# Patient Record
Sex: MALE | Race: WHITE | NOT HISPANIC OR LATINO | Employment: OTHER | URBAN - METROPOLITAN AREA
[De-identification: names, ages, dates, MRNs, and addresses within clinical notes are randomized per-mention and may not be internally consistent; named-entity substitution may affect disease eponyms.]

---

## 2017-02-23 ENCOUNTER — TELEPHONE (OUTPATIENT)
Dept: CARDIOLOGY | Facility: CLINIC | Age: 82
End: 2017-02-23

## 2017-03-08 ENCOUNTER — OFFICE VISIT (OUTPATIENT)
Dept: CARDIOLOGY | Facility: CLINIC | Age: 82
End: 2017-03-08

## 2017-03-08 VITALS
HEART RATE: 56 BPM | BODY MASS INDEX: 27.85 KG/M2 | DIASTOLIC BLOOD PRESSURE: 62 MMHG | WEIGHT: 188 LBS | SYSTOLIC BLOOD PRESSURE: 128 MMHG | HEIGHT: 69 IN

## 2017-03-08 DIAGNOSIS — R06.09 DYSPNEA ON EXERTION: ICD-10-CM

## 2017-03-08 DIAGNOSIS — I10 ESSENTIAL HYPERTENSION: Primary | ICD-10-CM

## 2017-03-08 DIAGNOSIS — R07.89 OTHER CHEST PAIN: ICD-10-CM

## 2017-03-08 PROCEDURE — 93000 ELECTROCARDIOGRAM COMPLETE: CPT | Performed by: INTERNAL MEDICINE

## 2017-03-08 PROCEDURE — 99214 OFFICE O/P EST MOD 30 MIN: CPT | Performed by: INTERNAL MEDICINE

## 2017-03-08 RX ORDER — NAPROXEN 500 MG/1
500 TABLET ORAL 2 TIMES DAILY WITH MEALS
Status: ON HOLD | COMMUNITY
End: 2019-01-07

## 2017-03-08 RX ORDER — METOPROLOL TARTRATE 50 MG/1
25 TABLET, FILM COATED ORAL EVERY 12 HOURS SCHEDULED
Status: ON HOLD | COMMUNITY
Start: 2017-02-17 | End: 2018-04-10

## 2017-03-09 NOTE — PROGRESS NOTES
Subjective:     Encounter Date:03/08/2017      Patient ID: Yoni Bonner Jr. is a 83 y.o. male.    Chief Complaint:  History of Present Illness     Mr. Bonner is an 83-year-old man with a history of hypertension, chronic bronchitis, and dyslipidemia who presents for a preoperative evaluation.  I saw the patient back on 02/04/2015 when he presented to [facility].  The patient had a history of recurrent syncopal episodes.  I felt that his symptoms were due to orthostatic syncope at that time.  He had a history of rheumatic fever and a thickened aortic valve along with carotid artery disease so I sat him up for both an echocardiogram and a carotid ultrasound around that time.  His echocardiogram was performed on 03/11/2015 and revealed normal left ventricular systolic function and wall motion, an ejection fraction of 54%, grade IA diastolic dysfunction and no significant valvular disease.  His carotid ultrasound revealed moderate bilateral stenosis.  I have not seen the patient in followup again until today.  He has had more than one fall where he has fallen on his shoulder.  His most recent fall was back in 01/2017.  He now has significant shoulder issues with the left worse than the right.  He is tentatively scheduled for left shoulder surgery at the end of the month.  The patient is not completely committed to proceeding with the surgery.      He reports dyspnea on exertion which has gotten a little worse lately.  He also reports episodes of chest discomfort that occur primarily when he is feeling stressed.  He has a chronic history of palpitations which is unchanged.  He will become lightheaded on occasion and that is unchanged.  His blood pressures have been well controlled recently per his wife and the patient.          Review of Systems   Constitution: Negative for weakness and malaise/fatigue.   HENT: Positive for hearing loss. Negative for headaches, hoarse voice, nosebleeds and sore throat.    Eyes:  Negative for pain.   Cardiovascular: Positive for chest pain and dyspnea on exertion. Negative for claudication, cyanosis, irregular heartbeat, leg swelling, near-syncope, orthopnea, palpitations, paroxysmal nocturnal dyspnea and syncope.   Respiratory: Negative for shortness of breath and snoring.    Endocrine: Negative for cold intolerance, heat intolerance, polydipsia, polyphagia and polyuria.   Skin: Negative for itching and rash.   Musculoskeletal: Positive for joint pain. Negative for arthritis, falls, joint swelling, muscle cramps, muscle weakness and myalgias.   Gastrointestinal: Negative for constipation, diarrhea, dysphagia, heartburn, hematemesis, hematochezia, melena, nausea and vomiting.   Genitourinary: Negative for frequency, hematuria and hesitancy.   Neurological: Positive for light-headedness. Negative for excessive daytime sleepiness, dizziness and numbness.   Psychiatric/Behavioral: Negative for depression. The patient is not nervous/anxious.           Current Outpatient Prescriptions:   •  metoprolol tartrate (LOPRESSOR) 50 MG tablet, , Disp: , Rfl:   •  naproxen (NAPROSYN) 500 MG tablet, Take 500 mg by mouth., Disp: , Rfl:     Past Medical History   Diagnosis Date   • Carotid arterial disease    • Chronic bronchitis    • Gout    • Hyperlipidemia    • Hypertension    • Rheumatic fever    • Syncopal episodes      Past Surgical History   Procedure Laterality Date   • Knee surgery     • Testicle surgery     • Hernia repair     • Eye surgery       cataract surg   • Hand surgery     • Cardiac catheterization       Family History   Problem Relation Age of Onset   • Heart disease Father    • Hypertension Father    • Stroke Father    • Diabetes Sister    • Cancer Brother    • Heart disease Brother    • Hypertension Brother      Social History   Substance Use Topics   • Smoking status: Never Smoker   • Smokeless tobacco: None      Comment: caffeine use   • Alcohol use Yes           ECG 12  "Lead  Date/Time: 3/8/2017 7:46 PM  Performed by: DB MCCLELLAN  Authorized by: DB MCCLELLAN   Comparison: compared with previous ECG   Similar to previous ECG  Rhythm: sinus rhythm  Comments: latearl T wave inversions               Objective:         Visit Vitals   • /62 (BP Location: Right arm, Patient Position: Sitting)   • Pulse 56   • Ht 69\" (175.3 cm)   • Wt 188 lb (85.3 kg)   • BMI 27.76 kg/m2          Physical Exam   Constitutional: He is oriented to person, place, and time. He appears well-developed and well-nourished.   HENT:   Head: Normocephalic and atraumatic.   Eyes: Conjunctivae, EOM and lids are normal. Pupils are equal, round, and reactive to light.   Neck: Normal range of motion and full passive range of motion without pain. Neck supple. No JVD present. Carotid bruit is not present.   Cardiovascular: Normal rate, regular rhythm, S1 normal and S2 normal.  Exam reveals no gallop.    No murmur heard.  Pulses:       Radial pulses are 2+ on the right side, and 2+ on the left side.   No bilateral lower extremity edema   Pulmonary/Chest: Effort normal and breath sounds normal.   Abdominal: Soft. Normal appearance.   Lymphadenopathy:     He has no cervical adenopathy.   Neurological: He is alert and oriented to person, place, and time.   Skin: Skin is warm, dry and intact.   Psychiatric: He has a normal mood and affect.       Lab Review:       Assessment:          Diagnosis Plan   1. Essential hypertension  Adult Stress Echo With Color & Doppler   2. Other chest pain  Adult Stress Echo With Color & Doppler   3. Dyspnea on exertion  Adult Stress Echo With Color & Doppler          Plan:        1. Preoperative evaluation.  The patient has been having some worsening of his chronic dyspnea on exertion with chest discomfort at times of stress.   He has risk factors of  hypertension, nicotine use and a strong family history of coronary artery disease.  I would like to proceed with a stress test in order " to further risk stratify him.  Unfortunately, we are limited on the type of stress test.  He is not steady enough to proceed with any kind of treadmill stress testing.   Imaging is limited because of his bad shoulders.  We will not be able to get adequate imaging with a nuclear stress test.   The only other option is to see if we can get any kind of imaging with a Dobutamine stress echo.   We will get him set up for this.  He does also have a murmur on exam, and he has not had an echocardiogram in the last couple of years.  I would like to see if he has developed any valvular disease.     2. Hypertension. His blood pressures are fairly well controlled on his current regimen of medications.     3. Tobacco use.  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         We will call and discuss the results of his echo and dobutamine stress echo.  We will give further recommendations based on those results.

## 2017-03-13 ENCOUNTER — HOSPITAL ENCOUNTER (OUTPATIENT)
Dept: CARDIOLOGY | Facility: HOSPITAL | Age: 82
Discharge: HOME | End: 2017-03-13
Attending: INTERNAL MEDICINE | Admitting: INTERNAL MEDICINE

## 2017-03-13 VITALS
OXYGEN SATURATION: 99 % | BODY MASS INDEX: 27.85 KG/M2 | DIASTOLIC BLOOD PRESSURE: 54 MMHG | SYSTOLIC BLOOD PRESSURE: 142 MMHG | HEART RATE: 65 BPM | WEIGHT: 188 LBS | HEIGHT: 69 IN

## 2017-03-13 LAB
ASCENDING AORTA: 3.2 CM
BH CV ECHO MEAS - ACS: 1.4 CM
BH CV ECHO MEAS - AO MAX PG: 5.3 MMHG
BH CV ECHO MEAS - AO ROOT AREA (BSA CORRECTED): 1.8
BH CV ECHO MEAS - AO ROOT AREA: 9.8 CM^2
BH CV ECHO MEAS - AO ROOT DIAM: 3.5 CM
BH CV ECHO MEAS - AO V2 MAX: 115.4 CM/SEC
BH CV ECHO MEAS - BSA(HAYCOCK): 2.1 M^2
BH CV ECHO MEAS - BSA: 2 M^2
BH CV ECHO MEAS - BZI_BMI: 27.8 KILOGRAMS/M^2
BH CV ECHO MEAS - BZI_METRIC_HEIGHT: 175.3 CM
BH CV ECHO MEAS - BZI_METRIC_WEIGHT: 85.3 KG
BH CV ECHO MEAS - CONTRAST EF 4CH: 56 ML/M^2
BH CV ECHO MEAS - EDV(CUBED): 127.9 ML
BH CV ECHO MEAS - EDV(MOD-SP4): 75 ML
BH CV ECHO MEAS - EDV(TEICH): 120.4 ML
BH CV ECHO MEAS - EF(CUBED): 64.7 %
BH CV ECHO MEAS - EF(MOD-SP4): 56 %
BH CV ECHO MEAS - EF(TEICH): 55.9 %
BH CV ECHO MEAS - ESV(CUBED): 45.2 ML
BH CV ECHO MEAS - ESV(MOD-SP4): 33 ML
BH CV ECHO MEAS - ESV(TEICH): 53 ML
BH CV ECHO MEAS - FS: 29.3 %
BH CV ECHO MEAS - IVS/LVPW: 1
BH CV ECHO MEAS - IVSD: 1.1 CM
BH CV ECHO MEAS - LAT PEAK E' VEL: 5 CM/SEC
BH CV ECHO MEAS - LV DIASTOLIC VOL/BSA (35-75): 37.3 ML/M^2
BH CV ECHO MEAS - LV MASS(C)D: 200.4 GRAMS
BH CV ECHO MEAS - LV MASS(C)DI: 99.6 GRAMS/M^2
BH CV ECHO MEAS - LV SYSTOLIC VOL/BSA (12-30): 16.4 ML/M^2
BH CV ECHO MEAS - LVIDD: 5 CM
BH CV ECHO MEAS - LVIDS: 3.6 CM
BH CV ECHO MEAS - LVLD AP4: 6.8 CM
BH CV ECHO MEAS - LVLS AP4: 6.5 CM
BH CV ECHO MEAS - LVPWD: 1 CM
BH CV ECHO MEAS - MED PEAK E' VEL: 3 CM/SEC
BH CV ECHO MEAS - MV A DUR: 0.11 SEC
BH CV ECHO MEAS - MV A MAX VEL: 107.2 CM/SEC
BH CV ECHO MEAS - MV DEC SLOPE: 178.5 CM/SEC^2
BH CV ECHO MEAS - MV DEC TIME: 0.35 SEC
BH CV ECHO MEAS - MV E MAX VEL: 60.6 CM/SEC
BH CV ECHO MEAS - MV E/A: 0.57
BH CV ECHO MEAS - MV P1/2T MAX VEL: 60.6 CM/SEC
BH CV ECHO MEAS - MV P1/2T: 99.5 MSEC
BH CV ECHO MEAS - MVA P1/2T LCG: 3.6 CM^2
BH CV ECHO MEAS - MVA(P1/2T): 2.2 CM^2
BH CV ECHO MEAS - PULM A REVS DUR: 0.13 SEC
BH CV ECHO MEAS - PULM A REVS VEL: 26.1 CM/SEC
BH CV ECHO MEAS - PULM DIAS VEL: 36.2 CM/SEC
BH CV ECHO MEAS - PULM S/D: 0.83
BH CV ECHO MEAS - PULM SYS VEL: 30.1 CM/SEC
BH CV ECHO MEAS - SI(CUBED): 41.1 ML/M^2
BH CV ECHO MEAS - SI(MOD-SP4): 20.9 ML/M^2
BH CV ECHO MEAS - SI(TEICH): 33.5 ML/M^2
BH CV ECHO MEAS - SV(CUBED): 82.7 ML
BH CV ECHO MEAS - SV(MOD-SP4): 42 ML
BH CV ECHO MEAS - SV(TEICH): 67.3 ML
BH CV ECHO MEAS - TAPSE (>1.6): 1.9 CM2
BH CV STRESS BP STAGE 1: NORMAL
BH CV STRESS BP STAGE 2: NORMAL
BH CV STRESS BP STAGE 3: NORMAL
BH CV STRESS DOSE DOBUTAMINE STAGE 1: 5
BH CV STRESS DOSE DOBUTAMINE STAGE 2: 10
BH CV STRESS DOSE DOBUTAMINE STAGE 3: 20
BH CV STRESS DURATION MIN STAGE 1: 3
BH CV STRESS DURATION MIN STAGE 2: 3
BH CV STRESS DURATION MIN STAGE 3: 2
BH CV STRESS DURATION SEC STAGE 1: 0
BH CV STRESS DURATION SEC STAGE 2: 0
BH CV STRESS DURATION SEC STAGE 3: 0
BH CV STRESS ECHO POST STRESS EJECTION FRACTION EF: 76 %
BH CV STRESS HR STAGE 1: 62
BH CV STRESS HR STAGE 2: 101
BH CV STRESS HR STAGE 3: 131
BH CV STRESS PROTOCOL 1: NORMAL
BH CV STRESS RATE STAGE 1: 26
BH CV STRESS RATE STAGE 2: 52
BH CV STRESS RATE STAGE 3: 104
BH CV STRESS STAGE 1: 1
BH CV STRESS STAGE 2: 2
BH CV STRESS STAGE 3: 3
BH CV XLRA - RV BASE: 2.4 CM
BH CV XLRA - TDI S': 11 CM/SEC
E/E' RATIO: 14.5
LEFT ATRIUM VOLUME INDEX: 26 ML/M2
LV EF 2D ECHO EST: 56 %
MAXIMAL PREDICTED HEART RATE: 137 BPM
PERCENT MAX PREDICTED HR: 95.62 %
SINUS: 3 CM
STJ: 2.6 CM
STRESS BASELINE BP: NORMAL MMHG
STRESS BASELINE HR: 65 BPM
STRESS O2 SAT REST: 99 %
STRESS PERCENT HR: 112 %
STRESS POST PEAK BP: NORMAL MMHG
STRESS POST PEAK HR: 131 BPM
STRESS TARGET HR: 116 BPM

## 2017-03-13 PROCEDURE — 93320 DOPPLER ECHO COMPLETE: CPT | Performed by: INTERNAL MEDICINE

## 2017-03-13 PROCEDURE — 93017 CV STRESS TEST TRACING ONLY: CPT

## 2017-03-13 PROCEDURE — 25010000002 PERFLUTREN (DEFINITY) 8.476 MG IN SODIUM CHLORIDE 10 ML INJECTION: Performed by: INTERNAL MEDICINE

## 2017-03-13 PROCEDURE — 93350 STRESS TTE ONLY: CPT | Performed by: INTERNAL MEDICINE

## 2017-03-13 PROCEDURE — 93016 CV STRESS TEST SUPVJ ONLY: CPT | Performed by: INTERNAL MEDICINE

## 2017-03-13 PROCEDURE — 93320 DOPPLER ECHO COMPLETE: CPT

## 2017-03-13 PROCEDURE — 93018 CV STRESS TEST I&R ONLY: CPT | Performed by: INTERNAL MEDICINE

## 2017-03-13 PROCEDURE — 93352 ADMIN ECG CONTRAST AGENT: CPT | Performed by: INTERNAL MEDICINE

## 2017-03-13 PROCEDURE — 25010000003 DOBUTAMINE PER 250 MG: Performed by: INTERNAL MEDICINE

## 2017-03-13 PROCEDURE — C8928 TTE W OR W/O FOL W/CON,STRES: HCPCS

## 2017-03-13 PROCEDURE — 93325 DOPPLER ECHO COLOR FLOW MAPG: CPT | Performed by: INTERNAL MEDICINE

## 2017-03-13 PROCEDURE — 93325 DOPPLER ECHO COLOR FLOW MAPG: CPT

## 2017-03-13 RX ORDER — DOBUTAMINE HYDROCHLORIDE 100 MG/100ML
2-20 INJECTION INTRAVENOUS
Status: DISCONTINUED | OUTPATIENT
Start: 2017-03-13 | End: 2017-03-14 | Stop reason: HOSPADM

## 2017-03-13 RX ADMIN — PERFLUTREN 3 ML: 6.52 INJECTION, SUSPENSION INTRAVENOUS at 11:24

## 2017-03-13 RX ADMIN — DOBUTAMINE HYDROCHLORIDE 8 MCG/KG/MIN: 100 INJECTION INTRAVENOUS at 12:05

## 2017-03-14 ENCOUNTER — TELEPHONE (OUTPATIENT)
Dept: CARDIOLOGY | Facility: CLINIC | Age: 82
End: 2017-03-14

## 2017-03-14 NOTE — TELEPHONE ENCOUNTER
Danielle spouse of pt requesting test results for Echo Stress Test. Please advise or call pt. Thanks, Mary

## 2017-03-16 NOTE — TELEPHONE ENCOUNTER
03/16/17  11:16 AM  Mrs. Tipton calls and says the fax number for Dr. Muñoz is [phone].    I informed her that clearance had been faxed to [facility] with Dr. Muñoz.    Vanesa NIEVES RN

## 2018-04-08 ENCOUNTER — HOSPITAL ENCOUNTER (INPATIENT)
Facility: HOSPITAL | Age: 83
LOS: 2 days | Discharge: HOME | End: 2018-04-10
Attending: INTERNAL MEDICINE | Admitting: INTERNAL MEDICINE

## 2018-04-08 PROBLEM — I21.19 ACUTE ST ELEVATION MYOCARDIAL INFARCTION (STEMI) OF INFERIOR WALL: Status: ACTIVE | Noted: 2018-04-08

## 2018-04-08 LAB
ANION GAP SERPL CALCULATED.3IONS-SCNC: 17.7 MMOL/L
BUN BLD-MCNC: 21 MG/DL (ref 8–23)
BUN/CREAT SERPL: 18.4 (ref 7–25)
CALCIUM SPEC-SCNC: 8.9 MG/DL (ref 8.6–10.5)
CHLORIDE SERPL-SCNC: 101 MMOL/L (ref 98–107)
CO2 SERPL-SCNC: 18.3 MMOL/L (ref 22–29)
CREAT BLD-MCNC: 1.14 MG/DL (ref 0.76–1.27)
DEPRECATED RDW RBC AUTO: 43.1 FL (ref 37–54)
ERYTHROCYTE [DISTWIDTH] IN BLOOD BY AUTOMATED COUNT: 13.5 % (ref 11.5–14.5)
GFR SERPL CREATININE-BSD FRML MDRD: 61 ML/MIN/1.73
GLUCOSE BLD-MCNC: 119 MG/DL (ref 65–99)
GLUCOSE BLDC GLUCOMTR-MCNC: 97 MG/DL (ref 70–130)
HCT VFR BLD AUTO: 37.6 % (ref 40.4–52.2)
HGB BLD-MCNC: 11.9 G/DL (ref 13.7–17.6)
MCH RBC QN AUTO: 27.7 PG (ref 27–32.7)
MCHC RBC AUTO-ENTMCNC: 31.6 G/DL (ref 32.6–36.4)
MCV RBC AUTO: 87.6 FL (ref 79.8–96.2)
PLATELET # BLD AUTO: 211 10*3/MM3 (ref 140–500)
PMV BLD AUTO: 10.7 FL (ref 6–12)
POTASSIUM BLD-SCNC: 4.2 MMOL/L (ref 3.5–5.2)
RBC # BLD AUTO: 4.29 10*6/MM3 (ref 4.6–6)
SODIUM BLD-SCNC: 137 MMOL/L (ref 136–145)
WBC NRBC COR # BLD: 6.92 10*3/MM3 (ref 4.5–10.7)

## 2018-04-08 PROCEDURE — C1874 STENT, COATED/COV W/DEL SYS: HCPCS | Performed by: INTERNAL MEDICINE

## 2018-04-08 PROCEDURE — 93460 R&L HRT ART/VENTRICLE ANGIO: CPT | Performed by: INTERNAL MEDICINE

## 2018-04-08 PROCEDURE — 85018 HEMOGLOBIN: CPT

## 2018-04-08 PROCEDURE — 25010000002 HEPARIN (PORCINE) PER 1000 UNITS: Performed by: INTERNAL MEDICINE

## 2018-04-08 PROCEDURE — C1769 GUIDE WIRE: HCPCS | Performed by: INTERNAL MEDICINE

## 2018-04-08 PROCEDURE — 25010000002 FUROSEMIDE PER 20 MG: Performed by: INTERNAL MEDICINE

## 2018-04-08 PROCEDURE — C1894 INTRO/SHEATH, NON-LASER: HCPCS | Performed by: INTERNAL MEDICINE

## 2018-04-08 PROCEDURE — C1757 CATH, THROMBECTOMY/EMBOLECT: HCPCS | Performed by: INTERNAL MEDICINE

## 2018-04-08 PROCEDURE — C1725 CATH, TRANSLUMIN NON-LASER: HCPCS | Performed by: INTERNAL MEDICINE

## 2018-04-08 PROCEDURE — 85027 COMPLETE CBC AUTOMATED: CPT | Performed by: INTERNAL MEDICINE

## 2018-04-08 PROCEDURE — C9606 PERC D-E COR REVASC W AMI S: HCPCS | Performed by: INTERNAL MEDICINE

## 2018-04-08 PROCEDURE — C1887 CATHETER, GUIDING: HCPCS | Performed by: INTERNAL MEDICINE

## 2018-04-08 PROCEDURE — 93010 ELECTROCARDIOGRAM REPORT: CPT | Performed by: INTERNAL MEDICINE

## 2018-04-08 PROCEDURE — 85347 COAGULATION TIME ACTIVATED: CPT

## 2018-04-08 PROCEDURE — B2161ZZ FLUOROSCOPY OF RIGHT AND LEFT HEART USING LOW OSMOLAR CONTRAST: ICD-10-PCS | Performed by: INTERNAL MEDICINE

## 2018-04-08 PROCEDURE — 80048 BASIC METABOLIC PNL TOTAL CA: CPT | Performed by: INTERNAL MEDICINE

## 2018-04-08 PROCEDURE — B2111ZZ FLUOROSCOPY OF MULTIPLE CORONARY ARTERIES USING LOW OSMOLAR CONTRAST: ICD-10-PCS | Performed by: INTERNAL MEDICINE

## 2018-04-08 PROCEDURE — 92941 PRQ TRLML REVSC TOT OCCL AMI: CPT | Performed by: INTERNAL MEDICINE

## 2018-04-08 PROCEDURE — 0 IOPAMIDOL PER 1 ML: Performed by: INTERNAL MEDICINE

## 2018-04-08 PROCEDURE — 99223 1ST HOSP IP/OBS HIGH 75: CPT | Performed by: INTERNAL MEDICINE

## 2018-04-08 PROCEDURE — 93005 ELECTROCARDIOGRAM TRACING: CPT | Performed by: INTERNAL MEDICINE

## 2018-04-08 PROCEDURE — 027034Z DILATION OF CORONARY ARTERY, ONE ARTERY WITH DRUG-ELUTING INTRALUMINAL DEVICE, PERCUTANEOUS APPROACH: ICD-10-PCS | Performed by: INTERNAL MEDICINE

## 2018-04-08 PROCEDURE — 82962 GLUCOSE BLOOD TEST: CPT

## 2018-04-08 PROCEDURE — 4A023N8 MEASUREMENT OF CARDIAC SAMPLING AND PRESSURE, BILATERAL, PERCUTANEOUS APPROACH: ICD-10-PCS | Performed by: INTERNAL MEDICINE

## 2018-04-08 PROCEDURE — 85014 HEMATOCRIT: CPT

## 2018-04-08 PROCEDURE — 02C03ZZ EXTIRPATION OF MATTER FROM CORONARY ARTERY, ONE ARTERY, PERCUTANEOUS APPROACH: ICD-10-PCS | Performed by: INTERNAL MEDICINE

## 2018-04-08 DEVICE — XIENCE ALPINE EVEROLIMUS ELUTING CORONARY STENT SYSTEM 3.50 MM X 33 MM / RAPID-EXCHANGE
Type: IMPLANTABLE DEVICE | Site: HEART | Status: FUNCTIONAL
Brand: XIENCE ALPINE

## 2018-04-08 RX ORDER — CLOPIDOGREL BISULFATE 75 MG/1
TABLET ORAL AS NEEDED
Status: DISCONTINUED | OUTPATIENT
Start: 2018-04-08 | End: 2018-04-08 | Stop reason: HOSPADM

## 2018-04-08 RX ORDER — ASPIRIN 81 MG/1
81 TABLET ORAL DAILY
Status: DISCONTINUED | OUTPATIENT
Start: 2018-04-08 | End: 2018-04-10 | Stop reason: HOSPADM

## 2018-04-08 RX ORDER — SODIUM CHLORIDE 9 MG/ML
INJECTION, SOLUTION INTRAVENOUS CONTINUOUS PRN
Status: DISCONTINUED | OUTPATIENT
Start: 2018-04-08 | End: 2018-04-08 | Stop reason: HOSPADM

## 2018-04-08 RX ORDER — CLOPIDOGREL BISULFATE 75 MG/1
75 TABLET ORAL DAILY
Status: DISCONTINUED | OUTPATIENT
Start: 2018-04-09 | End: 2018-04-10 | Stop reason: HOSPADM

## 2018-04-08 RX ORDER — LISINOPRIL 5 MG/1
5 TABLET ORAL DAILY
Status: DISCONTINUED | OUTPATIENT
Start: 2018-04-08 | End: 2018-04-10 | Stop reason: HOSPADM

## 2018-04-08 RX ORDER — HEPARIN SODIUM 1000 [USP'U]/ML
INJECTION, SOLUTION INTRAVENOUS; SUBCUTANEOUS AS NEEDED
Status: DISCONTINUED | OUTPATIENT
Start: 2018-04-08 | End: 2018-04-08 | Stop reason: HOSPADM

## 2018-04-08 RX ORDER — NITROGLYCERIN 5 MG/ML
INJECTION, SOLUTION INTRAVENOUS AS NEEDED
Status: DISCONTINUED | OUTPATIENT
Start: 2018-04-08 | End: 2018-04-08 | Stop reason: HOSPADM

## 2018-04-08 RX ORDER — FUROSEMIDE 10 MG/ML
40 INJECTION INTRAMUSCULAR; INTRAVENOUS ONCE
Status: COMPLETED | OUTPATIENT
Start: 2018-04-08 | End: 2018-04-08

## 2018-04-08 RX ORDER — LIDOCAINE HYDROCHLORIDE 20 MG/ML
INJECTION, SOLUTION INFILTRATION; PERINEURAL AS NEEDED
Status: DISCONTINUED | OUTPATIENT
Start: 2018-04-08 | End: 2018-04-08 | Stop reason: HOSPADM

## 2018-04-08 RX ADMIN — FUROSEMIDE 40 MG: 10 INJECTION, SOLUTION INTRAMUSCULAR; INTRAVENOUS at 17:18

## 2018-04-08 NOTE — H&P
History And Physical Assessment    Patient Name: Yoni Bonner Jr.  Age/Sex: 84 y.o. male  : 1933  MRN: [id number]    Date of Hospital Admission: 2018  Date of Encounter Visit: 18  Encounter Provider: Nicolas Jerez MD  Place of Service: [facility] CARDIOLOGY  Patient Care Team:  Patric Cameron DO as PCP - General (Family Medicine)    Subjective:     Chief Complaint:  Acute inferior STEMI with cardiogenic shock.    History of Present Illness:  Yoni Bonner Jr. is a 84 y.o. male who follows with Dr. Templeton for his cardiac care.  He has a past history of rheumatic fever and has some mild valvular heart disease.    He was at [facility] today visiting somebody in the emergency room.  While he was there he had a near-syncopal episode.  He was hypotensive and bradycardic.  EKG demonstrated evolution of an inferior acute inferior STEMI.    He was brought emergently to [facility] where he went directly to the Cath Lab.  He was found to have a thrombotic near total occlusion of the proximal right coronary.  This was treated with thrombectomy and stenting with good result.  He had a chronic occlusion of his Cx as well as diffuse LAD disease.    He was treated with diuretics for elevated filling pressures.  Currently he is feeling better and is without complaint.    Past Medical History:  Past Medical History:   Diagnosis Date   • Carotid arterial disease    • Chronic bronchitis    • Gout    • Hyperlipidemia    • Hypertension    • Rheumatic fever    • Syncopal episodes        Past Surgical History:   Procedure Laterality Date   • CARDIAC CATHETERIZATION     • EYE SURGERY      cataract surg   • HAND SURGERY     • HERNIA REPAIR     • KNEE SURGERY     • TESTICLE SURGERY         Home Medications:   Prescriptions Prior to Admission   Medication Sig Dispense Refill Last Dose   • metoprolol tartrate (LOPRESSOR) 50 MG tablet    Taking   • naproxen  (NAPROSYN) 500 MG tablet Take 500 mg by mouth.   Taking       Allergies:  Allergies   Allergen Reactions   • No Known Drug Allergy        Past Social History:  Social History     Social History   • Marital status:      Social History Main Topics   • Smoking status: Never Smoker      Comment: caffeine use   • Alcohol use Yes   • Drug use: Unknown     Other Topics Concern   • Not on file       Past Family History:  Family History   Problem Relation Age of Onset   • Heart disease Father    • Hypertension Father    • Stroke Father    • Diabetes Sister    • Cancer Brother    • Heart disease Brother    • Hypertension Brother        Review of Systems:   All systems reviewed. Pertinent positives identified in HPI. All other systems are negative.    Objective:   Resp:  [18] 18    Intake/Output Summary (Last 24 hours) at 04/08/18 1807  Last data filed at 04/08/18 1749   Gross per 24 hour   Intake              125 ml   Output                0 ml   Net              125 ml     There is no height or weight on file to calculate BMI.  There were no vitals filed for this visit.  Weight change:     Physical Exam:     Physical Exam   Constitutional: He is oriented to person, place, and time. He appears well-developed and well-nourished.   HENT:   Head: Normocephalic and atraumatic.   Right Ear: External ear normal.   Left Ear: External ear normal.   Eyes: EOM are normal. Pupils are equal, round, and reactive to light.   Neck: Normal range of motion. Neck supple.   Cardiovascular: Normal rate, regular rhythm, normal heart sounds and intact distal pulses.    Pulmonary/Chest: Effort normal. He has wheezes.   Abdominal: Soft. Bowel sounds are normal.   Musculoskeletal: Normal range of motion.   Neurological: He is alert and oriented to person, place, and time.   Skin: Skin is warm and dry.   Psychiatric: He has a normal mood and affect. His behavior is normal. Judgment and thought content normal.       Lab Review:                            Imaging:  Imaging Results (most recent)     None        I personally viewed and interpreted the patient's EKG    Assessment:     Active Problems:    Acute ST elevation myocardial infarction (STEMI) of inferior wall        Plan:     Acute inferior STEMI with borderline cardiogenic shock.  Routine post-MI care in CCU.  Diurese as needed for CHF.  New AF.  Hold on anticoagulation unless it persists.    Nicolas Jerez MD  04/08/18  6:07 PM

## 2018-04-09 ENCOUNTER — APPOINTMENT (OUTPATIENT)
Dept: CARDIOLOGY | Facility: HOSPITAL | Age: 83
End: 2018-04-09
Attending: INTERNAL MEDICINE

## 2018-04-09 LAB
ACT BLD: 296 SECONDS (ref 82–152)
ANION GAP SERPL CALCULATED.3IONS-SCNC: 14 MMOL/L
AORTIC DIMENSIONLESS INDEX: 0.5 (DI)
BH CV ECHO MEAS - ACS: 1.1 CM
BH CV ECHO MEAS - AO MAX PG (FULL): 16.5 MMHG
BH CV ECHO MEAS - AO MAX PG: 20.4 MMHG
BH CV ECHO MEAS - AO MEAN PG (FULL): 7 MMHG
BH CV ECHO MEAS - AO MEAN PG: 9 MMHG
BH CV ECHO MEAS - AO ROOT AREA (BSA CORRECTED): 1.6
BH CV ECHO MEAS - AO ROOT AREA: 8 CM^2
BH CV ECHO MEAS - AO ROOT DIAM: 3.2 CM
BH CV ECHO MEAS - AO V2 MAX: 226 CM/SEC
BH CV ECHO MEAS - AO V2 MEAN: 138 CM/SEC
BH CV ECHO MEAS - AO V2 VTI: 48 CM
BH CV ECHO MEAS - AVA(I,A): 1.3 CM^2
BH CV ECHO MEAS - AVA(I,D): 1.3 CM^2
BH CV ECHO MEAS - AVA(V,A): 1.2 CM^2
BH CV ECHO MEAS - AVA(V,D): 1.2 CM^2
BH CV ECHO MEAS - BSA(HAYCOCK): 2.1 M^2
BH CV ECHO MEAS - BSA: 2 M^2
BH CV ECHO MEAS - BZI_BMI: 28.8 KILOGRAMS/M^2
BH CV ECHO MEAS - BZI_METRIC_HEIGHT: 175.3 CM
BH CV ECHO MEAS - BZI_METRIC_WEIGHT: 88.5 KG
BH CV ECHO MEAS - CONTRAST EF (2CH): 43.5 ML/M^2
BH CV ECHO MEAS - CONTRAST EF 4CH: 41.9 ML/M^2
BH CV ECHO MEAS - EDV(CUBED): 117.6 ML
BH CV ECHO MEAS - EDV(MOD-SP2): 124 ML
BH CV ECHO MEAS - EDV(MOD-SP4): 129 ML
BH CV ECHO MEAS - EDV(TEICH): 112.8 ML
BH CV ECHO MEAS - EF(CUBED): 60.3 %
BH CV ECHO MEAS - EF(MOD-SP2): 43.5 %
BH CV ECHO MEAS - EF(MOD-SP4): 41.9 %
BH CV ECHO MEAS - EF(TEICH): 51.8 %
BH CV ECHO MEAS - ESV(CUBED): 46.7 ML
BH CV ECHO MEAS - ESV(MOD-SP2): 70 ML
BH CV ECHO MEAS - ESV(MOD-SP4): 75 ML
BH CV ECHO MEAS - ESV(TEICH): 54.4 ML
BH CV ECHO MEAS - FS: 26.5 %
BH CV ECHO MEAS - IVS/LVPW: 1
BH CV ECHO MEAS - IVSD: 1.1 CM
BH CV ECHO MEAS - LAT PEAK E' VEL: 8 CM/SEC
BH CV ECHO MEAS - LV DIASTOLIC VOL/BSA (35-75): 63.1 ML/M^2
BH CV ECHO MEAS - LV MASS(C)D: 200.5 GRAMS
BH CV ECHO MEAS - LV MASS(C)DI: 98.1 GRAMS/M^2
BH CV ECHO MEAS - LV MAX PG: 4 MMHG
BH CV ECHO MEAS - LV MEAN PG: 2 MMHG
BH CV ECHO MEAS - LV SYSTOLIC VOL/BSA (12-30): 36.7 ML/M^2
BH CV ECHO MEAS - LV V1 MAX: 99.4 CM/SEC
BH CV ECHO MEAS - LV V1 MEAN: 63.5 CM/SEC
BH CV ECHO MEAS - LV V1 VTI: 21.6 CM
BH CV ECHO MEAS - LVIDD: 4.9 CM
BH CV ECHO MEAS - LVIDS: 3.6 CM
BH CV ECHO MEAS - LVLD AP2: 8.4 CM
BH CV ECHO MEAS - LVLD AP4: 8.2 CM
BH CV ECHO MEAS - LVLS AP2: 7 CM
BH CV ECHO MEAS - LVLS AP4: 6.8 CM
BH CV ECHO MEAS - LVOT AREA (M): 2.8 CM^2
BH CV ECHO MEAS - LVOT AREA: 2.8 CM^2
BH CV ECHO MEAS - LVOT DIAM: 1.9 CM
BH CV ECHO MEAS - LVPWD: 1.1 CM
BH CV ECHO MEAS - MED PEAK E' VEL: 5 CM/SEC
BH CV ECHO MEAS - MR MAX PG: 114.9 MMHG
BH CV ECHO MEAS - MR MAX VEL: 536 CM/SEC
BH CV ECHO MEAS - MV A DUR: 0.14 SEC
BH CV ECHO MEAS - MV A MAX VEL: 130 CM/SEC
BH CV ECHO MEAS - MV DEC SLOPE: 313 CM/SEC^2
BH CV ECHO MEAS - MV DEC TIME: 0.25 SEC
BH CV ECHO MEAS - MV E MAX VEL: 97.5 CM/SEC
BH CV ECHO MEAS - MV E/A: 0.75
BH CV ECHO MEAS - MV MAX PG: 9 MMHG
BH CV ECHO MEAS - MV MEAN PG: 3 MMHG
BH CV ECHO MEAS - MV P1/2T MAX VEL: 106 CM/SEC
BH CV ECHO MEAS - MV P1/2T: 99.2 MSEC
BH CV ECHO MEAS - MV V2 MAX: 150 CM/SEC
BH CV ECHO MEAS - MV V2 MEAN: 77.5 CM/SEC
BH CV ECHO MEAS - MV V2 VTI: 44 CM
BH CV ECHO MEAS - MVA P1/2T LCG: 2.1 CM^2
BH CV ECHO MEAS - MVA(P1/2T): 2.2 CM^2
BH CV ECHO MEAS - MVA(VTI): 1.4 CM^2
BH CV ECHO MEAS - PA ACC TIME: 0.13 SEC
BH CV ECHO MEAS - PA MAX PG (FULL): 0.2 MMHG
BH CV ECHO MEAS - PA MAX PG: 2.6 MMHG
BH CV ECHO MEAS - PA PR(ACCEL): 20.5 MMHG
BH CV ECHO MEAS - PA V2 MAX: 80.1 CM/SEC
BH CV ECHO MEAS - PULM A REVS DUR: 0.16 SEC
BH CV ECHO MEAS - PULM A REVS VEL: 35 CM/SEC
BH CV ECHO MEAS - PULM DIAS VEL: 32.6 CM/SEC
BH CV ECHO MEAS - PULM S/D: 1.3
BH CV ECHO MEAS - PULM SYS VEL: 41.8 CM/SEC
BH CV ECHO MEAS - PVA(V,A): 2.4 CM^2
BH CV ECHO MEAS - PVA(V,D): 2.4 CM^2
BH CV ECHO MEAS - QP/QS: 0.58
BH CV ECHO MEAS - RV MAX PG: 2.4 MMHG
BH CV ECHO MEAS - RV MEAN PG: 1 MMHG
BH CV ECHO MEAS - RV V1 MAX: 76.9 CM/SEC
BH CV ECHO MEAS - RV V1 MEAN: 53.5 CM/SEC
BH CV ECHO MEAS - RV V1 VTI: 14 CM
BH CV ECHO MEAS - RVOT AREA: 2.5 CM^2
BH CV ECHO MEAS - RVOT DIAM: 1.8 CM
BH CV ECHO MEAS - SI(AO): 188.9 ML/M^2
BH CV ECHO MEAS - SI(CUBED): 34.7 ML/M^2
BH CV ECHO MEAS - SI(LVOT): 30 ML/M^2
BH CV ECHO MEAS - SI(MOD-SP2): 26.4 ML/M^2
BH CV ECHO MEAS - SI(MOD-SP4): 26.4 ML/M^2
BH CV ECHO MEAS - SI(TEICH): 28.6 ML/M^2
BH CV ECHO MEAS - SV(AO): 386 ML
BH CV ECHO MEAS - SV(CUBED): 71 ML
BH CV ECHO MEAS - SV(LVOT): 61.2 ML
BH CV ECHO MEAS - SV(MOD-SP2): 54 ML
BH CV ECHO MEAS - SV(MOD-SP4): 54 ML
BH CV ECHO MEAS - SV(RVOT): 35.6 ML
BH CV ECHO MEAS - SV(TEICH): 58.4 ML
BH CV ECHO MEAS - TAPSE (>1.6): 1.7 CM2
BH CV VAS BP LEFT ARM: NORMAL MMHG
BH CV XLRA - RV BASE: 2.5 CM
BH CV XLRA - TDI S': 13 CM/SEC
BUN BLD-MCNC: 24 MG/DL (ref 8–23)
BUN/CREAT SERPL: 19.2 (ref 7–25)
CALCIUM SPEC-SCNC: 8.9 MG/DL (ref 8.6–10.5)
CHLORIDE SERPL-SCNC: 102 MMOL/L (ref 98–107)
CHOLEST SERPL-MCNC: 129 MG/DL (ref 0–200)
CO2 SERPL-SCNC: 24 MMOL/L (ref 22–29)
CREAT BLD-MCNC: 1.25 MG/DL (ref 0.76–1.27)
DEPRECATED RDW RBC AUTO: 43.2 FL (ref 37–54)
E/E' RATIO: 16
ERYTHROCYTE [DISTWIDTH] IN BLOOD BY AUTOMATED COUNT: 13.4 % (ref 11.5–14.5)
GFR SERPL CREATININE-BSD FRML MDRD: 55 ML/MIN/1.73
GLUCOSE BLD-MCNC: 123 MG/DL (ref 65–99)
HBA1C MFR BLD: 4.91 % (ref 4.8–5.6)
HCT VFR BLD AUTO: 35.9 % (ref 40.4–52.2)
HCT VFR BLDA CALC: 31 % (ref 38–51)
HCT VFR BLDA CALC: 32 % (ref 38–51)
HCT VFR BLDA CALC: 32 % (ref 38–51)
HDLC SERPL-MCNC: 35 MG/DL (ref 40–60)
HGB BLD-MCNC: 11.2 G/DL (ref 13.7–17.6)
HGB BLDA-MCNC: 10.5 G/DL (ref 12–17)
HGB BLDA-MCNC: 10.9 G/DL (ref 12–17)
HGB BLDA-MCNC: 10.9 G/DL (ref 12–17)
LDLC SERPL CALC-MCNC: 67 MG/DL (ref 0–100)
LDLC/HDLC SERPL: 1.92 {RATIO}
LEFT ATRIUM VOLUME INDEX: 30 ML/M2
LEFT ATRIUM VOLUME: 56 CM3
LV EF 2D ECHO EST: 42 %
MAXIMAL PREDICTED HEART RATE: 136 BPM
MCH RBC QN AUTO: 27.3 PG (ref 27–32.7)
MCHC RBC AUTO-ENTMCNC: 31.2 G/DL (ref 32.6–36.4)
MCV RBC AUTO: 87.6 FL (ref 79.8–96.2)
PLATELET # BLD AUTO: 215 10*3/MM3 (ref 140–500)
PMV BLD AUTO: 10.7 FL (ref 6–12)
POTASSIUM BLD-SCNC: 4.7 MMOL/L (ref 3.5–5.2)
RBC # BLD AUTO: 4.1 10*6/MM3 (ref 4.6–6)
SAO2 % BLDA: 40 % (ref 95–98)
SAO2 % BLDA: 98 % (ref 95–98)
SAO2 % BLDA: 99 % (ref 95–98)
SODIUM BLD-SCNC: 140 MMOL/L (ref 136–145)
STRESS TARGET HR: 116 BPM
TRIGL SERPL-MCNC: 134 MG/DL (ref 0–150)
TROPONIN T SERPL-MCNC: 6.9 NG/ML (ref 0–0.03)
VLDLC SERPL-MCNC: 26.8 MG/DL (ref 5–40)
WBC NRBC COR # BLD: 9.04 10*3/MM3 (ref 4.5–10.7)

## 2018-04-09 PROCEDURE — 99232 SBSQ HOSP IP/OBS MODERATE 35: CPT | Performed by: INTERNAL MEDICINE

## 2018-04-09 PROCEDURE — 93005 ELECTROCARDIOGRAM TRACING: CPT | Performed by: INTERNAL MEDICINE

## 2018-04-09 PROCEDURE — 80061 LIPID PANEL: CPT | Performed by: INTERNAL MEDICINE

## 2018-04-09 PROCEDURE — 93306 TTE W/DOPPLER COMPLETE: CPT

## 2018-04-09 PROCEDURE — 93306 TTE W/DOPPLER COMPLETE: CPT | Performed by: INTERNAL MEDICINE

## 2018-04-09 PROCEDURE — 84484 ASSAY OF TROPONIN QUANT: CPT | Performed by: INTERNAL MEDICINE

## 2018-04-09 PROCEDURE — 83036 HEMOGLOBIN GLYCOSYLATED A1C: CPT | Performed by: INTERNAL MEDICINE

## 2018-04-09 PROCEDURE — 85027 COMPLETE CBC AUTOMATED: CPT | Performed by: INTERNAL MEDICINE

## 2018-04-09 PROCEDURE — 93010 ELECTROCARDIOGRAM REPORT: CPT | Performed by: INTERNAL MEDICINE

## 2018-04-09 PROCEDURE — 25010000002 PERFLUTREN (DEFINITY) 8.476 MG IN SODIUM CHLORIDE 0.9 % 10 ML INJECTION: Performed by: INTERNAL MEDICINE

## 2018-04-09 PROCEDURE — 80048 BASIC METABOLIC PNL TOTAL CA: CPT | Performed by: INTERNAL MEDICINE

## 2018-04-09 RX ORDER — ATORVASTATIN CALCIUM 10 MG/1
10 TABLET, FILM COATED ORAL NIGHTLY
Status: DISCONTINUED | OUTPATIENT
Start: 2018-04-09 | End: 2018-04-10 | Stop reason: HOSPADM

## 2018-04-09 RX ADMIN — METOPROLOL TARTRATE 25 MG: 25 TABLET ORAL at 02:43

## 2018-04-09 RX ADMIN — METOPROLOL TARTRATE 25 MG: 25 TABLET ORAL at 15:34

## 2018-04-09 RX ADMIN — PERFLUTREN 2 ML: 6.52 INJECTION, SUSPENSION INTRAVENOUS at 13:30

## 2018-04-09 RX ADMIN — ATORVASTATIN CALCIUM 10 MG: 10 TABLET, FILM COATED ORAL at 21:16

## 2018-04-09 RX ADMIN — CLOPIDOGREL 75 MG: 75 TABLET, FILM COATED ORAL at 10:24

## 2018-04-09 NOTE — PLAN OF CARE
Problem: Patient Care Overview  Goal: Plan of Care Review  Outcome: Ongoing (interventions implemented as appropriate)   04/09/18 0626   Coping/Psychosocial   Plan of Care Reviewed With patient;spouse   Plan of Care Review   Progress improving   OTHER   Outcome Summary Pt admitted to CCU s/p cardiac cath. R radial site intact, no complications noted. VSS throught night. CTM.      Goal: Individualization and Mutuality  Outcome: Ongoing (interventions implemented as appropriate)    Goal: Discharge Needs Assessment  Outcome: Ongoing (interventions implemented as appropriate)      Problem: Fall Risk (Adult)  Goal: Identify Related Risk Factors and Signs and Symptoms  Outcome: Ongoing (interventions implemented as appropriate)    Goal: Absence of Fall  Outcome: Ongoing (interventions implemented as appropriate)      Problem: Skin Injury Risk (Adult)  Goal: Identify Related Risk Factors and Signs and Symptoms  Outcome: Ongoing (interventions implemented as appropriate)    Goal: Skin Health and Integrity  Outcome: Ongoing (interventions implemented as appropriate)      Problem: Cardiac Catheterization (Diagnostic/Interventional) (Adult)  Goal: Signs and Symptoms of Listed Potential Problems Will be Absent, Minimized or Managed (Cardiac Catheterization)  Outcome: Ongoing (interventions implemented as appropriate)

## 2018-04-09 NOTE — DISCHARGE PLACEMENT REQUEST
"HaSolisarturohilario MOMIN Jr. (84 y.o. Male)     Date of Birth Social Security Number Address Home Phone MRN    04/21/1933  [address] [place] [id number] [phone] [id number]    Church Marital Status          [employer]        Admission Date Admission Type Admitting Provider Attending Provider Department, Room/Bed    4/8/18 Emergency Melissa Templeton MD Gondi, Sreedevi, MD [address], 2211/1    Discharge Date Discharge Disposition Discharge Destination                       Attending Provider:  Melissa Templeton MD    Allergies:  No Known Drug Allergy    Isolation:  None   Infection:  None   Code Status:  FULL    Ht:  175.3 cm (69.02\")   Wt:  88.6 kg (195 lb 5.2 oz)    Admission Cmt:  None   Principal Problem:  None                Active Insurance as of 4/8/2018     Primary Coverage     Payor Plan Insurance Group Employer/Plan Group    ANTHEM MEDICARE REPLACEMENT ANTHEM MEDICARE ADVANTAGE [id number]     Payor Plan Address Payor Plan Phone Number Effective From Effective To    [address] [phone] 1/1/2016     [place] [address]       Subscriber Name Subscriber Birth Date Member ID       JALIL BONNER LILIANE SOLORIO 4/21/1933 [id number]                 Emergency Contacts      (Rel.) Home Phone Work Phone Mobile Phone    Danielle Bonner (Spouse) [phone] -- [phone]    HaKory (Grandchild) -- [phone] --              "

## 2018-04-09 NOTE — CONSULTS
"Adult Nutrition  Assessment/PES    Patient Name:  Yoni Bonner Jr.  YOB: 1933  MRN: [id number]  Admit Date:  4/8/2018    Assessment Date:  4/9/2018    Comments:  Nutrition screen completed.          Adult Nutrition Assessment     Row Name 04/09/18 0928       Nutrition Prescription PO    Current PO Diet Regular    Common Modifiers Cardiac    Row Name 04/09/18 0927       Labs/Procedures/Meds    Lab Results Comments hgb/hct       Physical Findings    Overall Physical Appearance overweight    Skin --   intact    Row Name 04/09/18 0926       Labs/Procedures/Meds    Lab Results Reviewed reviewed    Row Name 04/09/18 0925       Anthropometrics    Height 175.3 cm (69.02\")    Weight 88.6 kg (195 lb 5.2 oz)       Ideal Body Weight (IBW)    Ideal Body Weight (IBW) (kg) 73.73    % Ideal Body Weight 120.17       Body Mass Index (BMI)    BMI (kg/m2) 28.89    BMI Assessment BMI 25-29.9: overweight       IBW Adjustment, Para/Tetraplegia    5% Adjustment, Para (IBW) 70.04    10% Adjustment, Para (IBW) 66.36    10% Adjustment, Tetra (IBW) 66.36    15% Adjustment, Tetra (IBW) 62.67    Row Name 04/09/18 0924       Reason for Assessment    Reason For Assessment diagnosis/disease state    Diagnosis cardiac disease   MI, hx if HTN          Problem/Interventions:        Problem 1     Row Name 04/09/18 0928       Nutrition Diagnoses Problem 1    Problem 1 Nutrition Appropriate for Condition at this Time    Etiology (related to) Medical Diagnosis    Cardiac MI                    Intervention Goal     Row Name 04/09/18 0928       Intervention Goal    General Maintain nutrition    PO Tolerate PO    Weight No significant weight loss            Nutrition Intervention     Row Name 04/09/18 0928       Nutrition Intervention    RD/Tech Action Follow Tx progress;Care plan reviewd;Interview for preference              Education/Evaluation     Row Name 04/09/18 0928       Education    Education Will Instruct as appropriate       " Monitor/Evaluation    Monitor Per protocol        Electronically signed by:  Mary Zhu RD  04/09/18 9:29 AM

## 2018-04-09 NOTE — PROGRESS NOTES
Hospital Follow Up        Chief Complaint: Follow up inferior myocardial infarction    Interval History: No further chest pain.  No dyspnea.  Back in normal sinus rhythm.     Objective:     Objective:  Temp:  [97.6 °F (36.4 °C)-99.1 °F (37.3 °C)] 99.1 °F (37.3 °C)  Heart Rate:  [] 68  Resp:  [18] 18  BP: ()/(56-91) 96/60     Intake/Output Summary (Last 24 hours) at 04/09/18 0806  Last data filed at 04/09/18 0545   Gross per 24 hour   Intake              615 ml   Output             1900 ml   Net            -1285 ml     Body mass index is 28.84 kg/m².  1    04/08/18  1832   Weight: 88.6 kg (195 lb 5.2 oz)     Weight change:       Physical Exam:   General : Alert, cooperative, in no acute distress.  Neuro: alert,cooperative and oriented  Lungs: CTAB. Normal respiratory effort and rate.  CV:: Regular rate and rhythm, normal S1 and S2, no murmurs, gallops or rubs.  ABD: Soft, nontender, non-distended. positive bowel sounds  Extr: No edema or cyanosis, moves all extremities    Lab Review:     Results from last 7 days  Lab Units 04/09/18  0407 04/08/18  1907   SODIUM mmol/L 140 137   POTASSIUM mmol/L 4.7 4.2   CHLORIDE mmol/L 102 101   CO2 mmol/L 24.0 18.3*   BUN mg/dL 24* 21   CREATININE mg/dL 1.25 1.14   GLUCOSE mg/dL 123* 119*   CALCIUM mg/dL 8.9 8.9       Results from last 7 days  Lab Units 04/09/18  0407   TROPONIN T ng/mL 6.900*       Results from last 7 days  Lab Units 04/09/18  0407 04/08/18  1907   WBC 10*3/mm3 9.04 6.92   HEMOGLOBIN g/dL 11.2* 11.9*   HEMATOCRIT % 35.9* 37.6*   PLATELETS 10*3/mm3 215 211               Results from last 7 days  Lab Units 04/09/18  0407   CHOLESTEROL mg/dL 129   TRIGLYCERIDES mg/dL 134   HDL CHOL mg/dL 35*             I reviewed the patient's new clinical results.  I personally viewed and interpreted the patient's EKG  Current Medications:   Scheduled Meds:  aspirin 81 mg Oral Daily   clopidogrel 75 mg Oral Daily   lisinopril 5 mg Oral Daily   metoprolol tartrate 25  mg Oral Q12H     Continuous Infusions:     Allergies:  Allergies   Allergen Reactions   • No Known Drug Allergy        Assessment/Plan:     1. Inferior myocardial infarction status post drug eluting stent of proximal RCA  2. Mitral regurgitation. Severe by cardiac catheterization.  Likely ischemic.  Echo today.  3. Acute systolic CHF.  Received a dose of furosemide yesterday.  No evidence of volume overload on exam today and sat'ing normally on room air.    4. Coronary artery disease. With chronic occlusion of left circumflex artery with collaterals from RCA after re-vascularized and stenosis of LAD.    5. Paroxysmal atrial fibrillation.  Reverted back to sinus rhythm today.  5. Hypertension    -  Continue aspirin and clopidogrel.  -  Hold off on further diuresis for now  -  Continue lisinopril and metoprolol as BP will tolerate  -  Since back in sinus rhythm, no plans for anticoagulation at this time especially with a history of frequent falls  -  Follow up echocardiogram  -  Transfer to telemetry      Melissa Templeton MD  04/09/18  8:06 AM

## 2018-04-09 NOTE — PROGRESS NOTES
Discharge Planning Assessment  [facility]     Patient Name: Yoni Bonner Jr.  MRN: [id number]  Today's Date: 4/9/2018    Admit Date: 4/8/2018          Discharge Needs Assessment     Row Name 04/09/18 6330       Living Environment    Lives With spouse;grandchild(genevieve)    Name(s) of Who Lives With Patient wife Danielle and granddaughter Yandy    Current Living Arrangements home/apartment/condo    Primary Care Provided by self    Provides Primary Care For no one, unable/limited ability to care for self    Family Caregiver if Needed spouse;grandchild(genevieve), adult    Quality of Family Relationships supportive    Able to Return to Prior Arrangements yes       Resource/Environmental Concerns    Resource/Environmental Concerns none    Transportation Concerns car, none       Transition Planning    Patient/Family Anticipates Transition to home with family    Patient/Family Anticipated Services at Transition home health care    Transportation Anticipated family or friend will provide       Discharge Needs Assessment    Readmission Within the Last 30 Days no previous admission in last 30 days    Concerns to be Addressed discharge planning    Concerns Comments pt would like      Equipment Currently Used at Home walker, rolling    Anticipated Changes Related to Illness none    Equipment Needed After Discharge none    Discharge Facility/Level of Care Needs home with home health    Offered/Gave Vendor List yes    Current Discharge Risk dependent with mobility/activities of daily living            Discharge Plan     Row Name 04/09/18 1743       Plan    Plan Home with family.  Referral called to [facility] .    Patient/Family in Agreement with Plan yes    Plan Comments Spoke with pt and his wife at bedside.  Introduced self and explained role.  CCP contact information provided.  Face sheet and pharmacy verified and IMM provided.  Pt's wife states that pt is independent for personal care at home.  He uses a rolling walker.   She states that they have used [facility] in the past for HH and PT and would like a referral again due to pt's weakness.  Pt will need a PT evaluation if PT is needed.  Referral called to [facility] for [facility].  CCP will follow.        Destination     No service coordination in this encounter.      Durable Medical Equipment     No service coordination in this encounter.      Dialysis/Infusion     No service coordination in this encounter.      Home Medical Care     Service Request Status Selected Specialties Address Phone Number Fax Number    [facility] Pending - Request Sent N/A [address] , [address], [facility] [address] [phone]        Destinee Rondon RN 4/9/2018 1747    Spoke with [facility] Alexander     No service coordination in this encounter.                Demographic Summary     Row Name 04/09/18 1739       General Information    Admission Type inpatient    Arrived From home    Required Notices Provided Important Message from Medicare    Referral Source admission list    Reason for Consult discharge planning    Preferred Language English     Used During This Interaction no       Contact Information    Permission Granted to Share Info With family/designee            Functional Status     Row Name 04/09/18 1739       Functional Status    Usual Activity Tolerance good    Current Activity Tolerance fair       Functional Status, IADL    Medications independent    Meal Preparation assistive person    Housekeeping assistive person    Laundry assistive person    Shopping assistive person       Mental Status    General Appearance WDL WDL       Mental Status Summary    Recent Changes in Mental Status/Cognitive Functioning no changes       Employment/    Employment Status retired            Psychosocial    No documentation.           Abuse/Neglect    No documentation.           Legal    No documentation.           Substance Abuse    No documentation.            Patient Forms    No documentation.         Destinee Rondon RN

## 2018-04-09 NOTE — CONSULTS
Met with pt's wife.  Pt was asleep.  Provided cardiac rehab handout.  Explained purpose and benefits of program.  Closest program for pt will be at [facility] in [place].  Wife has mentioned several family deaths in the past few years.  Two of their children and a daughter in law.  Has also had their own health issues.  Wife says pt chews tobacco.  We discussed the need to quit due to it's cardiovascular effects.  Encouraged wife to call for first appointment at cardiac rehab once he is discharged home.

## 2018-04-10 VITALS
HEART RATE: 69 BPM | BODY MASS INDEX: 26.9 KG/M2 | SYSTOLIC BLOOD PRESSURE: 118 MMHG | RESPIRATION RATE: 16 BRPM | TEMPERATURE: 98.3 F | HEIGHT: 69 IN | WEIGHT: 181.6 LBS | OXYGEN SATURATION: 94 % | DIASTOLIC BLOOD PRESSURE: 65 MMHG

## 2018-04-10 LAB
ANION GAP SERPL CALCULATED.3IONS-SCNC: 11.6 MMOL/L
BUN BLD-MCNC: 28 MG/DL (ref 8–23)
BUN/CREAT SERPL: 23.9 (ref 7–25)
CALCIUM SPEC-SCNC: 8.4 MG/DL (ref 8.6–10.5)
CHLORIDE SERPL-SCNC: 101 MMOL/L (ref 98–107)
CO2 SERPL-SCNC: 25.4 MMOL/L (ref 22–29)
CREAT BLD-MCNC: 1.17 MG/DL (ref 0.76–1.27)
GFR SERPL CREATININE-BSD FRML MDRD: 59 ML/MIN/1.73
GLUCOSE BLD-MCNC: 95 MG/DL (ref 65–99)
POTASSIUM BLD-SCNC: 4.4 MMOL/L (ref 3.5–5.2)
SODIUM BLD-SCNC: 138 MMOL/L (ref 136–145)
TROPONIN T SERPL-MCNC: 3.37 NG/ML (ref 0–0.03)

## 2018-04-10 PROCEDURE — 84484 ASSAY OF TROPONIN QUANT: CPT | Performed by: INTERNAL MEDICINE

## 2018-04-10 PROCEDURE — 80048 BASIC METABOLIC PNL TOTAL CA: CPT | Performed by: INTERNAL MEDICINE

## 2018-04-10 PROCEDURE — 93005 ELECTROCARDIOGRAM TRACING: CPT | Performed by: INTERNAL MEDICINE

## 2018-04-10 PROCEDURE — 93010 ELECTROCARDIOGRAM REPORT: CPT | Performed by: INTERNAL MEDICINE

## 2018-04-10 PROCEDURE — 94799 UNLISTED PULMONARY SVC/PX: CPT

## 2018-04-10 PROCEDURE — 99239 HOSP IP/OBS DSCHRG MGMT >30: CPT | Performed by: INTERNAL MEDICINE

## 2018-04-10 PROCEDURE — 94640 AIRWAY INHALATION TREATMENT: CPT

## 2018-04-10 RX ORDER — ASPIRIN 81 MG/1
81 TABLET ORAL DAILY
Qty: 30 TABLET | Refills: 5
Start: 2018-04-11

## 2018-04-10 RX ORDER — IPRATROPIUM BROMIDE AND ALBUTEROL SULFATE 2.5; .5 MG/3ML; MG/3ML
3 SOLUTION RESPIRATORY (INHALATION) EVERY 4 HOURS PRN
Status: DISCONTINUED | OUTPATIENT
Start: 2018-04-10 | End: 2018-04-10 | Stop reason: HOSPADM

## 2018-04-10 RX ORDER — ATORVASTATIN CALCIUM 10 MG/1
10 TABLET, FILM COATED ORAL NIGHTLY
Qty: 30 TABLET | Refills: 5 | Status: SHIPPED | OUTPATIENT
Start: 2018-04-10 | End: 2019-05-24 | Stop reason: SDUPTHER

## 2018-04-10 RX ORDER — CLOPIDOGREL BISULFATE 75 MG/1
75 TABLET ORAL DAILY
Qty: 30 TABLET | Refills: 11 | Status: SHIPPED | OUTPATIENT
Start: 2018-04-11 | End: 2019-05-24 | Stop reason: SDUPTHER

## 2018-04-10 RX ORDER — LISINOPRIL 5 MG/1
5 TABLET ORAL DAILY
Qty: 30 TABLET | Refills: 5 | Status: SHIPPED | OUTPATIENT
Start: 2018-04-11 | End: 2018-06-14 | Stop reason: SDUPTHER

## 2018-04-10 RX ADMIN — IPRATROPIUM BROMIDE AND ALBUTEROL SULFATE 3 ML: .5; 3 SOLUTION RESPIRATORY (INHALATION) at 08:17

## 2018-04-10 RX ADMIN — CLOPIDOGREL 75 MG: 75 TABLET, FILM COATED ORAL at 08:38

## 2018-04-10 RX ADMIN — ASPIRIN 81 MG: 81 TABLET ORAL at 08:38

## 2018-04-10 RX ADMIN — LISINOPRIL 5 MG: 5 TABLET ORAL at 08:38

## 2018-04-10 NOTE — DISCHARGE SUMMARY
Hospital Discharge    Patient Name: Yoni Bonner Jr.  Age/Sex: 84 y.o. male  : 1933  MRN: [id number]    Encounter Provider: Melissa Templeton MD  Referring Provider: Melissa Templeton MD  Place of Service: [facility] CARDIOLOGY  Patient Care Team:  Patric Cameron DO as PCP - General (Family Medicine)         Date of Discharge:  4/10/2018     Date of Admit: 2018    Discharge Condition: Stable    Discharge Diagnosis:  1. Inferior myocardial infarction status post drug eluting stent placement of RCA  2. Coronary artery disease  3. Ischemic cardiomyopathy, EF 42%  4. Tobacco use      Hospital Course:   Yoni Bonner Jr. is a 84 y.o. male with a history of hypertension, tobacco use, COPD who was admitted on  with an inferior myocardial infarction.     The patient had accompanied his wife to the [facility] emergency room when he suffered a near syncopal episode associated with hypotension and bradycardia.  An EKG was performed showing an acute inferior ST elevation myocardial infarction.  He was transferred emergently and brought to the cardiac catheterization laboratory where he was found to have a thrombotic subtotal occlusion of his proximal RCA.  This was treated with thrombectomy and drug eluting stent placement.  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      He was relatively hemodynamically stable following the intervention.  An echocardiogram showed mildly depressed left ventricular systolic function with an EF of 42%, inferior wall motion abnormalities, mild to moderate mitral regurgitation, mild aortic stenosis and regurgitation and grade 1a diastolic dysfunction.  He had not significant issues during the remainder of his hospitalization.      He will follow up with ABENA Moran in 1 week and me in 4 weeks.     Objective:  Temp:  [98 °F (36.7 °C)-99.2 °F (37.3 °C)] 98.3 °F (36.8  °C)  Heart Rate:  [58-69] 69  Resp:  [16-18] 16  BP: ()/(53-68) 118/65    Intake/Output Summary (Last 24 hours) at 04/10/18 1131  Last data filed at 04/10/18 0300   Gross per 24 hour   Intake              160 ml   Output              375 ml   Net             -215 ml     Body mass index is 26.81 kg/m².  1    04/08/18  1832 04/09/18  0925 04/10/18  0619   Weight: 88.6 kg (195 lb 5.2 oz) 88.6 kg (195 lb 5.2 oz) 82.4 kg (181 lb 9.6 oz)     Weight change: 0 kg (0 lb)      Physical Exam:   General Appearance:    No acute distress, alert and oriented x4   Lungs:     Scattered wheezes     Heart:    Regular rhythm and normal rate.  No murmurs, gallops, or       rubs.   Abdomen:     Soft, non-tender, non-distended.    Extremities:   Moves all extremities well.  No clubbing, cyanosis, or edema.       Procedures Performed  Procedure(s):  Left Heart Cath  Percutaneous Manual Thrombectomy  Stent CARLENE coronary  Right Heart Cath       Consults:  Consults     No orders found from 3/10/2018 to 4/9/2018.          Pertinent Test Results:    Results from last 7 days  Lab Units 04/10/18  0434 04/09/18  0407 04/08/18  1907   SODIUM mmol/L 138 140 137   POTASSIUM mmol/L 4.4 4.7 4.2   CHLORIDE mmol/L 101 102 101   CO2 mmol/L 25.4 24.0 18.3*   BUN mg/dL 28* 24* 21   CREATININE mg/dL 1.17 1.25 1.14   GLUCOSE mg/dL 95 123* 119*   CALCIUM mg/dL 8.4* 8.9 8.9       Results from last 7 days  Lab Units 04/10/18  0434 04/09/18  0407   TROPONIN T ng/mL 3.370* 6.900*       Results from last 7 days  Lab Units 04/09/18  0407 04/08/18  1907 04/08/18  1751 04/08/18  1748 04/08/18  1722   WBC 10*3/mm3 9.04 6.92  --   --   --    HEMOGLOBIN g/dL 11.2* 11.9*  --   --   --    HEMOGLOBIN, POC g/dL  --   --  10.5* 10.9* 10.9*   HEMATOCRIT % 35.9* 37.6*  --   --   --    HEMATOCRIT POC %  --   --  31* 32* 32*   PLATELETS 10*3/mm3 215 211  --   --   --                Results from last 7 days  Lab Units 04/09/18  0407   CHOLESTEROL mg/dL 129   TRIGLYCERIDES  mg/dL 134   HDL CHOL mg/dL 35*   LDL 67              Discharge Medications   SacYoni Jr.   Home Medication Instructions STEVE:[id number]    Printed on:04/10/18 1131   Medication Information                      aspirin 81 MG EC tablet  Take 1 tablet by mouth Daily.             atorvastatin (LIPITOR) 10 MG tablet  Take 1 tablet by mouth Every Night.             clopidogrel (PLAVIX) 75 MG tablet  Take 1 tablet by mouth Daily.             lisinopril (PRINIVIL,ZESTRIL) 5 MG tablet  Take 1 tablet by mouth Daily.             metoprolol tartrate (LOPRESSOR) 25 MG tablet  Take 1 tablet by mouth Every 12 (Twelve) Hours.             naproxen (NAPROSYN) 500 MG tablet  Take 500 mg by mouth.                 Discharge Diet:    Dietary Orders     Start     Ordered    04/08/18 1833  Diet Regular; Cardiac  Diet Effective Now     Question Answer Comment   Diet Texture / Consistency Regular    Common Modifiers Cardiac        04/08/18 1832          Activity at Discharge:  as instructed     Discharge disposition: home     Discharge Instructions and Follow ups:  No future appointments.  Additional Instructions for the Follow-ups that You Need to Schedule     Discharge Follow-up with Specified Provider: Dr. Templeton; 1 Month    As directed      To:  Dr. Templeton    Follow Up:  1 Month         Discharge Follow-up with Specified Provider: ABENA Moran; 1 Week    As directed      To:  ABENA Moran    Follow Up:  1 Week    Follow Up Details:  office will call with appointment           Follow-up Information     DO Stephanie Martinez    Specialty:  Family Medicine  Contact information:  [address] [place] [id number] [phone]                      Melissa Templeton MD  04/10/18  11:31 AM    Time: Discharge 45 min

## 2018-04-10 NOTE — PLAN OF CARE
Problem: Patient Care Overview  Goal: Plan of Care Review  Outcome: Ongoing (interventions implemented as appropriate)   04/10/18 0454   Coping/Psychosocial   Plan of Care Reviewed With patient;spouse   Plan of Care Review   Progress no change   OTHER   Outcome Summary Pt. transferred from CCU. Cath done 04/09-Right radial and right brachial site is C/D/I. Pt. has had no c/o of pain or discomfort tonight. Will continue to monitor.      Goal: Individualization and Mutuality  Outcome: Ongoing (interventions implemented as appropriate)    Goal: Discharge Needs Assessment  Outcome: Ongoing (interventions implemented as appropriate)    Goal: Interprofessional Rounds/Family Conf  Outcome: Ongoing (interventions implemented as appropriate)      Problem: Fall Risk (Adult)  Goal: Identify Related Risk Factors and Signs and Symptoms  Outcome: Ongoing (interventions implemented as appropriate)   04/10/18 0454   Fall Risk (Adult)   Related Risk Factors (Fall Risk) age-related changes;environment unfamiliar;objects hard to reach   Signs and Symptoms (Fall Risk) presence of risk factors     Goal: Absence of Fall  Outcome: Ongoing (interventions implemented as appropriate)   04/10/18 0454   Fall Risk (Adult)   Absence of Fall making progress toward outcome       Problem: Skin Injury Risk (Adult)  Goal: Identify Related Risk Factors and Signs and Symptoms  Outcome: Ongoing (interventions implemented as appropriate)   04/10/18 0454   Skin Injury Risk (Adult)   Related Risk Factors (Skin Injury Risk) advanced age;mobility impaired     Goal: Skin Health and Integrity  Outcome: Ongoing (interventions implemented as appropriate)   04/10/18 0454   Skin Injury Risk (Adult)   Skin Health and Integrity making progress toward outcome       Problem: Cardiac Catheterization (Diagnostic/Interventional) (Adult)  Goal: Signs and Symptoms of Listed Potential Problems Will be Absent, Minimized or Managed (Cardiac Catheterization)  Outcome: Ongoing  (interventions implemented as appropriate)   04/10/18 0454   Goal/Outcome Evaluation   Problems Assessed (Cardiac Catheterization) all   Problems Present (Cardiac Cath) none     Goal: Anesthesia/Sedation Recovery  Outcome: Ongoing (interventions implemented as appropriate)   04/10/18 0454   Goal/Outcome Evaluation   Anesthesia/Sedation Recovery progressing toward baseline

## 2018-04-10 NOTE — PROGRESS NOTES
Case Management Discharge Note    Final Note: Pt d/c home with family transporting.  Eliezer Law, RN, Md did not order home health prior to discharge because Pt was ambulating in hallway unassisted prior to d/c.  CCP called and notified Shanae, liaison with [facility]  to advise.      Destination     No service coordination in this encounter.      Durable Medical Equipment     No service coordination in this encounter.      Dialysis/Infusion     No service coordination in this encounter.      Home Medical Care     Service Request Status Selected Specialties Address Phone Number Fax Number    [facility] Accepted N/A [address], [address], [facility] [id number] [phone]        Destinee Rondon, RN 4/9/2018 1967    Spoke with Shanae Alexander     No service coordination in this encounter.        Other:  (private auto)    Final Discharge Disposition Code: 01 - home or self-care

## 2018-04-16 ENCOUNTER — OFFICE VISIT (OUTPATIENT)
Dept: CARDIOLOGY | Facility: CLINIC | Age: 83
End: 2018-04-16

## 2018-04-16 VITALS
WEIGHT: 189 LBS | SYSTOLIC BLOOD PRESSURE: 130 MMHG | HEART RATE: 60 BPM | OXYGEN SATURATION: 100 % | HEIGHT: 69 IN | DIASTOLIC BLOOD PRESSURE: 60 MMHG | BODY MASS INDEX: 27.99 KG/M2

## 2018-04-16 DIAGNOSIS — I50.20 SYSTOLIC CONGESTIVE HEART FAILURE, UNSPECIFIED CONGESTIVE HEART FAILURE CHRONICITY: ICD-10-CM

## 2018-04-16 DIAGNOSIS — Z72.0 TOBACCO ABUSE: ICD-10-CM

## 2018-04-16 DIAGNOSIS — I25.10 CORONARY ARTERY DISEASE INVOLVING NATIVE CORONARY ARTERY OF NATIVE HEART WITHOUT ANGINA PECTORIS: Primary | ICD-10-CM

## 2018-04-16 DIAGNOSIS — Z95.5 HISTORY OF CORONARY ARTERY STENT PLACEMENT: ICD-10-CM

## 2018-04-16 PROBLEM — I21.19 ACUTE ST ELEVATION MYOCARDIAL INFARCTION (STEMI) OF INFERIOR WALL (HCC): Status: RESOLVED | Noted: 2018-04-08 | Resolved: 2018-04-16

## 2018-04-16 PROBLEM — R55 EPISODE OF SYNCOPE: Status: ACTIVE | Noted: 2018-04-16

## 2018-04-16 PROBLEM — Z96.619 S/P REVERSE TOTAL SHOULDER ARTHROPLASTY: Status: ACTIVE | Noted: 2017-04-11

## 2018-04-16 PROBLEM — D64.9 POSTOPERATIVE ANEMIA: Status: ACTIVE | Noted: 2017-03-29

## 2018-04-16 PROCEDURE — 99214 OFFICE O/P EST MOD 30 MIN: CPT | Performed by: PHYSICIAN ASSISTANT

## 2018-04-16 PROCEDURE — 93000 ELECTROCARDIOGRAM COMPLETE: CPT | Performed by: PHYSICIAN ASSISTANT

## 2018-04-16 NOTE — PROGRESS NOTES
Date of Office Visit: 2018  Encounter Provider: ABENA Burgos  Place of Service: [facility] CARDIOLOGY  Patient Name: oYni Bonner Jr.  :1933    Chief Complaint   Patient presents with   • Coronary Artery Disease     1 week hospital follow up   :     HPI: Yoni Bonner Jr. is a 84 y.o. male, new to me, who presents today for follow-up.  Old records have been obtained and reviewed by me.  He is a patient of Dr. Anna with a past medical history significant for hypertension, tobacco use, and COPD.  On 2018 he was with his wife at another hospital when he suffered a near syncopal episode that was associated with hypotension and bradycardia.  We did an ECG on him and it showed that he was having an acute inferior STEMI.  He was transferred emergently to our hospital and taken for cardiac catheterization.  This showed an LV gram estimated his EF around 30% with 3+ mitral regurgitation.  He had a normal left main, 50% proximal LAD, 70% mid LAD, 70% diffuse apical LAD, totally occluded proximal circumflex, and an ulcerated 90% stenosis with significant thrombus burden in his proximal RCA.  He underwent successful thrombectomy and drug-eluting stent placement to the RCA lesion.  This felt that his MR was ischemic.  A follow-up echocardiogram showed slight improvement in his EF to around 42%, moderate mitral annular calcification, and mild to moderate mitral regurgitation.  Postoperatively he did well and was discharged home on 2018 in stable condition.  He is here today for follow-up.   Since he's been home he's been doing well.  He has no more chest pain.  He has a little shortness of breath on exertion but stable.  He does not smoke, but he has been using chewing tobacco since he was 4 years old.  He denies any chest pain, palpitations, edema, dizziness, or syncope.  He has been compliant with his medications, and he does not use salt on his food.      Past  Medical History:   Diagnosis Date   • Bronchitis    • Carotid arterial disease    • Chronic bronchitis    • COPD (chronic obstructive pulmonary disease)    • Gout    • Gout    • Hypercholesteremia    • Hyperlipidemia    • Hypertension    • Rheumatic fever    • Syncopal episodes        Past Surgical History:   Procedure Laterality Date   • CARDIAC CATHETERIZATION     • CARDIAC CATHETERIZATION N/A 4/8/2018    Procedure: Left Heart Cath;  Surgeon: Nicolas Jerez MD;  Location: [facility] CATH INVASIVE LOCATION;  Service: Cardiovascular   • CARDIAC CATHETERIZATION  4/8/2018    Procedure: Percutaneous Manual Thrombectomy;  Surgeon: Nicolas Jerez MD;  [facility];  Service: Cardiovascular   • CARDIAC CATHETERIZATION N/A 4/8/2018    Procedure: Stent CARLENE coronary;  Surgeon: Nicolas Jerez MD;  [facility];  Service: Cardiovascular   • CARDIAC CATHETERIZATION N/A 4/8/2018    Procedure: Right Heart Cath;  Surgeon: Nicolas Jerez MD;  Location: [facility] CATH INVASIVE LOCATION;  Service: Cardiovascular   • EYE SURGERY      cataract surg   • HAND SURGERY     • HERNIA REPAIR     • KNEE SURGERY     • TESTICLE SURGERY         Social History     Social History   • Marital status:      Spouse name: N/A   • Number of children: N/A   • Years of education: N/A     Occupational History   • Not on file.     Social History Main Topics   • Smoking status: Never Smoker   • Smokeless tobacco: Current User     Types: Chew      Comment: chewing tobacco since 4 yearrs old   • Alcohol use No   • Drug use: No   • Sexual activity: Defer     Other Topics Concern   • Not on file     Social History Narrative   • No narrative on file       Family History   Problem Relation Age of Onset   • Heart disease Father    • Hypertension Father    • Stroke Father    • Diabetes Sister    • Cancer Brother    • Heart disease Brother    • Hypertension Brother    • No Known Problems Maternal Grandmother    • No Known Problems  "Maternal Grandfather    • No Known Problems Paternal Grandmother    • No Known Problems Paternal Grandfather        Review of Systems   Constitution: Positive for malaise/fatigue. Negative for chills and fever.   Cardiovascular: Positive for dyspnea on exertion. Negative for chest pain, leg swelling, near-syncope, orthopnea, palpitations, paroxysmal nocturnal dyspnea and syncope.   Respiratory: Negative for cough and shortness of breath.    Musculoskeletal: Negative for joint pain, joint swelling and myalgias.   Gastrointestinal: Negative for abdominal pain, diarrhea, melena, nausea and vomiting.   Genitourinary: Negative for frequency and hematuria.   Neurological: Negative for light-headedness, numbness, paresthesias and seizures.   Allergic/Immunologic: Negative.    All other systems reviewed and are negative.      Allergies   Allergen Reactions   • No Known Drug Allergy          Current Outpatient Prescriptions:   •  aspirin 81 MG EC tablet, Take 1 tablet by mouth Daily., Disp: 30 tablet, Rfl: 5  •  atorvastatin (LIPITOR) 10 MG tablet, Take 1 tablet by mouth Every Night., Disp: 30 tablet, Rfl: 5  •  clopidogrel (PLAVIX) 75 MG tablet, Take 1 tablet by mouth Daily., Disp: 30 tablet, Rfl: 11  •  lisinopril (PRINIVIL,ZESTRIL) 5 MG tablet, Take 1 tablet by mouth Daily., Disp: 30 tablet, Rfl: 5  •  metoprolol tartrate (LOPRESSOR) 25 MG tablet, Take 1 tablet by mouth Every 12 (Twelve) Hours., Disp: 60 tablet, Rfl: 5  •  naproxen (NAPROSYN) 500 MG tablet, Take 500 mg by mouth 2 (Two) Times a Day With Meals., Disp: , Rfl:       Objective:     Vitals:    04/16/18 1047 04/16/18 1101   BP: 122/68 130/60   BP Location: Right arm Left arm   Pulse: 60    SpO2: 100%    Weight: 85.7 kg (189 lb)    Height: 175.3 cm (69\")      Body mass index is 27.91 kg/m².    PHYSICAL EXAM:    Physical Exam   Constitutional: He is oriented to person, place, and time. He appears well-developed and well-nourished. No distress.   HENT:   Head: " Normocephalic and atraumatic.   Eyes: Pupils are equal, round, and reactive to light.   Neck: No JVD present. No thyromegaly present.   Cardiovascular: Normal rate, regular rhythm, normal heart sounds and intact distal pulses.    No murmur heard.  Right radial cath site well healed without erythema or echymosis, palpable proximal and distal pulses, good capillary refill   Pulmonary/Chest: Effort normal. No respiratory distress. He has wheezes. He has no rales.   Abdominal: Soft. Bowel sounds are normal. He exhibits no distension and no ascites. There is no splenomegaly or hepatomegaly. There is no tenderness.   Musculoskeletal: Normal range of motion. He exhibits no edema.   Neurological: He is alert and oriented to person, place, and time.   Skin: Skin is warm and dry. He is not diaphoretic. No erythema.   Psychiatric: He has a normal mood and affect. His behavior is normal. Judgment normal.         ECG 12 Lead  Date/Time: 4/16/2018 11:08 AM  Performed by: GINA EDWARDS.  Authorized by: GINA EDWARDS.   Comparison: compared with previous ECG from 4/10/2018  Similar to previous ECG  Rhythm: sinus rhythm  BPM: 60  T depression: V5 and V6  T flattening: II, III and aVF  Clinical impression: abnormal ECG  Comments: Indication: Coronary artery disease, status post stent placement.              Assessment:       Diagnosis Plan   1. Coronary artery disease involving native coronary artery of native heart without angina pectoris  ECG 12 Lead   2. History of coronary artery stent placement  ECG 12 Lead   3. Tobacco abuse     4. Systolic congestive heart failure, unspecified congestive heart failure chronicity       Orders Placed This Encounter   Procedures   • ECG 12 Lead     This order was created via procedure documentation          Plan:       1.  Coronary Artery Disease  Assessment  • The patient has no angina  • There is a new diagnosis of stable angina in the past 12 months  • The patient is having symptoms  consistent with unstable angina     Plan  • Lifestyle modifications discussed include medication compliance and regular exercise    Subjective - Objective  • There is a history of past MI  • There has been a previous stent procedure using CARLENE  • Current antiplatelet therapy includes aspirin 81 mg and clopidogrel 75 mg  • The patient qualifies for cardiac rehabilitation, but has not been referred for system reasons  • Overall he's doing well.  Cardiac rehabilitation is going to be too expensive for him, so I have asked him to start slowly exercising at home.  He has no more complaints of angina at this time.  We did talk about the importance of dual antiplatelet therapy, as well as really minimizing the amount of naproxen that he uses.    2.  Heart Failure  Assessment  • NYHA class I - There is no limitation of physical activity. Physical activity does not cause fatigue, palpitations or shortness of breath.  • The patient was newly diagnosed with heart failure within the past 12 months  • ACE inhibitor prescribed  • Beta blocker prescribed  • The most recent ejection fraction is 42%  • Left ventricular function is mildly reduced by qualitative assessment    Plan  • The patient has received heart failure education on the following topics: dietary sodium restriction, minimizing or avoiding NSAID use, physical activity and weight monitoring  • The heart failure care plan was discussed with the patient today including: continuing the current program    Subjective/Objective    • Physical exam findings negative for rales, peripheral edema, elevated JVP, hepatomegaly and ascites.  • Overall he's stable and doing well.  We did talk about reducing his sodium intake, as well as daily weights and the necessity to call our office if he starts to gain weight, greater than 3 pounds overnight or 5 pounds in a week.  He is on a good medical regimen at this time.  Continue current plan.    He will follow-up with Dr. Templeton on  5/8/2018.  As always, it has been a pleasure to participate in your patient's care.      Sincerely,         Ani Jolley PA-C

## 2018-05-08 ENCOUNTER — OFFICE VISIT (OUTPATIENT)
Dept: CARDIOLOGY | Facility: CLINIC | Age: 83
End: 2018-05-08

## 2018-05-08 VITALS
HEIGHT: 69 IN | WEIGHT: 190 LBS | SYSTOLIC BLOOD PRESSURE: 144 MMHG | HEART RATE: 59 BPM | DIASTOLIC BLOOD PRESSURE: 66 MMHG | BODY MASS INDEX: 28.14 KG/M2

## 2018-05-08 DIAGNOSIS — Z72.0 TOBACCO ABUSE: ICD-10-CM

## 2018-05-08 DIAGNOSIS — I50.20 SYSTOLIC CONGESTIVE HEART FAILURE, UNSPECIFIED CONGESTIVE HEART FAILURE CHRONICITY: ICD-10-CM

## 2018-05-08 DIAGNOSIS — I25.10 CORONARY ARTERY DISEASE INVOLVING NATIVE CORONARY ARTERY OF NATIVE HEART WITHOUT ANGINA PECTORIS: Primary | ICD-10-CM

## 2018-05-08 DIAGNOSIS — Z95.5 HISTORY OF CORONARY ARTERY STENT PLACEMENT: ICD-10-CM

## 2018-05-08 DIAGNOSIS — I25.5 ISCHEMIC CARDIOMYOPATHY: ICD-10-CM

## 2018-05-08 DIAGNOSIS — I10 ESSENTIAL HYPERTENSION: ICD-10-CM

## 2018-05-08 DIAGNOSIS — E11.59 TYPE 2 DIABETES MELLITUS WITH OTHER CIRCULATORY COMPLICATION, WITHOUT LONG-TERM CURRENT USE OF INSULIN (HCC): ICD-10-CM

## 2018-05-08 PROCEDURE — 93000 ELECTROCARDIOGRAM COMPLETE: CPT | Performed by: INTERNAL MEDICINE

## 2018-05-08 PROCEDURE — 99214 OFFICE O/P EST MOD 30 MIN: CPT | Performed by: INTERNAL MEDICINE

## 2018-05-08 NOTE — PROGRESS NOTES
Subjective:     Encounter Date:05/08/2018      Patient ID: Yoni Bonner Jr. is a 85 y.o. male.    Chief Complaint:  History of Present Illness    This is an 85-year-old man with a history of hypertension, chronic bronchitis, dyslipidemia, tobacco use, coronary artery disease status post inferior myocardial infarction and drug eluting stent placement of his proximal RCA, who presents for follow up.     I saw the patient back on 02/04/2015 when he presented to establish care.  At that time the patient reported a history of recurrent syncopal episodes.  I felt that his symptoms were due to orthostatic syncope at that time.  He had a history of rheumatic fever and a thickened aortic valve along with carotid artery disease so I sat him up for both an echocardiogram and a carotid ultrasound around that time.  His echocardiogram was performed on 03/11/2015 and revealed normal left ventricular systolic function and wall motion, an ejection fraction of 54%, grade IA diastolic dysfunction and no significant valvular disease.  His carotid ultrasound revealed moderate bilateral stenosis.  He was last seen in follow up in 3/2017 for pre-operative evaluation.  At that time he reported some worsening of his baseline dyspnea on exertion though we proceeded with a dobutamine stress test that was negative for ischemia and the patient socially is proceeded with his shoulder surgery.    Patient was seen last in the hospital when he was admitted on 4/8/2018 with an inferior myocardial infarction.  The patient was accompanying his wife to the [facility] emergency room when he suffered a near syncopal episode associated with hypotension and bradycardia.  An EKG was performed at that time showing acute inferior ST elevations.  He is marginally transferred to [facility] and was found to have a thrombotic subtotal occlusion of his proximal RCA for which she underwent thrombectomy and drug-eluting stent placement.   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  Following his catheterization he underwent an echocardiogram that showed mildly depressed left ventricular systolic function with an ejection fraction of 42%, inferior wall motion abnormalities, mild to moderate mitral regurgitation, mild aortic stenosis and regurgitation, and grade 1A diastolic dysfunction.  Following his discharge he followed up with ABENA Moran at which time he was doing fairly well and no changes were made to his management.    Today he presents for routine one-month follow-up.  Overall his wife and he reports that he is been doing okay.  Continues to have dyspnea on exertion with some mild worsening recently.  He has been trying to stay as active as possible by giving out and mowing the lawn on his riding lawnmower.  He's had one episode of chest pain that lasted a few minutes last night while he was laying in bed.  He has not had any is pain with exertion.  He denies any lower extremity edema, palpitations, PND or orthopnea, near-syncope or syncope.       Review of Systems   Constitution: Positive for malaise/fatigue. Negative for weakness.   HENT: Negative for hearing loss, hoarse voice, nosebleeds and sore throat.    Eyes: Negative for pain.   Cardiovascular: Positive for chest pain and dyspnea on exertion. Negative for claudication, cyanosis, irregular heartbeat, leg swelling, near-syncope, orthopnea, palpitations, paroxysmal nocturnal dyspnea and syncope.   Respiratory: Negative for shortness of breath and snoring.    Endocrine: Negative for cold intolerance, heat intolerance, polydipsia, polyphagia and polyuria.   Skin: Negative for itching and rash.   Musculoskeletal: Negative for arthritis, falls, joint pain, joint swelling, muscle cramps, muscle weakness and myalgias.   Gastrointestinal: Negative for constipation, diarrhea, dysphagia, heartburn, hematemesis,  hematochezia, melena, nausea and vomiting.   Genitourinary: Negative for frequency, hematuria and hesitancy.   Neurological: Positive for light-headedness. Negative for excessive daytime sleepiness, dizziness, headaches and numbness.   Psychiatric/Behavioral: Negative for depression. The patient is not nervous/anxious.           Current Outpatient Prescriptions:   •  aspirin 81 MG EC tablet, Take 1 tablet by mouth Daily., Disp: 30 tablet, Rfl: 5  •  atorvastatin (LIPITOR) 10 MG tablet, Take 1 tablet by mouth Every Night., Disp: 30 tablet, Rfl: 5  •  clopidogrel (PLAVIX) 75 MG tablet, Take 1 tablet by mouth Daily., Disp: 30 tablet, Rfl: 11  •  lisinopril (PRINIVIL,ZESTRIL) 5 MG tablet, Take 1 tablet by mouth Daily., Disp: 30 tablet, Rfl: 5  •  metoprolol tartrate (LOPRESSOR) 25 MG tablet, Take 1 tablet by mouth Every 12 (Twelve) Hours., Disp: 60 tablet, Rfl: 5  •  naproxen (NAPROSYN) 500 MG tablet, Take 500 mg by mouth 2 (Two) Times a Day With Meals., Disp: , Rfl:     Past Medical History:   Diagnosis Date   • Bronchitis    • Carotid arterial disease    • Chronic bronchitis    • COPD (chronic obstructive pulmonary disease)    • Gout    • Hypercholesteremia    • Hyperlipidemia    • Hypertension    • Ischemic cardiomyopathy    • Rheumatic fever    • Syncopal episodes    • Tobacco use      Past Surgical History:   Procedure Laterality Date   • CARDIAC CATHETERIZATION     • CARDIAC CATHETERIZATION N/A 4/8/2018    Procedure: Left Heart Cath;  Surgeon: Nicolas Jerez MD;  Location: [facility] INVASIVE LOCATION;  Service: Cardiovascular   • CARDIAC CATHETERIZATION  4/8/2018    Procedure: Percutaneous Manual Thrombectomy;  Surgeon: Nicolas Jerez MD;  Location: [facility] INVASIVE LOCATION;  Service: Cardiovascular   • CARDIAC CATHETERIZATION N/A 4/8/2018    Procedure: Stent CARLENE coronary;  Surgeon: Nicolas Jerez MD;  Location: [facility] CATH INVASIVE LOCATION;  Service: Cardiovascular   • CARDIAC CATHETERIZATION N/A 4/8/2018     "Procedure: Right Heart Cath;  Surgeon: Nicolas Jerez MD;  Location:  [facility] CATH INVASIVE LOCATION;  Service: Cardiovascular   • EYE SURGERY      cataract surg   • HAND SURGERY     • HERNIA REPAIR     • KNEE SURGERY     • TESTICLE SURGERY       Family History   Problem Relation Age of Onset   • Heart disease Father    • Hypertension Father    • Stroke Father    • Diabetes Sister    • Cancer Brother    • Heart disease Brother    • Hypertension Brother    • No Known Problems Maternal Grandmother    • No Known Problems Maternal Grandfather    • No Known Problems Paternal Grandmother    • No Known Problems Paternal Grandfather      Social History   Substance Use Topics   • Smoking status: Never Smoker   • Smokeless tobacco: Current User     Types: Chew      Comment: chewing tobacco since 4 yearrs old   • Alcohol use No           ECG 12 Lead  Date/Time: 5/8/2018 5:20 PM  Performed by: DB MCCLELLAN  Authorized by: DB MCCLELLAN   Comparison: compared with previous ECG   Similar to previous ECG  Rhythm: sinus rhythm  Conduction: 1st degree               Objective:         Visit Vitals  /66 (BP Location: Right arm, Patient Position: Sitting)   Pulse 59   Ht 175.3 cm (69\")   Wt 86.2 kg (190 lb)   BMI 28.06 kg/m²          Physical Exam   Constitutional: He is oriented to person, place, and time. He appears well-developed and well-nourished.   HENT:   Head: Normocephalic and atraumatic.   Neck: No JVD present. Carotid bruit is not present.   Cardiovascular: Normal rate, regular rhythm, S1 normal and S2 normal.  Exam reveals no S3 and no S4.    No murmur heard.  Pulses:       Radial pulses are 2+ on the right side, and 2+ on the left side.   No bilateral lower extremity edema   Pulmonary/Chest: Effort normal and breath sounds normal. He has no rales.   Abdominal: Soft. Normal appearance. He exhibits no ascites. There is no hepatomegaly.   Neurological: He is alert and oriented to person, place, and time.   Skin: Skin is " warm, dry and intact.   Psychiatric: He has a normal mood and affect.       Lab Review:       Assessment:          Diagnosis Plan   1. Coronary artery disease involving native coronary artery of native heart without angina pectoris     2. History of coronary artery stent placement     3. Ischemic cardiomyopathy     4. Systolic congestive heart failure, unspecified congestive heart failure chronicity     5. Essential hypertension     6. Type 2 diabetes mellitus with other circulatory complication, without long-term current use of insulin     7. Tobacco abuse            Plan:       1.  Coronary artery disease.  He is status post recent inferior myocardial infarction and drug-eluting stent placement of his proximal right coronary artery.  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  Continue his current management.  2.  Ischemic cardiomyopathy.  EF of about 42% following his myocardial infarction.  Continue his current management and plan for a repeat echocardiogram at the time of this next follow-up.  3.  Hypertension.  Fairly well controlled on his current medications.  4.  Tobacco use    We'll plan on seeing the patient back again in 3 months with a repeat echocardiogram.    Coronary Artery Disease  Assessment  • The patient has no angina  • There is a new diagnosis of stable angina in the past 12 months  • The patient is having symptoms consistent with unstable angina     Plan  • Lifestyle modifications discussed include medication compliance and regular exercise    Subjective - Objective  • There is a history of past MI  • There has been a previous stent procedure using CARLENE  • Current antiplatelet therapy includes aspirin 81 mg and clopidogrel 75 mg  • The patient qualifies for cardiac rehabilitation, but has not been referred for system reasons        Heart Failure  Assessment  • NYHA class III-A - There is limitation of  physical activity. The patient is comfortable at rest, but ordinary activity causes fatigue, palpitations or shortness of breath.  • ACE inhibitor prescribed  • Beta blocker prescribed  • Diuretics not prescribed for medical reasons  • Angiotensin receptor blocker (ARB) not prescribed for medical reasons  • Left ventricular function is mildly reduced by qualitative assessment    Plan  • The heart failure care plan was discussed with the patient today including: continuing the current program    Subjective/Objective    • Physical exam findings negative for rales, peripheral edema, elevated JVP, S3 gallop, S4 gallop, hepatomegaly and ascites.

## 2018-06-14 RX ORDER — LISINOPRIL 5 MG/1
TABLET ORAL
Qty: 90 TABLET | Refills: 4 | Status: SHIPPED | OUTPATIENT
Start: 2018-06-14 | End: 2019-07-18 | Stop reason: SDUPTHER

## 2018-07-16 ENCOUNTER — TELEPHONE (OUTPATIENT)
Dept: CARDIOLOGY | Facility: CLINIC | Age: 83
End: 2018-07-16

## 2018-07-16 NOTE — TELEPHONE ENCOUNTER
Danielle called stating pt having chest pains, sharp, left side, Pain rate 9 or 10. Pt also fatigued, off balanced, very lt. Headed. Spouse states pt not feeling well at all. Please advise! Thanks, Mary

## 2018-07-17 NOTE — TELEPHONE ENCOUNTER
Spouse notified to take pt to ER. Spouse states pt feeling well this morning but if symptoms return will take to ER. Thanks, Mary

## 2018-07-18 ENCOUNTER — APPOINTMENT (OUTPATIENT)
Dept: NUCLEAR MEDICINE | Facility: HOSPITAL | Age: 83
End: 2018-07-18

## 2018-07-18 ENCOUNTER — HOSPITAL ENCOUNTER (OUTPATIENT)
Facility: HOSPITAL | Age: 83
Setting detail: OBSERVATION
Discharge: HOME | End: 2018-07-18
Attending: EMERGENCY MEDICINE | Admitting: EMERGENCY MEDICINE

## 2018-07-18 ENCOUNTER — APPOINTMENT (OUTPATIENT)
Dept: GENERAL RADIOLOGY | Facility: HOSPITAL | Age: 83
End: 2018-07-18

## 2018-07-18 VITALS
HEART RATE: 62 BPM | HEIGHT: 69 IN | BODY MASS INDEX: 28.76 KG/M2 | OXYGEN SATURATION: 100 % | RESPIRATION RATE: 16 BRPM | WEIGHT: 194.2 LBS | TEMPERATURE: 97.6 F | DIASTOLIC BLOOD PRESSURE: 80 MMHG | SYSTOLIC BLOOD PRESSURE: 132 MMHG

## 2018-07-18 DIAGNOSIS — R07.9 EXERTIONAL CHEST PAIN: Primary | ICD-10-CM

## 2018-07-18 DIAGNOSIS — Z86.79 HISTORY OF CORONARY ARTERY DISEASE: ICD-10-CM

## 2018-07-18 LAB
ALBUMIN SERPL-MCNC: 4.2 G/DL (ref 3.5–5.2)
ALBUMIN/GLOB SERPL: 1.4 G/DL
ALP SERPL-CCNC: 93 U/L (ref 39–117)
ALT SERPL W P-5'-P-CCNC: 16 U/L (ref 1–41)
ANION GAP SERPL CALCULATED.3IONS-SCNC: 12.7 MMOL/L
AST SERPL-CCNC: 19 U/L (ref 1–40)
BASOPHILS # BLD AUTO: 0.04 10*3/MM3 (ref 0–0.2)
BASOPHILS NFR BLD AUTO: 0.5 % (ref 0–1.5)
BH CV STRESS COMMENTS STAGE 1: NORMAL
BH CV STRESS DOSE REGADENOSON STAGE 1: 0.4
BH CV STRESS DURATION MIN STAGE 1: 0
BH CV STRESS DURATION SEC STAGE 1: 10
BH CV STRESS PROTOCOL 1: NORMAL
BH CV STRESS RECOVERY BP: NORMAL MMHG
BH CV STRESS RECOVERY HR: 70 BPM
BH CV STRESS STAGE 1: 1
BILIRUB SERPL-MCNC: 0.2 MG/DL (ref 0.1–1.2)
BUN BLD-MCNC: 24 MG/DL (ref 8–23)
BUN/CREAT SERPL: 19.8 (ref 7–25)
CALCIUM SPEC-SCNC: 9.6 MG/DL (ref 8.6–10.5)
CHLORIDE SERPL-SCNC: 99 MMOL/L (ref 98–107)
CO2 SERPL-SCNC: 27.3 MMOL/L (ref 22–29)
CREAT BLD-MCNC: 1.21 MG/DL (ref 0.76–1.27)
DEPRECATED RDW RBC AUTO: 42.4 FL (ref 37–54)
EOSINOPHIL # BLD AUTO: 0.23 10*3/MM3 (ref 0–0.7)
EOSINOPHIL NFR BLD AUTO: 2.9 % (ref 0.3–6.2)
ERYTHROCYTE [DISTWIDTH] IN BLOOD BY AUTOMATED COUNT: 13.5 % (ref 11.5–14.5)
GFR SERPL CREATININE-BSD FRML MDRD: 57 ML/MIN/1.73
GLOBULIN UR ELPH-MCNC: 3 GM/DL
GLUCOSE BLD-MCNC: 97 MG/DL (ref 65–99)
HCT VFR BLD AUTO: 37.3 % (ref 40.4–52.2)
HGB BLD-MCNC: 12.3 G/DL (ref 13.7–17.6)
IMM GRANULOCYTES # BLD: 0.02 10*3/MM3 (ref 0–0.03)
IMM GRANULOCYTES NFR BLD: 0.3 % (ref 0–0.5)
LV EF NUC BP: 50 %
LYMPHOCYTES # BLD AUTO: 3.16 10*3/MM3 (ref 0.9–4.8)
LYMPHOCYTES NFR BLD AUTO: 40.4 % (ref 19.6–45.3)
MAGNESIUM SERPL-MCNC: 2.2 MG/DL (ref 1.6–2.4)
MAXIMAL PREDICTED HEART RATE: 135 BPM
MCH RBC QN AUTO: 28.2 PG (ref 27–32.7)
MCHC RBC AUTO-ENTMCNC: 33 G/DL (ref 32.6–36.4)
MCV RBC AUTO: 85.6 FL (ref 79.8–96.2)
MONOCYTES # BLD AUTO: 0.83 10*3/MM3 (ref 0.2–1.2)
MONOCYTES NFR BLD AUTO: 10.6 % (ref 5–12)
NEUTROPHILS # BLD AUTO: 3.57 10*3/MM3 (ref 1.9–8.1)
NEUTROPHILS NFR BLD AUTO: 45.6 % (ref 42.7–76)
PERCENT MAX PREDICTED HR: 57.04 %
PLATELET # BLD AUTO: 189 10*3/MM3 (ref 140–500)
PMV BLD AUTO: 10.5 FL (ref 6–12)
POTASSIUM BLD-SCNC: 4.6 MMOL/L (ref 3.5–5.2)
PROT SERPL-MCNC: 7.2 G/DL (ref 6–8.5)
RBC # BLD AUTO: 4.36 10*6/MM3 (ref 4.6–6)
SODIUM BLD-SCNC: 139 MMOL/L (ref 136–145)
STRESS BASELINE BP: NORMAL MMHG
STRESS BASELINE HR: 60 BPM
STRESS PERCENT HR: 67 %
STRESS POST PEAK BP: NORMAL MMHG
STRESS POST PEAK HR: 77 BPM
STRESS TARGET HR: 115 BPM
TROPONIN T SERPL-MCNC: <0.01 NG/ML (ref 0–0.03)
TROPONIN T SERPL-MCNC: <0.01 NG/ML (ref 0–0.03)
WBC NRBC COR # BLD: 7.83 10*3/MM3 (ref 4.5–10.7)

## 2018-07-18 PROCEDURE — 84484 ASSAY OF TROPONIN QUANT: CPT | Performed by: PHYSICIAN ASSISTANT

## 2018-07-18 PROCEDURE — 93017 CV STRESS TEST TRACING ONLY: CPT

## 2018-07-18 PROCEDURE — 83735 ASSAY OF MAGNESIUM: CPT | Performed by: PHYSICIAN ASSISTANT

## 2018-07-18 PROCEDURE — 85025 COMPLETE CBC W/AUTO DIFF WBC: CPT | Performed by: PHYSICIAN ASSISTANT

## 2018-07-18 PROCEDURE — 94799 UNLISTED PULMONARY SVC/PX: CPT

## 2018-07-18 PROCEDURE — 99235 HOSP IP/OBS SAME DATE MOD 70: CPT | Performed by: INTERNAL MEDICINE

## 2018-07-18 PROCEDURE — 84484 ASSAY OF TROPONIN QUANT: CPT | Performed by: INTERNAL MEDICINE

## 2018-07-18 PROCEDURE — 99285 EMERGENCY DEPT VISIT HI MDM: CPT

## 2018-07-18 PROCEDURE — G0378 HOSPITAL OBSERVATION PER HR: HCPCS

## 2018-07-18 PROCEDURE — 25010000002 REGADENOSON 0.4 MG/5ML SOLUTION: Performed by: INTERNAL MEDICINE

## 2018-07-18 PROCEDURE — 93005 ELECTROCARDIOGRAM TRACING: CPT

## 2018-07-18 PROCEDURE — 93005 ELECTROCARDIOGRAM TRACING: CPT | Performed by: EMERGENCY MEDICINE

## 2018-07-18 PROCEDURE — 94640 AIRWAY INHALATION TREATMENT: CPT

## 2018-07-18 PROCEDURE — 93005 ELECTROCARDIOGRAM TRACING: CPT | Performed by: INTERNAL MEDICINE

## 2018-07-18 PROCEDURE — 0 TECHNETIUM SESTAMIBI: Performed by: INTERNAL MEDICINE

## 2018-07-18 PROCEDURE — 78452 HT MUSCLE IMAGE SPECT MULT: CPT

## 2018-07-18 PROCEDURE — 93010 ELECTROCARDIOGRAM REPORT: CPT | Performed by: INTERNAL MEDICINE

## 2018-07-18 PROCEDURE — 80053 COMPREHEN METABOLIC PANEL: CPT | Performed by: PHYSICIAN ASSISTANT

## 2018-07-18 PROCEDURE — A9500 TC99M SESTAMIBI: HCPCS | Performed by: INTERNAL MEDICINE

## 2018-07-18 PROCEDURE — 93018 CV STRESS TEST I&R ONLY: CPT | Performed by: INTERNAL MEDICINE

## 2018-07-18 PROCEDURE — 71045 X-RAY EXAM CHEST 1 VIEW: CPT

## 2018-07-18 PROCEDURE — 93016 CV STRESS TEST SUPVJ ONLY: CPT | Performed by: INTERNAL MEDICINE

## 2018-07-18 PROCEDURE — 78452 HT MUSCLE IMAGE SPECT MULT: CPT | Performed by: INTERNAL MEDICINE

## 2018-07-18 RX ORDER — ISOSORBIDE MONONITRATE 30 MG/1
30 TABLET, EXTENDED RELEASE ORAL
Status: DISCONTINUED | OUTPATIENT
Start: 2018-07-18 | End: 2018-07-18 | Stop reason: HOSPADM

## 2018-07-18 RX ORDER — ISOSORBIDE MONONITRATE 30 MG/1
30 TABLET, EXTENDED RELEASE ORAL
Qty: 30 TABLET | Refills: 6 | Status: SHIPPED | OUTPATIENT
Start: 2018-07-18 | End: 2019-07-18 | Stop reason: SDUPTHER

## 2018-07-18 RX ORDER — ASPIRIN 81 MG/1
81 TABLET ORAL DAILY
Status: DISCONTINUED | OUTPATIENT
Start: 2018-07-19 | End: 2018-07-18 | Stop reason: HOSPADM

## 2018-07-18 RX ORDER — CARVEDILOL 3.12 MG/1
3.12 TABLET ORAL EVERY 12 HOURS SCHEDULED
Status: DISCONTINUED | OUTPATIENT
Start: 2018-07-18 | End: 2018-07-18 | Stop reason: HOSPADM

## 2018-07-18 RX ORDER — LISINOPRIL 5 MG/1
5 TABLET ORAL
Status: DISCONTINUED | OUTPATIENT
Start: 2018-07-18 | End: 2018-07-18 | Stop reason: HOSPADM

## 2018-07-18 RX ORDER — SODIUM CHLORIDE 0.9 % (FLUSH) 0.9 %
10 SYRINGE (ML) INJECTION AS NEEDED
Status: DISCONTINUED | OUTPATIENT
Start: 2018-07-18 | End: 2018-07-18 | Stop reason: HOSPADM

## 2018-07-18 RX ORDER — ATORVASTATIN CALCIUM 10 MG/1
10 TABLET, FILM COATED ORAL NIGHTLY
Status: DISCONTINUED | OUTPATIENT
Start: 2018-07-18 | End: 2018-07-18 | Stop reason: HOSPADM

## 2018-07-18 RX ORDER — CARVEDILOL 3.12 MG/1
3.12 TABLET ORAL EVERY 12 HOURS SCHEDULED
Qty: 60 TABLET | Refills: 6 | Status: SHIPPED | OUTPATIENT
Start: 2018-07-18 | End: 2019-11-06 | Stop reason: SDUPTHER

## 2018-07-18 RX ORDER — CLOPIDOGREL BISULFATE 75 MG/1
75 TABLET ORAL ONCE
Status: COMPLETED | OUTPATIENT
Start: 2018-07-18 | End: 2018-07-18

## 2018-07-18 RX ORDER — ASPIRIN 325 MG
325 TABLET ORAL ONCE
Status: COMPLETED | OUTPATIENT
Start: 2018-07-18 | End: 2018-07-18

## 2018-07-18 RX ORDER — CLOPIDOGREL BISULFATE 75 MG/1
75 TABLET ORAL DAILY
Status: DISCONTINUED | OUTPATIENT
Start: 2018-07-19 | End: 2018-07-18 | Stop reason: HOSPADM

## 2018-07-18 RX ORDER — NITROGLYCERIN 0.4 MG/1
0.4 TABLET SUBLINGUAL
Status: DISCONTINUED | OUTPATIENT
Start: 2018-07-18 | End: 2018-07-18 | Stop reason: HOSPADM

## 2018-07-18 RX ORDER — ALBUTEROL SULFATE 2.5 MG/3ML
2.5 SOLUTION RESPIRATORY (INHALATION) EVERY 6 HOURS PRN
Status: DISCONTINUED | OUTPATIENT
Start: 2018-07-18 | End: 2018-07-18 | Stop reason: HOSPADM

## 2018-07-18 RX ADMIN — REGADENOSON 0.4 MG: 0.08 INJECTION, SOLUTION INTRAVENOUS at 10:45

## 2018-07-18 RX ADMIN — NITROGLYCERIN 0.4 MG: 0.4 TABLET SUBLINGUAL at 01:24

## 2018-07-18 RX ADMIN — NITROGLYCERIN 1 INCH: 20 OINTMENT TOPICAL at 04:21

## 2018-07-18 RX ADMIN — ASPIRIN 325 MG: 325 TABLET ORAL at 01:24

## 2018-07-18 RX ADMIN — CARVEDILOL 3.12 MG: 3.12 TABLET, FILM COATED ORAL at 09:22

## 2018-07-18 RX ADMIN — LISINOPRIL 5 MG: 5 TABLET ORAL at 09:23

## 2018-07-18 RX ADMIN — CLOPIDOGREL 75 MG: 75 TABLET, FILM COATED ORAL at 04:22

## 2018-07-18 RX ADMIN — ALBUTEROL SULFATE 2.5 MG: 2.5 SOLUTION RESPIRATORY (INHALATION) at 06:04

## 2018-07-18 RX ADMIN — ALBUTEROL SULFATE 2.5 MG: 2.5 SOLUTION RESPIRATORY (INHALATION) at 12:23

## 2018-07-18 RX ADMIN — TECHNETIUM TC 99M SESTAMIBI 1 DOSE: 1 INJECTION INTRAVENOUS at 09:10

## 2018-07-18 RX ADMIN — TECHNETIUM TC 99M SESTAMIBI 1 DOSE: 1 INJECTION INTRAVENOUS at 10:51

## 2018-07-18 RX ADMIN — NITROGLYCERIN 0.4 MG: 0.4 TABLET SUBLINGUAL at 01:32

## 2018-07-18 NOTE — ED PROVIDER NOTES
Pt presents to ED c/o intermittent episodes of chest pain since 3 days ago. Pt states he is not currently having pain at this time. Pt hx of MI in April 2018.    Upon exam, pt is A&O x3, in no distress, he has well healed surgical scars on L shoulder from recent shoulder surgery, his lungs are CTAB, and has no chest wall tenderness.    Plan to review imaging and labs prior to disposition. Pt understands and agrees with the plan, all questions answered.    MD ATTESTATION NOTE    The ADA and I have discussed this patient's history, physical exam, and treatment plan.  I have reviewed the documentation and personally had a face to face interaction with the patient. I affirm the documentation and agree with the treatment and plan.  The attached note describes my personal findings.    Documentation assistance provided by abdiel Latif for Dr. Crowder.  Information recorded by the scribe was done at my direction and has been verified and validated by me.       Siri Latif  07/18/18 0157       Idris Crowder MD  07/18/18 0740

## 2018-07-18 NOTE — ED TRIAGE NOTES
"Pt to ED triage with c/o chest pain x a few days intermittent. Pt states \"it got pretty bad tonight, it just stays on my L chest.\"  Pt also reports MI in April.  "

## 2018-07-18 NOTE — H&P
Patient Name: Yoni Bonner Jr.  :1933  85 y.o.    Date of Admission: 2018  Date of Consultation:  18  Encounter Provider: Melissa Templeton MD  Place of Service: [facility] CARDIOLOGY  Referring Provider: Makenzie Diaz MD  Patient Care Team:  Patric Cameron DO as PCP - General (Family Medicine)      Chief complaint: Chest pain    History of Present Illness:    Mr Bonner is an 85 year old patient of mine with a history of COPD, hypertension, hyperlipidemia, rheumatic fever, carotid disease, ischemic cardiomyopathy, coronary artery disease status post inferior myocardial infarction and drug eluting stent placement of his RCA in 2018, tobacco use who presents with chest pain.    He reports that he started having sharp left sided chest pain staring 3 days ago.  Symptoms last anywhere from a few minutes to up to 30 minutes.  Improved the following day but recurred yesterday and was more intense than before.  Associated with some dyspnea and nausea.  Had been outside after mowing the lawn on a riding  yesterday.  He was at rest when symptoms started.  No pain like this before.  With his inferior myocardial infarction, he had presented with syncope.  Reports a recurrent episode last night that resolved with nitroglycerin.            Previous Cardiac Testing   Cardiac Catheterization 2018  FINDINGS:     1. HEMODYNAMICS:  RA -/9/8, RV 44/11, PA 44/22/36, PCWP 28/32/23, /27, /78/107.  The cardiac output was 2.8 L/min.     2. LEFT VENTRICULOGRAPHY: EF 30%, 3+ mitral regurgitation.  Global hypokinesis with inferior akinesis.     3. CORONARY ANGIOGRAPHY: Right dominant system, three-vessel coronary disease.  The left main is normal.  The proximal LAD has 50% stenosis.  The mid LAD has diffuse 70% stenosis.  The apical LAD has diffuse 70% stenosis.  The circumflex is totally occluded proximally.  The RCA is very large.  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  Severely tortuous.  There is an ulcerated 90% stenosis with significant thrombus burden proximally.     4. INTERVENTIONAL COMMENTS:  Successful thrombectomy/CARLENE of the proximal RCA was performed as described above.     SUMMARY: Acute inferior STEMI complicated by ischemic MR and borderline shock.     RECOMMENDATIONS: Routine post-MI care.  Dual anti-platelet therapy for at least one year.  This recommendation may change based on anticoagulation needs for AF.          ECHO 04/09/2018  · Left ventricular systolic function is mildly decreased. Calculated EF = 41.9%. Estimated EF was in agreement with the calculated EF. Estimated EF = 42%. The left ventricular cavity is borderline dilated. Left ventricular wall thickness is consistent with mild concentric hypertrophy. Left ventricular diastolic dysfunction is noted (grade I a w/high LAP) consistent with impaired relaxation.  · Left atrial volume is borderline increased.  · The aortic valve is abnormal in structure. There is moderate thickening of the aortic valve. Mild aortic valve regurgitation is present. Mild aortic valve stenosis is present.  · Moderate MAC is present. Mild-to-moderate mitral valve regurgitation is present.  · Wall motion abnormalities as noted below    Stress test 03/13/2017  · Left ventricular function is normal. Calculated EF = 56%.  · Left ventricular diastolic dysfunction is noted (grade I) consistent with impaired relaxation.  · Normal stress echo with no significant echocardiographic evidence for myocardial ischemia.  · The patient had no chest pain with dobutamine infusion.  · At peak dobutamine infusion, the patient had 1 mm of ST elevation in leads II and V6. This resolved in recovery.       Past Medical History:   Diagnosis Date   • Bronchitis    • Carotid arterial disease (CMS/HCC)    • Chronic bronchitis (CMS/HCC)    • COPD (chronic obstructive pulmonary disease) (CMS/HCC)    • Gout    •  Hypercholesteremia    • Hyperlipidemia    • Hypertension    • Ischemic cardiomyopathy    • Rheumatic fever    • Syncopal episodes    • Tobacco use        Past Surgical History:   Procedure Laterality Date   • CARDIAC CATHETERIZATION     • CARDIAC CATHETERIZATION N/A 4/8/2018    Procedure: Left Heart Cath;  Surgeon: Nicolas Jerez MD;  Location:  [facility] CATH INVASIVE LOCATION;  Service: Cardiovascular   • CARDIAC CATHETERIZATION  4/8/2018    Procedure: Percutaneous Manual Thrombectomy;  Surgeon: Nicolas Jerez MD;  Location:  [facility] CATH INVASIVE LOCATION;  Service: Cardiovascular   • CARDIAC CATHETERIZATION N/A 4/8/2018    Procedure: Stent CARLENE coronary;  Surgeon: Nicolas Jerez MD;  Location:  [facility] CATH INVASIVE LOCATION;  Service: Cardiovascular   • CARDIAC CATHETERIZATION N/A 4/8/2018    Procedure: Right Heart Cath;  Surgeon: Nicolas Jerez MD;  Location:  [facility] CATH INVASIVE LOCATION;  Service: Cardiovascular   • EYE SURGERY      cataract surg   • HAND SURGERY     • HERNIA REPAIR     • KNEE SURGERY     • TESTICLE SURGERY           Prior to Admission medications    Medication Sig Start Date End Date Taking? Authorizing Provider   aspirin 81 MG EC tablet Take 1 tablet by mouth Daily. 4/11/18  Yes Melissa Templeton MD   atorvastatin (LIPITOR) 10 MG tablet Take 1 tablet by mouth Every Night. 4/10/18  Yes Melissa Templeton MD   clopidogrel (PLAVIX) 75 MG tablet Take 1 tablet by mouth Daily. 4/11/18  Yes Melissa Templeton MD   lisinopril (PRINIVIL,ZESTRIL) 5 MG tablet TAKE ONE TABLET BY MOUTH DAILY 6/14/18  Yes Melissa Templeton MD   metoprolol tartrate (LOPRESSOR) 25 MG tablet Take 1 tablet by mouth Every 12 (Twelve) Hours. 4/10/18  Yes Melissa Templeton MD   naproxen (NAPROSYN) 500 MG tablet Take 500 mg by mouth 2 (Two) Times a Day With Meals.    Historical Provider, MD       Allergies   Allergen Reactions   • No Known Drug Allergy        Social History     Social History   • Marital status:      Social History Main Topics   • Smoking  status: Never Smoker   • Smokeless tobacco: Current User     Types: Chew      Comment: chewing tobacco since 4 yearrs old   • Alcohol use No   • Drug use: No   • Sexual activity: Defer     Other Topics Concern   • Not on file       Family History   Problem Relation Age of Onset   • Heart disease Father    • Hypertension Father    • Stroke Father    • Diabetes Sister    • Cancer Brother    • Heart disease Brother    • Hypertension Brother    • No Known Problems Maternal Grandmother    • No Known Problems Maternal Grandfather    • No Known Problems Paternal Grandmother    • No Known Problems Paternal Grandfather        REVIEW OF SYSTEMS:   All systems reviewed.  Pertinent positives identified in HPI.  All other systems are negative.      Objective:     Vitals:    07/18/18 0500 07/18/18 0604 07/18/18 0607 07/18/18 0808   BP:    145/66   Pulse:  57 57 64   Resp:  18 18 16   Temp:    97.8 °F (36.6 °C)   TempSrc:    Oral   SpO2:  98% 100% 100%   Weight: 88.1 kg (194 lb 3.2 oz)      Height:         Body mass index is 28.68 kg/m².    General Appearance:    Alert, cooperative, in no acute distress   Head:    Normocephalic, without obvious abnormality, atraumatic   Eyes:            Lids and lashes normal, conjunctivae and sclerae normal, no   icterus, no pallor, corneas clear, PERRLA   Ears:    Ears appear intact with no abnormalities noted   Neck:   No adenopathy, supple, trachea midline, no thyromegaly, no   carotid bruit, no JVD   Lungs:     Clear to auscultation,respirations regular, even and unlabored    Heart:    Regular rhythm and normal rate, normal S1 and S2, no murmur, no gallop, no rub, no click   Chest Wall:    No abnormalities observed   Abdomen:     Normal bowel sounds, no masses, no organomegaly, soft        non-tender, non-distended, no guarding, no rebound  tenderness   Extremities:   Moves all extremities well, no edema, no cyanosis, no redness   Pulses:   Pulses palpable and equal bilaterally. Normal  radial, carotid, femoral, dorsalis pedis and posterior tibial pulses bilaterally. Normal abdominal aorta   Skin:  Psychiatric:   No bleeding, bruising or rash    Alert and oriented x 3, normal mood and affect   Lab Review:       Results from last 7 days  Lab Units 07/18/18  0131   SODIUM mmol/L 139   POTASSIUM mmol/L 4.6   CHLORIDE mmol/L 99   CO2 mmol/L 27.3   BUN mg/dL 24*   CREATININE mg/dL 1.21   CALCIUM mg/dL 9.6   BILIRUBIN mg/dL 0.2   ALK PHOS U/L 93   ALT (SGPT) U/L 16   AST (SGOT) U/L 19   GLUCOSE mg/dL 97       Results from last 7 days  Lab Units 07/18/18  0747 07/18/18  0131   TROPONIN T ng/mL <0.010 <0.010       Results from last 7 days  Lab Units 07/18/18  0131   WBC 10*3/mm3 7.83   HEMOGLOBIN g/dL 12.3*   HEMATOCRIT % 37.3*   PLATELETS 10*3/mm3 189           Results from last 7 days  Lab Units 07/18/18  0131   MAGNESIUM mg/dL 2.2                 Telemetry      EKG    EKG Baseline 05/08/2018      I personally viewed and interpreted the patient's EKG/Telemetry data.        Assessment and Plan:       1. Chest pain.  Atypical sounding but with his history including diffuse LAD disease noted at the time of his last cath he needs further evaluation.  Troponins and EKG ok.   2. Coronary artery disease.  Status post inferior myocardial infarction and drug eluting stent placement of proximal RCA in 4/2018.  Has known diffuse LAD stenosis and  of left circumflex artery that I opted to treat medically.    3.  ICMP.  EF 42% following MI.   4.  Hypertension.  Fair control.   5.  Tobacco use    -  Will proceed with stress test today.   -  Will switch metoprolol to carvedilol.  Continue lisinopril.     Melissa Templeton MD  07/18/18  8:56 AM    ADDENDUM:  Stress test with inferior and lateral infarct but no ischemia.  Will discharge home with addition of isosorbide mononitrate.  Follow up with me in 1 month.

## 2018-07-18 NOTE — ED PROVIDER NOTES
"Subjective   Pt is a 85 y.o. male who presents with a hx of MI, stent placement in RCA, HTN, COPD, and tobacco use complaining of intermittent \"sharp\" L sided chest pain that radiates down his L arm which began two days ago. Pt states that the latest episode began while mowing the lawn earlier today and states that his episodes usually last a couple of minutes. Pt rates the severity as a 6/10. Pt admits to a nonproductive cough, SOA, [place], CP on exertion, nausea, and decreased appetite. Pt had an MI, heart cath, and thrombectomy done in 4/2018 by Dr. Templeton. Pt states that this episode feels similar to his previous MI. Pt took his Lopressor today but has not had any of his morning medications including ASA and Plavix. Pt called his cardiologist today PTA, Dr. Templeton, who instructed pt to come to ED.      Duration:  Two days  Onset: sudden  Timing: intermittent episodes  Location: L chest  Radiation: down L arm  Quality: \"sharp\"  Intensity/Severity: moderate 6/10  Progression: unchanged  Associated Symptoms: nonproductive cough, SOA, CP on exertion, nausea, decreased appetite  Aggravating Factors: exertion  Alleviating Factors: none  Previous Episodes: Pt states that this episode feels similar to his previous MI.  Treatment before arrival: Pt took his Lopressor today but has not had any of his morning medications including ASA and Plavix. Pt called his cardiologist today PTA, Dr. Templeton, who instructed pt to come to ED.         History provided by:  Patient      Review of Systems   Constitutional: Positive for appetite change (decreased). Negative for activity change and fever.   HENT: Negative for congestion and sore throat.    Eyes: Negative.    Respiratory: Positive for cough (nonproductive) and shortness of breath.    Cardiovascular: Positive for chest pain. Negative for leg swelling.   Gastrointestinal: Positive for nausea. Negative for abdominal pain, diarrhea and vomiting.   Endocrine: Negative.    Genitourinary: " Negative for decreased urine volume and dysuria.   Musculoskeletal: Negative for neck pain.   Skin: Negative for rash and wound.   Allergic/Immunologic: Negative.    Neurological: Negative for weakness, numbness and headaches.   Hematological: Negative.    Psychiatric/Behavioral: Negative.    All other systems reviewed and are negative.      Past Medical History:   Diagnosis Date   • Bronchitis    • Carotid arterial disease (CMS/HCC)    • Chronic bronchitis (CMS/HCC)    • COPD (chronic obstructive pulmonary disease) (CMS/HCC)    • Gout    • Hypercholesteremia    • Hyperlipidemia    • Hypertension    • Ischemic cardiomyopathy    • Rheumatic fever    • Syncopal episodes    • Tobacco use        Allergies   Allergen Reactions   • No Known Drug Allergy        Past Surgical History:   Procedure Laterality Date   • CARDIAC CATHETERIZATION     • CARDIAC CATHETERIZATION N/A 4/8/2018    Procedure: Left Heart Cath;  Surgeon: Nicolas Jerez MD;  Location: [facility] CATH INVASIVE LOCATION;  Service: Cardiovascular   • CARDIAC CATHETERIZATION  4/8/2018    Procedure: Percutaneous Manual Thrombectomy;  Surgeon: Nicolas Jerez MD;  Location: [facility] CATH INVASIVE LOCATION;  Service: Cardiovascular   • CARDIAC CATHETERIZATION N/A 4/8/2018    Procedure: Stent CARLENE coronary;  Surgeon: Nicolas Jerez MD;  Location: [facility] CATH INVASIVE LOCATION;  Service: Cardiovascular   • CARDIAC CATHETERIZATION N/A 4/8/2018    Procedure: Right Heart Cath;  Surgeon: Nicolas Jerez MD;  Location: [facility] CATH INVASIVE LOCATION;  Service: Cardiovascular   • EYE SURGERY      cataract surg   • HAND SURGERY     • HERNIA REPAIR     • KNEE SURGERY     • TESTICLE SURGERY         Family History   Problem Relation Age of Onset   • Heart disease Father    • Hypertension Father    • Stroke Father    • Diabetes Sister    • Cancer Brother    • Heart disease Brother    • Hypertension Brother    • No Known Problems Maternal Grandmother    • No Known Problems Maternal Grandfather    • No  Known Problems Paternal Grandmother    • No Known Problems Paternal Grandfather        Social History     Social History   • Marital status:      Social History Main Topics   • Smoking status: Never Smoker   • Smokeless tobacco: Current User     Types: Chew      Comment: chewing tobacco since 4 yearrs old   • Alcohol use No   • Drug use: No   • Sexual activity: Defer     Other Topics Concern   • Not on file         Objective   Physical Exam   Constitutional: He is oriented to person, place, and time. He appears well-developed and well-nourished. No distress.   Communication difficult due pt hearing loss.    HENT:   Head: Normocephalic and atraumatic.   Eyes: EOM are normal.   Neck: Normal range of motion.   Cardiovascular: Normal rate and regular rhythm.    Murmur (slight systolic murmur best heard at [place] Pt) heard.  Pulmonary/Chest: Effort normal and breath sounds normal. No respiratory distress. He has no wheezes. He has no rales.   Abdominal: Soft. Bowel sounds are normal. There is no tenderness. There is no guarding.   Musculoskeletal: Normal range of motion. He exhibits no edema.   Neurological: He is alert and oriented to person, place, and time.   Skin: Skin is warm and dry.   Nursing note and vitals reviewed.    Lab Results:   Lab Results (last 24 hours)     Procedure Component Value Units Date/Time    CBC & Differential [[id number]] Collected:  07/18/18 0131    Specimen:  Blood Updated:  07/18/18 0145    Narrative:       The following orders were created for panel order CBC & Differential.  Procedure                               Abnormality         Status                     ---------                               -----------         ------                     CBC Auto Differential[[id number]]        Abnormal            Final result                 Please view results for these tests on the individual orders.    Comprehensive Metabolic Panel [[id number]]  (Abnormal) Collected:  07/18/18 0131     Specimen:  Blood Updated:  07/18/18 0237     Glucose 97 mg/dL      BUN 24 (H) mg/dL      Creatinine 1.21 mg/dL      Sodium 139 mmol/L      Potassium 4.6 mmol/L      Chloride 99 mmol/L      CO2 27.3 mmol/L      Calcium 9.6 mg/dL      Total Protein 7.2 g/dL      Albumin 4.20 g/dL      ALT (SGPT) 16 U/L      AST (SGOT) 19 U/L      Alkaline Phosphatase 93 U/L      Total Bilirubin 0.2 mg/dL      eGFR Non African Amer 57 (L) mL/min/1.73      Globulin 3.0 gm/dL      A/G Ratio 1.4 g/dL      BUN/Creatinine Ratio 19.8     Anion Gap 12.7 mmol/L     Narrative:       The MDRD GFR formula is only valid for adults with stable renal function between ages 18 and 70.    Troponin [[id number]]  (Normal) Collected:  07/18/18 0131    Specimen:  Blood Updated:  07/18/18 0237     Troponin T <0.010 ng/mL     Narrative:       Troponin T Reference Ranges:  Less than 0.03 ng/mL:    Negative for AMI  0.03 to 0.09 ng/mL:      Indeterminant for AMI  Greater than 0.09 ng/mL: Positive for AMI    CBC Auto Differential [[id number]]  (Abnormal) Collected:  07/18/18 0131    Specimen:  Blood Updated:  07/18/18 0145     WBC 7.83 10*3/mm3      RBC 4.36 (L) 10*6/mm3      Hemoglobin 12.3 (L) g/dL      Hematocrit 37.3 (L) %      MCV 85.6 fL      MCH 28.2 pg      MCHC 33.0 g/dL      RDW 13.5 %      RDW-SD 42.4 fl      MPV 10.5 fL      Platelets 189 10*3/mm3      Neutrophil % 45.6 %      Lymphocyte % 40.4 %      Monocyte % 10.6 %      Eosinophil % 2.9 %      Basophil % 0.5 %      Immature Grans % 0.3 %      Neutrophils, Absolute 3.57 10*3/mm3      Lymphocytes, Absolute 3.16 10*3/mm3      Monocytes, Absolute 0.83 10*3/mm3      Eosinophils, Absolute 0.23 10*3/mm3      Basophils, Absolute 0.04 10*3/mm3      Immature Grans, Absolute 0.02 10*3/mm3     Magnesium [[id number]]  (Normal) Collected:  07/18/18 0131    Specimen:  Blood Updated:  07/18/18 0237     Magnesium 2.2 mg/dL         Radiology:  XR Chest 1 View   Final Result       Poor inspiration without active  disease identified,       This report was finalized on 7/18/2018 1:33 AM by Barrie Beltran M.D.            Procedures  EKG           EKG time: 1257  Rhythm/Rate: NSR, 65  P waves and VT: borderline prolonged VT interval  QRS, axis: nml   ST and T waves: nonspecific T waves changes in the lateral leads  QT interval unremarkable     Interpreted Contemporaneously by me, independently viewed  Changed compared to prior 5/2018, improved from previous MI     ED Course     0110: Ordered EKG, blood work, troponin, and CXR for further evaluation.      0123: Ordered magnesium for further evaluation. Ordered Nitro and ASA for pt's CP.     0217: Per RN, after two Nitro, pt is experiencing complete relief.     0221: Discussed pt's case with Dr. Crowder, attending, who agrees with plan of care.     0228: Pt rechecked. Pt resting comfortably and asymptomatic. Notified pt lab results including negative troponin and unremarkable imaging results. Notified pt of plan to consult with cardiology to assess disposition. Pt understands and agrees with plan, all questions answered.     0344: Discussed pt's case with Dr. Diaz, cardiology, who agrees to admit pt.        MDM  Number of Diagnoses or Management Options  Exertional chest pain:   History of coronary artery disease:      Amount and/or Complexity of Data Reviewed  Clinical lab tests: ordered and reviewed (Troponin negative.)  Tests in the radiology section of CPT®: reviewed and ordered (CXR is negative.)  Tests in the medicine section of CPT®: reviewed and ordered (See EKG note.)  Decide to obtain previous medical records or to obtain history from someone other than the patient: yes  Review and summarize past medical records: yes (4/8/18-4/10/18 Pt ws admitted for MI by Dr. Jerez, and was dx Inferior myocardial infarction status post drug eluting stent placement of RCA, coronary artery disease, ischemic cardiomyopathy, EF 42%. Pt had a heart cath done and thrombectomy done at that time of  the R coronary artery.)  Discuss the patient with other providers: yes (Dr. Crowder, attending; Dr. Diaz, cardiology)  Independent visualization of images, tracings, or specimens: yes    Patient Progress  Patient progress: stable    Results were reviewed/discussed with the patient and they were also made aware of online access. Pt also made aware that some labs, such as cultures, will not be resulted during ER visit and follow up with PMD is necessary.     Final diagnoses:   Exertional chest pain   History of coronary artery disease     DISPOSITION:  ADMISSION    Discussed treatment plan and reason for admission with pt/family and admitting physician.  Pt/family voiced understanding of the plan for admission for further testing/treatment as needed.     Documentation assistance provided by abdiel Vides for Ari Fowler PA-C Information recorded by the abdiel was done at my direction and has been verified and validated by me.     Britney Vides  07/18/18 0418       ABENA Serra III  07/18/18 0615       Adama Fowler III, PA  07/18/18 0618       Adama Fowler III, PA  07/18/18 0643       Adama Fowler III, PA  07/18/18 0643

## 2018-08-08 ENCOUNTER — HOSPITAL ENCOUNTER (OUTPATIENT)
Dept: CARDIOLOGY | Facility: HOSPITAL | Age: 83
Discharge: HOME | End: 2018-08-08
Attending: INTERNAL MEDICINE | Admitting: INTERNAL MEDICINE

## 2018-08-08 ENCOUNTER — OFFICE VISIT (OUTPATIENT)
Dept: CARDIOLOGY | Facility: CLINIC | Age: 83
End: 2018-08-08

## 2018-08-08 VITALS
BODY MASS INDEX: 26.36 KG/M2 | WEIGHT: 178 LBS | DIASTOLIC BLOOD PRESSURE: 74 MMHG | HEART RATE: 67 BPM | SYSTOLIC BLOOD PRESSURE: 160 MMHG | HEIGHT: 69 IN

## 2018-08-08 VITALS
DIASTOLIC BLOOD PRESSURE: 70 MMHG | HEART RATE: 72 BPM | HEIGHT: 69 IN | SYSTOLIC BLOOD PRESSURE: 124 MMHG | OXYGEN SATURATION: 98 % | BODY MASS INDEX: 28.73 KG/M2 | WEIGHT: 194 LBS

## 2018-08-08 DIAGNOSIS — I10 ESSENTIAL HYPERTENSION: ICD-10-CM

## 2018-08-08 DIAGNOSIS — Z72.0 TOBACCO ABUSE: ICD-10-CM

## 2018-08-08 DIAGNOSIS — E11.59 TYPE 2 DIABETES MELLITUS WITH OTHER CIRCULATORY COMPLICATION, WITHOUT LONG-TERM CURRENT USE OF INSULIN (HCC): ICD-10-CM

## 2018-08-08 DIAGNOSIS — Z95.5 HISTORY OF CORONARY ARTERY STENT PLACEMENT: ICD-10-CM

## 2018-08-08 DIAGNOSIS — I25.10 CORONARY ARTERY DISEASE INVOLVING NATIVE CORONARY ARTERY OF NATIVE HEART WITHOUT ANGINA PECTORIS: Primary | ICD-10-CM

## 2018-08-08 DIAGNOSIS — I25.5 ISCHEMIC CARDIOMYOPATHY: ICD-10-CM

## 2018-08-08 LAB
AORTIC DIMENSIONLESS INDEX: 0.4 (DI)
ASCENDING AORTA: 9 CM
BH CV ECHO MEAS - ACS: 1.6 CM
BH CV ECHO MEAS - AO MAX PG (FULL): 25.1 MMHG
BH CV ECHO MEAS - AO MAX PG: 28.8 MMHG
BH CV ECHO MEAS - AO MEAN PG (FULL): 11.5 MMHG
BH CV ECHO MEAS - AO MEAN PG: 13 MMHG
BH CV ECHO MEAS - AO ROOT AREA (BSA CORRECTED): 1.7
BH CV ECHO MEAS - AO ROOT AREA: 9.2 CM^2
BH CV ECHO MEAS - AO ROOT DIAM: 3.4 CM
BH CV ECHO MEAS - AO V2 MAX: 268.5 CM/SEC
BH CV ECHO MEAS - AO V2 MEAN: 171.1 CM/SEC
BH CV ECHO MEAS - AO V2 VTI: 61.4 CM
BH CV ECHO MEAS - AVA(I,A): 1.1 CM^2
BH CV ECHO MEAS - AVA(I,D): 1.1 CM^2
BH CV ECHO MEAS - AVA(V,A): 1.1 CM^2
BH CV ECHO MEAS - AVA(V,D): 1.1 CM^2
BH CV ECHO MEAS - BSA(HAYCOCK): 2.1 M^2
BH CV ECHO MEAS - BSA: 2 M^2
BH CV ECHO MEAS - BZI_BMI: 28.6 KILOGRAMS/M^2
BH CV ECHO MEAS - BZI_METRIC_HEIGHT: 175.3 CM
BH CV ECHO MEAS - BZI_METRIC_WEIGHT: 88 KG
BH CV ECHO MEAS - CONTRAST EF (2CH): 57.7 ML/M^2
BH CV ECHO MEAS - CONTRAST EF 4CH: 61.2 ML/M^2
BH CV ECHO MEAS - EDV(CUBED): 378 ML
BH CV ECHO MEAS - EDV(MOD-SP2): 78 ML
BH CV ECHO MEAS - EDV(MOD-SP4): 103 ML
BH CV ECHO MEAS - EDV(TEICH): 274.8 ML
BH CV ECHO MEAS - EF(CUBED): 43.5 %
BH CV ECHO MEAS - EF(MOD-BP): 60 %
BH CV ECHO MEAS - EF(MOD-SP2): 57.7 %
BH CV ECHO MEAS - EF(MOD-SP4): 61.2 %
BH CV ECHO MEAS - EF(TEICH): 35 %
BH CV ECHO MEAS - ESV(CUBED): 213.6 ML
BH CV ECHO MEAS - ESV(MOD-SP2): 33 ML
BH CV ECHO MEAS - ESV(MOD-SP4): 40 ML
BH CV ECHO MEAS - ESV(TEICH): 178.5 ML
BH CV ECHO MEAS - FS: 17.3 %
BH CV ECHO MEAS - IVS/LVPW: 1.1
BH CV ECHO MEAS - IVSD: 0.89 CM
BH CV ECHO MEAS - LAT PEAK E' VEL: 7 CM/SEC
BH CV ECHO MEAS - LV DIASTOLIC VOL/BSA (35-75): 50.5 ML/M^2
BH CV ECHO MEAS - LV MASS(C)D: 282.3 GRAMS
BH CV ECHO MEAS - LV MASS(C)DI: 138.4 GRAMS/M^2
BH CV ECHO MEAS - LV MAX PG: 3.7 MMHG
BH CV ECHO MEAS - LV MEAN PG: 1.9 MMHG
BH CV ECHO MEAS - LV SYSTOLIC VOL/BSA (12-30): 19.6 ML/M^2
BH CV ECHO MEAS - LV V1 MAX: 96.4 CM/SEC
BH CV ECHO MEAS - LV V1 MEAN: 64.5 CM/SEC
BH CV ECHO MEAS - LV V1 VTI: 22.3 CM
BH CV ECHO MEAS - LVIDD: 7.2 CM
BH CV ECHO MEAS - LVIDS: 6 CM
BH CV ECHO MEAS - LVLD AP2: 6 CM
BH CV ECHO MEAS - LVLD AP4: 7.4 CM
BH CV ECHO MEAS - LVLS AP2: 5 CM
BH CV ECHO MEAS - LVLS AP4: 6.4 CM
BH CV ECHO MEAS - LVOT AREA (M): 3.1 CM^2
BH CV ECHO MEAS - LVOT AREA: 3.1 CM^2
BH CV ECHO MEAS - LVOT DIAM: 2 CM
BH CV ECHO MEAS - LVPWD: 0.82 CM
BH CV ECHO MEAS - MED PEAK E' VEL: 5 CM/SEC
BH CV ECHO MEAS - MR MAX PG: 100.4 MMHG
BH CV ECHO MEAS - MR MAX VEL: 501 CM/SEC
BH CV ECHO MEAS - MV A DUR: 0.13 SEC
BH CV ECHO MEAS - MV A MAX VEL: 135.3 CM/SEC
BH CV ECHO MEAS - MV DEC SLOPE: 305.9 CM/SEC^2
BH CV ECHO MEAS - MV DEC TIME: 0.27 SEC
BH CV ECHO MEAS - MV E MAX VEL: 82.5 CM/SEC
BH CV ECHO MEAS - MV E/A: 0.61
BH CV ECHO MEAS - MV MAX PG: 8.5 MMHG
BH CV ECHO MEAS - MV MEAN PG: 2.7 MMHG
BH CV ECHO MEAS - MV P1/2T MAX VEL: 83.2 CM/SEC
BH CV ECHO MEAS - MV P1/2T: 79.7 MSEC
BH CV ECHO MEAS - MV V2 MAX: 146 CM/SEC
BH CV ECHO MEAS - MV V2 MEAN: 74 CM/SEC
BH CV ECHO MEAS - MV V2 VTI: 41.2 CM
BH CV ECHO MEAS - MVA P1/2T LCG: 2.6 CM^2
BH CV ECHO MEAS - MVA(P1/2T): 2.8 CM^2
BH CV ECHO MEAS - MVA(VTI): 1.7 CM^2
BH CV ECHO MEAS - PA ACC TIME: 0.1 SEC
BH CV ECHO MEAS - PA MAX PG (FULL): 0.59 MMHG
BH CV ECHO MEAS - PA MAX PG: 3.2 MMHG
BH CV ECHO MEAS - PA PR(ACCEL): 34.6 MMHG
BH CV ECHO MEAS - PA V2 MAX: 89.6 CM/SEC
BH CV ECHO MEAS - PULM A REVS DUR: 0.11 SEC
BH CV ECHO MEAS - PULM A REVS VEL: 16.1 CM/SEC
BH CV ECHO MEAS - PULM DIAS VEL: 23 CM/SEC
BH CV ECHO MEAS - PULM S/D: 1.2
BH CV ECHO MEAS - PULM SYS VEL: 28 CM/SEC
BH CV ECHO MEAS - PVA(V,A): 3.3 CM^2
BH CV ECHO MEAS - PVA(V,D): 3.3 CM^2
BH CV ECHO MEAS - QP/QS: 0.85
BH CV ECHO MEAS - RV MAX PG: 2.6 MMHG
BH CV ECHO MEAS - RV MEAN PG: 1.2 MMHG
BH CV ECHO MEAS - RV V1 MAX: 81 CM/SEC
BH CV ECHO MEAS - RV V1 MEAN: 49.3 CM/SEC
BH CV ECHO MEAS - RV V1 VTI: 16.1 CM
BH CV ECHO MEAS - RVOT AREA: 3.6 CM^2
BH CV ECHO MEAS - RVOT DIAM: 2.1 CM
BH CV ECHO MEAS - SI(AO): 276.4 ML/M^2
BH CV ECHO MEAS - SI(CUBED): 80.6 ML/M^2
BH CV ECHO MEAS - SI(LVOT): 33.5 ML/M^2
BH CV ECHO MEAS - SI(MOD-SP2): 22.1 ML/M^2
BH CV ECHO MEAS - SI(MOD-SP4): 30.9 ML/M^2
BH CV ECHO MEAS - SI(TEICH): 47.2 ML/M^2
BH CV ECHO MEAS - SV(AO): 563.5 ML
BH CV ECHO MEAS - SV(CUBED): 164.4 ML
BH CV ECHO MEAS - SV(LVOT): 68.4 ML
BH CV ECHO MEAS - SV(MOD-SP2): 45 ML
BH CV ECHO MEAS - SV(MOD-SP4): 63 ML
BH CV ECHO MEAS - SV(RVOT): 58.4 ML
BH CV ECHO MEAS - SV(TEICH): 96.3 ML
BH CV ECHO MEAS - TAPSE (>1.6): 1.7 CM2
BH CV ECHO MEAS - TR MAX VEL: 173.7 CM/SEC
BH CV ECHO MEASUREMENTS AVERAGE E/E' RATIO: 13.75
BH CV XLRA - RV BASE: 2.9 CM
BH CV XLRA - TDI S': 9 CM/SEC
LEFT ATRIUM VOLUME INDEX: 29 ML/M2
MAXIMAL PREDICTED HEART RATE: 135 BPM
SINUS: 1.7 CM
STJ: 2.9 CM
STRESS TARGET HR: 115 BPM

## 2018-08-08 PROCEDURE — 93000 ELECTROCARDIOGRAM COMPLETE: CPT | Performed by: INTERNAL MEDICINE

## 2018-08-08 PROCEDURE — 25010000002 PERFLUTREN (DEFINITY) 8.476 MG IN SODIUM CHLORIDE 0.9 % 10 ML INJECTION: Performed by: INTERNAL MEDICINE

## 2018-08-08 PROCEDURE — 99213 OFFICE O/P EST LOW 20 MIN: CPT | Performed by: INTERNAL MEDICINE

## 2018-08-08 PROCEDURE — 93306 TTE W/DOPPLER COMPLETE: CPT | Performed by: INTERNAL MEDICINE

## 2018-08-08 PROCEDURE — 93306 TTE W/DOPPLER COMPLETE: CPT

## 2018-08-08 RX ADMIN — PERFLUTREN 1.5 ML: 6.52 INJECTION, SUSPENSION INTRAVENOUS at 13:36

## 2018-08-08 NOTE — PROGRESS NOTES
Subjective:     Encounter Date:08/08/2018      Patient ID: Yoni Bonner Jr. is a 85 y.o. male.    Chief Complaint:  History of Present Illness    This is an 85-year-old man with a history of hypertension, chronic bronchitis, dyslipidemia, tobacco use, coronary artery disease status post inferior myocardial infarction and drug eluting stent placement of his proximal RCA, who presents for follow up.      I saw the patient back on 02/04/2015 when he presented to establish care.  At that time the patient reported a history of recurrent syncopal episodes.  I felt that his symptoms were due to orthostatic syncope at that time.  He had a history of rheumatic fever and a thickened aortic valve along with carotid artery disease so I sat him up for both an echocardiogram and a carotid ultrasound around that time.  His echocardiogram was performed on 03/11/2015 and revealed normal left ventricular systolic function and wall motion, an ejection fraction of 54%, grade IA diastolic dysfunction and no significant valvular disease.  His carotid ultrasound revealed moderate bilateral stenosis.  He was last seen in follow up in 3/2017 for pre-operative evaluation.  At that time he reported some worsening of his baseline dyspnea on exertion though we proceeded with a dobutamine stress test that was negative for ischemia and the patient proceeded with his shoulder surgery.     The patient was seen last in the hospital when he was admitted on 4/8/2018 with an inferior myocardial infarction.  The patient was accompanying his wife to the [facility] emergency room where he suffered a near syncopal episode associated with hypotension and bradycardia.  An EKG was performed at that time showing acute inferior ST elevations.  He was transferred to [facility] and was found to have a thrombotic subtotal occlusion of his proximal RCA for which she underwent thrombectomy and drug-eluting stent placement.  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  Following his catheterization he underwent an echocardiogram that showed mildly depressed left ventricular systolic function with an ejection fraction of 42%, inferior wall motion abnormalities, mild to moderate mitral regurgitation, mild aortic stenosis and regurgitation, and grade 1A diastolic dysfunction.       When he saw the patient last in 5/2018 he was doing well.  The plan was to see him back in 3 months with a repeat echocardiogram.  He returned back to the hospital on 7/2018 for chest pain.  He described it as old sharp left-sided chest pain lasting anywhere from a few minutes to 30 minutes.  Symptoms persisted with dyspnea and nausea.  Into being in admitted to the hospital at that time.  He underwent a stress test during that admission showed an inferior lateral infarct but no evidence of ischemia.  I started him on isosorbide mononitrate and discharged him home.    Today presents for 1 month follow-up.  He had a repeat echocardiogram today that showed normalization of his left ventricular function with an EF of 60%, mild aortic stenosis, mild mitral regurgitation, and grade 1 diastolic dysfunction.  He denies any further chest pain.  Continues to have dyspnea on exertion which she has wife believe has progressed some.  He reports some occasional palpitations which are chronic and unchanged.  He denies any PND or orthopnea, near-syncope or syncope, or lower extremity edema.       Review of Systems   Constitution: Negative for weakness and malaise/fatigue.   HENT: Negative for hearing loss, hoarse voice, nosebleeds and sore throat.    Eyes: Negative for pain.   Cardiovascular: Positive for dyspnea on exertion and palpitations. Negative for chest pain, claudication, cyanosis, irregular heartbeat, leg swelling, near-syncope, orthopnea, paroxysmal nocturnal dyspnea and syncope.   Respiratory: Negative for shortness of  breath and snoring.    Endocrine: Negative for cold intolerance, heat intolerance, polydipsia, polyphagia and polyuria.   Skin: Negative for itching and rash.   Musculoskeletal: Negative for arthritis, falls, joint pain, joint swelling, muscle cramps, muscle weakness and myalgias.   Gastrointestinal: Negative for constipation, diarrhea, dysphagia, heartburn, hematemesis, hematochezia, melena, nausea and vomiting.   Genitourinary: Negative for frequency, hematuria and hesitancy.   Neurological: Negative for excessive daytime sleepiness, dizziness, headaches, light-headedness and numbness.   Psychiatric/Behavioral: Negative for depression. The patient is not nervous/anxious.           Current Outpatient Prescriptions:   •  aspirin 81 MG EC tablet, Take 1 tablet by mouth Daily., Disp: 30 tablet, Rfl: 5  •  atorvastatin (LIPITOR) 10 MG tablet, Take 1 tablet by mouth Every Night., Disp: 30 tablet, Rfl: 5  •  carvedilol (COREG) 3.125 MG tablet, Take 1 tablet by mouth Every 12 (Twelve) Hours., Disp: 60 tablet, Rfl: 6  •  clopidogrel (PLAVIX) 75 MG tablet, Take 1 tablet by mouth Daily., Disp: 30 tablet, Rfl: 11  •  isosorbide mononitrate (IMDUR) 30 MG 24 hr tablet, Take 1 tablet by mouth Daily., Disp: 30 tablet, Rfl: 6  •  lisinopril (PRINIVIL,ZESTRIL) 5 MG tablet, TAKE ONE TABLET BY MOUTH DAILY, Disp: 90 tablet, Rfl: 4  •  naproxen (NAPROSYN) 500 MG tablet, Take 500 mg by mouth 2 (Two) Times a Day With Meals., Disp: , Rfl:     Past Medical History:   Diagnosis Date   • Bronchitis    • CAD (coronary artery disease)    • Carotid arterial disease (CMS/HCC)    • Chronic bronchitis (CMS/HCC)    • COPD (chronic obstructive pulmonary disease) (CMS/HCC)    • Exertional chest pain    • Gout    • Hypercholesteremia    • Hyperlipidemia    • Hypertension    • Ischemic cardiomyopathy    • Rheumatic fever    • Syncopal episodes    • Tobacco use      Past Surgical History:   Procedure Laterality Date   • CARDIAC CATHETERIZATION     •  "CARDIAC CATHETERIZATION N/A 4/8/2018    Procedure: Left Heart Cath;  Surgeon: Nicolas Jerez MD;  Location:  [facility] CATH INVASIVE LOCATION;  Service: Cardiovascular   • CARDIAC CATHETERIZATION  4/8/2018    Procedure: Percutaneous Manual Thrombectomy;  Surgeon: Nicolas Jerez MD;  Location:  [facility] CATH INVASIVE LOCATION;  Service: Cardiovascular   • CARDIAC CATHETERIZATION N/A 4/8/2018    Procedure: Stent CARLENE coronary;  Surgeon: Nicolas Jerez MD;  Location:  [facility] CATH INVASIVE LOCATION;  Service: Cardiovascular   • CARDIAC CATHETERIZATION N/A 4/8/2018    Procedure: Right Heart Cath;  Surgeon: Nicolas Jerez MD;  Location:  [facility] CATH INVASIVE LOCATION;  Service: Cardiovascular   • EYE SURGERY      cataract surg   • HAND SURGERY     • HERNIA REPAIR     • KNEE SURGERY     • TESTICLE SURGERY       Family History   Problem Relation Age of Onset   • Heart disease Father    • Hypertension Father    • Stroke Father    • Diabetes Sister    • Cancer Brother    • Heart disease Brother    • Hypertension Brother    • No Known Problems Maternal Grandmother    • No Known Problems Maternal Grandfather    • No Known Problems Paternal Grandmother    • No Known Problems Paternal Grandfather      Social History   Substance Use Topics   • Smoking status: Never Smoker   • Smokeless tobacco: Current User     Types: Chew      Comment: chewing tobacco since 4 yearrs old   • Alcohol use No         ECG 12 Lead  Date/Time: 8/8/2018 3:58 PM  Performed by: DB MCCLELLAN  Authorized by: DB MCCLELLAN   Comparison: compared with previous ECG   Similar to previous ECG  Rhythm: sinus rhythm  Ectopy: trigeminy               Objective:         Visit Vitals  /74 (BP Location: Right arm, Patient Position: Sitting)   Pulse 67   Ht 175.3 cm (69\")   Wt 80.7 kg (178 lb)   BMI 26.29 kg/m²          Physical Exam   Constitutional: He is oriented to person, place, and time. He appears well-developed and well-nourished.   HENT:   Head: Normocephalic and atraumatic. "   Neck: No JVD present. Carotid bruit is not present.   Cardiovascular: Normal rate, regular rhythm, S1 normal and S2 normal.  Exam reveals no gallop.    No murmur heard.  Pulses:       Radial pulses are 2+ on the right side, and 2+ on the left side.   No bilateral lower extremity edema   Pulmonary/Chest: Effort normal and breath sounds normal.   Abdominal: Soft. Normal appearance.   Neurological: He is alert and oriented to person, place, and time.   Skin: Skin is warm, dry and intact.   Psychiatric: He has a normal mood and affect.       Lab Review:       Assessment:          Diagnosis Plan   1. Coronary artery disease involving native coronary artery of native heart without angina pectoris     2. History of coronary artery stent placement     3. Essential hypertension     4. Type 2 diabetes mellitus with other circulatory complication, without long-term current use of insulin (CMS/[facility])     5. Tobacco abuse            Plan:       1.  Coronary artery disease.  Status post prior right coronary artery stent placement.  He had diffuse disease of his LAD and a chronic total occlusion of his left circumflex artery at that time.  Recent stress test showed prior infarct but no ischemia.  As of now he is doing well on medical management.  Will continue the same.  2.  Mild ischemic cardiomyopathy.  Previous EF was 42%.  This is now normalized to 60%.  Continue current medical management.    3.  Hypertension.  Well he elevated in the office today but his wife reports that it's been normally well-controlled.  Will monitor for now.  4.  Diabetes mellitus type 2  5.  Tobacco use    We'll plan on seeing the patient back again in 3 months.    Coronary Artery Disease  Assessment  • The patient has no angina  • There is a new diagnosis of stable angina in the past 12 months  • The patient is having symptoms consistent with unstable angina     Plan  • Lifestyle modifications discussed include medication compliance and regular  exercise    Subjective - Objective  • There is a history of past MI  • There has been a previous stent procedure using CARLENE  • Current antiplatelet therapy includes aspirin 81 mg and clopidogrel 75 mg  • The patient qualifies for cardiac rehabilitation, but has not been referred for system reasons                1.57

## 2018-12-06 ENCOUNTER — HOSPITAL ENCOUNTER (OUTPATIENT)
Facility: HOSPITAL | Age: 83
Setting detail: OBSERVATION
Discharge: HOME | End: 2018-12-06
Attending: INTERNAL MEDICINE | Admitting: INTERNAL MEDICINE

## 2018-12-06 VITALS
DIASTOLIC BLOOD PRESSURE: 69 MMHG | BODY MASS INDEX: 28.76 KG/M2 | TEMPERATURE: 97.8 F | SYSTOLIC BLOOD PRESSURE: 113 MMHG | RESPIRATION RATE: 18 BRPM | WEIGHT: 194.2 LBS | HEART RATE: 69 BPM | OXYGEN SATURATION: 96 % | HEIGHT: 69 IN

## 2018-12-06 PROBLEM — R07.9 CHEST PAIN: Status: ACTIVE | Noted: 2018-12-06

## 2018-12-06 LAB
ANION GAP SERPL CALCULATED.3IONS-SCNC: 12.2 MMOL/L
BUN BLD-MCNC: 26 MG/DL (ref 8–23)
BUN/CREAT SERPL: 24.5 (ref 7–25)
CALCIUM SPEC-SCNC: 9.2 MG/DL (ref 8.6–10.5)
CHLORIDE SERPL-SCNC: 101 MMOL/L (ref 98–107)
CO2 SERPL-SCNC: 24.8 MMOL/L (ref 22–29)
CREAT BLD-MCNC: 1.06 MG/DL (ref 0.76–1.27)
GFR SERPL CREATININE-BSD FRML MDRD: 66 ML/MIN/1.73
GLUCOSE BLD-MCNC: 108 MG/DL (ref 65–99)
GLUCOSE BLDC GLUCOMTR-MCNC: 102 MG/DL (ref 70–130)
POTASSIUM BLD-SCNC: 4.9 MMOL/L (ref 3.5–5.2)
SODIUM BLD-SCNC: 138 MMOL/L (ref 136–145)
TROPONIN T SERPL-MCNC: <0.01 NG/ML (ref 0–0.03)

## 2018-12-06 PROCEDURE — 93005 ELECTROCARDIOGRAM TRACING: CPT | Performed by: INTERNAL MEDICINE

## 2018-12-06 PROCEDURE — 93010 ELECTROCARDIOGRAM REPORT: CPT | Performed by: INTERNAL MEDICINE

## 2018-12-06 PROCEDURE — G0378 HOSPITAL OBSERVATION PER HR: HCPCS

## 2018-12-06 PROCEDURE — 99235 HOSP IP/OBS SAME DATE MOD 70: CPT | Performed by: INTERNAL MEDICINE

## 2018-12-06 PROCEDURE — 84484 ASSAY OF TROPONIN QUANT: CPT | Performed by: INTERNAL MEDICINE

## 2018-12-06 PROCEDURE — 82962 GLUCOSE BLOOD TEST: CPT

## 2018-12-06 PROCEDURE — 80048 BASIC METABOLIC PNL TOTAL CA: CPT | Performed by: INTERNAL MEDICINE

## 2018-12-06 RX ORDER — CLOPIDOGREL BISULFATE 75 MG/1
75 TABLET ORAL DAILY
Status: DISCONTINUED | OUTPATIENT
Start: 2018-12-06 | End: 2018-12-06 | Stop reason: HOSPADM

## 2018-12-06 RX ORDER — LISINOPRIL 5 MG/1
5 TABLET ORAL DAILY
Status: DISCONTINUED | OUTPATIENT
Start: 2018-12-06 | End: 2018-12-06 | Stop reason: HOSPADM

## 2018-12-06 RX ORDER — SODIUM CHLORIDE 0.9 % (FLUSH) 0.9 %
3-10 SYRINGE (ML) INJECTION AS NEEDED
Status: DISCONTINUED | OUTPATIENT
Start: 2018-12-06 | End: 2018-12-06 | Stop reason: HOSPADM

## 2018-12-06 RX ORDER — ATORVASTATIN CALCIUM 10 MG/1
10 TABLET, FILM COATED ORAL NIGHTLY
Status: DISCONTINUED | OUTPATIENT
Start: 2018-12-06 | End: 2018-12-06 | Stop reason: HOSPADM

## 2018-12-06 RX ORDER — SODIUM CHLORIDE 0.9 % (FLUSH) 0.9 %
3 SYRINGE (ML) INJECTION EVERY 12 HOURS SCHEDULED
Status: DISCONTINUED | OUTPATIENT
Start: 2018-12-06 | End: 2018-12-06 | Stop reason: HOSPADM

## 2018-12-06 RX ORDER — ASPIRIN 81 MG/1
81 TABLET ORAL DAILY
Status: DISCONTINUED | OUTPATIENT
Start: 2018-12-06 | End: 2018-12-06 | Stop reason: HOSPADM

## 2018-12-06 RX ORDER — ISOSORBIDE MONONITRATE 30 MG/1
30 TABLET, EXTENDED RELEASE ORAL
Status: DISCONTINUED | OUTPATIENT
Start: 2018-12-06 | End: 2018-12-06 | Stop reason: HOSPADM

## 2018-12-06 RX ORDER — CARVEDILOL 3.12 MG/1
3.12 TABLET ORAL EVERY 12 HOURS SCHEDULED
Status: DISCONTINUED | OUTPATIENT
Start: 2018-12-06 | End: 2018-12-06 | Stop reason: HOSPADM

## 2018-12-06 RX ADMIN — LISINOPRIL 5 MG: 5 TABLET ORAL at 11:14

## 2018-12-06 RX ADMIN — ASPIRIN 81 MG: 81 TABLET, DELAYED RELEASE ORAL at 11:14

## 2018-12-06 RX ADMIN — ISOSORBIDE MONONITRATE 30 MG: 30 TABLET ORAL at 11:14

## 2018-12-06 RX ADMIN — CARVEDILOL 3.12 MG: 3.12 TABLET, FILM COATED ORAL at 11:13

## 2018-12-06 RX ADMIN — CLOPIDOGREL 75 MG: 75 TABLET, FILM COATED ORAL at 11:20

## 2018-12-06 NOTE — PLAN OF CARE
Problem: Patient Care Overview  Goal: Discharge Needs Assessment  Outcome: Ongoing (interventions implemented as appropriate)   12/06/18 1353   Discharge Needs Assessment   Readmission Within the Last 30 Days no previous admission in last 30 days   Patient/Family Anticipates Transition to home   Transportation Concerns car, none   Transportation Anticipated agency   Anticipated Changes Related to Illness none;inability to care for self   Equipment Needed After Discharge none   Disability   Equipment Currently Used at Home none     Goal: Interprofessional Rounds/Family Conf  Outcome: Outcome(s) achieved Date Met: 12/06/18 12/06/18 1353   Interdisciplinary Rounds/Family Conf   Participants ;family;nursing;patient;pharmacy

## 2018-12-06 NOTE — H&P
Patient Name: Yoni Bonner Jr.  :1933  85 y.o.    Date of Admission: 2018  Date of Consultation:  18  Encounter Provider: Melissa Templeton MD  Place of Service: [facility] CARDIOLOGY  Referring Provider: Makenzie Diaz MD  Patient Care Team:  Patric Cameron DO as PCP - General (Family Medicine)      Chief complaint: chest pain    History of Present Illness:  Mr. Bonner is an 85 year old man with history of hypertension, chronic bronchitis, chronic lightheadedness, dysplipidemia, and coronary artery disease status post drug eluting stent placement of his RCA for an inferior myocardial infarction in 2018 who is admitted with chest pain.     The patient and his wife report that he was sitting and watching TV when he bent over to tie his shoes and had a sudden onset of chest discomfort associated with diaphoresis and dyspnea.  The pain lasted several hours and finally resolved after he got to the emergency room about 9 hours later.  He can not recall if this is similar to prior chest pain symptoms.  He does not recall chest pain with his MI since that presentation was syncope.  Following resolution of his pain he has remained pain free.  He and his wife report they have been under a lot of stress because they have been helping with their daughter who has been diagnosed with throat cancer.     He was admitted on 18 for acute inferior STEMI and went to cardiac cath lab. Cath showed EF 30%, 3+ mitral regurgitation, normal left main, 50% proximal LAD, 70% mid LAD, 70% diffuse apical LAD, totally occluded proximal circumflex, and an ulcerated 90% stenosis with significant thrombus burden in his proximal RCA.  He underwent successful thrombectomy and drug-eluting stent placement to the RCA lesion.  His LAD disease was managed medically.   He was admitted in 2018 for chest pain. He had a stress test showing inferior and lateral infarct but no ischemia. He was  discharged home on increased Imdur.He was last seen in August 2018 and reported no chest pain. He had an echocardiogram which showed EF 60%, moderately dilated LV, grade I diastolic dysfunction, heavily calcified aortic valve leaflets with mild stenosis (mean gradient 13mmHg), and mild mitral insufficiency.       Cardiac Testing:  Echocardiogram 8/8/18  Interpretation Summary     · The left ventricular cavity is moderately dilated.  · Left ventricular systolic function is normal. Calculated EF = 60%  · Left ventricular diastolic dysfunction is noted (grade I) consistent with impaired relaxation. There  · Normal right ventricular cavity size and systolic function noted.  · Left atrial cavity size is mildly dilated.  · The aortic valve leaflets are heavily calcified  · Mild aortic valve stenosis is present.  · Aortic valve mean pressure gradient is 13 mmHg.  · Mild mitral valve regurgitation is present  · There is no evidence of pericardial effusion.     Myocardial Perfusion Stress Test 7/18/18  Interpretation Summary     · Left ventricular ejection fraction is normal (Calculated EF = 50%).  · Myocardial perfusion imaging indicates a large-sized infarct located in the inferior wall and lateral wall with no significant ischemia noted.  · Impressions are consistent with a low risk study.       Echocardiogram 4/9/18  Interpretation Summary     · Left ventricular systolic function is mildly decreased. Calculated EF = 41.9%. Estimated EF was in agreement with the calculated EF. Estimated EF = 42%. The left ventricular cavity is borderline dilated. Left ventricular wall thickness is consistent with mild concentric hypertrophy. Left ventricular diastolic dysfunction is noted (grade I a w/high LAP) consistent with impaired relaxation.  · Left atrial volume is borderline increased.  · The aortic valve is abnormal in structure. There is moderate thickening of the aortic valve. Mild aortic valve regurgitation is present. Mild  aortic valve stenosis is present.  · Moderate MAC is present. Mild-to-moderate mitral valve regurgitation is present.  · Wall motion abnormalities as noted below     Cardiac Catheterization 4/8/18        Stress Echocardiogram 3/13/17    Past Medical History:   Diagnosis Date   • Bronchitis    • CAD (coronary artery disease)    • Carotid arterial disease (CMS/HCC)    • Chronic bronchitis (CMS/HCC)    • COPD (chronic obstructive pulmonary disease) (CMS/HCC)    • Exertional chest pain    • Gout    • Hypercholesteremia    • Hyperlipidemia    • Hypertension    • Ischemic cardiomyopathy    • Rheumatic fever    • Syncopal episodes    • Tobacco use        Past Surgical History:   Procedure Laterality Date   • CARDIAC CATHETERIZATION     • EYE SURGERY      cataract surg   • HAND SURGERY     • HERNIA REPAIR     • KNEE SURGERY     • TESTICLE SURGERY           Prior to Admission medications    Medication Sig Start Date End Date Taking? Authorizing Provider   aspirin 81 MG EC tablet Take 1 tablet by mouth Daily. 4/11/18   Melissa Templeton MD   atorvastatin (LIPITOR) 10 MG tablet Take 1 tablet by mouth Every Night. 4/10/18   Melissa Templeton MD   carvedilol (COREG) 3.125 MG tablet Take 1 tablet by mouth Every 12 (Twelve) Hours. 7/18/18   Melissa Templeton MD   clopidogrel (PLAVIX) 75 MG tablet Take 1 tablet by mouth Daily. 4/11/18   Melissa Templeton MD   isosorbide mononitrate (IMDUR) 30 MG 24 hr tablet Take 1 tablet by mouth Daily. 7/18/18   Melissa Templeton MD   lisinopril (PRINIVIL,ZESTRIL) 5 MG tablet TAKE ONE TABLET BY MOUTH DAILY 6/14/18   Melissa Templeton MD   naproxen (NAPROSYN) 500 MG tablet Take 500 mg by mouth 2 (Two) Times a Day With Meals.    Provider, MD Miguelina       Allergies   Allergen Reactions   • No Known Drug Allergy        Social History     Socioeconomic History   • Marital status:      Spouse name: Not on file   • Number of children: Not on file   • Years of education: Not on file   • Highest  "education level: Not on file   Tobacco Use   • Smoking status: Never Smoker   • Smokeless tobacco: Current User     Types: Chew   • Tobacco comment: chewing tobacco since 4 yearrs old   Substance and Sexual Activity   • Alcohol use: No   • Drug use: No   • Sexual activity: Defer       Family History   Problem Relation Age of Onset   • Heart disease Father    • Hypertension Father    • Stroke Father    • Diabetes Sister    • Cancer Brother    • Heart disease Brother    • Hypertension Brother    • No Known Problems Maternal Grandmother    • No Known Problems Maternal Grandfather    • No Known Problems Paternal Grandmother    • No Known Problems Paternal Grandfather        REVIEW OF SYSTEMS:   All systems reviewed.  Pertinent positives identified in HPI.  All other systems are negative.      Objective:     Vitals:    12/06/18 0400 12/06/18 0750   BP: 153/73 120/67   BP Location: Right arm Right arm   Patient Position: Lying Lying   Pulse: 76 64   Resp: 20 18   Temp: 97.6 °F (36.4 °C) 97.6 °F (36.4 °C)   TempSrc: Oral Oral   SpO2: 99% 98%   Weight: 88.1 kg (194 lb 3.2 oz)    Height: 175.3 cm (69\")      Body mass index is 28.68 kg/m².    General Appearance:    Alert, cooperative, in no acute distress   Head:    Normocephalic, without obvious abnormality, atraumatic   Eyes:            Lids and lashes normal, conjunctivae and sclerae normal, no   icterus, no pallor, corneas clear, PERRLA   Ears:    Ears appear intact with no abnormalities noted   Neck:   No adenopathy, supple, trachea midline, no thyromegaly, no   carotid bruit, no JVD   Lungs:     Clear to auscultation,respirations regular, even and unlabored    Heart:    Regular rhythm and normal rate, normal S1 and S2, no murmur, no gallop, no rub, no click   Chest Wall:    No abnormalities observed   Abdomen:     Normal bowel sounds, no masses, no organomegaly, soft        non-tender, non-distended, no guarding, no rebound  tenderness   Extremities:   Moves all " extremities well, no edema, no cyanosis, no redness   Pulses:   Pulses palpable and equal bilaterally. Normal radial, carotid, femoral, dorsalis pedis and posterior tibial pulses bilaterally. Normal abdominal aorta   Skin:  Psychiatric:   No bleeding, bruising or rash    Alert and oriented x 3, normal mood and affect   Lab Review:                                  Telemetry:        EKG:    I personally viewed and interpreted the patient's EKG/Telemetry data.        Assessment and Plan:       1. Chest pain.  No acute EKG changes with old lateral T wave inversions and normal troponins following a prolonged episode of pain.  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  These have been medically managed.  A recent stress test in 7/2018 showed inferior infarct but no ischemia.    2. Hypertension  3. Hyperlipidemia  4. Tobacco use    -  Discussed options with patient and wife at length.  With a single episode of pain and stable EKG and normal troponins I am not sure his pain was ischemia related.  Could be due to recent increased stress.  We discussed options of continued observation and medical management versus proceeding with a repeat cath.  At this point we decided on the former.    Will discharge home on current medical management with plans to keep his follow up with me on 12/19.    Melissa Templeton MD  12/06/18  8:14 AM

## 2018-12-06 NOTE — PLAN OF CARE
Problem: Patient Care Overview  Goal: Plan of Care Review  Outcome: Ongoing (interventions implemented as appropriate)   12/06/18 0425   Coping/Psychosocial   Plan of Care Reviewed With patient;spouse   Plan of Care Review   Progress no change   OTHER   Outcome Summary VS as charted. No c/o pain or discomfort at this time. Will con't to monitor.      Goal: Individualization and Mutuality  Outcome: Ongoing (interventions implemented as appropriate)   12/06/18 0425   Individualization   Patient Specific Interventions education

## 2018-12-19 ENCOUNTER — OFFICE VISIT (OUTPATIENT)
Dept: CARDIOLOGY | Facility: CLINIC | Age: 83
End: 2018-12-19

## 2018-12-19 VITALS
HEART RATE: 80 BPM | BODY MASS INDEX: 30.29 KG/M2 | SYSTOLIC BLOOD PRESSURE: 180 MMHG | HEIGHT: 67 IN | WEIGHT: 193 LBS | DIASTOLIC BLOOD PRESSURE: 80 MMHG

## 2018-12-19 DIAGNOSIS — Z96.612 STATUS POST REVERSE TOTAL REPLACEMENT OF LEFT SHOULDER: ICD-10-CM

## 2018-12-19 DIAGNOSIS — I25.10 CORONARY ARTERY DISEASE INVOLVING NATIVE CORONARY ARTERY OF NATIVE HEART WITHOUT ANGINA PECTORIS: Primary | ICD-10-CM

## 2018-12-19 DIAGNOSIS — E11.59 TYPE 2 DIABETES MELLITUS WITH OTHER CIRCULATORY COMPLICATION, WITHOUT LONG-TERM CURRENT USE OF INSULIN (HCC): ICD-10-CM

## 2018-12-19 DIAGNOSIS — Z72.0 TOBACCO ABUSE: ICD-10-CM

## 2018-12-19 DIAGNOSIS — Z95.5 HISTORY OF CORONARY ARTERY STENT PLACEMENT: ICD-10-CM

## 2018-12-19 DIAGNOSIS — I10 ESSENTIAL HYPERTENSION: ICD-10-CM

## 2018-12-19 PROBLEM — I25.5 ISCHEMIC CARDIOMYOPATHY: Status: RESOLVED | Noted: 2018-05-08 | Resolved: 2018-12-19

## 2018-12-19 PROCEDURE — 93000 ELECTROCARDIOGRAM COMPLETE: CPT | Performed by: INTERNAL MEDICINE

## 2018-12-19 PROCEDURE — 99213 OFFICE O/P EST LOW 20 MIN: CPT | Performed by: INTERNAL MEDICINE

## 2018-12-19 NOTE — PROGRESS NOTES
Subjective:     Encounter Date:12/19/2018      Patient ID: Yoni Bonner Jr. is a 85 y.o. male.    Chief Complaint:  History of Present Illness    This is an 85-year-old man with a history of hypertension, chronic bronchitis, dyslipidemia, tobacco use, coronary artery disease status post inferior myocardial infarction and drug eluting stent placement of his proximal RCA, who presents for follow up.      I saw the patient back on 02/04/2015 when he presented to establish care.  At that time the patient reported a history of recurrent syncopal episodes.  I felt that his symptoms were due to orthostatic syncope at that time.  He had a history of rheumatic fever and a thickened aortic valve along with carotid artery disease so I sat him up for both an echocardiogram and a carotid ultrasound around that time.  His echocardiogram was performed on 03/11/2015 and revealed normal left ventricular systolic function and wall motion, an ejection fraction of 54%, grade IA diastolic dysfunction and no significant valvular disease.  His carotid ultrasound revealed moderate bilateral stenosis.  He was last seen in follow up in 3/2017 for pre-operative evaluation.  At that time he reported some worsening of his baseline dyspnea on exertion though we proceeded with a dobutamine stress test that was negative for ischemia and the patient proceeded with his shoulder surgery.     On 4/8/2018 he was admitted with an inferior myocardial infarction.  The patient was accompanying his wife to the [facility] emergency room where he suffered a near syncopal episode associated with hypotension and bradycardia.  An EKG was performed at that time showing acute inferior ST elevations.  He was transferred to [facility] and was found to have a thrombotic subtotal occlusion of his proximal RCA for which she underwent thrombectomy and drug-eluting stent placement.  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  Following his catheterization he underwent an echocardiogram that showed mildly depressed left ventricular systolic function with an ejection fraction of 42%, inferior wall motion abnormalities, mild to moderate mitral regurgitation, mild aortic stenosis and regurgitation, and grade 1A diastolic dysfunction.       In 7/2018 he was readmitted for chest pain.  He described it as old sharp left-sided chest pain lasting anywhere from a few minutes to 30 minutes.  Symptoms persisted with dyspnea and nausea.  Into being in admitted to the hospital at that time.  He underwent a stress test during that admission showed an inferior lateral infarct but no evidence of ischemia.  I started him on isosorbide mononitrate and discharged him home.  In 8/2018 he presented back for hospital follow at which time he was feeling well.  He had a repeat echocardiogram today that showed normalization of his left ventricular function with an EF of 60%, mild aortic stenosis, mild mitral regurgitation, and grade 1 diastolic dysfunction.   No changes were made to his management at that time.    Was admitted on 12/6/2018 with recurrent chest pain.  Patient reported that the pain started when he was watching TV and bent over to tie his shoes.  This pain was associated with diaphoresis and dyspnea.  He could not tell me if the pain was somewhat to his prior symptoms with his myocardial infarction.  The pain lasted several hours and resolved after he got to the emergency room.  After resolution he had no further pain.  His EKGs were unremarkable and troponins were normal.  With no evidence of a myocardial infarction and a single episode of pain that sounded somewhat atypical we decided to continue with medical management at that time.    Today he presents for routine follow-up.  Since his discharge she's had no further chest pain.  He mainly complains of left shoulder pain related to his prior left  shoulder replacement surgery.  He reports that sometime after his surgery that the shoulder replacement seemed to shift downwards.  He's had pain since then in the last 2 or 3 months the pain has been getting worse.  He is a hard time sleeping because the pain and is unable to use the left arm very well due to limited mobility.  If not come back to see his orthopedic surgeon Dr. Duque since 5/2017.  He reports stable dyspnea on exertion and intermittent palpitations.  He reports some lightheadedness with position changes.  His wife reports that his blood pressure was last checked at Dr. Cameron's office and it was reportedly normal.  He denies any PND or orthopnea, near-syncope syncope, or lower extremity edema.    Review of Systems   Constitution: Positive for malaise/fatigue. Negative for weakness.   HENT: Negative for hearing loss, hoarse voice, nosebleeds and sore throat.    Eyes: Negative for pain.   Cardiovascular: Positive for dyspnea on exertion and palpitations. Negative for chest pain, claudication, cyanosis, irregular heartbeat, leg swelling, near-syncope, orthopnea, paroxysmal nocturnal dyspnea and syncope.   Respiratory: Negative for shortness of breath and snoring.    Endocrine: Negative for cold intolerance, heat intolerance, polydipsia, polyphagia and polyuria.   Skin: Negative for itching and rash.   Musculoskeletal: Positive for joint pain. Negative for arthritis, falls, joint swelling, muscle cramps, muscle weakness and myalgias.   Gastrointestinal: Negative for constipation, diarrhea, dysphagia, heartburn, hematemesis, hematochezia, melena, nausea and vomiting.   Genitourinary: Negative for frequency, hematuria and hesitancy.   Neurological: Positive for light-headedness. Negative for excessive daytime sleepiness, dizziness, headaches and numbness.   Psychiatric/Behavioral: Negative for depression. The patient is not nervous/anxious.           Current Outpatient Medications:   •  aspirin 81 MG EC  tablet, Take 1 tablet by mouth Daily., Disp: 30 tablet, Rfl: 5  •  atorvastatin (LIPITOR) 10 MG tablet, Take 1 tablet by mouth Every Night., Disp: 30 tablet, Rfl: 5  •  carvedilol (COREG) 3.125 MG tablet, Take 1 tablet by mouth Every 12 (Twelve) Hours., Disp: 60 tablet, Rfl: 6  •  clopidogrel (PLAVIX) 75 MG tablet, Take 1 tablet by mouth Daily., Disp: 30 tablet, Rfl: 11  •  isosorbide mononitrate (IMDUR) 30 MG 24 hr tablet, Take 1 tablet by mouth Daily., Disp: 30 tablet, Rfl: 6  •  lisinopril (PRINIVIL,ZESTRIL) 5 MG tablet, TAKE ONE TABLET BY MOUTH DAILY, Disp: 90 tablet, Rfl: 4  •  naproxen (NAPROSYN) 500 MG tablet, Take 500 mg by mouth 2 (Two) Times a Day With Meals., Disp: , Rfl:     Past Medical History:   Diagnosis Date   • Bronchitis    • CAD (coronary artery disease)    • Carotid arterial disease (CMS/HCC)    • Chronic bronchitis (CMS/HCC)    • COPD (chronic obstructive pulmonary disease) (CMS/HCC)    • Exertional chest pain    • Gout    • Hypercholesteremia    • Hyperlipidemia    • Hypertension    • Ischemic cardiomyopathy    • Rheumatic fever    • Syncopal episodes    • Tobacco use      Past Surgical History:   Procedure Laterality Date   • CARDIAC CATHETERIZATION     • CARDIAC CATHETERIZATION N/A 4/8/2018    Procedure: Left Heart Cath;  Surgeon: Nicolas Jerez MD;  Location: [facility] INVASIVE LOCATION;  Service: Cardiovascular   • CARDIAC CATHETERIZATION  4/8/2018    Procedure: Percutaneous Manual Thrombectomy;  Surgeon: Nicolas Jerez MD;  Location: [facility] INVASIVE LOCATION;  Service: Cardiovascular   • CARDIAC CATHETERIZATION N/A 4/8/2018    Procedure: Stent CARLENE coronary;  Surgeon: Nicolas Jerez MD;  Location: [facility] CATH INVASIVE LOCATION;  Service: Cardiovascular   • CARDIAC CATHETERIZATION N/A 4/8/2018    Procedure: Right Heart Cath;  Surgeon: Nicolas Jerez MD;  Location: [facility] CATH INVASIVE LOCATION;  Service: Cardiovascular   • EYE SURGERY      cataract surg   • HAND SURGERY     • HERNIA REPAIR     •  "KNEE SURGERY     • TESTICLE SURGERY       Family History   Problem Relation Age of Onset   • Heart disease Father    • Hypertension Father    • Stroke Father    • Diabetes Sister    • Cancer Brother    • Heart disease Brother    • Hypertension Brother    • No Known Problems Maternal Grandmother    • No Known Problems Maternal Grandfather    • No Known Problems Paternal Grandmother    • No Known Problems Paternal Grandfather      Social History     Tobacco Use   • Smoking status: Never Smoker   • Smokeless tobacco: Current User     Types: Chew   • Tobacco comment: chewing tobacco since 4 yearrs old   Substance Use Topics   • Alcohol use: No   • Drug use: No           ECG 12 Lead  Date/Time: 12/19/2018 9:48 AM  Performed by: Melissa Templeton MD  Authorized by: Melissa Templeton MD   Comparison: compared with previous ECG   Comparison to previous ECG: pvc's have resolved  Rhythm: sinus rhythm               Objective:         Visit Vitals  /80   Pulse 80   Ht 170.2 cm (67\")   Wt 87.5 kg (193 lb)   BMI 30.23 kg/m²          Physical Exam   Constitutional: He is oriented to person, place, and time. He appears well-developed and well-nourished.   HENT:   Head: Normocephalic and atraumatic.   Neck: No JVD present. Carotid bruit is not present.   Cardiovascular: Normal rate, regular rhythm, S1 normal and S2 normal. Exam reveals no gallop.   No murmur heard.  Pulses:       Radial pulses are 2+ on the right side, and 2+ on the left side.   No bilateral lower extremity edema   Pulmonary/Chest: Effort normal and breath sounds normal.   Abdominal: Soft. Normal appearance.   Musculoskeletal:        Left shoulder: He exhibits decreased range of motion, tenderness and deformity.   Neurological: He is alert and oriented to person, place, and time.   Skin: Skin is warm, dry and intact.   Psychiatric: He has a normal mood and affect.       Lab Review:       Assessment:          Diagnosis Plan   1. Coronary artery disease involving " native coronary artery of native heart without angina pectoris     2. Essential hypertension     3. Ischemic cardiomyopathy     4. Status post reverse total replacement of left shoulder     5. History of coronary artery stent placement     6. Tobacco abuse     7. Type 2 diabetes mellitus with other circulatory complication, without long-term current use of insulin (CMS/[facility])            Plan:       1.  Chest pain.  He's had no further chest pain since his admission.  His symptoms sounded atypical and I suspect this may be related to his left shoulder issues.  2.  Status post left reverse shoulder arthroplasty.  He does appear to have some deformity of his left shoulder and tenderness with palpation.  He's having a significant amount of pain and decreased mobility from this.  I encouraged him to either follow-up with his prior orthopedic surgeon or if they would like to get another opinion I offered to refer him to another surgeon if they wish.  They are to call and let me know.  3.  Coronary artery disease.  A status post prior right coronary artery stent placement.  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  He did have a recent stress test in 7/2018 that showed no evidence of ischemia.  4.  Hypertension.  Elevated the office today but this appears to be due to his pain.  His blood pressures were normal post during his recent hospitalization and his recent follow-up with Dr. Cameron.   5.  Diabetes mellitus type 2  6.  Tobacco use    We'll see the patient back again in 3 months.    Coronary Artery Disease  Assessment  • The patient has no angina  • There is a new diagnosis of stable angina in the past 12 months  • The patient is having symptoms consistent with unstable angina     Plan  • Lifestyle modifications discussed include medication compliance and regular exercise    Subjective - Objective  • There is a history of  past MI  • There has been a previous stent procedure using CARLENE  • Current antiplatelet therapy includes aspirin 81 mg and clopidogrel 75 mg  • The patient qualifies for cardiac rehabilitation, but has not been referred for system reasons

## 2019-01-06 ENCOUNTER — HOSPITAL ENCOUNTER (OUTPATIENT)
Facility: HOSPITAL | Age: 84
Setting detail: OBSERVATION
Discharge: HOME | End: 2019-01-09
Attending: EMERGENCY MEDICINE | Admitting: HOSPITALIST

## 2019-01-06 ENCOUNTER — APPOINTMENT (OUTPATIENT)
Dept: GENERAL RADIOLOGY | Facility: HOSPITAL | Age: 84
End: 2019-01-06

## 2019-01-06 DIAGNOSIS — J44.1 COPD WITH ACUTE EXACERBATION (HCC): Primary | ICD-10-CM

## 2019-01-06 PROBLEM — N18.30 CKD (CHRONIC KIDNEY DISEASE) STAGE 3, GFR 30-59 ML/MIN: Status: ACTIVE | Noted: 2019-01-06

## 2019-01-06 LAB
ALBUMIN SERPL-MCNC: 3.9 G/DL (ref 3.5–5.2)
ALBUMIN/GLOB SERPL: 1.2 G/DL
ALP SERPL-CCNC: 76 U/L (ref 39–117)
ALT SERPL W P-5'-P-CCNC: 13 U/L (ref 1–41)
ANION GAP SERPL CALCULATED.3IONS-SCNC: 13 MMOL/L
AST SERPL-CCNC: 17 U/L (ref 1–40)
BASOPHILS # BLD AUTO: 0.02 10*3/MM3 (ref 0–0.2)
BASOPHILS NFR BLD AUTO: 0.2 % (ref 0–1.5)
BILIRUB SERPL-MCNC: 0.5 MG/DL (ref 0.1–1.2)
BUN BLD-MCNC: 27 MG/DL (ref 8–23)
BUN/CREAT SERPL: 20.1 (ref 7–25)
CALCIUM SPEC-SCNC: 8.9 MG/DL (ref 8.6–10.5)
CHLORIDE SERPL-SCNC: 98 MMOL/L (ref 98–107)
CO2 SERPL-SCNC: 25 MMOL/L (ref 22–29)
CREAT BLD-MCNC: 1.34 MG/DL (ref 0.76–1.27)
DEPRECATED RDW RBC AUTO: 45.2 FL (ref 37–54)
EOSINOPHIL # BLD AUTO: 0.27 10*3/MM3 (ref 0–0.7)
EOSINOPHIL NFR BLD AUTO: 2.8 % (ref 0.3–6.2)
ERYTHROCYTE [DISTWIDTH] IN BLOOD BY AUTOMATED COUNT: 14.4 % (ref 11.5–14.5)
GFR SERPL CREATININE-BSD FRML MDRD: 51 ML/MIN/1.73
GLOBULIN UR ELPH-MCNC: 3.2 GM/DL
GLUCOSE BLD-MCNC: 136 MG/DL (ref 65–99)
HCT VFR BLD AUTO: 33.4 % (ref 40.4–52.2)
HGB BLD-MCNC: 11.1 G/DL (ref 13.7–17.6)
IMM GRANULOCYTES # BLD AUTO: 0.02 10*3/MM3 (ref 0–0.03)
IMM GRANULOCYTES NFR BLD AUTO: 0.2 % (ref 0–0.5)
LYMPHOCYTES # BLD AUTO: 2.15 10*3/MM3 (ref 0.9–4.8)
LYMPHOCYTES NFR BLD AUTO: 22.1 % (ref 19.6–45.3)
MCH RBC QN AUTO: 28.5 PG (ref 27–32.7)
MCHC RBC AUTO-ENTMCNC: 33.2 G/DL (ref 32.6–36.4)
MCV RBC AUTO: 85.9 FL (ref 79.8–96.2)
MONOCYTES # BLD AUTO: 0.95 10*3/MM3 (ref 0.2–1.2)
MONOCYTES NFR BLD AUTO: 9.8 % (ref 5–12)
NEUTROPHILS # BLD AUTO: 6.32 10*3/MM3 (ref 1.9–8.1)
NEUTROPHILS NFR BLD AUTO: 65.1 % (ref 42.7–76)
NT-PROBNP SERPL-MCNC: 873.6 PG/ML (ref 0–1800)
PLATELET # BLD AUTO: 200 10*3/MM3 (ref 140–500)
PMV BLD AUTO: 10.7 FL (ref 6–12)
POTASSIUM BLD-SCNC: 4.6 MMOL/L (ref 3.5–5.2)
PROT SERPL-MCNC: 7.1 G/DL (ref 6–8.5)
RBC # BLD AUTO: 3.89 10*6/MM3 (ref 4.6–6)
SODIUM BLD-SCNC: 136 MMOL/L (ref 136–145)
TROPONIN T SERPL-MCNC: <0.01 NG/ML (ref 0–0.03)
WBC NRBC COR # BLD: 9.71 10*3/MM3 (ref 4.5–10.7)

## 2019-01-06 PROCEDURE — 99284 EMERGENCY DEPT VISIT MOD MDM: CPT

## 2019-01-06 PROCEDURE — 83880 ASSAY OF NATRIURETIC PEPTIDE: CPT | Performed by: EMERGENCY MEDICINE

## 2019-01-06 PROCEDURE — 94640 AIRWAY INHALATION TREATMENT: CPT

## 2019-01-06 PROCEDURE — 71046 X-RAY EXAM CHEST 2 VIEWS: CPT

## 2019-01-06 PROCEDURE — G0378 HOSPITAL OBSERVATION PER HR: HCPCS

## 2019-01-06 PROCEDURE — 25010000002 METHYLPREDNISOLONE PER 125 MG: Performed by: EMERGENCY MEDICINE

## 2019-01-06 PROCEDURE — 93010 ELECTROCARDIOGRAM REPORT: CPT | Performed by: INTERNAL MEDICINE

## 2019-01-06 PROCEDURE — 80053 COMPREHEN METABOLIC PANEL: CPT | Performed by: EMERGENCY MEDICINE

## 2019-01-06 PROCEDURE — 85025 COMPLETE CBC W/AUTO DIFF WBC: CPT | Performed by: EMERGENCY MEDICINE

## 2019-01-06 PROCEDURE — 94799 UNLISTED PULMONARY SVC/PX: CPT

## 2019-01-06 PROCEDURE — 96374 THER/PROPH/DIAG INJ IV PUSH: CPT

## 2019-01-06 PROCEDURE — 84484 ASSAY OF TROPONIN QUANT: CPT | Performed by: EMERGENCY MEDICINE

## 2019-01-06 PROCEDURE — 93005 ELECTROCARDIOGRAM TRACING: CPT | Performed by: EMERGENCY MEDICINE

## 2019-01-06 RX ORDER — SODIUM CHLORIDE 0.9 % (FLUSH) 0.9 %
10 SYRINGE (ML) INJECTION AS NEEDED
Status: DISCONTINUED | OUTPATIENT
Start: 2019-01-06 | End: 2019-01-09 | Stop reason: HOSPADM

## 2019-01-06 RX ORDER — IPRATROPIUM BROMIDE AND ALBUTEROL SULFATE 2.5; .5 MG/3ML; MG/3ML
3 SOLUTION RESPIRATORY (INHALATION)
Status: DISCONTINUED | OUTPATIENT
Start: 2019-01-07 | End: 2019-01-09 | Stop reason: HOSPADM

## 2019-01-06 RX ORDER — IPRATROPIUM BROMIDE AND ALBUTEROL SULFATE 2.5; .5 MG/3ML; MG/3ML
3 SOLUTION RESPIRATORY (INHALATION) ONCE
Status: COMPLETED | OUTPATIENT
Start: 2019-01-06 | End: 2019-01-06

## 2019-01-06 RX ORDER — GUAIFENESIN 600 MG/1
600 TABLET, EXTENDED RELEASE ORAL EVERY 12 HOURS
Status: DISCONTINUED | OUTPATIENT
Start: 2019-01-06 | End: 2019-01-09 | Stop reason: HOSPADM

## 2019-01-06 RX ORDER — METHYLPREDNISOLONE SODIUM SUCCINATE 125 MG/2ML
125 INJECTION, POWDER, LYOPHILIZED, FOR SOLUTION INTRAMUSCULAR; INTRAVENOUS ONCE
Status: COMPLETED | OUTPATIENT
Start: 2019-01-06 | End: 2019-01-06

## 2019-01-06 RX ORDER — GUAIFENESIN/DEXTROMETHORPHAN 100-10MG/5
10 SYRUP ORAL EVERY 6 HOURS PRN
Status: DISCONTINUED | OUTPATIENT
Start: 2019-01-06 | End: 2019-01-09 | Stop reason: HOSPADM

## 2019-01-06 RX ORDER — ALBUTEROL SULFATE 2.5 MG/3ML
2.5 SOLUTION RESPIRATORY (INHALATION) EVERY 4 HOURS PRN
Status: DISCONTINUED | OUTPATIENT
Start: 2019-01-06 | End: 2019-01-09 | Stop reason: HOSPADM

## 2019-01-06 RX ORDER — METHYLPREDNISOLONE SODIUM SUCCINATE 125 MG/2ML
60 INJECTION, POWDER, LYOPHILIZED, FOR SOLUTION INTRAMUSCULAR; INTRAVENOUS EVERY 12 HOURS
Status: DISCONTINUED | OUTPATIENT
Start: 2019-01-06 | End: 2019-01-07

## 2019-01-06 RX ADMIN — IPRATROPIUM BROMIDE AND ALBUTEROL SULFATE 3 ML: 2.5; .5 SOLUTION RESPIRATORY (INHALATION) at 21:51

## 2019-01-06 RX ADMIN — IPRATROPIUM BROMIDE AND ALBUTEROL SULFATE 3 ML: 2.5; .5 SOLUTION RESPIRATORY (INHALATION) at 19:47

## 2019-01-06 RX ADMIN — METHYLPREDNISOLONE SODIUM SUCCINATE 125 MG: 125 INJECTION, POWDER, FOR SOLUTION INTRAMUSCULAR; INTRAVENOUS at 19:10

## 2019-01-07 PROBLEM — I50.22 CHRONIC SYSTOLIC CONGESTIVE HEART FAILURE (HCC): Status: RESOLVED | Noted: 2018-04-16 | Resolved: 2019-01-07

## 2019-01-07 PROBLEM — I50.22 CHRONIC SYSTOLIC CONGESTIVE HEART FAILURE (HCC): Status: ACTIVE | Noted: 2018-04-16

## 2019-01-07 LAB
ANION GAP SERPL CALCULATED.3IONS-SCNC: 14.8 MMOL/L
B PARAPERT DNA SPEC QL NAA+PROBE: NOT DETECTED
B PERT DNA SPEC QL NAA+PROBE: NOT DETECTED
BUN BLD-MCNC: 29 MG/DL (ref 8–23)
BUN/CREAT SERPL: 21.8 (ref 7–25)
C PNEUM DNA NPH QL NAA+NON-PROBE: NOT DETECTED
CALCIUM SPEC-SCNC: 9.2 MG/DL (ref 8.6–10.5)
CHLORIDE SERPL-SCNC: 100 MMOL/L (ref 98–107)
CO2 SERPL-SCNC: 21.2 MMOL/L (ref 22–29)
CREAT BLD-MCNC: 1.33 MG/DL (ref 0.76–1.27)
DEPRECATED RDW RBC AUTO: 43.9 FL (ref 37–54)
ERYTHROCYTE [DISTWIDTH] IN BLOOD BY AUTOMATED COUNT: 14.3 % (ref 11.5–14.5)
FLUAV H1 2009 PAND RNA NPH QL NAA+PROBE: NOT DETECTED
FLUAV H1 HA GENE NPH QL NAA+PROBE: NOT DETECTED
FLUAV H3 RNA NPH QL NAA+PROBE: NOT DETECTED
FLUAV SUBTYP SPEC NAA+PROBE: NOT DETECTED
FLUBV RNA ISLT QL NAA+PROBE: NOT DETECTED
GFR SERPL CREATININE-BSD FRML MDRD: 51 ML/MIN/1.73
GLUCOSE BLD-MCNC: 168 MG/DL (ref 65–99)
HADV DNA SPEC NAA+PROBE: NOT DETECTED
HCOV 229E RNA SPEC QL NAA+PROBE: NOT DETECTED
HCOV HKU1 RNA SPEC QL NAA+PROBE: NOT DETECTED
HCOV NL63 RNA SPEC QL NAA+PROBE: NOT DETECTED
HCOV OC43 RNA SPEC QL NAA+PROBE: NOT DETECTED
HCT VFR BLD AUTO: 32.3 % (ref 40.4–52.2)
HGB BLD-MCNC: 10.8 G/DL (ref 13.7–17.6)
HMPV RNA NPH QL NAA+NON-PROBE: NOT DETECTED
HPIV1 RNA SPEC QL NAA+PROBE: NOT DETECTED
HPIV2 RNA SPEC QL NAA+PROBE: NOT DETECTED
HPIV3 RNA NPH QL NAA+PROBE: NOT DETECTED
HPIV4 P GENE NPH QL NAA+PROBE: NOT DETECTED
M PNEUMO IGG SER IA-ACNC: NOT DETECTED
MAGNESIUM SERPL-MCNC: 2.3 MG/DL (ref 1.6–2.4)
MCH RBC QN AUTO: 28.1 PG (ref 27–32.7)
MCHC RBC AUTO-ENTMCNC: 33.4 G/DL (ref 32.6–36.4)
MCV RBC AUTO: 84.1 FL (ref 79.8–96.2)
PHOSPHATE SERPL-MCNC: 4.1 MG/DL (ref 2.5–4.5)
PLATELET # BLD AUTO: 206 10*3/MM3 (ref 140–500)
PMV BLD AUTO: 10.5 FL (ref 6–12)
POTASSIUM BLD-SCNC: 4.4 MMOL/L (ref 3.5–5.2)
RBC # BLD AUTO: 3.84 10*6/MM3 (ref 4.6–6)
RHINOVIRUS RNA SPEC NAA+PROBE: DETECTED
RSV RNA NPH QL NAA+NON-PROBE: NOT DETECTED
SODIUM BLD-SCNC: 136 MMOL/L (ref 136–145)
WBC NRBC COR # BLD: 6.83 10*3/MM3 (ref 4.5–10.7)

## 2019-01-07 PROCEDURE — 94799 UNLISTED PULMONARY SVC/PX: CPT

## 2019-01-07 PROCEDURE — G0378 HOSPITAL OBSERVATION PER HR: HCPCS

## 2019-01-07 PROCEDURE — 25010000002 METHYLPREDNISOLONE PER 125 MG: Performed by: HOSPITALIST

## 2019-01-07 PROCEDURE — 84100 ASSAY OF PHOSPHORUS: CPT | Performed by: HOSPITALIST

## 2019-01-07 PROCEDURE — 25010000002 ENOXAPARIN PER 10 MG: Performed by: HOSPITALIST

## 2019-01-07 PROCEDURE — 87581 M.PNEUMON DNA AMP PROBE: CPT | Performed by: HOSPITALIST

## 2019-01-07 PROCEDURE — 80048 BASIC METABOLIC PNL TOTAL CA: CPT | Performed by: HOSPITALIST

## 2019-01-07 PROCEDURE — 87486 CHLMYD PNEUM DNA AMP PROBE: CPT | Performed by: HOSPITALIST

## 2019-01-07 PROCEDURE — 85027 COMPLETE CBC AUTOMATED: CPT | Performed by: HOSPITALIST

## 2019-01-07 PROCEDURE — 96372 THER/PROPH/DIAG INJ SC/IM: CPT

## 2019-01-07 PROCEDURE — 87633 RESP VIRUS 12-25 TARGETS: CPT | Performed by: HOSPITALIST

## 2019-01-07 PROCEDURE — 87798 DETECT AGENT NOS DNA AMP: CPT | Performed by: HOSPITALIST

## 2019-01-07 PROCEDURE — 83735 ASSAY OF MAGNESIUM: CPT | Performed by: HOSPITALIST

## 2019-01-07 PROCEDURE — 96376 TX/PRO/DX INJ SAME DRUG ADON: CPT

## 2019-01-07 RX ORDER — ONDANSETRON 4 MG/1
4 TABLET, FILM COATED ORAL EVERY 6 HOURS PRN
Status: DISCONTINUED | OUTPATIENT
Start: 2019-01-07 | End: 2019-01-09 | Stop reason: HOSPADM

## 2019-01-07 RX ORDER — SODIUM CHLORIDE 0.9 % (FLUSH) 0.9 %
3 SYRINGE (ML) INJECTION EVERY 12 HOURS SCHEDULED
Status: DISCONTINUED | OUTPATIENT
Start: 2019-01-07 | End: 2019-01-09 | Stop reason: HOSPADM

## 2019-01-07 RX ORDER — ACETAMINOPHEN 325 MG/1
650 TABLET ORAL EVERY 4 HOURS PRN
Status: DISCONTINUED | OUTPATIENT
Start: 2019-01-07 | End: 2019-01-09 | Stop reason: HOSPADM

## 2019-01-07 RX ORDER — ASPIRIN 81 MG/1
81 TABLET ORAL DAILY
Status: DISCONTINUED | OUTPATIENT
Start: 2019-01-07 | End: 2019-01-09 | Stop reason: HOSPADM

## 2019-01-07 RX ORDER — BISACODYL 5 MG/1
5 TABLET, DELAYED RELEASE ORAL DAILY PRN
Status: DISCONTINUED | OUTPATIENT
Start: 2019-01-07 | End: 2019-01-09 | Stop reason: HOSPADM

## 2019-01-07 RX ORDER — CARVEDILOL 3.12 MG/1
3.12 TABLET ORAL EVERY 12 HOURS SCHEDULED
Status: DISCONTINUED | OUTPATIENT
Start: 2019-01-07 | End: 2019-01-09 | Stop reason: HOSPADM

## 2019-01-07 RX ORDER — ARFORMOTEROL TARTRATE 15 UG/2ML
15 SOLUTION RESPIRATORY (INHALATION)
Status: DISCONTINUED | OUTPATIENT
Start: 2019-01-07 | End: 2019-01-09 | Stop reason: HOSPADM

## 2019-01-07 RX ORDER — CLOPIDOGREL BISULFATE 75 MG/1
75 TABLET ORAL DAILY
Status: DISCONTINUED | OUTPATIENT
Start: 2019-01-07 | End: 2019-01-09 | Stop reason: HOSPADM

## 2019-01-07 RX ORDER — METHYLPREDNISOLONE SODIUM SUCCINATE 125 MG/2ML
60 INJECTION, POWDER, LYOPHILIZED, FOR SOLUTION INTRAMUSCULAR; INTRAVENOUS EVERY 8 HOURS
Status: DISCONTINUED | OUTPATIENT
Start: 2019-01-07 | End: 2019-01-09 | Stop reason: HOSPADM

## 2019-01-07 RX ORDER — ONDANSETRON 2 MG/ML
4 INJECTION INTRAMUSCULAR; INTRAVENOUS EVERY 6 HOURS PRN
Status: DISCONTINUED | OUTPATIENT
Start: 2019-01-07 | End: 2019-01-09 | Stop reason: HOSPADM

## 2019-01-07 RX ORDER — ISOSORBIDE MONONITRATE 30 MG/1
30 TABLET, EXTENDED RELEASE ORAL
Status: DISCONTINUED | OUTPATIENT
Start: 2019-01-07 | End: 2019-01-09 | Stop reason: HOSPADM

## 2019-01-07 RX ORDER — BISACODYL 10 MG
10 SUPPOSITORY, RECTAL RECTAL DAILY PRN
Status: DISCONTINUED | OUTPATIENT
Start: 2019-01-07 | End: 2019-01-09 | Stop reason: HOSPADM

## 2019-01-07 RX ORDER — SODIUM CHLORIDE 0.9 % (FLUSH) 0.9 %
3-10 SYRINGE (ML) INJECTION AS NEEDED
Status: DISCONTINUED | OUTPATIENT
Start: 2019-01-07 | End: 2019-01-09 | Stop reason: HOSPADM

## 2019-01-07 RX ORDER — LISINOPRIL 5 MG/1
5 TABLET ORAL DAILY
Status: DISCONTINUED | OUTPATIENT
Start: 2019-01-07 | End: 2019-01-09 | Stop reason: HOSPADM

## 2019-01-07 RX ORDER — ATORVASTATIN CALCIUM 10 MG/1
10 TABLET, FILM COATED ORAL NIGHTLY
Status: DISCONTINUED | OUTPATIENT
Start: 2019-01-07 | End: 2019-01-09 | Stop reason: HOSPADM

## 2019-01-07 RX ORDER — ONDANSETRON 4 MG/1
4 TABLET, ORALLY DISINTEGRATING ORAL EVERY 6 HOURS PRN
Status: DISCONTINUED | OUTPATIENT
Start: 2019-01-07 | End: 2019-01-09 | Stop reason: HOSPADM

## 2019-01-07 RX ADMIN — IPRATROPIUM BROMIDE AND ALBUTEROL SULFATE 3 ML: 2.5; .5 SOLUTION RESPIRATORY (INHALATION) at 08:04

## 2019-01-07 RX ADMIN — GUAIFENESIN 600 MG: 600 TABLET, EXTENDED RELEASE ORAL at 02:44

## 2019-01-07 RX ADMIN — METHYLPREDNISOLONE SODIUM SUCCINATE 60 MG: 125 INJECTION, POWDER, FOR SOLUTION INTRAMUSCULAR; INTRAVENOUS at 14:29

## 2019-01-07 RX ADMIN — LISINOPRIL 5 MG: 5 TABLET ORAL at 08:45

## 2019-01-07 RX ADMIN — GUAIFENESIN 600 MG: 600 TABLET, EXTENDED RELEASE ORAL at 23:34

## 2019-01-07 RX ADMIN — METHYLPREDNISOLONE SODIUM SUCCINATE 60 MG: 125 INJECTION, POWDER, FOR SOLUTION INTRAMUSCULAR; INTRAVENOUS at 06:40

## 2019-01-07 RX ADMIN — ENOXAPARIN SODIUM 40 MG: 40 INJECTION SUBCUTANEOUS at 02:45

## 2019-01-07 RX ADMIN — IPRATROPIUM BROMIDE AND ALBUTEROL SULFATE 3 ML: 2.5; .5 SOLUTION RESPIRATORY (INHALATION) at 13:07

## 2019-01-07 RX ADMIN — ISOSORBIDE MONONITRATE 30 MG: 30 TABLET ORAL at 08:45

## 2019-01-07 RX ADMIN — ASPIRIN 81 MG: 81 TABLET, DELAYED RELEASE ORAL at 08:45

## 2019-01-07 RX ADMIN — METHYLPREDNISOLONE SODIUM SUCCINATE 60 MG: 125 INJECTION, POWDER, FOR SOLUTION INTRAMUSCULAR; INTRAVENOUS at 23:34

## 2019-01-07 RX ADMIN — ATORVASTATIN CALCIUM 10 MG: 10 TABLET, FILM COATED ORAL at 21:13

## 2019-01-07 RX ADMIN — CARVEDILOL 3.12 MG: 3.12 TABLET, FILM COATED ORAL at 08:45

## 2019-01-07 RX ADMIN — SODIUM CHLORIDE, PRESERVATIVE FREE 3 ML: 5 INJECTION INTRAVENOUS at 21:13

## 2019-01-07 RX ADMIN — SODIUM CHLORIDE, PRESERVATIVE FREE 3 ML: 5 INJECTION INTRAVENOUS at 08:45

## 2019-01-07 RX ADMIN — GUAIFENESIN 600 MG: 600 TABLET, EXTENDED RELEASE ORAL at 10:15

## 2019-01-07 RX ADMIN — CARVEDILOL 3.12 MG: 3.12 TABLET, FILM COATED ORAL at 21:13

## 2019-01-07 RX ADMIN — CLOPIDOGREL 75 MG: 75 TABLET, FILM COATED ORAL at 08:45

## 2019-01-07 RX ADMIN — IPRATROPIUM BROMIDE AND ALBUTEROL SULFATE 3 ML: 2.5; .5 SOLUTION RESPIRATORY (INHALATION) at 00:12

## 2019-01-07 RX ADMIN — IPRATROPIUM BROMIDE AND ALBUTEROL SULFATE 3 ML: 2.5; .5 SOLUTION RESPIRATORY (INHALATION) at 19:49

## 2019-01-08 PROCEDURE — 96376 TX/PRO/DX INJ SAME DRUG ADON: CPT

## 2019-01-08 PROCEDURE — 94799 UNLISTED PULMONARY SVC/PX: CPT

## 2019-01-08 PROCEDURE — G0378 HOSPITAL OBSERVATION PER HR: HCPCS

## 2019-01-08 PROCEDURE — 25010000002 METHYLPREDNISOLONE PER 125 MG: Performed by: HOSPITALIST

## 2019-01-08 RX ORDER — POTASSIUM CHLORIDE 750 MG/1
20 CAPSULE, EXTENDED RELEASE ORAL ONCE
Status: COMPLETED | OUTPATIENT
Start: 2019-01-09 | End: 2019-01-09

## 2019-01-08 RX ORDER — BUDESONIDE 1 MG/2ML
1 INHALANT ORAL
Status: DISCONTINUED | OUTPATIENT
Start: 2019-01-08 | End: 2019-01-09 | Stop reason: HOSPADM

## 2019-01-08 RX ORDER — FUROSEMIDE 10 MG/ML
20 INJECTION INTRAMUSCULAR; INTRAVENOUS ONCE
Status: COMPLETED | OUTPATIENT
Start: 2019-01-09 | End: 2019-01-09

## 2019-01-08 RX ADMIN — BUDESONIDE 1 MG: 1 SUSPENSION RESPIRATORY (INHALATION) at 21:25

## 2019-01-08 RX ADMIN — ISOSORBIDE MONONITRATE 30 MG: 30 TABLET ORAL at 08:26

## 2019-01-08 RX ADMIN — GUAIFENESIN 600 MG: 600 TABLET, EXTENDED RELEASE ORAL at 12:08

## 2019-01-08 RX ADMIN — CLOPIDOGREL 75 MG: 75 TABLET, FILM COATED ORAL at 08:26

## 2019-01-08 RX ADMIN — IPRATROPIUM BROMIDE AND ALBUTEROL SULFATE 3 ML: 2.5; .5 SOLUTION RESPIRATORY (INHALATION) at 00:32

## 2019-01-08 RX ADMIN — METHYLPREDNISOLONE SODIUM SUCCINATE 60 MG: 125 INJECTION, POWDER, FOR SOLUTION INTRAMUSCULAR; INTRAVENOUS at 23:40

## 2019-01-08 RX ADMIN — ASPIRIN 81 MG: 81 TABLET, DELAYED RELEASE ORAL at 08:25

## 2019-01-08 RX ADMIN — ARFORMOTEROL TARTRATE 15 MCG: 15 SOLUTION RESPIRATORY (INHALATION) at 21:25

## 2019-01-08 RX ADMIN — ATORVASTATIN CALCIUM 10 MG: 10 TABLET, FILM COATED ORAL at 20:10

## 2019-01-08 RX ADMIN — SODIUM CHLORIDE, PRESERVATIVE FREE 3 ML: 5 INJECTION INTRAVENOUS at 20:11

## 2019-01-08 RX ADMIN — IPRATROPIUM BROMIDE AND ALBUTEROL SULFATE 3 ML: 2.5; .5 SOLUTION RESPIRATORY (INHALATION) at 11:26

## 2019-01-08 RX ADMIN — CARVEDILOL 3.12 MG: 3.12 TABLET, FILM COATED ORAL at 08:26

## 2019-01-08 RX ADMIN — METHYLPREDNISOLONE SODIUM SUCCINATE 60 MG: 125 INJECTION, POWDER, FOR SOLUTION INTRAMUSCULAR; INTRAVENOUS at 06:54

## 2019-01-08 RX ADMIN — CARVEDILOL 3.12 MG: 3.12 TABLET, FILM COATED ORAL at 20:10

## 2019-01-08 RX ADMIN — METHYLPREDNISOLONE SODIUM SUCCINATE 60 MG: 125 INJECTION, POWDER, FOR SOLUTION INTRAMUSCULAR; INTRAVENOUS at 14:24

## 2019-01-08 RX ADMIN — LISINOPRIL 5 MG: 5 TABLET ORAL at 08:25

## 2019-01-08 RX ADMIN — SODIUM CHLORIDE, PRESERVATIVE FREE 3 ML: 5 INJECTION INTRAVENOUS at 08:26

## 2019-01-09 VITALS
WEIGHT: 195.5 LBS | RESPIRATION RATE: 20 BRPM | DIASTOLIC BLOOD PRESSURE: 59 MMHG | HEIGHT: 69 IN | TEMPERATURE: 97 F | OXYGEN SATURATION: 91 % | BODY MASS INDEX: 28.96 KG/M2 | SYSTOLIC BLOOD PRESSURE: 118 MMHG | HEART RATE: 68 BPM

## 2019-01-09 PROBLEM — J20.6 ACUTE BRONCHITIS DUE TO RHINOVIRUS: Status: ACTIVE | Noted: 2019-01-09

## 2019-01-09 PROCEDURE — 25010000002 FUROSEMIDE PER 20 MG: Performed by: HOSPITALIST

## 2019-01-09 PROCEDURE — G0378 HOSPITAL OBSERVATION PER HR: HCPCS

## 2019-01-09 PROCEDURE — 94799 UNLISTED PULMONARY SVC/PX: CPT

## 2019-01-09 PROCEDURE — 96376 TX/PRO/DX INJ SAME DRUG ADON: CPT

## 2019-01-09 PROCEDURE — 25010000002 METHYLPREDNISOLONE PER 125 MG: Performed by: HOSPITALIST

## 2019-01-09 PROCEDURE — 96375 TX/PRO/DX INJ NEW DRUG ADDON: CPT

## 2019-01-09 RX ORDER — ALBUTEROL SULFATE 2.5 MG/3ML
2.5 SOLUTION RESPIRATORY (INHALATION) EVERY 4 HOURS PRN
Refills: 12
Start: 2019-01-09

## 2019-01-09 RX ORDER — GUAIFENESIN 600 MG/1
600 TABLET, EXTENDED RELEASE ORAL EVERY 12 HOURS
Start: 2019-01-09 | End: 2019-07-24

## 2019-01-09 RX ORDER — PREDNISONE 20 MG/1
60 TABLET ORAL DAILY
Start: 2019-01-09 | End: 2019-07-24

## 2019-01-09 RX ORDER — BUDESONIDE 1 MG/2ML
1 INHALANT ORAL
Start: 2019-01-09 | End: 2019-07-24

## 2019-01-09 RX ORDER — ARFORMOTEROL TARTRATE 15 UG/2ML
15 SOLUTION RESPIRATORY (INHALATION)
Qty: 120 ML
Start: 2019-01-09 | End: 2019-07-24

## 2019-01-09 RX ADMIN — SODIUM CHLORIDE, PRESERVATIVE FREE 3 ML: 5 INJECTION INTRAVENOUS at 08:41

## 2019-01-09 RX ADMIN — POTASSIUM CHLORIDE 20 MEQ: 750 CAPSULE, EXTENDED RELEASE ORAL at 08:40

## 2019-01-09 RX ADMIN — CARVEDILOL 3.12 MG: 3.12 TABLET, FILM COATED ORAL at 08:40

## 2019-01-09 RX ADMIN — ARFORMOTEROL TARTRATE 15 MCG: 15 SOLUTION RESPIRATORY (INHALATION) at 09:45

## 2019-01-09 RX ADMIN — IPRATROPIUM BROMIDE AND ALBUTEROL SULFATE 3 ML: 2.5; .5 SOLUTION RESPIRATORY (INHALATION) at 01:38

## 2019-01-09 RX ADMIN — ISOSORBIDE MONONITRATE 30 MG: 30 TABLET ORAL at 08:40

## 2019-01-09 RX ADMIN — METHYLPREDNISOLONE SODIUM SUCCINATE 60 MG: 125 INJECTION, POWDER, FOR SOLUTION INTRAMUSCULAR; INTRAVENOUS at 06:51

## 2019-01-09 RX ADMIN — GUAIFENESIN 600 MG: 600 TABLET, EXTENDED RELEASE ORAL at 10:43

## 2019-01-09 RX ADMIN — METHYLPREDNISOLONE SODIUM SUCCINATE 60 MG: 125 INJECTION, POWDER, FOR SOLUTION INTRAMUSCULAR; INTRAVENOUS at 13:18

## 2019-01-09 RX ADMIN — FUROSEMIDE 20 MG: 10 INJECTION, SOLUTION INTRAMUSCULAR; INTRAVENOUS at 08:49

## 2019-01-09 RX ADMIN — GUAIFENESIN 600 MG: 600 TABLET, EXTENDED RELEASE ORAL at 00:26

## 2019-01-09 RX ADMIN — ASPIRIN 81 MG: 81 TABLET, DELAYED RELEASE ORAL at 08:40

## 2019-01-09 RX ADMIN — CLOPIDOGREL 75 MG: 75 TABLET, FILM COATED ORAL at 08:40

## 2019-01-09 RX ADMIN — LISINOPRIL 5 MG: 5 TABLET ORAL at 08:40

## 2019-01-09 RX ADMIN — BUDESONIDE 1 MG: 1 SUSPENSION RESPIRATORY (INHALATION) at 09:45

## 2019-01-12 ENCOUNTER — READMISSION MANAGEMENT (OUTPATIENT)
Dept: CALL CENTER | Facility: HOSPITAL | Age: 84
End: 2019-01-12

## 2019-01-13 NOTE — OUTREACH NOTE
Prep Survey      Responses   Facility patient discharged from?  [facility]   Is patient eligible?  Yes   Discharge diagnosis  COPD Exac   Does the patient have one of the following disease processes/diagnoses(primary or secondary)?  COPD/Pneumonia   Does the patient have Home health ordered?  No   Is there a DME ordered?  No   Comments regarding appointments  needs f/u scheduled    Prep survey completed?  Yes          Tess Meade RN

## 2019-01-16 ENCOUNTER — READMISSION MANAGEMENT (OUTPATIENT)
Dept: CALL CENTER | Facility: HOSPITAL | Age: 84
End: 2019-01-16

## 2019-01-16 NOTE — OUTREACH NOTE
COPD/PN Week 1 Survey      Responses   Facility patient discharged from?  [facility]   Does the patient have one of the following disease processes/diagnoses(primary or secondary)?  COPD/Pneumonia   Is there a successful TCM telephone encounter documented?  No   Was the primary reason for admission:  COPD exacerbation   Week 1 attempt successful?  No   Unsuccessful attempts  Attempt 1          Hoa Valenzuela RN

## 2019-01-18 ENCOUNTER — READMISSION MANAGEMENT (OUTPATIENT)
Dept: CALL CENTER | Facility: HOSPITAL | Age: 84
End: 2019-01-18

## 2019-01-18 NOTE — OUTREACH NOTE
COPD/PN Week 1 Survey      Responses   Facility patient discharged from?  [facility]   Does the patient have one of the following disease processes/diagnoses(primary or secondary)?  COPD/Pneumonia   Is there a successful TCM telephone encounter documented?  No   Was the primary reason for admission:  COPD exacerbation   Week 1 attempt successful?  No   Unsuccessful attempts  Attempt 2          Andre Barnhart RN

## 2019-01-24 ENCOUNTER — READMISSION MANAGEMENT (OUTPATIENT)
Dept: CALL CENTER | Facility: HOSPITAL | Age: 84
End: 2019-01-24

## 2019-01-24 NOTE — OUTREACH NOTE
COPD/PN Week 3 Survey      Responses   Facility patient discharged from?  [facility]   Does the patient have one of the following disease processes/diagnoses(primary or secondary)?  COPD/Pneumonia   Was the primary reason for admission:  COPD exacerbation   Week 3 attempt successful?  Yes   Call start time  1726   Call end time  1731   Medication alerts for this patient  has been started on medication for fluid retention   Meds reviewed with patient/caregiver?  Yes   Does the patient have all medications ordered at discharge?  Yes   Is the patient taking all medications as directed (includes completed medication regime)?  Yes   Comments regarding appointments  has seen his provider   Has the patient kept scheduled appointments due by today?  Yes   Did the patient receive a copy of their discharge instructions?  Yes   Nursing interventions  Reviewed instructions with patient   What is the patient's perception of their health status since discharge?  Improving   Are the patient's immunizations up to date?   Yes   Is the patient/caregiver able to teach back the hierarchy of who to call/visit for symptoms/problems? PCP, Specialist, Home health nurse, Urgent Care, ED, 911  Yes   Is the patient able to teach back COPD zones?  No [review the zones with the wife]   Nursing interventions  Education provided on various zones   Patient reports what zone on this call?  Yellow Zone   Yellow Zone  Increased shortness of air, Unable to complete daily activities, Using quick relief inhaler/nebulizer more often, Medication is not helping relieve symptoms, Poor Appetite   Yellow interventions  Continue to use daily medications, Do not smoke, Get plenty of rest, Call provider immediatly if symptoms do not improve   Week 3 call completed?  Yes   Wrap up additional comments  has fallen at home, has some fluid and started on medication for fluid , is weak and  doing better          Hoa Valenzuela RN

## 2019-01-31 ENCOUNTER — READMISSION MANAGEMENT (OUTPATIENT)
Dept: CALL CENTER | Facility: HOSPITAL | Age: 84
End: 2019-01-31

## 2019-01-31 NOTE — OUTREACH NOTE
COPD/PN Week 4 Survey      Responses   Facility patient discharged from?  [place]   Does the patient have one of the following disease processes/diagnoses(primary or secondary)?  COPD/Pneumonia   Week 4 attempt successful?  No          Nolvia Robert RN

## 2019-03-20 ENCOUNTER — OFFICE VISIT (OUTPATIENT)
Dept: CARDIOLOGY | Facility: CLINIC | Age: 84
End: 2019-03-20

## 2019-03-20 VITALS
WEIGHT: 190 LBS | HEART RATE: 72 BPM | SYSTOLIC BLOOD PRESSURE: 130 MMHG | HEIGHT: 69 IN | DIASTOLIC BLOOD PRESSURE: 72 MMHG | BODY MASS INDEX: 28.14 KG/M2

## 2019-03-20 DIAGNOSIS — E11.59 TYPE 2 DIABETES MELLITUS WITH OTHER CIRCULATORY COMPLICATION, WITHOUT LONG-TERM CURRENT USE OF INSULIN (HCC): ICD-10-CM

## 2019-03-20 DIAGNOSIS — I10 ESSENTIAL HYPERTENSION: ICD-10-CM

## 2019-03-20 DIAGNOSIS — I25.10 CORONARY ARTERY DISEASE INVOLVING NATIVE CORONARY ARTERY OF NATIVE HEART WITHOUT ANGINA PECTORIS: Primary | ICD-10-CM

## 2019-03-20 DIAGNOSIS — Z95.5 HISTORY OF CORONARY ARTERY STENT PLACEMENT: ICD-10-CM

## 2019-03-20 DIAGNOSIS — I35.0 AORTIC STENOSIS, MILD: ICD-10-CM

## 2019-03-20 DIAGNOSIS — J44.1 COPD WITH ACUTE EXACERBATION (HCC): ICD-10-CM

## 2019-03-20 DIAGNOSIS — Z72.0 TOBACCO ABUSE: ICD-10-CM

## 2019-03-20 PROCEDURE — 99214 OFFICE O/P EST MOD 30 MIN: CPT | Performed by: INTERNAL MEDICINE

## 2019-03-20 PROCEDURE — 93000 ELECTROCARDIOGRAM COMPLETE: CPT | Performed by: INTERNAL MEDICINE

## 2019-03-20 NOTE — PROGRESS NOTES
Subjective:     Encounter Date:03/20/2019      Patient ID: Yoni Bonner Jr. is a 85 y.o. male.    Chief Complaint:  History of Present Illness    This is an 85-year-old man with a history of hypertension, chronic bronchitis, dyslipidemia, tobacco use, coronary artery disease status post inferior myocardial infarction and drug eluting stent placement of his proximal RCA, who presents for follow up.      I saw the patient back on 02/04/2015 when he presented to establish care.  At that time the patient reported a history of recurrent syncopal episodes.  I felt that his symptoms were due to orthostatic syncope at that time.  He had a history of rheumatic fever and a thickened aortic valve along with carotid artery disease so I sat him up for both an echocardiogram and a carotid ultrasound around that time.  His echocardiogram was performed on 03/11/2015 and revealed normal left ventricular systolic function and wall motion, an ejection fraction of 54%, grade IA diastolic dysfunction and no significant valvular disease.  His carotid ultrasound revealed moderate bilateral stenosis.  He was last seen in follow up in 3/2017 for pre-operative evaluation.  At that time he reported some worsening of his baseline dyspnea on exertion though we proceeded with a dobutamine stress test that was negative for ischemia and the patient proceeded with his shoulder surgery.     On 4/8/2018 he was admitted with an inferior myocardial infarction.  The patient was accompanying his wife to the [facility] emergency room where he suffered a near syncopal episode associated with hypotension and bradycardia.  An EKG was performed at that time showing acute inferior ST elevations.  He was transferred to [facility] and was found to have a thrombotic subtotal occlusion of his proximal RCA for which she underwent thrombectomy and drug-eluting stent placement.  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  Following his catheterization he underwent an echocardiogram that showed mildly depressed left ventricular systolic function with an ejection fraction of 42%, inferior wall motion abnormalities, mild to moderate mitral regurgitation, mild aortic stenosis and regurgitation, and grade 1A diastolic dysfunction.       In 7/2018 he was readmitted for chest pain.  He described it as old sharp left-sided chest pain lasting anywhere from a few minutes to 30 minutes.  Symptoms persisted with dyspnea and nausea.  Into being in admitted to the hospital at that time.  He underwent a stress test during that admission showed an inferior lateral infarct but no evidence of ischemia.  I started him on isosorbide mononitrate and discharged him home.  In 8/2018 he presented back for hospital follow at which time he was feeling well.  He had a repeat echocardiogram today that showed normalization of his left ventricular function with an EF of 60%, mild aortic stenosis, mild mitral regurgitation, and grade 1 diastolic dysfunction.        Was admitted on 12/6/2018 with recurrent chest pain.  Patient reported that the pain started when he was watching TV and bent over to tie his shoes.  This pain was associated with diaphoresis and dyspnea.  He could not tell me if the pain was somewhat to his prior symptoms with his myocardial infarction.  The pain lasted several hours and resolved after he got to the emergency room.  After resolution he had no further pain.  His EKGs were unremarkable and troponins were normal.  With no evidence of a myocardial infarction and a single episode of pain that sounded somewhat atypical we decided to continue with medical management at that time.     Today presents for routine six-month follow-up.  Denies any chest pain.  Continues to have dyspnea on exertion which seems to be largely unchanged.  It appears that he was admitted to the hospital in 1/2019 with  bronchitis due to rhinovirus.  He was started on new therapy for his COPD.  His wife reports that he was on his inhalers for period of time but apparently he was only supposed to be on it for a limited period of time.  She did believe that the inhalers helped.  He continues to have mobility issues related to his left shoulder which had been replaced earlier and has not quite been the same since his surgery.  He also reports chronic left knee issues that causes pain both with exertion and at rest.  He is fallen a couple of times in the last few months primarily because he loses his balance and related to his knee pain.  Denies any PND orthopnea, near-syncope or syncope, or lower extremity edema.  He has had no significant change in his medications.        Review of Systems   Constitution: Negative for weakness and malaise/fatigue.   HENT: Negative for hearing loss, hoarse voice, nosebleeds and sore throat.    Eyes: Negative for pain.   Cardiovascular: Positive for dyspnea on exertion. Negative for chest pain, claudication, cyanosis, irregular heartbeat, leg swelling, near-syncope, orthopnea, palpitations, paroxysmal nocturnal dyspnea and syncope.   Respiratory: Negative for shortness of breath and snoring.    Endocrine: Negative for cold intolerance, heat intolerance, polydipsia, polyphagia and polyuria.   Skin: Negative for itching and rash.   Musculoskeletal: Positive for falls, joint pain and joint swelling. Negative for arthritis, muscle cramps, muscle weakness and myalgias.   Gastrointestinal: Negative for constipation, diarrhea, dysphagia, heartburn, hematemesis, hematochezia, melena, nausea and vomiting.   Genitourinary: Negative for frequency, hematuria and hesitancy.   Neurological: Negative for excessive daytime sleepiness, dizziness, headaches, light-headedness and numbness.   Psychiatric/Behavioral: Negative for depression. The patient is not nervous/anxious.           Current Outpatient Medications:   •   albuterol (PROVENTIL) (2.5 MG/3ML) 0.083% nebulizer solution, Take 2.5 mg by nebulization Every 4 (Four) Hours As Needed for Wheezing or Shortness of Air., Disp: , Rfl: 12  •  aspirin 81 MG EC tablet, Take 1 tablet by mouth Daily., Disp: 30 tablet, Rfl: 5  •  atorvastatin (LIPITOR) 10 MG tablet, Take 1 tablet by mouth Every Night., Disp: 30 tablet, Rfl: 5  •  carvedilol (COREG) 3.125 MG tablet, Take 1 tablet by mouth Every 12 (Twelve) Hours., Disp: 60 tablet, Rfl: 6  •  clopidogrel (PLAVIX) 75 MG tablet, Take 1 tablet by mouth Daily., Disp: 30 tablet, Rfl: 11  •  isosorbide mononitrate (IMDUR) 30 MG 24 hr tablet, Take 1 tablet by mouth Daily., Disp: 30 tablet, Rfl: 6  •  lisinopril (PRINIVIL,ZESTRIL) 5 MG tablet, TAKE ONE TABLET BY MOUTH DAILY, Disp: 90 tablet, Rfl: 4  •  arformoterol (BROVANA) 15 MCG/2ML nebulizer solution, Take 2 mL by nebulization 2 (Two) Times a Day., Disp: 120 mL, Rfl:   •  budesonide (PULMICORT) 1 MG/2ML nebulizer solution, Take 2 mL by nebulization 2 (Two) Times a Day., Disp: , Rfl:   •  guaiFENesin (MUCINEX) 600 MG 12 hr tablet, Take 1 tablet by mouth Every 12 (Twelve) Hours., Disp: , Rfl:   •  predniSONE (DELTASONE) 20 MG tablet, Take 3 tablets by mouth Daily., Disp: , Rfl:     Past Medical History:   Diagnosis Date   • Bronchitis    • CAD (coronary artery disease)    • Carotid arterial disease (CMS/HCC)    • Chronic bronchitis (CMS/HCC)    • COPD (chronic obstructive pulmonary disease) (CMS/HCC)    • Exertional chest pain    • Gout    • Hypercholesteremia    • Hyperlipidemia    • Hypertension    • Ischemic cardiomyopathy    • Rheumatic fever    • Syncopal episodes    • Tobacco use      Past Surgical History:   Procedure Laterality Date   • CARDIAC CATHETERIZATION     • CARDIAC CATHETERIZATION N/A 4/8/2018    Procedure: Left Heart Cath;  Surgeon: Nicolas Jerez MD;  Location: [facility] INVASIVE LOCATION;  Service: Cardiovascular   • CARDIAC CATHETERIZATION  4/8/2018    Procedure: Percutaneous  "Manual Thrombectomy;  Surgeon: Nicolas Jerez MD;  Location:  [facility] INVASIVE LOCATION;  Service: Cardiovascular   • CARDIAC CATHETERIZATION N/A 4/8/2018    Procedure: Stent CARLENE coronary;  Surgeon: Nicolas Jerez MD;  Location:  [facility] INVASIVE LOCATION;  Service: Cardiovascular   • CARDIAC CATHETERIZATION N/A 4/8/2018    Procedure: Right Heart Cath;  Surgeon: Nicolas Jerez MD;  Location:  [facility] INVASIVE LOCATION;  Service: Cardiovascular   • EYE SURGERY      cataract surg   • HAND SURGERY     • HERNIA REPAIR     • KNEE SURGERY     • TESTICLE SURGERY       Family History   Problem Relation Age of Onset   • Heart disease Father    • Hypertension Father    • Stroke Father    • Diabetes Sister    • Cancer Brother    • Heart disease Brother    • Hypertension Brother    • No Known Problems Maternal Grandmother    • No Known Problems Maternal Grandfather    • No Known Problems Paternal Grandmother    • No Known Problems Paternal Grandfather      Social History     Tobacco Use   • Smoking status: Never Smoker   • Smokeless tobacco: Current User     Types: Chew   • Tobacco comment: chewing tobacco since 4 yearrs old   Substance Use Topics   • Alcohol use: No   • Drug use: No           ECG 12 Lead  Date/Time: 3/20/2019 12:05 PM  Performed by: Melissa Templeton MD  Authorized by: Melissa Templeton MD   Comparison: compared with previous ECG   Similar to previous ECG  Rhythm: sinus rhythm  T inversion: I and aVL                 Objective:         Visit Vitals  /72 (BP Location: Right arm)   Pulse 72   Ht 175.3 cm (69\")   Wt 86.2 kg (190 lb)   BMI 28.06 kg/m²          Physical Exam   Constitutional: He is oriented to person, place, and time. He appears well-developed and well-nourished.   HENT:   Head: Normocephalic and atraumatic.   Neck: No JVD present. Carotid bruit is not present.   Cardiovascular: Normal rate, regular rhythm, S1 normal and S2 normal. Exam reveals no gallop.   Murmur heard.  High-pitched harsh " midsystolic murmur is present with a grade of 3/6 at the upper right sternal border radiating to the neck.  Pulses:       Radial pulses are 2+ on the right side, and 2+ on the left side.   Pulmonary/Chest: Effort normal and breath sounds normal.   Abdominal: Soft. Normal appearance.   Neurological: He is alert and oriented to person, place, and time.   Skin: Skin is warm, dry and intact.   Psychiatric: He has a normal mood and affect.       Lab Review:       Assessment:          Diagnosis Plan   1. Coronary artery disease involving native coronary artery of native heart without angina pectoris     2. History of coronary artery stent placement     3. Essential hypertension     4. Type 2 diabetes mellitus with other circulatory complication, without long-term current use of insulin (CMS/[facility])     5. COPD with acute exacerbation (CMS/[facility])     6. Tobacco abuse     7. Aortic stenosis, mild  Adult Transthoracic Echo Complete W/ Cont if Necessary Per Protocol          Plan:         1.  Coronary artery disease.  Status post right coronary artery stent placement for inferior MI cardial infarction.  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  Stress test in 7/2018 showed no evidence of ischemia.  No significant symptoms at this time.  Continue current management.  2.  Hypertension.  Well controlled today on his current medications.  3.  Diabetes mellitus type 2  4.  COPD  5.  Aortic stenosis.  Noted to be mild on his last echocardiogram.  He continues to have a high-pitched systolic murmur on exam.  We will plan for repeat echocardiogram later this year.    We will see the patient back again in 6 months with a repeat echocardiogram.    Coronary Artery Disease  Assessment  • The patient has no angina  • There is a new diagnosis of stable angina in the past 12 months  • The patient is having symptoms consistent with unstable angina     Plan  •  Lifestyle modifications discussed include medication compliance and regular exercise    Subjective - Objective  • There is a history of past MI  • There has been a previous stent procedure using CARLENE  • Current antiplatelet therapy includes aspirin 81 mg and clopidogrel 75 mg  • The patient qualifies for cardiac rehabilitation, but has not been referred for system reasons

## 2019-05-24 RX ORDER — ATORVASTATIN CALCIUM 10 MG/1
TABLET, FILM COATED ORAL
Qty: 30 TABLET | Refills: 2 | Status: SHIPPED | OUTPATIENT
Start: 2019-05-24 | End: 2019-07-24

## 2019-05-24 RX ORDER — CLOPIDOGREL BISULFATE 75 MG/1
TABLET ORAL
Qty: 30 TABLET | Refills: 2 | Status: SHIPPED | OUTPATIENT
Start: 2019-05-24 | End: 2019-07-24

## 2019-07-18 RX ORDER — ISOSORBIDE MONONITRATE 30 MG/1
TABLET, EXTENDED RELEASE ORAL
Qty: 90 TABLET | Refills: 2 | Status: SHIPPED | OUTPATIENT
Start: 2019-07-18 | End: 2019-07-24

## 2019-07-18 RX ORDER — LISINOPRIL 5 MG/1
TABLET ORAL
Qty: 90 TABLET | Refills: 2 | Status: SHIPPED | OUTPATIENT
Start: 2019-07-18 | End: 2019-07-24

## 2019-07-23 ENCOUNTER — HOSPITAL ENCOUNTER (EMERGENCY)
Facility: HOSPITAL | Age: 84
Discharge: HOME | End: 2019-07-23
Attending: EMERGENCY MEDICINE | Admitting: EMERGENCY MEDICINE

## 2019-07-23 ENCOUNTER — APPOINTMENT (OUTPATIENT)
Dept: GENERAL RADIOLOGY | Facility: HOSPITAL | Age: 84
End: 2019-07-23

## 2019-07-23 VITALS
OXYGEN SATURATION: 96 % | SYSTOLIC BLOOD PRESSURE: 101 MMHG | WEIGHT: 189.5 LBS | TEMPERATURE: 96.2 F | RESPIRATION RATE: 18 BRPM | HEART RATE: 79 BPM | DIASTOLIC BLOOD PRESSURE: 65 MMHG | HEIGHT: 69 IN | BODY MASS INDEX: 28.07 KG/M2

## 2019-07-23 DIAGNOSIS — R07.9 CHEST PAIN, UNSPECIFIED TYPE: Primary | ICD-10-CM

## 2019-07-23 LAB
ALBUMIN SERPL-MCNC: 4 G/DL (ref 3.5–5.2)
ALBUMIN/GLOB SERPL: 1.5 G/DL
ALP SERPL-CCNC: 77 U/L (ref 39–117)
ALT SERPL W P-5'-P-CCNC: 14 U/L (ref 1–41)
ANION GAP SERPL CALCULATED.3IONS-SCNC: 9.2 MMOL/L (ref 5–15)
AST SERPL-CCNC: 20 U/L (ref 1–40)
BASOPHILS # BLD AUTO: 0.1 10*3/MM3 (ref 0–0.2)
BASOPHILS NFR BLD AUTO: 1.4 % (ref 0–1.5)
BILIRUB SERPL-MCNC: 0.4 MG/DL (ref 0.2–1.2)
BUN BLD-MCNC: 26 MG/DL (ref 8–23)
BUN/CREAT SERPL: 23 (ref 7–25)
CALCIUM SPEC-SCNC: 9.1 MG/DL (ref 8.6–10.5)
CHLORIDE SERPL-SCNC: 102 MMOL/L (ref 98–107)
CO2 SERPL-SCNC: 27.8 MMOL/L (ref 22–29)
CREAT BLD-MCNC: 1.13 MG/DL (ref 0.76–1.27)
DEPRECATED RDW RBC AUTO: 45.3 FL (ref 37–54)
EOSINOPHIL # BLD AUTO: 0.39 10*3/MM3 (ref 0–0.4)
EOSINOPHIL NFR BLD AUTO: 5.3 % (ref 0.3–6.2)
ERYTHROCYTE [DISTWIDTH] IN BLOOD BY AUTOMATED COUNT: 14.1 % (ref 12.3–15.4)
GFR SERPL CREATININE-BSD FRML MDRD: 62 ML/MIN/1.73
GLOBULIN UR ELPH-MCNC: 2.6 GM/DL
GLUCOSE BLD-MCNC: 110 MG/DL (ref 65–99)
HCT VFR BLD AUTO: 36.2 % (ref 37.5–51)
HGB BLD-MCNC: 11.6 G/DL (ref 13–17.7)
IMM GRANULOCYTES # BLD AUTO: 0.03 10*3/MM3 (ref 0–0.05)
IMM GRANULOCYTES NFR BLD AUTO: 0.4 % (ref 0–0.5)
LYMPHOCYTES # BLD AUTO: 2.49 10*3/MM3 (ref 0.7–3.1)
LYMPHOCYTES NFR BLD AUTO: 33.8 % (ref 19.6–45.3)
MCH RBC QN AUTO: 28.2 PG (ref 26.6–33)
MCHC RBC AUTO-ENTMCNC: 32 G/DL (ref 31.5–35.7)
MCV RBC AUTO: 87.9 FL (ref 79–97)
MONOCYTES # BLD AUTO: 0.65 10*3/MM3 (ref 0.1–0.9)
MONOCYTES NFR BLD AUTO: 8.8 % (ref 5–12)
NEUTROPHILS # BLD AUTO: 3.7 10*3/MM3 (ref 1.7–7)
NEUTROPHILS NFR BLD AUTO: 50.3 % (ref 42.7–76)
NRBC BLD AUTO-RTO: 0 /100 WBC (ref 0–0.2)
NT-PROBNP SERPL-MCNC: 579.5 PG/ML (ref 5–1800)
PLATELET # BLD AUTO: 234 10*3/MM3 (ref 140–450)
PMV BLD AUTO: 10.5 FL (ref 6–12)
POTASSIUM BLD-SCNC: 5.1 MMOL/L (ref 3.5–5.2)
PROT SERPL-MCNC: 6.6 G/DL (ref 6–8.5)
RBC # BLD AUTO: 4.12 10*6/MM3 (ref 4.14–5.8)
SODIUM BLD-SCNC: 139 MMOL/L (ref 136–145)
TROPONIN T SERPL-MCNC: <0.01 NG/ML (ref 0–0.03)
TROPONIN T SERPL-MCNC: <0.01 NG/ML (ref 0–0.03)
WBC NRBC COR # BLD: 7.36 10*3/MM3 (ref 3.4–10.8)

## 2019-07-23 PROCEDURE — 94799 UNLISTED PULMONARY SVC/PX: CPT

## 2019-07-23 PROCEDURE — 83880 ASSAY OF NATRIURETIC PEPTIDE: CPT | Performed by: PHYSICIAN ASSISTANT

## 2019-07-23 PROCEDURE — 94640 AIRWAY INHALATION TREATMENT: CPT

## 2019-07-23 PROCEDURE — 80053 COMPREHEN METABOLIC PANEL: CPT | Performed by: PHYSICIAN ASSISTANT

## 2019-07-23 PROCEDURE — 93005 ELECTROCARDIOGRAM TRACING: CPT | Performed by: EMERGENCY MEDICINE

## 2019-07-23 PROCEDURE — 99284 EMERGENCY DEPT VISIT MOD MDM: CPT

## 2019-07-23 PROCEDURE — 84484 ASSAY OF TROPONIN QUANT: CPT | Performed by: PHYSICIAN ASSISTANT

## 2019-07-23 PROCEDURE — 93005 ELECTROCARDIOGRAM TRACING: CPT

## 2019-07-23 PROCEDURE — 71045 X-RAY EXAM CHEST 1 VIEW: CPT

## 2019-07-23 PROCEDURE — 85025 COMPLETE CBC W/AUTO DIFF WBC: CPT | Performed by: PHYSICIAN ASSISTANT

## 2019-07-23 PROCEDURE — 93010 ELECTROCARDIOGRAM REPORT: CPT | Performed by: INTERNAL MEDICINE

## 2019-07-23 RX ORDER — SODIUM CHLORIDE 0.9 % (FLUSH) 0.9 %
10 SYRINGE (ML) INJECTION AS NEEDED
Status: DISCONTINUED | OUTPATIENT
Start: 2019-07-23 | End: 2019-07-24 | Stop reason: HOSPADM

## 2019-07-23 RX ORDER — IPRATROPIUM BROMIDE AND ALBUTEROL SULFATE 2.5; .5 MG/3ML; MG/3ML
3 SOLUTION RESPIRATORY (INHALATION) ONCE
Status: COMPLETED | OUTPATIENT
Start: 2019-07-23 | End: 2019-07-23

## 2019-07-23 RX ADMIN — IPRATROPIUM BROMIDE AND ALBUTEROL SULFATE 3 ML: 2.5; .5 SOLUTION RESPIRATORY (INHALATION) at 17:21

## 2019-07-24 ENCOUNTER — HOSPITAL ENCOUNTER (OUTPATIENT)
Facility: HOSPITAL | Age: 84
Setting detail: OBSERVATION
Discharge: HOME | End: 2019-07-26
Attending: EMERGENCY MEDICINE | Admitting: INTERNAL MEDICINE

## 2019-07-24 ENCOUNTER — APPOINTMENT (OUTPATIENT)
Dept: GENERAL RADIOLOGY | Facility: HOSPITAL | Age: 84
End: 2019-07-24

## 2019-07-24 DIAGNOSIS — J44.1 COPD EXACERBATION (HCC): Primary | ICD-10-CM

## 2019-07-24 DIAGNOSIS — R77.8 ELEVATED TROPONIN: ICD-10-CM

## 2019-07-24 LAB
ALBUMIN SERPL-MCNC: 3.9 G/DL (ref 3.5–5.2)
ALBUMIN/GLOB SERPL: 1.4 G/DL
ALP SERPL-CCNC: 74 U/L (ref 39–117)
ALT SERPL W P-5'-P-CCNC: 13 U/L (ref 1–41)
ANION GAP SERPL CALCULATED.3IONS-SCNC: 13 MMOL/L (ref 5–15)
AST SERPL-CCNC: 21 U/L (ref 1–40)
BASOPHILS # BLD AUTO: 0.07 10*3/MM3 (ref 0–0.2)
BASOPHILS NFR BLD AUTO: 0.9 % (ref 0–1.5)
BILIRUB SERPL-MCNC: 0.6 MG/DL (ref 0.2–1.2)
BUN BLD-MCNC: 24 MG/DL (ref 8–23)
BUN/CREAT SERPL: 20.5 (ref 7–25)
CALCIUM SPEC-SCNC: 8.9 MG/DL (ref 8.6–10.5)
CHLORIDE SERPL-SCNC: 101 MMOL/L (ref 98–107)
CO2 SERPL-SCNC: 23 MMOL/L (ref 22–29)
CREAT BLD-MCNC: 1.17 MG/DL (ref 0.76–1.27)
DEPRECATED RDW RBC AUTO: 44.9 FL (ref 37–54)
EOSINOPHIL # BLD AUTO: 0.13 10*3/MM3 (ref 0–0.4)
EOSINOPHIL NFR BLD AUTO: 1.6 % (ref 0.3–6.2)
ERYTHROCYTE [DISTWIDTH] IN BLOOD BY AUTOMATED COUNT: 13.9 % (ref 12.3–15.4)
GFR SERPL CREATININE-BSD FRML MDRD: 59 ML/MIN/1.73
GLOBULIN UR ELPH-MCNC: 2.7 GM/DL
GLUCOSE BLD-MCNC: 123 MG/DL (ref 65–99)
HCT VFR BLD AUTO: 35.7 % (ref 37.5–51)
HGB BLD-MCNC: 11.3 G/DL (ref 13–17.7)
HOLD SPECIMEN: NORMAL
HOLD SPECIMEN: NORMAL
IMM GRANULOCYTES # BLD AUTO: 0.02 10*3/MM3 (ref 0–0.05)
IMM GRANULOCYTES NFR BLD AUTO: 0.2 % (ref 0–0.5)
LYMPHOCYTES # BLD AUTO: 1.49 10*3/MM3 (ref 0.7–3.1)
LYMPHOCYTES NFR BLD AUTO: 18.1 % (ref 19.6–45.3)
MCH RBC QN AUTO: 27.8 PG (ref 26.6–33)
MCHC RBC AUTO-ENTMCNC: 31.7 G/DL (ref 31.5–35.7)
MCV RBC AUTO: 87.7 FL (ref 79–97)
MONOCYTES # BLD AUTO: 0.17 10*3/MM3 (ref 0.1–0.9)
MONOCYTES NFR BLD AUTO: 2.1 % (ref 5–12)
NEUTROPHILS # BLD AUTO: 6.33 10*3/MM3 (ref 1.7–7)
NEUTROPHILS NFR BLD AUTO: 77.1 % (ref 42.7–76)
NRBC BLD AUTO-RTO: 0 /100 WBC (ref 0–0.2)
NT-PROBNP SERPL-MCNC: 632.8 PG/ML (ref 5–1800)
PLATELET # BLD AUTO: 209 10*3/MM3 (ref 140–450)
PMV BLD AUTO: 11.3 FL (ref 6–12)
POTASSIUM BLD-SCNC: 4.7 MMOL/L (ref 3.5–5.2)
PROT SERPL-MCNC: 6.6 G/DL (ref 6–8.5)
RBC # BLD AUTO: 4.07 10*6/MM3 (ref 4.14–5.8)
SODIUM BLD-SCNC: 137 MMOL/L (ref 136–145)
TROPONIN T SERPL-MCNC: 0.06 NG/ML (ref 0–0.03)
WBC NRBC COR # BLD: 8.21 10*3/MM3 (ref 3.4–10.8)
WHOLE BLOOD HOLD SPECIMEN: NORMAL
WHOLE BLOOD HOLD SPECIMEN: NORMAL

## 2019-07-24 PROCEDURE — 94799 UNLISTED PULMONARY SVC/PX: CPT

## 2019-07-24 PROCEDURE — 71045 X-RAY EXAM CHEST 1 VIEW: CPT

## 2019-07-24 PROCEDURE — 83880 ASSAY OF NATRIURETIC PEPTIDE: CPT | Performed by: PHYSICIAN ASSISTANT

## 2019-07-24 PROCEDURE — G0378 HOSPITAL OBSERVATION PER HR: HCPCS

## 2019-07-24 PROCEDURE — 94640 AIRWAY INHALATION TREATMENT: CPT

## 2019-07-24 PROCEDURE — 99285 EMERGENCY DEPT VISIT HI MDM: CPT

## 2019-07-24 PROCEDURE — 85025 COMPLETE CBC W/AUTO DIFF WBC: CPT | Performed by: PHYSICIAN ASSISTANT

## 2019-07-24 PROCEDURE — 96374 THER/PROPH/DIAG INJ IV PUSH: CPT

## 2019-07-24 PROCEDURE — 84484 ASSAY OF TROPONIN QUANT: CPT | Performed by: PHYSICIAN ASSISTANT

## 2019-07-24 PROCEDURE — 93010 ELECTROCARDIOGRAM REPORT: CPT | Performed by: INTERNAL MEDICINE

## 2019-07-24 PROCEDURE — 80053 COMPREHEN METABOLIC PANEL: CPT | Performed by: PHYSICIAN ASSISTANT

## 2019-07-24 PROCEDURE — 93005 ELECTROCARDIOGRAM TRACING: CPT | Performed by: PHYSICIAN ASSISTANT

## 2019-07-24 PROCEDURE — 25010000002 METHYLPREDNISOLONE PER 125 MG: Performed by: NURSE PRACTITIONER

## 2019-07-24 RX ORDER — ACETAMINOPHEN 325 MG/1
650 TABLET ORAL EVERY 4 HOURS PRN
Status: DISCONTINUED | OUTPATIENT
Start: 2019-07-24 | End: 2019-07-26 | Stop reason: HOSPADM

## 2019-07-24 RX ORDER — IPRATROPIUM BROMIDE AND ALBUTEROL SULFATE 2.5; .5 MG/3ML; MG/3ML
3 SOLUTION RESPIRATORY (INHALATION) ONCE
Status: COMPLETED | OUTPATIENT
Start: 2019-07-24 | End: 2019-07-24

## 2019-07-24 RX ORDER — IPRATROPIUM BROMIDE AND ALBUTEROL SULFATE 2.5; .5 MG/3ML; MG/3ML
3 SOLUTION RESPIRATORY (INHALATION)
Status: DISCONTINUED | OUTPATIENT
Start: 2019-07-24 | End: 2019-07-26 | Stop reason: HOSPADM

## 2019-07-24 RX ORDER — METHYLPREDNISOLONE SODIUM SUCCINATE 125 MG/2ML
80 INJECTION, POWDER, LYOPHILIZED, FOR SOLUTION INTRAMUSCULAR; INTRAVENOUS EVERY 8 HOURS
Status: DISCONTINUED | OUTPATIENT
Start: 2019-07-24 | End: 2019-07-26 | Stop reason: HOSPADM

## 2019-07-24 RX ORDER — SODIUM CHLORIDE 0.9 % (FLUSH) 0.9 %
10 SYRINGE (ML) INJECTION AS NEEDED
Status: DISCONTINUED | OUTPATIENT
Start: 2019-07-24 | End: 2019-07-26 | Stop reason: HOSPADM

## 2019-07-24 RX ORDER — SODIUM CHLORIDE 0.9 % (FLUSH) 0.9 %
3 SYRINGE (ML) INJECTION EVERY 12 HOURS SCHEDULED
Status: DISCONTINUED | OUTPATIENT
Start: 2019-07-24 | End: 2019-07-26 | Stop reason: HOSPADM

## 2019-07-24 RX ORDER — ALBUTEROL SULFATE 90 UG/1
2 AEROSOL, METERED RESPIRATORY (INHALATION) EVERY 4 HOURS PRN
COMMUNITY

## 2019-07-24 RX ORDER — GUAIFENESIN 600 MG/1
600 TABLET, EXTENDED RELEASE ORAL EVERY 12 HOURS SCHEDULED
Status: DISCONTINUED | OUTPATIENT
Start: 2019-07-24 | End: 2019-07-26 | Stop reason: HOSPADM

## 2019-07-24 RX ORDER — CLOPIDOGREL BISULFATE 75 MG/1
75 TABLET ORAL DAILY
COMMUNITY
End: 2020-02-24

## 2019-07-24 RX ORDER — SODIUM CHLORIDE 0.9 % (FLUSH) 0.9 %
1-10 SYRINGE (ML) INJECTION AS NEEDED
Status: DISCONTINUED | OUTPATIENT
Start: 2019-07-24 | End: 2019-07-26 | Stop reason: HOSPADM

## 2019-07-24 RX ORDER — ATORVASTATIN CALCIUM 10 MG/1
10 TABLET, FILM COATED ORAL NIGHTLY
COMMUNITY
End: 2020-03-18

## 2019-07-24 RX ORDER — LISINOPRIL 5 MG/1
5 TABLET ORAL DAILY
COMMUNITY
End: 2020-02-22 | Stop reason: HOSPADM

## 2019-07-24 RX ORDER — CEFUROXIME AXETIL 250 MG/1
250 TABLET ORAL 2 TIMES DAILY
COMMUNITY
Start: 2019-07-22 | End: 2019-07-26 | Stop reason: HOSPADM

## 2019-07-24 RX ORDER — NITROGLYCERIN 0.4 MG/1
0.4 TABLET SUBLINGUAL
Status: DISCONTINUED | OUTPATIENT
Start: 2019-07-24 | End: 2019-07-26 | Stop reason: HOSPADM

## 2019-07-24 RX ORDER — ISOSORBIDE MONONITRATE 30 MG/1
30 TABLET, EXTENDED RELEASE ORAL DAILY
COMMUNITY
End: 2020-08-12

## 2019-07-24 RX ADMIN — IPRATROPIUM BROMIDE AND ALBUTEROL SULFATE 3 ML: 2.5; .5 SOLUTION RESPIRATORY (INHALATION) at 20:18

## 2019-07-24 RX ADMIN — METHYLPREDNISOLONE SODIUM SUCCINATE 80 MG: 125 INJECTION, POWDER, FOR SOLUTION INTRAMUSCULAR; INTRAVENOUS at 23:12

## 2019-07-24 RX ADMIN — SODIUM CHLORIDE, PRESERVATIVE FREE 3 ML: 5 INJECTION INTRAVENOUS at 22:59

## 2019-07-24 RX ADMIN — GUAIFENESIN 600 MG: 600 TABLET, EXTENDED RELEASE ORAL at 23:12

## 2019-07-25 PROBLEM — R07.9 EXERTIONAL CHEST PAIN: Status: RESOLVED | Noted: 2018-07-18 | Resolved: 2019-07-25

## 2019-07-25 PROBLEM — J20.6 ACUTE BRONCHITIS DUE TO RHINOVIRUS: Status: RESOLVED | Noted: 2019-01-09 | Resolved: 2019-07-25

## 2019-07-25 PROBLEM — R79.89 ELEVATED TROPONIN: Status: ACTIVE | Noted: 2019-07-25

## 2019-07-25 PROBLEM — D64.9 POSTOPERATIVE ANEMIA: Status: RESOLVED | Noted: 2017-03-29 | Resolved: 2019-07-25

## 2019-07-25 PROBLEM — R77.8 ELEVATED TROPONIN: Status: ACTIVE | Noted: 2019-07-25

## 2019-07-25 PROBLEM — E78.5 HLD (HYPERLIPIDEMIA): Status: ACTIVE | Noted: 2019-07-25

## 2019-07-25 LAB
ANION GAP SERPL CALCULATED.3IONS-SCNC: 13 MMOL/L (ref 5–15)
B PARAPERT DNA SPEC QL NAA+PROBE: NOT DETECTED
B PERT DNA SPEC QL NAA+PROBE: NOT DETECTED
BUN BLD-MCNC: 30 MG/DL (ref 8–23)
BUN/CREAT SERPL: 25.6 (ref 7–25)
C PNEUM DNA NPH QL NAA+NON-PROBE: NOT DETECTED
CALCIUM SPEC-SCNC: 9 MG/DL (ref 8.6–10.5)
CHLORIDE SERPL-SCNC: 100 MMOL/L (ref 98–107)
CO2 SERPL-SCNC: 21 MMOL/L (ref 22–29)
CREAT BLD-MCNC: 1.17 MG/DL (ref 0.76–1.27)
DEPRECATED RDW RBC AUTO: 44.2 FL (ref 37–54)
ERYTHROCYTE [DISTWIDTH] IN BLOOD BY AUTOMATED COUNT: 13.9 % (ref 12.3–15.4)
FLUAV H1 2009 PAND RNA NPH QL NAA+PROBE: NOT DETECTED
FLUAV H1 HA GENE NPH QL NAA+PROBE: NOT DETECTED
FLUAV H3 RNA NPH QL NAA+PROBE: NOT DETECTED
FLUAV SUBTYP SPEC NAA+PROBE: NOT DETECTED
FLUBV RNA ISLT QL NAA+PROBE: NOT DETECTED
GFR SERPL CREATININE-BSD FRML MDRD: 59 ML/MIN/1.73
GLUCOSE BLD-MCNC: 167 MG/DL (ref 65–99)
HADV DNA SPEC NAA+PROBE: NOT DETECTED
HCOV 229E RNA SPEC QL NAA+PROBE: NOT DETECTED
HCOV HKU1 RNA SPEC QL NAA+PROBE: NOT DETECTED
HCOV NL63 RNA SPEC QL NAA+PROBE: NOT DETECTED
HCOV OC43 RNA SPEC QL NAA+PROBE: NOT DETECTED
HCT VFR BLD AUTO: 33.6 % (ref 37.5–51)
HGB BLD-MCNC: 10.7 G/DL (ref 13–17.7)
HMPV RNA NPH QL NAA+NON-PROBE: NOT DETECTED
HPIV1 RNA SPEC QL NAA+PROBE: NOT DETECTED
HPIV2 RNA SPEC QL NAA+PROBE: NOT DETECTED
HPIV3 RNA NPH QL NAA+PROBE: NOT DETECTED
HPIV4 P GENE NPH QL NAA+PROBE: NOT DETECTED
M PNEUMO IGG SER IA-ACNC: NOT DETECTED
MCH RBC QN AUTO: 27.7 PG (ref 26.6–33)
MCHC RBC AUTO-ENTMCNC: 31.8 G/DL (ref 31.5–35.7)
MCV RBC AUTO: 87 FL (ref 79–97)
PLATELET # BLD AUTO: 207 10*3/MM3 (ref 140–450)
PMV BLD AUTO: 11 FL (ref 6–12)
POTASSIUM BLD-SCNC: 4.4 MMOL/L (ref 3.5–5.2)
RBC # BLD AUTO: 3.86 10*6/MM3 (ref 4.14–5.8)
RHINOVIRUS RNA SPEC NAA+PROBE: NOT DETECTED
RSV RNA NPH QL NAA+NON-PROBE: NOT DETECTED
SODIUM BLD-SCNC: 134 MMOL/L (ref 136–145)
TROPONIN T SERPL-MCNC: 0.02 NG/ML (ref 0–0.03)
TROPONIN T SERPL-MCNC: 0.04 NG/ML (ref 0–0.03)
WBC NRBC COR # BLD: 5.43 10*3/MM3 (ref 3.4–10.8)

## 2019-07-25 PROCEDURE — 94799 UNLISTED PULMONARY SVC/PX: CPT

## 2019-07-25 PROCEDURE — 96376 TX/PRO/DX INJ SAME DRUG ADON: CPT

## 2019-07-25 PROCEDURE — 84484 ASSAY OF TROPONIN QUANT: CPT | Performed by: NURSE PRACTITIONER

## 2019-07-25 PROCEDURE — 97162 PT EVAL MOD COMPLEX 30 MIN: CPT

## 2019-07-25 PROCEDURE — 97110 THERAPEUTIC EXERCISES: CPT

## 2019-07-25 PROCEDURE — 25010000002 METHYLPREDNISOLONE PER 125 MG: Performed by: NURSE PRACTITIONER

## 2019-07-25 PROCEDURE — 99214 OFFICE O/P EST MOD 30 MIN: CPT | Performed by: INTERNAL MEDICINE

## 2019-07-25 PROCEDURE — G0378 HOSPITAL OBSERVATION PER HR: HCPCS

## 2019-07-25 PROCEDURE — 0100U HC BIOFIRE FILMARRAY RESP PANEL 2: CPT | Performed by: NURSE PRACTITIONER

## 2019-07-25 PROCEDURE — 85027 COMPLETE CBC AUTOMATED: CPT | Performed by: NURSE PRACTITIONER

## 2019-07-25 PROCEDURE — 80048 BASIC METABOLIC PNL TOTAL CA: CPT | Performed by: NURSE PRACTITIONER

## 2019-07-25 PROCEDURE — 36415 COLL VENOUS BLD VENIPUNCTURE: CPT | Performed by: NURSE PRACTITIONER

## 2019-07-25 RX ORDER — ASPIRIN 81 MG/1
81 TABLET ORAL DAILY
Status: DISCONTINUED | OUTPATIENT
Start: 2019-07-25 | End: 2019-07-26 | Stop reason: HOSPADM

## 2019-07-25 RX ORDER — ISOSORBIDE MONONITRATE 30 MG/1
30 TABLET, EXTENDED RELEASE ORAL DAILY
Status: DISCONTINUED | OUTPATIENT
Start: 2019-07-25 | End: 2019-07-26 | Stop reason: HOSPADM

## 2019-07-25 RX ORDER — LISINOPRIL 5 MG/1
5 TABLET ORAL DAILY
Status: DISCONTINUED | OUTPATIENT
Start: 2019-07-25 | End: 2019-07-26 | Stop reason: HOSPADM

## 2019-07-25 RX ORDER — CLOPIDOGREL BISULFATE 75 MG/1
75 TABLET ORAL DAILY
Status: DISCONTINUED | OUTPATIENT
Start: 2019-07-25 | End: 2019-07-26 | Stop reason: HOSPADM

## 2019-07-25 RX ORDER — ATORVASTATIN CALCIUM 10 MG/1
10 TABLET, FILM COATED ORAL NIGHTLY
Status: DISCONTINUED | OUTPATIENT
Start: 2019-07-25 | End: 2019-07-26 | Stop reason: HOSPADM

## 2019-07-25 RX ORDER — CARVEDILOL 3.12 MG/1
3.12 TABLET ORAL EVERY 12 HOURS SCHEDULED
Status: DISCONTINUED | OUTPATIENT
Start: 2019-07-25 | End: 2019-07-26 | Stop reason: HOSPADM

## 2019-07-25 RX ADMIN — GUAIFENESIN 600 MG: 600 TABLET, EXTENDED RELEASE ORAL at 20:06

## 2019-07-25 RX ADMIN — IPRATROPIUM BROMIDE AND ALBUTEROL SULFATE 3 ML: 2.5; .5 SOLUTION RESPIRATORY (INHALATION) at 20:31

## 2019-07-25 RX ADMIN — IPRATROPIUM BROMIDE AND ALBUTEROL SULFATE 3 ML: 2.5; .5 SOLUTION RESPIRATORY (INHALATION) at 15:31

## 2019-07-25 RX ADMIN — CARVEDILOL 3.12 MG: 3.12 TABLET, FILM COATED ORAL at 20:06

## 2019-07-25 RX ADMIN — IPRATROPIUM BROMIDE AND ALBUTEROL SULFATE 3 ML: 2.5; .5 SOLUTION RESPIRATORY (INHALATION) at 23:26

## 2019-07-25 RX ADMIN — IPRATROPIUM BROMIDE AND ALBUTEROL SULFATE 3 ML: 2.5; .5 SOLUTION RESPIRATORY (INHALATION) at 03:38

## 2019-07-25 RX ADMIN — IPRATROPIUM BROMIDE AND ALBUTEROL SULFATE 3 ML: 2.5; .5 SOLUTION RESPIRATORY (INHALATION) at 07:26

## 2019-07-25 RX ADMIN — ASPIRIN 81 MG: 81 TABLET, COATED ORAL at 15:12

## 2019-07-25 RX ADMIN — IPRATROPIUM BROMIDE AND ALBUTEROL SULFATE 3 ML: 2.5; .5 SOLUTION RESPIRATORY (INHALATION) at 11:24

## 2019-07-25 RX ADMIN — METHYLPREDNISOLONE SODIUM SUCCINATE 80 MG: 125 INJECTION, POWDER, FOR SOLUTION INTRAMUSCULAR; INTRAVENOUS at 06:23

## 2019-07-25 RX ADMIN — GUAIFENESIN 600 MG: 600 TABLET, EXTENDED RELEASE ORAL at 08:07

## 2019-07-25 RX ADMIN — IPRATROPIUM BROMIDE AND ALBUTEROL SULFATE 3 ML: 2.5; .5 SOLUTION RESPIRATORY (INHALATION) at 00:01

## 2019-07-25 RX ADMIN — METHYLPREDNISOLONE SODIUM SUCCINATE 80 MG: 125 INJECTION, POWDER, FOR SOLUTION INTRAMUSCULAR; INTRAVENOUS at 15:12

## 2019-07-25 RX ADMIN — ATORVASTATIN CALCIUM 10 MG: 10 TABLET, FILM COATED ORAL at 20:06

## 2019-07-25 RX ADMIN — SODIUM CHLORIDE, PRESERVATIVE FREE 3 ML: 5 INJECTION INTRAVENOUS at 08:07

## 2019-07-25 RX ADMIN — CARVEDILOL 3.12 MG: 3.12 TABLET, FILM COATED ORAL at 15:13

## 2019-07-25 RX ADMIN — CLOPIDOGREL 75 MG: 75 TABLET, FILM COATED ORAL at 15:12

## 2019-07-25 RX ADMIN — ISOSORBIDE MONONITRATE 30 MG: 30 TABLET ORAL at 16:56

## 2019-07-25 RX ADMIN — LISINOPRIL 5 MG: 5 TABLET ORAL at 15:12

## 2019-07-25 RX ADMIN — SODIUM CHLORIDE, PRESERVATIVE FREE 3 ML: 5 INJECTION INTRAVENOUS at 20:05

## 2019-07-26 VITALS
BODY MASS INDEX: 29.99 KG/M2 | SYSTOLIC BLOOD PRESSURE: 113 MMHG | RESPIRATION RATE: 14 BRPM | HEART RATE: 75 BPM | HEIGHT: 65 IN | DIASTOLIC BLOOD PRESSURE: 60 MMHG | OXYGEN SATURATION: 98 % | WEIGHT: 180 LBS | TEMPERATURE: 98 F

## 2019-07-26 PROCEDURE — G0378 HOSPITAL OBSERVATION PER HR: HCPCS

## 2019-07-26 PROCEDURE — 92610 EVALUATE SWALLOWING FUNCTION: CPT

## 2019-07-26 PROCEDURE — 96376 TX/PRO/DX INJ SAME DRUG ADON: CPT

## 2019-07-26 PROCEDURE — 25010000002 METHYLPREDNISOLONE PER 125 MG: Performed by: NURSE PRACTITIONER

## 2019-07-26 PROCEDURE — 94799 UNLISTED PULMONARY SVC/PX: CPT

## 2019-07-26 RX ORDER — DOXYCYCLINE HYCLATE 50 MG/1
100 CAPSULE ORAL 2 TIMES DAILY
Qty: 20 CAPSULE | Refills: 0 | Status: SHIPPED | OUTPATIENT
Start: 2019-07-26 | End: 2019-07-31

## 2019-07-26 RX ORDER — METHYLPREDNISOLONE 4 MG/1
TABLET ORAL
Qty: 1 EACH | Refills: 0 | Status: SHIPPED | OUTPATIENT
Start: 2019-07-26 | End: 2019-11-05 | Stop reason: ALTCHOICE

## 2019-07-26 RX ADMIN — CLOPIDOGREL 75 MG: 75 TABLET, FILM COATED ORAL at 08:01

## 2019-07-26 RX ADMIN — IPRATROPIUM BROMIDE AND ALBUTEROL SULFATE 3 ML: 2.5; .5 SOLUTION RESPIRATORY (INHALATION) at 03:50

## 2019-07-26 RX ADMIN — SODIUM CHLORIDE, PRESERVATIVE FREE 3 ML: 5 INJECTION INTRAVENOUS at 08:00

## 2019-07-26 RX ADMIN — IPRATROPIUM BROMIDE AND ALBUTEROL SULFATE 3 ML: 2.5; .5 SOLUTION RESPIRATORY (INHALATION) at 12:03

## 2019-07-26 RX ADMIN — METHYLPREDNISOLONE SODIUM SUCCINATE 80 MG: 125 INJECTION, POWDER, FOR SOLUTION INTRAMUSCULAR; INTRAVENOUS at 07:57

## 2019-07-26 RX ADMIN — GUAIFENESIN 600 MG: 600 TABLET, EXTENDED RELEASE ORAL at 08:01

## 2019-07-26 RX ADMIN — IPRATROPIUM BROMIDE AND ALBUTEROL SULFATE 3 ML: 2.5; .5 SOLUTION RESPIRATORY (INHALATION) at 06:59

## 2019-07-26 RX ADMIN — METHYLPREDNISOLONE SODIUM SUCCINATE 80 MG: 125 INJECTION, POWDER, FOR SOLUTION INTRAMUSCULAR; INTRAVENOUS at 00:22

## 2019-07-26 RX ADMIN — IPRATROPIUM BROMIDE AND ALBUTEROL SULFATE 3 ML: 2.5; .5 SOLUTION RESPIRATORY (INHALATION) at 16:36

## 2019-07-26 RX ADMIN — LISINOPRIL 5 MG: 5 TABLET ORAL at 08:01

## 2019-07-26 RX ADMIN — ASPIRIN 81 MG: 81 TABLET, COATED ORAL at 08:01

## 2019-07-26 RX ADMIN — CARVEDILOL 3.12 MG: 3.12 TABLET, FILM COATED ORAL at 08:01

## 2019-07-26 RX ADMIN — ISOSORBIDE MONONITRATE 30 MG: 30 TABLET ORAL at 08:01

## 2019-07-27 ENCOUNTER — READMISSION MANAGEMENT (OUTPATIENT)
Dept: CALL CENTER | Facility: HOSPITAL | Age: 84
End: 2019-07-27

## 2019-07-27 NOTE — OUTREACH NOTE
Prep Survey      Responses   Facility patient discharged from?  [place]   Is patient eligible?  Yes   Discharge diagnosis  COPD exacerbation    Does the patient have one of the following disease processes/diagnoses(primary or secondary)?  COPD/Pneumonia   Is there a DME ordered?  No   Prep survey completed?  Yes          Chica Barnes RN

## 2019-07-29 ENCOUNTER — READMISSION MANAGEMENT (OUTPATIENT)
Dept: CALL CENTER | Facility: HOSPITAL | Age: 84
End: 2019-07-29

## 2019-07-29 NOTE — OUTREACH NOTE
COPD/PN Week 1 Survey      Responses   Facility patient discharged from?  [place]   Does the patient have one of the following disease processes/diagnoses(primary or secondary)?  COPD/Pneumonia   Is there a successful TCM telephone encounter documented?  No   Was the primary reason for admission:  COPD exacerbation   Week 1 attempt successful?  Yes   Call start time  1155   Call end time  1201   Discharge diagnosis  COPD exacerbation    Is patient permission given to speak with other caregiver?  Yes   Person spoke with today (if not patient) and relationship  Yovanny, wife   Meds reviewed with patient/caregiver?  Yes   Is the patient having any side effects they believe may be caused by any medication additions or changes?  No   Does the patient have all medications ordered at discharge?  Yes   Is the patient taking all medications as directed (includes completed medication regime)?  Yes   Does the patient have a primary care provider?   Yes   Does the patient have an appointment with their PCP or pulmonologist within 7 days of discharge?  Yes   Has the patient kept scheduled appointments due by today?  N/A   Comments  Heart MD tomorrow 07/30   PCP today 07/29   Has home health visited the patient within 72 hours of discharge?  N/A   Home health comments  Uses Nebulizer and Inhaler   Has all DME been delivered?  Yes   Psychosocial issues?  No   Did the patient receive a copy of their discharge instructions?  Yes   Nursing interventions  Reviewed instructions with patient   What is the patient's perception of their health status since discharge?  Improving   Nursing Interventions  Nurse provided patient education   Are the patient's immunizations up to date?   Yes   Is the patient/caregiver able to teach back the hierarchy of who to call/visit for symptoms/problems? PCP, Specialist, Home health nurse, Urgent Care, ED, 911  Yes   Is the patient able to teach back COPD zones?  Yes [Wife says the hospital gave them a  paper with zone information.]   Nursing interventions  Education provided on various zones [Zones reviewed with wife.]   Patient reports what zone on this call?  Green Zone   Green Zone  Reports doing well, Breathing without shortness of breath   Week 1 call completed?  Yes          Klarissa Sandhu RN

## 2019-07-30 ENCOUNTER — HOSPITAL ENCOUNTER (OUTPATIENT)
Dept: CARDIOLOGY | Facility: HOSPITAL | Age: 84
Discharge: HOME | End: 2019-07-30
Admitting: INTERNAL MEDICINE

## 2019-07-30 ENCOUNTER — OFFICE VISIT (OUTPATIENT)
Dept: CARDIOLOGY | Facility: CLINIC | Age: 84
End: 2019-07-30

## 2019-07-30 VITALS
HEART RATE: 72 BPM | SYSTOLIC BLOOD PRESSURE: 120 MMHG | HEIGHT: 65 IN | OXYGEN SATURATION: 99 % | BODY MASS INDEX: 30.99 KG/M2 | DIASTOLIC BLOOD PRESSURE: 80 MMHG | WEIGHT: 186 LBS

## 2019-07-30 VITALS
WEIGHT: 180 LBS | SYSTOLIC BLOOD PRESSURE: 173 MMHG | HEART RATE: 62 BPM | HEIGHT: 65 IN | BODY MASS INDEX: 29.99 KG/M2 | OXYGEN SATURATION: 98 % | DIASTOLIC BLOOD PRESSURE: 80 MMHG

## 2019-07-30 DIAGNOSIS — I25.10 CORONARY ARTERY DISEASE INVOLVING NATIVE CORONARY ARTERY OF NATIVE HEART WITHOUT ANGINA PECTORIS: Primary | ICD-10-CM

## 2019-07-30 DIAGNOSIS — I35.0 AORTIC STENOSIS, MILD: ICD-10-CM

## 2019-07-30 DIAGNOSIS — I10 ESSENTIAL HYPERTENSION: ICD-10-CM

## 2019-07-30 DIAGNOSIS — R55 SYNCOPE, UNSPECIFIED SYNCOPE TYPE: ICD-10-CM

## 2019-07-30 DIAGNOSIS — E78.5 HYPERLIPIDEMIA, UNSPECIFIED HYPERLIPIDEMIA TYPE: ICD-10-CM

## 2019-07-30 DIAGNOSIS — E11.59 TYPE 2 DIABETES MELLITUS WITH OTHER CIRCULATORY COMPLICATION, WITHOUT LONG-TERM CURRENT USE OF INSULIN (HCC): ICD-10-CM

## 2019-07-30 DIAGNOSIS — J44.1 COPD EXACERBATION (HCC): ICD-10-CM

## 2019-07-30 DIAGNOSIS — Z72.0 TOBACCO ABUSE: ICD-10-CM

## 2019-07-30 PROCEDURE — 25010000002 PERFLUTREN (DEFINITY) 8.476 MG IN SODIUM CHLORIDE 0.9 % 10 ML INJECTION: Performed by: INTERNAL MEDICINE

## 2019-07-30 PROCEDURE — 93000 ELECTROCARDIOGRAM COMPLETE: CPT | Performed by: NURSE PRACTITIONER

## 2019-07-30 PROCEDURE — 99214 OFFICE O/P EST MOD 30 MIN: CPT | Performed by: NURSE PRACTITIONER

## 2019-07-30 PROCEDURE — 93306 TTE W/DOPPLER COMPLETE: CPT | Performed by: INTERNAL MEDICINE

## 2019-07-30 PROCEDURE — 93306 TTE W/DOPPLER COMPLETE: CPT

## 2019-07-30 RX ADMIN — PERFLUTREN 1.5 ML: 6.52 INJECTION, SUSPENSION INTRAVENOUS at 13:48

## 2019-07-30 NOTE — PROGRESS NOTES
Date of Office Visit: 2019  Encounter Provider: BOOGIE Sosa  Place of Service: [facility] CARDIOLOGY  Patient Name: Yoni Bonner Jr.  :1933      Subjective:     Chief Complaint:  Hospital follow-up for atypical chest pain and dyspnea    History of Present Illness:  Yoni Bonner Jr. is a pleasant 86 y.o. male who is new to me.  Outside records have been obtained and reviewed by me.     This is a patient with a history of coronary artery disease with previous lateral infarct and chronic left circumflex occlusion, inferior STEMI with CARLENE to occluded RCA 2018, ischemic cardiomyopathy with recent normal EF , COPD/tobacco abuse, recurrent syncope felt orthostatic versus vasovagal, difficulty hearing.    He has presented to the hospital a couple times in 2019 with chest pain.  Initially he had extremely sharp stabbing brief chest pain that lasted seconds and was discharged home.  He then presented again as his dyspnea progressed along with wheezing however his chest pain had not occurred in the last day or so.  Was reported that he had a syncopal spell a few weeks ago consistent with usual syncope while getting up quickly from bed.  Dr. Freeman evaluated him for an indeterminate troponin which occurred in the setting of COPD exacerbation.  She did not feel his symptoms represented acute coronary syndromes and did not feel he needed any further testing. He was treated with IV steroids bronchodilators and antibiotics and advised to follow-up with PCP.  He was discharged on 2019    He is presenting today for hospital follow-up with repeat echocardiogram to reassess his aortic stenosis was scheduled for September and moved up due to recent hospital stay.  He had his echocardiogram done prior to his appointment and results are not available yet.  He is present today with his wife.  At today's visit he has been doing better.  He reports he just feels a  "little weak.  He has not had any further episodes of stabbing chest pain.  He does not have any exertional angina.  He reports he has good days and bad days as far as his breathing goes but currently his breathing is been doing much better.  He has another day or 2 left of his steroids and antibiotics.  He does not yet have a follow-up appointment with his PCP.  His cough is a lot better and wheezing has improved.  He denies any fevers, leg swelling, PND or orthopnea. He has not had any further syncopal episodes or falls since his episode early July 2019.  His blood pressure when they check it at home periodically has been \"good,\" although they cannot tell me numbers.  He has not had any further choking episodes or dysphasia.      Past Medical History:   Diagnosis Date   • Bronchitis    • CAD (coronary artery disease)    • Carotid arterial disease (CMS/[facility])    • Chronic bronchitis (CMS/[facility])    • COPD (chronic obstructive pulmonary disease) (CMS/[facility])    • Gout    • Hyperlipidemia    • Hypertension    • Ischemic cardiomyopathy     resolved, EF was 60% in 2018   • Mild aortic stenosis    • Rheumatic fever    • Syncopal episodes    • Tobacco use      Past Surgical History:   Procedure Laterality Date   • CARDIAC CATHETERIZATION     • CARDIAC CATHETERIZATION N/A 4/8/2018    Procedure: Left Heart Cath;  Surgeon: Nicolas Jerez MD;  Location: [facility] INVASIVE LOCATION;  Service: Cardiovascular   • CARDIAC CATHETERIZATION  4/8/2018    Procedure: Percutaneous Manual Thrombectomy;  Surgeon: Nicolas Jerez MD;  Location: [facility] INVASIVE LOCATION;  Service: Cardiovascular   • CARDIAC CATHETERIZATION N/A 4/8/2018    Procedure: Stent CARLENE coronary;  Surgeon: Nicolas Jerez MD;  Location: [facility] INVASIVE LOCATION;  Service: Cardiovascular   • CARDIAC CATHETERIZATION N/A 4/8/2018    Procedure: Right Heart Cath;  Surgeon: Nicolas Jerez MD;  Location: [facility] INVASIVE LOCATION;  Service: Cardiovascular   • EYE SURGERY      cataract " surg   • HAND SURGERY     • HERNIA REPAIR     • KNEE SURGERY     • TESTICLE SURGERY       Outpatient Medications Prior to Visit   Medication Sig Dispense Refill   • albuterol (PROVENTIL) (2.5 MG/3ML) 0.083% nebulizer solution Take 2.5 mg by nebulization Every 4 (Four) Hours As Needed for Wheezing or Shortness of Air.  12   • albuterol sulfate  (90 Base) MCG/ACT inhaler Inhale 2 puffs Every 4 (Four) Hours As Needed for Wheezing.     • aspirin 81 MG EC tablet Take 1 tablet by mouth Daily. 30 tablet 5   • atorvastatin (LIPITOR) 10 MG tablet Take 10 mg by mouth Every Night.     • carvedilol (COREG) 3.125 MG tablet Take 1 tablet by mouth Every 12 (Twelve) Hours. 60 tablet 6   • clopidogrel (PLAVIX) 75 MG tablet Take 75 mg by mouth Daily.     • doxycycline (VIBRAMYCIN) 50 MG capsule Take 2 capsules by mouth 2 (Two) Times a Day for 5 days. 20 capsule 0   • isosorbide mononitrate (IMDUR) 30 MG 24 hr tablet Take 30 mg by mouth Daily.     • lisinopril (PRINIVIL,ZESTRIL) 5 MG tablet Take 5 mg by mouth Daily.     • methylPREDNISolone (MEDROL, KOLE,) 4 MG tablet Take as directed on package instructions. 1 each 0     No facility-administered medications prior to visit.        Allergies as of 07/30/2019 - Reviewed 07/30/2019   Allergen Reaction Noted   • No known drug allergy Unknown (See Comments) 02/04/2015     Social History     Socioeconomic History   • Marital status:      Spouse name: Not on file   • Number of children: Not on file   • Years of education: Not on file   • Highest education level: Not on file   Tobacco Use   • Smoking status: Never Smoker   • Smokeless tobacco: Current User     Types: Chew   • Tobacco comment: chewing tobacco since 4 yearrs old   Substance and Sexual Activity   • Alcohol use: No     Comment: caffeine use   • Drug use: No   • Sexual activity: Defer     Family History   Problem Relation Age of Onset   • Heart disease Father    • Hypertension Father    • Stroke Father    • Diabetes  "Sister    • Cancer Brother    • Heart disease Brother    • Hypertension Brother    • No Known Problems Maternal Grandmother    • No Known Problems Maternal Grandfather    • No Known Problems Paternal Grandmother    • No Known Problems Paternal Grandfather      Review of Systems   Constitution: Positive for malaise/fatigue. Negative for chills and fever.   HENT: Positive for hearing loss. Negative for ear pain, nosebleeds and sore throat.    Eyes: Negative for double vision, pain, vision loss in left eye and vision loss in right eye.   Cardiovascular: Positive for dyspnea on exertion. Negative for chest pain, claudication, irregular heartbeat, leg swelling, near-syncope, orthopnea, palpitations, paroxysmal nocturnal dyspnea and syncope.   Respiratory: Positive for cough and shortness of breath. Negative for snoring and wheezing.    Endocrine: Negative for cold intolerance and heat intolerance.   Hematologic/Lymphatic: Negative for bleeding problem.   Skin: Negative for color change, itching, rash and unusual hair distribution.   Musculoskeletal: Positive for joint pain (pain in legs with walking). Negative for joint swelling.   Gastrointestinal: Positive for nausea. Negative for abdominal pain, diarrhea, hematochezia, melena and vomiting.   Genitourinary: Negative for decreased libido, frequency, hematuria, hesitancy and incomplete emptying.   Neurological: Positive for dizziness. Negative for excessive daytime sleepiness, headaches, light-headedness, loss of balance, numbness, paresthesias and seizures.   Psychiatric/Behavioral: Negative for depression.          Objective:     Vitals:    07/30/19 1430 07/30/19 1501   BP: 120/80    BP Location: Left arm    Patient Position: Sitting    Pulse: 64 72   SpO2:  99%   Weight: 84.4 kg (186 lb)    Height: 165.1 cm (65\")      Body mass index is 30.95 kg/m².    PHYSICAL EXAM:  Physical Exam   Constitutional: He is oriented to person, place, and time. He appears well-developed " and well-nourished. No distress.   Overweight, uses rollator walker chair   HENT:   Head: Normocephalic and atraumatic.   Healing left periorbital bruising and left eyebrow incision, hoarse voice, dysarthria secondary to no teeth   Eyes: Conjunctivae and EOM are normal.   Neck: No JVD present.   Cardiovascular: Normal rate, regular rhythm and intact distal pulses.   Murmur heard.   Midsystolic murmur is present with a grade of 2/6 at the upper right sternal border and apex.  Pulses:       Carotid pulses are 2+ on the right side, and 2+ on the left side.       Radial pulses are 2+ on the right side, and 2+ on the left side.        Dorsalis pedis pulses are 2+ on the right side, and 2+ on the left side.        Posterior tibial pulses are 2+ on the right side, and 2+ on the left side.   Pulmonary/Chest: Effort normal. No accessory muscle usage. No tachypnea. No respiratory distress. He has no decreased breath sounds. He has no wheezes. He has no rhonchi. He has no rales. He exhibits no tenderness.   Abdominal: Soft. Bowel sounds are normal. He exhibits no distension. There is no tenderness. There is no rebound and no guarding.   Musculoskeletal: Normal range of motion. He exhibits no edema.   Neurological: He is alert and oriented to person, place, and time.   Skin: Skin is warm, dry and intact. He is not diaphoretic. No erythema.   Psychiatric: He has a normal mood and affect. His speech is normal and behavior is normal. Judgment and thought content normal. Cognition and memory are normal.   Nursing note and vitals reviewed.        ECG 12 Lead  Date/Time: 7/30/2019 2:36 PM  Performed by: Concepcion Leyva APRN  Authorized by: Concepcion Leyva APRN   Comparison: compared with previous ECG from 7/23/2019  Similar to previous ECG  Rhythm: sinus rhythm  Rate: normal  BPM: 64  T elevation: I  T inversion: aVL  Other findings: non-specific ST-T wave changes and poor R wave progression  Other findings comments: lateral leads  unchanged    Clinical impression: abnormal EKG  Comments: Indication: CAD            Assessment:       Diagnosis Plan   1. Coronary artery disease involving native coronary artery of native heart without angina pectoris     2. COPD exacerbation (CMS/[facility])     3. Hyperlipidemia, unspecified hyperlipidemia type     4. Type 2 diabetes mellitus with other circulatory complication, without long-term current use of insulin (CMS/[facility])     5. Essential hypertension     6. Aortic stenosis, mild     7. Syncope, unspecified syncope type     8. Tobacco abuse         Plan:     1.  Coronary artery disease:  Status post right coronary artery stent placement for inferior MI cardial infarction.    Also with diffuse disease of his LAD and chronic total occlusion of his left circumflex artery that we have been managing medically.  Stress test in 7/2018 showed no evidence of ischemia.  No significant symptoms at this time.  Continue current management.  2.  Hypertension: Well controlled today on his current medications.  3.  Diabetes mellitus type 2  4.  COPD: Recently hospitalized for exacerbation.  Currently on last couple days of course of steroids and antibiotics.  Clinically improved.  Defer further to PCP  5.  Aortic stenosis:  Noted to be mild on his last echocardiogram.  He continues to have a high-pitched systolic murmur on exam.    Repeat echocardiogram from today-results not available yet.  6.  History of recurrent syncope: Felt to be vasovagal/orthostatic.  At this time blood pressures controlled and he has not had a fall or syncope since episode July 2019  7.  Tobacco abuse: Patient has been chewing tobacco since the age of 4.  He is not interested in quitting despite knowing the health risks.    Coronary Artery Disease  Assessment  • The patient has no angina    Plan  • Lifestyle modifications discussed include adhering to a heart healthy diet, avoidance of tobacco products, maintenance of a healthy weight, medication  compliance, regular exercise and regular monitoring of cholesterol and blood pressure    Subjective - Objective  • There is a history of past MI  • There has been a previous stent procedure using CARLENE  on or around 4/8/2019 Last stent April 2019  • Current antiplatelet therapy includes aspirin 81 mg and clopidogrel 75 mg           We will make further recommendations based on echocardiogram results.  The patient and his wife were educated that if they had not heard regarding his echocardiogram results by Friday of this week to please contact the office to obtain them.  They both demonstrated understanding.    Follow up with Dr. Templeton in 8 weeks, unless otherwise needed sooner based on problems or echo results.  I advised the patient to contact our office with any questions or concerns.       It has been a pleasure to participate in this patient's care. Please feel free to contact me with any questions or concerns.     Concepcion Leyva, BOOGIE  07/30/2019            Your medication list           Accurate as of 7/30/19  4:53 PM. If you have any questions, ask your nurse or doctor.               CONTINUE taking these medications      Instructions Last Dose Given Next Dose Due   albuterol sulfate  (90 Base) MCG/ACT inhaler  Commonly known as:  PROVENTIL HFA;VENTOLIN HFA;PROAIR HFA      Inhale 2 puffs Every 4 (Four) Hours As Needed for Wheezing.       albuterol (2.5 MG/3ML) 0.083% nebulizer solution  Commonly known as:  PROVENTIL      Take 2.5 mg by nebulization Every 4 (Four) Hours As Needed for Wheezing or Shortness of Air.       aspirin 81 MG EC tablet      Take 1 tablet by mouth Daily.       atorvastatin 10 MG tablet  Commonly known as:  LIPITOR      Take 10 mg by mouth Every Night.       carvedilol 3.125 MG tablet  Commonly known as:  COREG      Take 1 tablet by mouth Every 12 (Twelve) Hours.       clopidogrel 75 MG tablet  Commonly known as:  PLAVIX      Take 75 mg by mouth Daily.       doxycycline 50 MG  capsule  Commonly known as:  VIBRAMYCIN      Take 2 capsules by mouth 2 (Two) Times a Day for 5 days.       isosorbide mononitrate 30 MG 24 hr tablet  Commonly known as:  IMDUR      Take 30 mg by mouth Daily.       lisinopril 5 MG tablet  Commonly known as:  PRINIVIL,ZESTRIL      Take 5 mg by mouth Daily.       methylPREDNISolone 4 MG tablet  Commonly known as:  MEDROL (KOLE)      Take as directed on package instructions.              The above medication changes may not have been made by this provider.  Medication list was updated to reflect medications patient is currently taking including medication changes and discontinuations made by other healthcare providers.     Dictated utilizing Dragon Dictation System.

## 2019-07-31 LAB
AORTIC ROOT ANNULUS: 2.2 CM
ASCENDING AORTA: 3.1 CM
BH CV ECHO MEAS - ACS: 0.98 CM
BH CV ECHO MEAS - AO MAX PG (FULL): 20.6 MMHG
BH CV ECHO MEAS - AO MAX PG: 22.8 MMHG
BH CV ECHO MEAS - AO MEAN PG (FULL): 11.3 MMHG
BH CV ECHO MEAS - AO MEAN PG: 12.8 MMHG
BH CV ECHO MEAS - AO V2 MAX: 238.9 CM/SEC
BH CV ECHO MEAS - AO V2 MEAN: 175.3 CM/SEC
BH CV ECHO MEAS - AO V2 VTI: 48.8 CM
BH CV ECHO MEAS - AVA(I,A): 1.1 CM^2
BH CV ECHO MEAS - AVA(I,D): 1.1 CM^2
BH CV ECHO MEAS - AVA(V,A): 0.96 CM^2
BH CV ECHO MEAS - AVA(V,D): 0.96 CM^2
BH CV ECHO MEAS - BSA(HAYCOCK): 2 M^2
BH CV ECHO MEAS - BSA: 1.9 M^2
BH CV ECHO MEAS - BZI_BMI: 30 KILOGRAMS/M^2
BH CV ECHO MEAS - BZI_METRIC_HEIGHT: 165.1 CM
BH CV ECHO MEAS - BZI_METRIC_WEIGHT: 81.6 KG
BH CV ECHO MEAS - EDV(MOD-SP2): 57 ML
BH CV ECHO MEAS - EDV(MOD-SP4): 107 ML
BH CV ECHO MEAS - EDV(TEICH): 99.7 ML
BH CV ECHO MEAS - EF(CUBED): 65.6 %
BH CV ECHO MEAS - EF(MOD-BP): 60 %
BH CV ECHO MEAS - EF(MOD-SP2): 61.4 %
BH CV ECHO MEAS - EF(MOD-SP4): 60.7 %
BH CV ECHO MEAS - EF(TEICH): 57.1 %
BH CV ECHO MEAS - ESV(MOD-SP2): 22 ML
BH CV ECHO MEAS - ESV(MOD-SP4): 42 ML
BH CV ECHO MEAS - ESV(TEICH): 42.8 ML
BH CV ECHO MEAS - FS: 29.9 %
BH CV ECHO MEAS - IVS/LVPW: 1
BH CV ECHO MEAS - IVSD: 1.2 CM
BH CV ECHO MEAS - LAT PEAK E' VEL: 3 CM/SEC
BH CV ECHO MEAS - LV DIASTOLIC VOL/BSA (35-75): 56.6 ML/M^2
BH CV ECHO MEAS - LV MASS(C)D: 212.3 GRAMS
BH CV ECHO MEAS - LV MASS(C)DI: 112.3 GRAMS/M^2
BH CV ECHO MEAS - LV MAX PG: 2.2 MMHG
BH CV ECHO MEAS - LV MEAN PG: 1.4 MMHG
BH CV ECHO MEAS - LV SYSTOLIC VOL/BSA (12-30): 22.2 ML/M^2
BH CV ECHO MEAS - LV V1 MAX: 74.7 CM/SEC
BH CV ECHO MEAS - LV V1 MEAN: 57.1 CM/SEC
BH CV ECHO MEAS - LV V1 VTI: 17.5 CM
BH CV ECHO MEAS - LVIDD: 4.6 CM
BH CV ECHO MEAS - LVIDS: 3.3 CM
BH CV ECHO MEAS - LVLD AP2: 7.1 CM
BH CV ECHO MEAS - LVLD AP4: 7.8 CM
BH CV ECHO MEAS - LVLS AP2: 5.7 CM
BH CV ECHO MEAS - LVLS AP4: 6.9 CM
BH CV ECHO MEAS - LVOT AREA (M): 3.1 CM^2
BH CV ECHO MEAS - LVOT AREA: 3.1 CM^2
BH CV ECHO MEAS - LVOT DIAM: 2 CM
BH CV ECHO MEAS - LVPWD: 1.2 CM
BH CV ECHO MEAS - MED PEAK E' VEL: 3 CM/SEC
BH CV ECHO MEAS - MR MAX PG: 121.6 MMHG
BH CV ECHO MEAS - MR MAX VEL: 551.3 CM/SEC
BH CV ECHO MEAS - MV A DUR: 0.12 SEC
BH CV ECHO MEAS - MV A MAX VEL: 122.7 CM/SEC
BH CV ECHO MEAS - MV DEC SLOPE: 134.9 CM/SEC^2
BH CV ECHO MEAS - MV DEC TIME: 0.34 SEC
BH CV ECHO MEAS - MV E MAX VEL: 45.6 CM/SEC
BH CV ECHO MEAS - MV E/A: 0.37
BH CV ECHO MEAS - MV MAX PG: 8.6 MMHG
BH CV ECHO MEAS - MV MEAN PG: 2.1 MMHG
BH CV ECHO MEAS - MV P1/2T MAX VEL: 46.1 CM/SEC
BH CV ECHO MEAS - MV P1/2T: 100 MSEC
BH CV ECHO MEAS - MV V2 MAX: 147 CM/SEC
BH CV ECHO MEAS - MV V2 MEAN: 65.7 CM/SEC
BH CV ECHO MEAS - MV V2 VTI: 41.2 CM
BH CV ECHO MEAS - MVA P1/2T LCG: 4.8 CM^2
BH CV ECHO MEAS - MVA(P1/2T): 2.2 CM^2
BH CV ECHO MEAS - MVA(VTI): 1.3 CM^2
BH CV ECHO MEAS - PA ACC TIME: 0.13 SEC
BH CV ECHO MEAS - PA MAX PG (FULL): 0.25 MMHG
BH CV ECHO MEAS - PA MAX PG: 2.1 MMHG
BH CV ECHO MEAS - PA PR(ACCEL): 18.8 MMHG
BH CV ECHO MEAS - PA V2 MAX: 73.3 CM/SEC
BH CV ECHO MEAS - PULM A REVS DUR: 0.12 SEC
BH CV ECHO MEAS - PULM A REVS VEL: 28 CM/SEC
BH CV ECHO MEAS - PULM DIAS VEL: 28.9 CM/SEC
BH CV ECHO MEAS - PULM S/D: 1.1
BH CV ECHO MEAS - PULM SYS VEL: 31.2 CM/SEC
BH CV ECHO MEAS - PVA(V,A): 2.4 CM^2
BH CV ECHO MEAS - PVA(V,D): 2.4 CM^2
BH CV ECHO MEAS - QP/QS: 0.72
BH CV ECHO MEAS - RV MAX PG: 1.9 MMHG
BH CV ECHO MEAS - RV MEAN PG: 1.1 MMHG
BH CV ECHO MEAS - RV V1 MAX: 68.9 CM/SEC
BH CV ECHO MEAS - RV V1 MEAN: 51.5 CM/SEC
BH CV ECHO MEAS - RV V1 VTI: 15.1 CM
BH CV ECHO MEAS - RVOT AREA: 2.6 CM^2
BH CV ECHO MEAS - RVOT DIAM: 1.8 CM
BH CV ECHO MEAS - SI(CUBED): 34.8 ML/M^2
BH CV ECHO MEAS - SI(LVOT): 28.4 ML/M^2
BH CV ECHO MEAS - SI(MOD-SP2): 18.5 ML/M^2
BH CV ECHO MEAS - SI(MOD-SP4): 34.4 ML/M^2
BH CV ECHO MEAS - SI(TEICH): 30.1 ML/M^2
BH CV ECHO MEAS - SV(CUBED): 65.8 ML
BH CV ECHO MEAS - SV(LVOT): 53.7 ML
BH CV ECHO MEAS - SV(MOD-SP2): 35 ML
BH CV ECHO MEAS - SV(MOD-SP4): 65 ML
BH CV ECHO MEAS - SV(RVOT): 38.7 ML
BH CV ECHO MEAS - SV(TEICH): 56.9 ML
BH CV ECHO MEAS - TAPSE (>1.6): 1.5 CM2
BH CV ECHO MEAS - TR MAX VEL: 156.1 CM/SEC
BH CV ECHO MEASUREMENTS AVERAGE E/E' RATIO: 15.2
BH CV XLRA - RV BASE: 2.5 CM
BH CV XLRA - TDI S': 9 CM/SEC
LEFT ATRIUM VOLUME INDEX: 10 ML/M2
MAXIMAL PREDICTED HEART RATE: 134 BPM
SINUS: 2.8 CM
STJ: 2.8 CM
STRESS TARGET HR: 114 BPM

## 2019-08-05 ENCOUNTER — READMISSION MANAGEMENT (OUTPATIENT)
Dept: CALL CENTER | Facility: HOSPITAL | Age: 84
End: 2019-08-05

## 2019-08-05 NOTE — OUTREACH NOTE
COPD/PN Week 2 Survey      Responses   Facility patient discharged from?  [place]   Does the patient have one of the following disease processes/diagnoses(primary or secondary)?  COPD/Pneumonia   Was the primary reason for admission:  COPD exacerbation   Week 2 attempt successful?  No   Unsuccessful attempts  Attempt 1          Ivis Hankins RN

## 2019-08-06 ENCOUNTER — READMISSION MANAGEMENT (OUTPATIENT)
Dept: CALL CENTER | Facility: HOSPITAL | Age: 84
End: 2019-08-06

## 2019-08-06 NOTE — OUTREACH NOTE
COPD/PN Week 2 Survey      Responses   Facility patient discharged from?  [place]   Does the patient have one of the following disease processes/diagnoses(primary or secondary)?  COPD/Pneumonia   Was the primary reason for admission:  COPD exacerbation   Week 2 attempt successful?  No   Unsuccessful attempts  Attempt 2          Zuly Blount RN

## 2019-08-09 ENCOUNTER — READMISSION MANAGEMENT (OUTPATIENT)
Dept: CALL CENTER | Facility: HOSPITAL | Age: 84
End: 2019-08-09

## 2019-08-09 NOTE — OUTREACH NOTE
COPD/PN Week 3 Survey      Responses   Facility patient discharged from?  [place]   Does the patient have one of the following disease processes/diagnoses(primary or secondary)?  COPD/Pneumonia   Was the primary reason for admission:  COPD exacerbation   Call end time  1543   Meds reviewed with patient/caregiver?  Yes   Is the patient having any side effects they believe may be caused by any medication additions or changes?  No   Does the patient have all medications ordered at discharge?  Yes   Is the patient taking all medications as directed (includes completed medication regime)?  Yes   Does the patient have a primary care provider?   Yes   Has the patient kept scheduled appointments due by today?  Yes   Home health comments  Uses Nebulizer and Inhaler   Psychosocial issues?  No   Did the patient receive a copy of their discharge instructions?  Yes   Nursing interventions  Reviewed instructions with patient   What is the patient's perception of their health status since discharge?  Improving   Is the patient/caregiver able to teach back the hierarchy of who to call/visit for symptoms/problems? PCP, Specialist, Home health nurse, Urgent Care, ED, 911  Yes   Is the patient able to teach back COPD zones?  Yes   Nursing interventions  Education provided on various zones   Patient reports what zone on this call?  Green Zone   Green Zone  Reports doing well, Breathing without shortness of breath, Usual activity and exercise level, Usual amount of phlegm/mucus without difficulty coughing up, Sleeping well, Appetite is good   Week 3 call completed?  Yes          Sarah Laura RN

## 2019-08-16 ENCOUNTER — READMISSION MANAGEMENT (OUTPATIENT)
Dept: CALL CENTER | Facility: HOSPITAL | Age: 84
End: 2019-08-16

## 2019-11-05 ENCOUNTER — OFFICE VISIT (OUTPATIENT)
Dept: CARDIOLOGY | Facility: CLINIC | Age: 84
End: 2019-11-05

## 2019-11-05 VITALS
HEIGHT: 65 IN | HEART RATE: 80 BPM | WEIGHT: 189 LBS | SYSTOLIC BLOOD PRESSURE: 138 MMHG | BODY MASS INDEX: 31.49 KG/M2 | DIASTOLIC BLOOD PRESSURE: 60 MMHG

## 2019-11-05 DIAGNOSIS — R55 SYNCOPE, UNSPECIFIED SYNCOPE TYPE: ICD-10-CM

## 2019-11-05 DIAGNOSIS — E11.59 TYPE 2 DIABETES MELLITUS WITH OTHER CIRCULATORY COMPLICATION, WITHOUT LONG-TERM CURRENT USE OF INSULIN (HCC): ICD-10-CM

## 2019-11-05 DIAGNOSIS — E78.5 HYPERLIPIDEMIA, UNSPECIFIED HYPERLIPIDEMIA TYPE: ICD-10-CM

## 2019-11-05 DIAGNOSIS — N18.30 CKD (CHRONIC KIDNEY DISEASE) STAGE 3, GFR 30-59 ML/MIN (HCC): ICD-10-CM

## 2019-11-05 DIAGNOSIS — Z95.5 HISTORY OF CORONARY ARTERY STENT PLACEMENT: ICD-10-CM

## 2019-11-05 DIAGNOSIS — J44.9 CHRONIC OBSTRUCTIVE PULMONARY DISEASE, UNSPECIFIED COPD TYPE (HCC): ICD-10-CM

## 2019-11-05 DIAGNOSIS — I10 ESSENTIAL HYPERTENSION: ICD-10-CM

## 2019-11-05 DIAGNOSIS — I25.10 CORONARY ARTERY DISEASE INVOLVING NATIVE CORONARY ARTERY OF NATIVE HEART WITHOUT ANGINA PECTORIS: Primary | ICD-10-CM

## 2019-11-05 DIAGNOSIS — I35.0 AORTIC STENOSIS, MILD: ICD-10-CM

## 2019-11-05 PROCEDURE — 93000 ELECTROCARDIOGRAM COMPLETE: CPT | Performed by: INTERNAL MEDICINE

## 2019-11-05 PROCEDURE — 99214 OFFICE O/P EST MOD 30 MIN: CPT | Performed by: INTERNAL MEDICINE

## 2019-11-05 NOTE — PROGRESS NOTES
Subjective:     Encounter Date:11/05/2019      Patient ID: Yoni Bonner Jr. is a 86 y.o. male.    Chief Complaint:  History of Present Illness    This is an 86-year-old man with a history of hypertension, chronic bronchitis, dyslipidemia, tobacco use, coronary artery disease status post inferior myocardial infarction and drug eluting stent placement of his proximal RCA, who presents for follow up.     Today he presents for routine follow-up.  Overall he has been doing well.  He continues to have dyspnea on exertion which he attributes to his COPD.  It has been a little worse lately because he has had a couple of bouts of bronchitis requiring antibiotic courses and has been exposed to a lot of dust because they have been remodeling the bathroom.  He has intermittent episodes of chest discomfort 1 of which occurred recently and resolved with sublingual nitroglycerin.  He reports no recent episodes of syncope.  He denies any palpitations, PND orthopnea, or lower extremity edema.     Prior History:  I saw the patient back on 02/04/2015 when he presented to establish care.  At that time the patient reported a history of recurrent syncopal episodes.  I felt that his symptoms were due to orthostatic syncope at that time.  He had a history of rheumatic fever and a thickened aortic valve along with carotid artery disease so I sat him up for both an echocardiogram and a carotid ultrasound around that time.  His echocardiogram was performed on 03/11/2015 and revealed normal left ventricular systolic function and wall motion, an ejection fraction of 54%, grade IA diastolic dysfunction and no significant valvular disease.  His carotid ultrasound revealed moderate bilateral stenosis.  He was last seen in follow up in 3/2017 for pre-operative evaluation.  At that time he reported some worsening of his baseline dyspnea on exertion though we proceeded with a dobutamine stress test that was negative for ischemia and the patient  proceeded with his shoulder surgery.     On 4/8/2018 he was admitted with an inferior myocardial infarction.  The patient was accompanying his wife to the [facility] emergency room where he suffered a near syncopal episode associated with hypotension and bradycardia.  An EKG was performed at that time showing acute inferior ST elevations.  He was transferred to [facility] and was found to have a thrombotic subtotal occlusion of his proximal RCA for which she underwent thrombectomy and drug-eluting stent placement.  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  Following his catheterization he underwent an echocardiogram that showed mildly depressed left ventricular systolic function with an ejection fraction of 42%, inferior wall motion abnormalities, mild to moderate mitral regurgitation, mild aortic stenosis and regurgitation, and grade 1A diastolic dysfunction.       In 7/2018 he was readmitted for chest pain.  He described it as old sharp left-sided chest pain lasting anywhere from a few minutes to 30 minutes.  Symptoms persisted with dyspnea and nausea.  Into being in admitted to the hospital at that time.  He underwent a stress test during that admission showed an inferior lateral infarct but no evidence of ischemia.  I started him on isosorbide mononitrate and discharged him home.  In 8/2018 he presented back for hospital follow at which time he was feeling well.  He had a repeat echocardiogram today that showed normalization of his left ventricular function with an EF of 60%, mild aortic stenosis, mild mitral regurgitation, and grade 1 diastolic dysfunction.        Was admitted on 12/6/2018 with recurrent chest pain.  Patient reported that the pain started when he was watching TV and bent over to tie his shoes.  This pain was associated with diaphoresis and dyspnea.  He could not tell me if the pain was somewhat to his prior  symptoms with his myocardial infarction.  The pain lasted several hours and resolved after he got to the emergency room.  After resolution he had no further pain.  His EKGs were unremarkable and troponins were normal.  With no evidence of a myocardial infarction and a single episode of pain that sounded somewhat atypical we decided to continue with medical management at that time.     In 1/2019 he was admitted with bronchitis due to rhinovirus.  He was started on new therapy for his COPD.  His wife reports that he was on his inhalers for period of time but apparently he was only supposed to be on it for a limited period of time.  She did believe that the inhalers helped.  He continued to have mobility issues related to his left shoulder which had been replaced earlier and has not quite been the same since his surgery.    Following that I saw him back in follow-up in 3/2019 at which time he was doing fairly well and I did not make any changes to his management.  He was then admitted to the hospital in 7/2019 with shortness of breath.  He was noted to have elevated troponin in the indeterminate level during that admission and was evaluated by Dr. Freeman.  He was not having any symptoms concerning for angina and no further work up was recommended at that time.  Following that admission he saw BOOGIE Mejia in follow-up on 7/30/2019.  At that time he appeared to be doing well.  A repeat echocardiogram was ordered in order to reevaluate his aortic stenosis.  This showed normal left ventricular systolic function and wall motion with an EF 30 to 60%, grade 1A diastolic dysfunction, and moderate mitral regurgitation.    Review of Systems   Constitution: Negative for weakness and malaise/fatigue.   HENT: Negative for hearing loss, hoarse voice, nosebleeds and sore throat.    Eyes: Negative for pain.   Cardiovascular: Positive for chest pain, dyspnea on exertion, near-syncope and syncope. Negative for claudication,  cyanosis, irregular heartbeat, leg swelling, orthopnea, palpitations and paroxysmal nocturnal dyspnea.   Respiratory: Negative for shortness of breath and snoring.    Endocrine: Negative for cold intolerance, heat intolerance, polydipsia, polyphagia and polyuria.   Skin: Negative for itching and rash.   Musculoskeletal: Negative for arthritis, falls, joint pain, joint swelling, muscle cramps, muscle weakness and myalgias.   Gastrointestinal: Negative for constipation, diarrhea, dysphagia, heartburn, hematemesis, hematochezia, melena, nausea and vomiting.   Genitourinary: Negative for frequency, hematuria and hesitancy.   Neurological: Positive for light-headedness. Negative for excessive daytime sleepiness, dizziness, headaches and numbness.   Psychiatric/Behavioral: Negative for depression. The patient is not nervous/anxious.          Current Outpatient Medications:   •  albuterol (PROVENTIL) (2.5 MG/3ML) 0.083% nebulizer solution, Take 2.5 mg by nebulization Every 4 (Four) Hours As Needed for Wheezing or Shortness of Air., Disp: , Rfl: 12  •  albuterol sulfate  (90 Base) MCG/ACT inhaler, Inhale 2 puffs Every 4 (Four) Hours As Needed for Wheezing., Disp: , Rfl:   •  aspirin 81 MG EC tablet, Take 1 tablet by mouth Daily., Disp: 30 tablet, Rfl: 5  •  atorvastatin (LIPITOR) 10 MG tablet, Take 10 mg by mouth Every Night., Disp: , Rfl:   •  carvedilol (COREG) 3.125 MG tablet, Take 1 tablet by mouth Every 12 (Twelve) Hours., Disp: 60 tablet, Rfl: 6  •  clopidogrel (PLAVIX) 75 MG tablet, Take 75 mg by mouth Daily., Disp: , Rfl:   •  isosorbide mononitrate (IMDUR) 30 MG 24 hr tablet, Take 30 mg by mouth Daily., Disp: , Rfl:   •  lisinopril (PRINIVIL,ZESTRIL) 5 MG tablet, Take 5 mg by mouth Daily., Disp: , Rfl:     Past Medical History:   Diagnosis Date   • Bronchitis    • CAD (coronary artery disease)    • Carotid arterial disease (CMS/HCC)    • Chronic bronchitis (CMS/HCC)    • COPD (chronic obstructive pulmonary  disease) (CMS/[facility])    • Gout    • Hyperlipidemia    • Hypertension    • Ischemic cardiomyopathy     resolved, EF was 60% in 2018   • Mild aortic stenosis    • Rheumatic fever    • Syncopal episodes    • Tobacco use    • Type 2 diabetes mellitus (CMS/[facility])        Past Surgical History:   Procedure Laterality Date   • CARDIAC CATHETERIZATION     • CARDIAC CATHETERIZATION N/A 4/8/2018    Procedure: Left Heart Cath;  Surgeon: Nicolas Jerez MD;  Location:  [facility];  Service: Cardiovascular   • CARDIAC CATHETERIZATION  4/8/2018    Procedure: Percutaneous Manual Thrombectomy;  Surgeon: Nicolas Jerez MD;  Location:  [facility];  Service: Cardiovascular   • CARDIAC CATHETERIZATION N/A 4/8/2018    Procedure: Stent CARLENE coronary;  Surgeon: Nicolas Jerez MD;  Location:  [facility];  Service: Cardiovascular   • CARDIAC CATHETERIZATION N/A 4/8/2018    Procedure: Right Heart Cath;  Surgeon: Nicolas Jerez MD;  Location:  [facility];  Service: Cardiovascular   • EYE SURGERY      cataract surg   • HAND SURGERY     • HERNIA REPAIR     • KNEE SURGERY     • TESTICLE SURGERY         Family History   Problem Relation Age of Onset   • Heart disease Father    • Hypertension Father    • Stroke Father    • Diabetes Sister    • Cancer Brother    • Heart disease Brother    • Hypertension Brother    • No Known Problems Maternal Grandmother    • No Known Problems Maternal Grandfather    • No Known Problems Paternal Grandmother    • No Known Problems Paternal Grandfather        Social History     Tobacco Use   • Smoking status: Never Smoker   • Smokeless tobacco: Current User     Types: Chew   • Tobacco comment: chewing tobacco since 4 yearrs old   Substance Use Topics   • Alcohol use: No     Comment: caffeine use   • Drug use: No         ECG 12 Lead  Date/Time: 11/5/2019 4:28 PM  Performed by: Melissa Templeton MD  Authorized by: Melissa Templeton MD   Comparison: compared with previous  "ECG   Comparison to previous ECG: PACs are new  Rhythm: sinus rhythm  Ectopy: atrial premature contractions  T inversion: I and aVL                 Objective:     Visit Vitals  /60   Pulse 80   Ht 165.1 cm (65\")   Wt 85.7 kg (189 lb)   BMI 31.45 kg/m²         Physical Exam   Constitutional: He is oriented to person, place, and time. He appears well-developed and well-nourished.   HENT:   Head: Normocephalic and atraumatic.   Neck: No JVD present. Carotid bruit is not present.   Cardiovascular: Normal rate, regular rhythm, S1 normal and S2 normal. Exam reveals no gallop.   No murmur heard.  Pulses:       Radial pulses are 2+ on the right side, and 2+ on the left side.   No bilateral lower extremity edema   Pulmonary/Chest: Effort normal and breath sounds normal.   Abdominal: Soft. Normal appearance.   Neurological: He is alert and oriented to person, place, and time.   Skin: Skin is warm, dry and intact.   Psychiatric: He has a normal mood and affect.       Lab Review:       Assessment:          Diagnosis Plan   1. Coronary artery disease involving native coronary artery of native heart without angina pectoris     2. History of coronary artery stent placement     3. Aortic stenosis, mild     4. Essential hypertension     5. Hyperlipidemia, unspecified hyperlipidemia type     6. Type 2 diabetes mellitus with other circulatory complication, without long-term current use of insulin (CMS/[facility])     7. Syncope, unspecified syncope type     8. Chronic obstructive pulmonary disease, unspecified COPD type (CMS/[facility])     9. CKD (chronic kidney disease) stage 3, GFR 30-59 ml/min (CMS/[facility])     10. Tobacco abuse            Plan:       1.  Coronary artery disease.  Continues to have intermittent episodes of chest pain but many of these episodes sound atypical and often resolved with sublingual nitroglycerin.  May be related to the chronic total occlusion of the circumflex vessel and his diffuse LAD disease that is not amenable " to PCI.  We will continue medical management.  2.  Hypertension.  Remains well controlled on his current medications.  3.  Mitral regurgitation.  Moderate on his last echocardiogram.  4.  Diabetes mellitus type 2  5.  COPD  6.  Recurrent syncope.  Felt to be due to either orthostatic hypotension or vasovagal syncope.    We will plan on seeing the patient back again in 6 months.

## 2019-11-06 RX ORDER — NITROGLYCERIN 0.4 MG/1
TABLET SUBLINGUAL
Qty: 30 TABLET | Refills: 5 | Status: SHIPPED | OUTPATIENT
Start: 2019-11-06 | End: 2021-01-28

## 2019-11-06 RX ORDER — CARVEDILOL 3.12 MG/1
3.12 TABLET ORAL EVERY 12 HOURS SCHEDULED
Qty: 180 TABLET | Refills: 2 | Status: SHIPPED | OUTPATIENT
Start: 2019-11-06 | End: 2020-11-10

## 2020-02-20 ENCOUNTER — HOSPITAL ENCOUNTER (OUTPATIENT)
Facility: HOSPITAL | Age: 85
Discharge: HOME | End: 2020-02-22
Attending: EMERGENCY MEDICINE | Admitting: INTERNAL MEDICINE

## 2020-02-20 ENCOUNTER — APPOINTMENT (OUTPATIENT)
Dept: GENERAL RADIOLOGY | Facility: HOSPITAL | Age: 85
End: 2020-02-20

## 2020-02-20 DIAGNOSIS — R07.9 CHEST PAIN, UNSPECIFIED TYPE: ICD-10-CM

## 2020-02-20 DIAGNOSIS — I20.8 STABLE ANGINA (HCC): Primary | ICD-10-CM

## 2020-02-20 LAB
ALBUMIN SERPL-MCNC: 3.7 G/DL (ref 3.5–5.2)
ALBUMIN/GLOB SERPL: 1.4 G/DL
ALP SERPL-CCNC: 67 U/L (ref 39–117)
ALT SERPL W P-5'-P-CCNC: 14 U/L (ref 1–41)
ANION GAP SERPL CALCULATED.3IONS-SCNC: 12.7 MMOL/L (ref 5–15)
AST SERPL-CCNC: 18 U/L (ref 1–40)
BASOPHILS # BLD AUTO: 0.06 10*3/MM3 (ref 0–0.2)
BASOPHILS NFR BLD AUTO: 0.5 % (ref 0–1.5)
BILIRUB SERPL-MCNC: 0.3 MG/DL (ref 0.2–1.2)
BUN BLD-MCNC: 46 MG/DL (ref 8–23)
BUN/CREAT SERPL: 37.4 (ref 7–25)
CALCIUM SPEC-SCNC: 8.7 MG/DL (ref 8.6–10.5)
CHLORIDE SERPL-SCNC: 96 MMOL/L (ref 98–107)
CO2 SERPL-SCNC: 21.3 MMOL/L (ref 22–29)
CREAT BLD-MCNC: 1.23 MG/DL (ref 0.76–1.27)
DEPRECATED RDW RBC AUTO: 42 FL (ref 37–54)
EOSINOPHIL # BLD AUTO: 0.12 10*3/MM3 (ref 0–0.4)
EOSINOPHIL NFR BLD AUTO: 0.9 % (ref 0.3–6.2)
ERYTHROCYTE [DISTWIDTH] IN BLOOD BY AUTOMATED COUNT: 13.8 % (ref 12.3–15.4)
GFR SERPL CREATININE-BSD FRML MDRD: 56 ML/MIN/1.73
GLOBULIN UR ELPH-MCNC: 2.7 GM/DL
GLUCOSE BLD-MCNC: 98 MG/DL (ref 65–99)
HCT VFR BLD AUTO: 36.9 % (ref 37.5–51)
HGB BLD-MCNC: 12.1 G/DL (ref 13–17.7)
IMM GRANULOCYTES # BLD AUTO: 0.05 10*3/MM3 (ref 0–0.05)
IMM GRANULOCYTES NFR BLD AUTO: 0.4 % (ref 0–0.5)
INR PPP: 1.02 (ref 0.9–1.1)
LIPASE SERPL-CCNC: 35 U/L (ref 13–60)
LYMPHOCYTES # BLD AUTO: 3.62 10*3/MM3 (ref 0.7–3.1)
LYMPHOCYTES NFR BLD AUTO: 27.7 % (ref 19.6–45.3)
MAGNESIUM SERPL-MCNC: 2.2 MG/DL (ref 1.6–2.4)
MCH RBC QN AUTO: 27.6 PG (ref 26.6–33)
MCHC RBC AUTO-ENTMCNC: 32.8 G/DL (ref 31.5–35.7)
MCV RBC AUTO: 84.2 FL (ref 79–97)
MONOCYTES # BLD AUTO: 0.81 10*3/MM3 (ref 0.1–0.9)
MONOCYTES NFR BLD AUTO: 6.2 % (ref 5–12)
NEUTROPHILS # BLD AUTO: 8.42 10*3/MM3 (ref 1.7–7)
NEUTROPHILS NFR BLD AUTO: 64.3 % (ref 42.7–76)
NRBC BLD AUTO-RTO: 0 /100 WBC (ref 0–0.2)
NT-PROBNP SERPL-MCNC: 556.7 PG/ML (ref 5–1800)
PLATELET # BLD AUTO: 206 10*3/MM3 (ref 140–450)
PMV BLD AUTO: 10.3 FL (ref 6–12)
POTASSIUM BLD-SCNC: 4.8 MMOL/L (ref 3.5–5.2)
PROT SERPL-MCNC: 6.4 G/DL (ref 6–8.5)
PROTHROMBIN TIME: 13.1 SECONDS (ref 11.7–14.2)
RBC # BLD AUTO: 4.38 10*6/MM3 (ref 4.14–5.8)
SODIUM BLD-SCNC: 130 MMOL/L (ref 136–145)
TROPONIN T SERPL-MCNC: <0.01 NG/ML (ref 0–0.03)
TROPONIN T SERPL-MCNC: <0.01 NG/ML (ref 0–0.03)
WBC NRBC COR # BLD: 13.08 10*3/MM3 (ref 3.4–10.8)

## 2020-02-20 PROCEDURE — G0378 HOSPITAL OBSERVATION PER HR: HCPCS

## 2020-02-20 PROCEDURE — 93005 ELECTROCARDIOGRAM TRACING: CPT | Performed by: EMERGENCY MEDICINE

## 2020-02-20 PROCEDURE — 83690 ASSAY OF LIPASE: CPT | Performed by: EMERGENCY MEDICINE

## 2020-02-20 PROCEDURE — 83880 ASSAY OF NATRIURETIC PEPTIDE: CPT | Performed by: EMERGENCY MEDICINE

## 2020-02-20 PROCEDURE — 85025 COMPLETE CBC W/AUTO DIFF WBC: CPT | Performed by: EMERGENCY MEDICINE

## 2020-02-20 PROCEDURE — 84484 ASSAY OF TROPONIN QUANT: CPT | Performed by: EMERGENCY MEDICINE

## 2020-02-20 PROCEDURE — 71045 X-RAY EXAM CHEST 1 VIEW: CPT

## 2020-02-20 PROCEDURE — 93010 ELECTROCARDIOGRAM REPORT: CPT | Performed by: INTERNAL MEDICINE

## 2020-02-20 PROCEDURE — 85610 PROTHROMBIN TIME: CPT | Performed by: EMERGENCY MEDICINE

## 2020-02-20 PROCEDURE — 83735 ASSAY OF MAGNESIUM: CPT | Performed by: EMERGENCY MEDICINE

## 2020-02-20 PROCEDURE — 93005 ELECTROCARDIOGRAM TRACING: CPT

## 2020-02-20 PROCEDURE — 80053 COMPREHEN METABOLIC PANEL: CPT | Performed by: EMERGENCY MEDICINE

## 2020-02-20 PROCEDURE — 99285 EMERGENCY DEPT VISIT HI MDM: CPT

## 2020-02-20 RX ORDER — ASPIRIN 81 MG/1
324 TABLET, CHEWABLE ORAL ONCE
Status: COMPLETED | OUTPATIENT
Start: 2020-02-20 | End: 2020-02-20

## 2020-02-20 RX ORDER — NITROGLYCERIN 0.4 MG/1
0.4 TABLET SUBLINGUAL
Status: DISCONTINUED | OUTPATIENT
Start: 2020-02-20 | End: 2020-02-22 | Stop reason: HOSPADM

## 2020-02-20 RX ADMIN — ASPIRIN 324 MG: 81 TABLET, CHEWABLE ORAL at 15:29

## 2020-02-20 NOTE — ED NOTES
Pt to er c/o chest pain since last night. States pain comes and goes.  Pt has used nitro twice today, once last night.      Vinh Posey, RN  02/20/20 [id number]

## 2020-02-20 NOTE — ED PROVIDER NOTES
EMERGENCY DEPARTMENT ENCOUNTER    CHIEF COMPLAINT  Chief Complaint: Chest Pain   History given by: Patient and spouse   History limited by: none   Room Number: S602/1  PMD: Patric Cameron DO  Cardiology: Dr. Templeton    HPI:  Pt is a 86 y.o. male with hx of CAD who presents complaining of intermittent L sided chest pain that began yesterday. Spouse states pt was complaining of pain from 1200 to 1700 last night and then fell asleep. Per spouse, he woke up with chest pain again at 2300 that persisted until 0430. Spouse states pt has not complained of pain since that time. Pt confirms some nausea last night, but spouse and pt deny any SOA, abd pain, or diaphoresis. Pt affirms hx of MI 3 years ago that was painless at this time. Spouse states pt intermittently complains of chest pain, but it usually resolves. Spouse affirms pt has had stress test within the past year.     Duration:  1 day   Onset: gradual   Timing: intermittent   Location: chest   Radiation: none   Quality: pain   Intensity/Severity: moderate   Progression: improved at this time.   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   • Episode of syncope 04/16/2018   • Type 2 diabetes mellitus with circulatory disorder, without long-term current use of insulin (CMS/[facility]) 04/27/2015   • Tobacco abuse 04/16/2018   • Coronary artery disease involving native coronary artery of native heart without angina pectoris 04/16/2018   • Chest pain 12/06/2018   • CKD (chronic kidney disease)  stage 3, GFR 30-59 ml/min (CMS/HCC) 01/06/2019   • Aortic stenosis, mild 03/20/2019   • COPD (chronic obstructive pulmonary disease) (CMS/HCC) 07/24/2019   • Elevated troponin 07/25/2019   • HLD (hyperlipidemia) 07/25/2019     Resolved Ambulatory Problems     Diagnosis Date Noted   • Acute ST elevation myocardial infarction (STEMI) of inferior wall (CMS/HCC) 04/08/2018   • Contusion of left knee 01/09/2016   • Left shoulder pain 04/13/2016   • Postoperative anemia 03/29/2017   • History of coronary artery stent placement 04/16/2018   • Chronic systolic congestive heart failure (CMS/HCC) 04/16/2018   • Ischemic cardiomyopathy 05/08/2018   • Exertional chest pain 07/18/2018   • Acute bronchitis due to Rhinovirus 01/09/2019     Past Medical History:   Diagnosis Date   • Bronchitis    • CAD (coronary artery disease)    • Carotid arterial disease (CMS/HCC)    • Chronic bronchitis (CMS/HCC)    • Gout    • Hyperlipidemia    • Hypertension    • Mild aortic stenosis    • Rheumatic fever    • Syncopal episodes    • Tobacco use    • Type 2 diabetes mellitus (CMS/HCC)        PAST SURGICAL HISTORY  Past Surgical History:   Procedure Laterality Date   • CARDIAC CATHETERIZATION     • CARDIAC CATHETERIZATION N/A 4/8/2018    Procedure: Left Heart Cath;  Surgeon: Nicolas Jerez MD;  Location: [facility] CATH INVASIVE LOCATION;  Service: Cardiovascular   • CARDIAC CATHETERIZATION  4/8/2018    Procedure: Percutaneous Manual Thrombectomy;  Surgeon: Nicolas Jerez MD;  Location: [facility] [place] C[facility] INVASIVE LOCATION;  Service: Cardiovascular   • CARDIAC CATHETERIZATION N/A 4/8/2018    Procedure: Stent CARLENE coronary;  Surgeon: Nicolas Jerez MD;  Location: [facility] CATH INVASIVE LOCATION;  Service: Cardiovascular   • CARDIAC CATHETERIZATION N/A 4/8/2018    Procedure: Right Heart Cath;  Surgeon: Nicolas Jerez MD;  Location: [facility] [place] CATH INVASIVE LOCATION;  Service: Cardiovascular   • EYE SURGERY      cataract surg   • HAND SURGERY     • HERNIA REPAIR     • KNEE SURGERY      • TESTICLE SURGERY         FAMILY HISTORY  Family History   Problem Relation Age of Onset   • Heart disease Father    • Hypertension Father    • Stroke Father    • Diabetes Sister    • Cancer Brother    • Heart disease Brother    • Hypertension Brother    • No Known Problems Maternal Grandmother    • No Known Problems Maternal Grandfather    • No Known Problems Paternal Grandmother    • No Known Problems Paternal Grandfather        SOCIAL HISTORY  Social History     Socioeconomic History   • Marital status:      Spouse name: Not on file   • Number of children: Not on file   • Years of education: Not on file   • Highest education level: Not on file   Tobacco Use   • Smoking status: Never Smoker   • Smokeless tobacco: Current User     Types: Chew   • Tobacco comment: chewing tobacco since 4 yearrs old   Substance and Sexual Activity   • Alcohol use: No     Comment: caffeine use   • Drug use: No   • Sexual activity: Defer       ALLERGIES  No known drug allergy    REVIEW OF SYSTEMS  Review of Systems   Constitutional: Negative for activity change, appetite change and fever.   HENT: Negative for congestion and sore throat.    Eyes: Negative.    Respiratory: Negative for cough and shortness of breath.    Cardiovascular: Positive for chest pain. Negative for leg swelling.   Gastrointestinal: Positive for nausea. Negative for abdominal pain, diarrhea and vomiting.   Endocrine: Negative.    Genitourinary: Negative for decreased urine volume and dysuria.   Musculoskeletal: Negative for neck pain.   Skin: Negative for rash and wound.   Allergic/Immunologic: Negative.    Neurological: Negative for weakness, numbness and headaches.   Hematological: Negative.    Psychiatric/Behavioral: Negative.    All other systems reviewed and are negative.      PHYSICAL EXAM  ED Triage Vitals [02/20/20 1501]   Temp Heart Rate Resp BP SpO2   97.9 °F (36.6 °C) 87 18 -- 97 %      Temp src Heart Rate Source Patient Position BP Location  FiO2 (%)   -- -- -- -- --       Physical Exam   Constitutional: He is oriented to person, place, and time. No distress.   Pt extremely hard of hearing.   HENT:   Head: Normocephalic and atraumatic.   Eyes: Pupils are equal, round, and reactive to light. EOM are normal.   Neck: Normal range of motion. Neck supple.   Cardiovascular: Normal rate, regular rhythm and normal heart sounds.   Pulmonary/Chest: Effort normal and breath sounds normal. No respiratory distress.   Abdominal: Soft. There is no tenderness. There is no rebound and no guarding.   Musculoskeletal: Normal range of motion. He exhibits no edema (no pedal).   Neurological: He is alert and oriented to person, place, and time. He has normal sensation and normal strength.   Skin: Skin is warm and dry.   Psychiatric: Mood and affect normal.   Nursing note and vitals reviewed.      LAB RESULTS  Lab Results (last 24 hours)     Procedure Component Value Units Date/Time    CBC & Differential [[id number]] Collected:  02/20/20 1530    Specimen:  Blood Updated:  02/20/20 1539    Narrative:       The following orders were created for panel order CBC & Differential.  Procedure                               Abnormality         Status                     ---------                               -----------         ------                     CBC Auto Differential[[id number]]        Abnormal            Final result                 Please view results for these tests on the individual orders.    Comprehensive Metabolic Panel [[id number]]  (Abnormal) Collected:  02/20/20 1530    Specimen:  Blood Updated:  02/20/20 1606     Glucose 98 mg/dL      BUN 46 mg/dL      Creatinine 1.23 mg/dL      Sodium 130 mmol/L      Potassium 4.8 mmol/L      Chloride 96 mmol/L      CO2 21.3 mmol/L      Calcium 8.7 mg/dL      Total Protein 6.4 g/dL      Albumin 3.70 g/dL      ALT (SGPT) 14 U/L      AST (SGOT) 18 U/L      Alkaline Phosphatase 67 U/L      Total Bilirubin 0.3 mg/dL      eGFR Non   Amer 56 mL/min/1.73      Globulin 2.7 gm/dL      A/G Ratio 1.4 g/dL      BUN/Creatinine Ratio 37.4     Anion Gap 12.7 mmol/L     Narrative:       GFR Normal >60  Chronic Kidney Disease <60  Kidney Failure <15      Lipase [[id number]]  (Normal) Collected:  02/20/20 1530    Specimen:  Blood Updated:  02/20/20 1606     Lipase 35 U/L     Troponin [[id number]]  (Normal) Collected:  02/20/20 1530    Specimen:  Blood Updated:  02/20/20 1606     Troponin T <0.010 ng/mL     Narrative:       Troponin T Reference Range:  <= 0.03 ng/mL-   Negative for AMI  >0.03 ng/mL-     Abnormal for myocardial necrosis.  Clinicians would have to utilize clinical acumen, EKG, Troponin and serial changes to determine if it is an Acute Myocardial Infarction or myocardial injury due to an underlying chronic condition.       Results may be falsely decreased if patient taking Biotin.      Magnesium [[id number]]  (Normal) Collected:  02/20/20 1530    Specimen:  Blood Updated:  02/20/20 1606     Magnesium 2.2 mg/dL     Protime-INR [[id number]]  (Normal) Collected:  02/20/20 1530    Specimen:  Blood Updated:  02/20/20 1549     Protime 13.1 Seconds      INR 1.02    BNP [[id number]]  (Normal) Collected:  02/20/20 1530    Specimen:  Blood Updated:  02/20/20 1605     proBNP 556.7 pg/mL     Narrative:       Among patients with dyspnea, NT-proBNP is highly sensitive for the detection of acute congestive heart failure. In addition NT-proBNP of <300 pg/ml effectively rules out acute congestive heart failure with 99% negative predictive value.    Results may be falsely decreased if patient taking Biotin.      CBC Auto Differential [[id number]]  (Abnormal) Collected:  02/20/20 1530    Specimen:  Blood Updated:  02/20/20 1539     WBC 13.08 10*3/mm3      RBC 4.38 10*6/mm3      Hemoglobin 12.1 g/dL      Hematocrit 36.9 %      MCV 84.2 fL      MCH 27.6 pg      MCHC 32.8 g/dL      RDW 13.8 %      RDW-SD 42.0 fl      MPV 10.3 fL      Platelets 206 10*3/mm3      Neutrophil  % 64.3 %      Lymphocyte % 27.7 %      Monocyte % 6.2 %      Eosinophil % 0.9 %      Basophil % 0.5 %      Immature Grans % 0.4 %      Neutrophils, Absolute 8.42 10*3/mm3      Lymphocytes, Absolute 3.62 10*3/mm3      Monocytes, Absolute 0.81 10*3/mm3      Eosinophils, Absolute 0.12 10*3/mm3      Basophils, Absolute 0.06 10*3/mm3      Immature Grans, Absolute 0.05 10*3/mm3      nRBC 0.0 /100 WBC     Troponin [[id number]]  (Normal) Collected:  02/20/20 1658    Specimen:  Blood Updated:  02/20/20 1729     Troponin T <0.010 ng/mL     Narrative:       Troponin T Reference Range:  <= 0.03 ng/mL-   Negative for AMI  >0.03 ng/mL-     Abnormal for myocardial necrosis.  Clinicians would have to utilize clinical acumen, EKG, Troponin and serial changes to determine if it is an Acute Myocardial Infarction or myocardial injury due to an underlying chronic condition.       Results may be falsely decreased if patient taking Biotin.            I ordered the above labs and reviewed the results    RADIOLOGY  XR Chest 1 View   Final Result           I ordered the above noted radiological studies. Interpreted by radiologist. Reviewed by me in PACS.       PROCEDURES  Procedures  EKG          EKG time: 1505  Rhythm/Rate: NSR 73  P waves and WV: nml  QRS, axis: nml   ST and T waves: no acute changes   No STEMI  Non Ischemic      Interpreted Contemporaneously by me, independently viewed  Unchanged compared to prior 7/24/19    EKG          EKG time: 1833  Rhythm/Rate: NSR  P waves and WV: nml  QRS, axis: nml   ST and T waves: nml, non ischemic, no STEMI     Interpreted Contemporaneously by me, independently viewed  Unchanged compared to prior - see above     PROGRESS AND CONSULTS     1515: Upon pt exam, discussed with pt and spouse the plan for workup in ED, and updated them on pt's normal appearing EKG. Informed pt and spouse of plan for workup, and possible consult with cardiology about admission due to pt's extensive hx.     1521: 324mg  ASA and NTG ordered for chest pain.     1658: Negative initial workup. Second troponin ordered.     1835: Call placed to Cardiology.     1913: Discussed pt's case with Dr. Junior ([facility] [place]) who agreed to admit pt to telemetry for further workup and cardiac evaluation.     2040: Pt rechecked and resting comfortably, spouse at bedside. Discussed with pt and spouse pt's negative workup in ED, but due to age and hx the plan for admission and further cardiology workup. Pt updated on call with Dr. Junior and plan for care. Pt understands and agrees with the plan, all questions answered.    MEDICAL DECISION MAKING  Results were reviewed/discussed with the patient and they were also made aware of online access. Pt also made aware that some labs, such as cultures, will not be resulted during ER visit and follow up with PMD is necessary.     MDM  Number of Diagnoses or Management Options     Amount and/or Complexity of Data Reviewed  Clinical lab tests: ordered and reviewed (WBC - 13.08  Troponin (1530) - negative  Troponin (1658) - negative )  Tests in the radiology section of CPT®: ordered and reviewed (CXR - no acute )  Tests in the medicine section of CPT®: ordered and reviewed (See note)  Review and summarize past medical records: yes (3/20/19 - last echo - EF = 60%  11/5/19 - Dr. Templeton's note  1.  Coronary artery disease.  Continues to have intermittent episodes of chest pain but many of these episodes sound atypical and often resolved with sublingual nitroglycerin.  May be related to the chronic total occlusion of the circumflex vessel and his diffuse LAD disease that is not amenable to PCI.  We will continue medical management.  2.  Hypertension.  Remains well controlled on his current medications.  3.  Mitral regurgitation.  Moderate on his last echocardiogram.  4.  Diabetes mellitus type 2  5.  COPD  6.  Recurrent syncope.  Felt to be due to either orthostatic hypotension or vasovagal syncope.     We will plan on seeing  the patient back again in 6 months.)  Discuss the patient with other providers: yes (Dr. Junior ([facility] [place]           DIAGNOSIS  Final diagnoses:   Chest pain, unspecified type       DISPOSITION  ADMISSION    Discussed treatment plan and reason for admission with pt/family and admitting physician.  Pt/family voiced understanding of the plan for admission for further testing/treatment as needed.         Latest Documented Vital Signs:  As of 10:26 PM  BP- 106/74 HR- 72 Temp- 97.7 °F (36.5 °C) (Oral) O2 sat- 96%    --  Documentation assistance provided by abdiel Guzman for Dr. Mulligan.  Information recorded by the scribe was done at my direction and has been verified and validated by me.         Vanesa Guzman  02/20/20 2041       Nicolás Mulligan MD  02/20/20 [id number]

## 2020-02-21 ENCOUNTER — APPOINTMENT (OUTPATIENT)
Dept: NUCLEAR MEDICINE | Facility: HOSPITAL | Age: 85
End: 2020-02-21

## 2020-02-21 LAB
ALBUMIN SERPL-MCNC: 3.4 G/DL (ref 3.5–5.2)
ALBUMIN/GLOB SERPL: 1.4 G/DL
ALP SERPL-CCNC: 65 U/L (ref 39–117)
ALT SERPL W P-5'-P-CCNC: 14 U/L (ref 1–41)
ANION GAP SERPL CALCULATED.3IONS-SCNC: 9.1 MMOL/L (ref 5–15)
AST SERPL-CCNC: 15 U/L (ref 1–40)
BH CV STRESS COMMENTS STAGE 1: NORMAL
BH CV STRESS DOSE REGADENOSON STAGE 1: 0.4
BH CV STRESS DURATION MIN STAGE 1: 0
BH CV STRESS DURATION SEC STAGE 1: 10
BH CV STRESS PROTOCOL 1: NORMAL
BH CV STRESS RECOVERY BP: NORMAL MMHG
BH CV STRESS RECOVERY HR: 86 BPM
BH CV STRESS STAGE 1: 1
BILIRUB SERPL-MCNC: 0.4 MG/DL (ref 0.2–1.2)
BUN BLD-MCNC: 46 MG/DL (ref 8–23)
BUN/CREAT SERPL: 38 (ref 7–25)
CALCIUM SPEC-SCNC: 8.4 MG/DL (ref 8.6–10.5)
CHLORIDE SERPL-SCNC: 98 MMOL/L (ref 98–107)
CO2 SERPL-SCNC: 20.9 MMOL/L (ref 22–29)
CREAT BLD-MCNC: 1.21 MG/DL (ref 0.76–1.27)
DEPRECATED RDW RBC AUTO: 42.1 FL (ref 37–54)
ERYTHROCYTE [DISTWIDTH] IN BLOOD BY AUTOMATED COUNT: 13.7 % (ref 12.3–15.4)
GFR SERPL CREATININE-BSD FRML MDRD: 57 ML/MIN/1.73
GLOBULIN UR ELPH-MCNC: 2.5 GM/DL
GLUCOSE BLD-MCNC: 103 MG/DL (ref 65–99)
HCT VFR BLD AUTO: 34.9 % (ref 37.5–51)
HGB BLD-MCNC: 11.4 G/DL (ref 13–17.7)
LV EF NUC BP: 54 %
MAXIMAL PREDICTED HEART RATE: 134 BPM
MCH RBC QN AUTO: 27.4 PG (ref 26.6–33)
MCHC RBC AUTO-ENTMCNC: 32.7 G/DL (ref 31.5–35.7)
MCV RBC AUTO: 83.9 FL (ref 79–97)
PERCENT MAX PREDICTED HR: 73.13 %
PLATELET # BLD AUTO: 171 10*3/MM3 (ref 140–450)
PMV BLD AUTO: 10.5 FL (ref 6–12)
POTASSIUM BLD-SCNC: 4.5 MMOL/L (ref 3.5–5.2)
PROT SERPL-MCNC: 5.9 G/DL (ref 6–8.5)
RBC # BLD AUTO: 4.16 10*6/MM3 (ref 4.14–5.8)
SODIUM BLD-SCNC: 128 MMOL/L (ref 136–145)
SODIUM UR-SCNC: 55 MMOL/L
STRESS BASELINE BP: NORMAL MMHG
STRESS BASELINE HR: 65 BPM
STRESS PERCENT HR: 86 %
STRESS POST PEAK BP: NORMAL MMHG
STRESS POST PEAK HR: 98 BPM
STRESS TARGET HR: 114 BPM
TROPONIN T SERPL-MCNC: 0.01 NG/ML (ref 0–0.03)
WBC NRBC COR # BLD: 9.79 10*3/MM3 (ref 3.4–10.8)

## 2020-02-21 PROCEDURE — 85027 COMPLETE CBC AUTOMATED: CPT | Performed by: NURSE PRACTITIONER

## 2020-02-21 PROCEDURE — 63710000001 CARVEDILOL 3.125 MG TABLET: Performed by: INTERNAL MEDICINE

## 2020-02-21 PROCEDURE — 94799 UNLISTED PULMONARY SVC/PX: CPT

## 2020-02-21 PROCEDURE — 0 IOPAMIDOL PER 1 ML: Performed by: INTERNAL MEDICINE

## 2020-02-21 PROCEDURE — A9270 NON-COVERED ITEM OR SERVICE: HCPCS | Performed by: INTERNAL MEDICINE

## 2020-02-21 PROCEDURE — 93458 L HRT ARTERY/VENTRICLE ANGIO: CPT | Performed by: INTERNAL MEDICINE

## 2020-02-21 PROCEDURE — 84484 ASSAY OF TROPONIN QUANT: CPT | Performed by: NURSE PRACTITIONER

## 2020-02-21 PROCEDURE — G0378 HOSPITAL OBSERVATION PER HR: HCPCS

## 2020-02-21 PROCEDURE — 63710000001 ATORVASTATIN 10 MG TABLET: Performed by: INTERNAL MEDICINE

## 2020-02-21 PROCEDURE — C1894 INTRO/SHEATH, NON-LASER: HCPCS | Performed by: INTERNAL MEDICINE

## 2020-02-21 PROCEDURE — 99219 PR INITIAL OBSERVATION CARE/DAY 50 MINUTES: CPT | Performed by: INTERNAL MEDICINE

## 2020-02-21 PROCEDURE — 25010000002 REGADENOSON 0.4 MG/5ML SOLUTION: Performed by: INTERNAL MEDICINE

## 2020-02-21 PROCEDURE — 0 TECHNETIUM SESTAMIBI: Performed by: INTERNAL MEDICINE

## 2020-02-21 PROCEDURE — A9500 TC99M SESTAMIBI: HCPCS | Performed by: INTERNAL MEDICINE

## 2020-02-21 PROCEDURE — 25010000002 MIDAZOLAM PER 1 MG: Performed by: INTERNAL MEDICINE

## 2020-02-21 PROCEDURE — 25010000002 FENTANYL CITRATE (PF) 100 MCG/2ML SOLUTION: Performed by: INTERNAL MEDICINE

## 2020-02-21 PROCEDURE — 93017 CV STRESS TEST TRACING ONLY: CPT

## 2020-02-21 PROCEDURE — 78452 HT MUSCLE IMAGE SPECT MULT: CPT

## 2020-02-21 PROCEDURE — 93018 CV STRESS TEST I&R ONLY: CPT | Performed by: INTERNAL MEDICINE

## 2020-02-21 PROCEDURE — C1769 GUIDE WIRE: HCPCS | Performed by: INTERNAL MEDICINE

## 2020-02-21 PROCEDURE — 78452 HT MUSCLE IMAGE SPECT MULT: CPT | Performed by: INTERNAL MEDICINE

## 2020-02-21 PROCEDURE — 84300 ASSAY OF URINE SODIUM: CPT | Performed by: INTERNAL MEDICINE

## 2020-02-21 PROCEDURE — 80053 COMPREHEN METABOLIC PANEL: CPT | Performed by: NURSE PRACTITIONER

## 2020-02-21 PROCEDURE — 25010000002 HEPARIN (PORCINE) PER 1000 UNITS: Performed by: INTERNAL MEDICINE

## 2020-02-21 PROCEDURE — 93016 CV STRESS TEST SUPVJ ONLY: CPT | Performed by: INTERNAL MEDICINE

## 2020-02-21 RX ORDER — CARVEDILOL 3.12 MG/1
3.12 TABLET ORAL EVERY 12 HOURS SCHEDULED
Status: DISCONTINUED | OUTPATIENT
Start: 2020-02-21 | End: 2020-02-22 | Stop reason: HOSPADM

## 2020-02-21 RX ORDER — ISOSORBIDE MONONITRATE 30 MG/1
30 TABLET, EXTENDED RELEASE ORAL DAILY
Status: DISCONTINUED | OUTPATIENT
Start: 2020-02-21 | End: 2020-02-22 | Stop reason: HOSPADM

## 2020-02-21 RX ORDER — ALBUTEROL SULFATE 2.5 MG/3ML
2.5 SOLUTION RESPIRATORY (INHALATION) EVERY 6 HOURS PRN
Status: DISCONTINUED | OUTPATIENT
Start: 2020-02-21 | End: 2020-02-21 | Stop reason: SDUPTHER

## 2020-02-21 RX ORDER — CLOPIDOGREL BISULFATE 75 MG/1
75 TABLET ORAL DAILY
Status: DISCONTINUED | OUTPATIENT
Start: 2020-02-21 | End: 2020-02-22 | Stop reason: HOSPADM

## 2020-02-21 RX ORDER — FENTANYL CITRATE 50 UG/ML
INJECTION, SOLUTION INTRAMUSCULAR; INTRAVENOUS AS NEEDED
Status: DISCONTINUED | OUTPATIENT
Start: 2020-02-21 | End: 2020-02-21 | Stop reason: HOSPADM

## 2020-02-21 RX ORDER — MIDAZOLAM HYDROCHLORIDE 1 MG/ML
INJECTION INTRAMUSCULAR; INTRAVENOUS AS NEEDED
Status: DISCONTINUED | OUTPATIENT
Start: 2020-02-21 | End: 2020-02-21 | Stop reason: HOSPADM

## 2020-02-21 RX ORDER — SODIUM CHLORIDE 9 MG/ML
100 INJECTION, SOLUTION INTRAVENOUS CONTINUOUS
Status: ACTIVE | OUTPATIENT
Start: 2020-02-21 | End: 2020-02-22

## 2020-02-21 RX ORDER — NITROGLYCERIN 0.4 MG/1
0.4 TABLET SUBLINGUAL
Status: DISCONTINUED | OUTPATIENT
Start: 2020-02-21 | End: 2020-02-21 | Stop reason: SDUPTHER

## 2020-02-21 RX ORDER — SODIUM CHLORIDE 0.9 % (FLUSH) 0.9 %
10 SYRINGE (ML) INJECTION AS NEEDED
Status: DISCONTINUED | OUTPATIENT
Start: 2020-02-21 | End: 2020-02-22 | Stop reason: HOSPADM

## 2020-02-21 RX ORDER — ACETAMINOPHEN 160 MG/5ML
650 SOLUTION ORAL EVERY 4 HOURS PRN
Status: DISCONTINUED | OUTPATIENT
Start: 2020-02-21 | End: 2020-02-22 | Stop reason: HOSPADM

## 2020-02-21 RX ORDER — LISINOPRIL 5 MG/1
5 TABLET ORAL DAILY
Status: DISCONTINUED | OUTPATIENT
Start: 2020-02-21 | End: 2020-02-21

## 2020-02-21 RX ORDER — SODIUM CHLORIDE 0.9 % (FLUSH) 0.9 %
10 SYRINGE (ML) INJECTION EVERY 12 HOURS SCHEDULED
Status: DISCONTINUED | OUTPATIENT
Start: 2020-02-21 | End: 2020-02-22 | Stop reason: HOSPADM

## 2020-02-21 RX ORDER — SODIUM CHLORIDE 9 MG/ML
75 INJECTION, SOLUTION INTRAVENOUS CONTINUOUS
Status: DISCONTINUED | OUTPATIENT
Start: 2020-02-21 | End: 2020-02-22 | Stop reason: HOSPADM

## 2020-02-21 RX ORDER — ASPIRIN 81 MG/1
81 TABLET ORAL DAILY
Status: DISCONTINUED | OUTPATIENT
Start: 2020-02-21 | End: 2020-02-22 | Stop reason: HOSPADM

## 2020-02-21 RX ORDER — ACETAMINOPHEN 325 MG/1
650 TABLET ORAL EVERY 4 HOURS PRN
Status: DISCONTINUED | OUTPATIENT
Start: 2020-02-21 | End: 2020-02-22 | Stop reason: HOSPADM

## 2020-02-21 RX ORDER — ACETAMINOPHEN 650 MG/1
650 SUPPOSITORY RECTAL EVERY 4 HOURS PRN
Status: DISCONTINUED | OUTPATIENT
Start: 2020-02-21 | End: 2020-02-22 | Stop reason: HOSPADM

## 2020-02-21 RX ORDER — ALBUTEROL SULFATE 2.5 MG/3ML
2.5 SOLUTION RESPIRATORY (INHALATION) EVERY 4 HOURS PRN
Status: DISCONTINUED | OUTPATIENT
Start: 2020-02-21 | End: 2020-02-22 | Stop reason: HOSPADM

## 2020-02-21 RX ORDER — ATORVASTATIN CALCIUM 10 MG/1
10 TABLET, FILM COATED ORAL NIGHTLY
Status: DISCONTINUED | OUTPATIENT
Start: 2020-02-21 | End: 2020-02-22 | Stop reason: HOSPADM

## 2020-02-21 RX ADMIN — SODIUM CHLORIDE 75 ML/HR: 9 INJECTION, SOLUTION INTRAVENOUS at 15:10

## 2020-02-21 RX ADMIN — ATORVASTATIN CALCIUM 10 MG: 10 TABLET, FILM COATED ORAL at 20:29

## 2020-02-21 RX ADMIN — TECHNETIUM TC 99M SESTAMIBI 1 DOSE: 1 INJECTION INTRAVENOUS at 13:10

## 2020-02-21 RX ADMIN — CLOPIDOGREL 75 MG: 75 TABLET, FILM COATED ORAL at 14:53

## 2020-02-21 RX ADMIN — SODIUM CHLORIDE 100 ML/HR: 9 INJECTION, SOLUTION INTRAVENOUS at 20:28

## 2020-02-21 RX ADMIN — REGADENOSON 0.4 MG: 0.08 INJECTION, SOLUTION INTRAVENOUS at 13:10

## 2020-02-21 RX ADMIN — SODIUM CHLORIDE, PRESERVATIVE FREE 10 ML: 5 INJECTION INTRAVENOUS at 10:45

## 2020-02-21 RX ADMIN — CARVEDILOL 3.12 MG: 3.12 TABLET, FILM COATED ORAL at 23:21

## 2020-02-21 RX ADMIN — TECHNETIUM TC 99M SESTAMIBI 1 DOSE: 1 INJECTION INTRAVENOUS at 09:45

## 2020-02-21 RX ADMIN — SODIUM CHLORIDE, PRESERVATIVE FREE 10 ML: 5 INJECTION INTRAVENOUS at 20:30

## 2020-02-21 RX ADMIN — ASPIRIN 81 MG: 81 TABLET, COATED ORAL at 14:52

## 2020-02-21 NOTE — PLAN OF CARE
Problem: Patient Care Overview  Goal: Plan of Care Review  Outcome: Ongoing (interventions implemented as appropriate)  Flowsheets (Taken 2/21/2020 0501)  Plan of Care Reviewed With: patient  Outcome Summary: Patient admitted overnight. No complaints of chest pain. NPO. Wife at bedside. VSS. Will continue to monitor.

## 2020-02-21 NOTE — H&P
[facility] Consult Note    Patient Name: Yoni Bonner Jr.  :1933  86 y.o.    Date of Admission: 2020  Date of Consultation:  20  Encounter Provider: Melissa Templeton MD  Place of Service: [facility] CARDIOLOGY  Referring Provider: Janak Junior III, MD  Patient Care Team:  Patric Cameron DO as PCP - General (Family Medicine)      Chief complaint: Chest pain    History of Present Illness:  This is a 86 year old male with history of tobacco abuse, diabetes mellitus type 2, COPD, hypertension, recurrent syncope due to orthostatic hypotension/vasovagal syncope, coronary artery disease status post prior inferior myocardial infarction and drug eluting stent placement of his proximal RCA who is admitted with chest pain.      He is very hard of hearing and difficult to get a history but his wife was at this bedside and able to provide further information.  His wife reports that the night prior to this admission he began having mild chest pain that continued overnight and associated with nausea and vomiting.  The morning of his admission the pain became more severe, at which point he took 2 sublingual nitroglycerin with resolution of his pain en route to the hospital.  He has had no recurrent pain since.  His wife denies any recent low blood pressures.  The patient current denies any lightheadedness, dizziness, palpitations, chest pain or dyspnea.   His wife states that they are looking into moving into a smaller home. She states that he has not been very active and has been slowing down.     In 2018 he went to the [place] ER after a syncope episode with hypotension and bradycardia. He was found to have an acute inferior ST elevation MI and emergently underwent cardiac catheterization where he had a thrombotic subtotal occlusion of his proximal RCA. This was treated with thrombectomy and CARLENE. He also had chronic occlusion of his proximal left  circumflex and diffuse disease in his LAD. Post cath echocardiogram found EF of 42%, mild to moderate MR, mild aortic stenosis and regurgitation, and grade 1 diastolic dysfunction.  Then in 7/2018 he again was hospitalized for chest pain on the left side with dyspnea and nausea. Stress test found inferior lateral infarct but no evidence of ischemia.  In December of 2018 he again had chest pain and came to the ER. EKGs were unremarkable and he was ruled out for ACS. He was to continue medical management. Dr. Freeman then saw him in another hospitalization in July of 2019 for indeterminate troponin in the setting of COPD exacerbation.  He then had a repeat echocardiogram at this time which found that his EF had improved to 60%.He had a follow up in November of 2019 with me where he was still having atypical chest pain that would resolve with SL nitro. It was thought that it may be related to chronic total occlusion of the circumflex vessel and his diffuse LAD disease that is not amenable to PCI due to it being a small caliber, tortuous vessel. He was to continue medical management.      PORTABLE CHEST on 2/20/20-     HISTORY:  Shortness of air.     A single view of the chest demonstrates the heart to be within normal  limits in size. There is no evidence of infiltrate, effusion or of  congestive failure.      Previous Testing-  Echocardiogram on 7/30/19-  Interpretation Summary     · Left ventricular wall thickness is consistent with mild concentric hypertrophy.  · Calculated EF = 60%.  · Left ventricular systolic function is normal.  · Left ventricular diastolic dysfunction (grade I a) consistent with impaired relaxation.  · There is calcification of the aortic valve.  · Moderate mitral valve regurgitation is present     Cardiac catheterization on 4/8/18-  FINDINGS:     1. HEMODYNAMICS:  RA -/9/8, RV 44/11, PA 44/22/36, PCWP 28/32/23, /27, /78/107.  The cardiac output was 2.8 L/min.     2. LEFT  VENTRICULOGRAPHY: EF 30%, 3+ mitral regurgitation.  Global hypokinesis with inferior akinesis.     3. CORONARY ANGIOGRAPHY: Right dominant system, three-vessel coronary disease.  The left main is normal.  The proximal LAD has 50% stenosis.  The mid LAD has diffuse 70% stenosis.  The apical LAD has diffuse 70% stenosis.  The circumflex is totally occluded proximally.  The RCA is very large.  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  Severely tortuous.  There is an ulcerated 90% stenosis with significant thrombus burden proximally.     4. INTERVENTIONAL COMMENTS:  Successful thrombectomy/CARLENE of the proximal RCA was performed as described above.     SUMMARY: Acute inferior STEMI complicated by ischemic MR and borderline shock.     RECOMMENDATIONS: Routine post-MI care.  Dual anti-platelet therapy for at least one year.  This recommendation may change based on anticoagulation needs for AF.    Stress Test on 7/18/18-  Interpretation Summary     · Left ventricular ejection fraction is normal (Calculated EF = 50%).  · Myocardial perfusion imaging indicates a large-sized infarct located in the inferior wall and lateral wall with no significant ischemia noted.  · Impressions are consistent with a low risk study.     Stress echo on 3/13/17-  Interpretation Summary     · Left ventricular function is normal. Calculated EF = 56%.  · Left ventricular diastolic dysfunction is noted (grade I) consistent with impaired relaxation.  · Normal stress echo with no significant echocardiographic evidence for myocardial ischemia.  · The patient had no chest pain with dobutamine infusion.  · At peak dobutamine infusion, the patient had 1 mm of ST elevation in leads II and V6. This resolved in recovery.         Past Medical History:   Diagnosis Date   • Bronchitis    • CAD (coronary artery disease)    • Carotid arterial disease (CMS/HCC)    • Chronic bronchitis (CMS/HCC)    • COPD (chronic obstructive pulmonary disease)  (CMS/[facility])    • Gout    • Hyperlipidemia    • Hypertension    • Ischemic cardiomyopathy     resolved, EF was 60% in 2018   • Mild aortic stenosis    • Rheumatic fever    • Syncopal episodes    • Tobacco use    • Type 2 diabetes mellitus (CMS/[facility])        Past Surgical History:   Procedure Laterality Date   • CARDIAC CATHETERIZATION     • CARDIAC CATHETERIZATION N/A 4/8/2018    Procedure: Left Heart Cath;  Surgeon: Nicolas Jerez MD;  Location:  [facility];  Service: Cardiovascular   • CARDIAC CATHETERIZATION  4/8/2018    Procedure: Percutaneous Manual Thrombectomy;  Surgeon: Nicolas Jerez MD;  Location:  [facility];  Service: Cardiovascular   • CARDIAC CATHETERIZATION N/A 4/8/2018    Procedure: Stent CARLENE coronary;  Surgeon: Nicolas Jerez MD;  Location:  [facility];  Service: Cardiovascular   • CARDIAC CATHETERIZATION N/A 4/8/2018    Procedure: Right Heart Cath;  Surgeon: Nicolas Jerez MD;  Location:  [facility];  Service: Cardiovascular   • EYE SURGERY      cataract surg   • HAND SURGERY     • HERNIA REPAIR     • KNEE SURGERY     • TESTICLE SURGERY           Prior to Admission medications    Medication Sig Start Date End Date Taking? Authorizing Provider   albuterol (PROVENTIL) (2.5 MG/3ML) 0.083% nebulizer solution Take 2.5 mg by nebulization Every 4 (Four) Hours As Needed for Wheezing or Shortness of Air. 1/9/19  Yes Raj Bey MD   albuterol sulfate  (90 Base) MCG/ACT inhaler Inhale 2 puffs Every 4 (Four) Hours As Needed for Wheezing.   Yes ProviderMiguelina MD   aspirin 81 MG EC tablet Take 1 tablet by mouth Daily. 4/11/18  Yes Melissa Templeton MD   atorvastatin (LIPITOR) 10 MG tablet Take 10 mg by mouth Every Night.   Yes ProviderMiguelina MD   carvedilol (COREG) 3.125 MG tablet Take 1 tablet by mouth Every 12 (Twelve) Hours. 11/6/19  Yes Melissa Templeton MD   clopidogrel (PLAVIX) 75 MG tablet Take 75 mg by mouth Daily.   Yes  "Provider, MD Miguelina   isosorbide mononitrate (IMDUR) 30 MG 24 hr tablet Take 30 mg by mouth Daily.   Yes ProviderMiguelina MD   lisinopril (PRINIVIL,ZESTRIL) 5 MG tablet Take 5 mg by mouth Daily.   Yes Miguelina Myles MD   nitroglycerin (NITROSTAT) 0.4 MG SL tablet 1 under the tongue as needed for angina, may repeat q5mins for up three doses 11/6/19  Yes Melissa Templeton MD       Allergies   Allergen Reactions   • No Known Drug Allergy Unknown (See Comments)     NKA       Social History     Socioeconomic History   • Marital status:      Spouse name: Not on file   • Number of children: Not on file   • Years of education: Not on file   • Highest education level: Not on file   Tobacco Use   • Smoking status: Never Smoker   • Smokeless tobacco: Current User     Types: Chew   • Tobacco comment: chewing tobacco since 4 yearrs old   Substance and Sexual Activity   • Alcohol use: No     Comment: caffeine use   • Drug use: No   • Sexual activity: Defer       Family History   Problem Relation Age of Onset   • Heart disease Father    • Hypertension Father    • Stroke Father    • Diabetes Sister    • Cancer Brother    • Heart disease Brother    • Hypertension Brother    • No Known Problems Maternal Grandmother    • No Known Problems Maternal Grandfather    • No Known Problems Paternal Grandmother    • No Known Problems Paternal Grandfather        REVIEW OF SYSTEMS:   All systems reviewed.  Pertinent positives identified in HPI.  All other systems are negative.      Objective:     Vitals:    02/20/20 2139 02/20/20 2339 02/21/20 0700 02/21/20 0910   BP: 106/74 134/63  98/58   BP Location: Left arm Left arm     Patient Position: Lying Lying     Pulse: 72 65     Resp: 18 18 18    Temp: 97.7 °F (36.5 °C) 97.5 °F (36.4 °C) 97.9 °F (36.6 °C)    TempSrc: Oral Oral Oral    SpO2: 96% 94%     Weight: 83.5 kg (184 lb 1.6 oz)      Height: 172.7 cm (68\")        Body mass index is 27.99 kg/m².    General Appearance:    " Alert, cooperative, in no acute distress   Head:    Normocephalic, without obvious abnormality, atraumatic   Eyes:            Lids and lashes normal, conjunctivae and sclerae normal, no icterus, no pallor, corneas clear, PERRLA   Ears:    Ears appear intact with no abnormalities noted   Neck:   No adenopathy, supple, trachea midline, no thyromegaly, no carotid bruit, no JVD   Lungs:     Clear to auscultation, respirations regular, even and unlabored    Heart:    Regular rhythm and normal rate, normal S1 and S2, no murmur, no gallop, no rub, no click   Chest Wall:    No abnormalities observed   Abdomen:     Normal bowel sounds, no masses, no organomegaly, soft, nontender, nondistended, no guarding, no rebound  tenderness   Extremities:   Moves all extremities well, no edema, no cyanosis, no redness   Pulses:   Pulses palpable and equal bilaterally. Normal radial, carotid, femoral, dorsalis pedis and posterior tibial pulses bilaterally. Normal abdominal aorta   Skin:  Psychiatric:   No bleeding, bruising or rash    Alert and oriented x 3, normal mood and affect   Lab Review:     Results from last 7 days   Lab Units 02/21/20  0437   SODIUM mmol/L 128*   POTASSIUM mmol/L 4.5   CHLORIDE mmol/L 98   CO2 mmol/L 20.9*   BUN mg/dL 46*   CREATININE mg/dL 1.21   CALCIUM mg/dL 8.4*   BILIRUBIN mg/dL 0.4   ALK PHOS U/L 65   ALT (SGPT) U/L 14   AST (SGOT) U/L 15   GLUCOSE mg/dL 103*     Results from last 7 days   Lab Units 02/21/20  0437 02/20/20  1658 02/20/20  1530   TROPONIN T ng/mL 0.010 <0.010 <0.010     Results from last 7 days   Lab Units 02/21/20  0437   WBC 10*3/mm3 9.79   HEMOGLOBIN g/dL 11.4*   HEMATOCRIT % 34.9*   PLATELETS 10*3/mm3 171     Results from last 7 days   Lab Units 02/20/20  1530   INR  1.02     Results from last 7 days   Lab Units 02/20/20  1530   MAGNESIUM mg/dL 2.2                 EKG on 2/20/20 @ 1833-      EKG on 2/20/20 @ 1505-      Baseline EKG on 11/5/19-      I personally viewed and interpreted  the patient's EKG/Telemetry data.        Assessment and Plan:       1. Chest pain.  Normal troponins and unremarkable EKG.  No recurrent chest pain.  2. Hyponatremia.  With relatively low BP this morning.  May be hypovolemic from nausea and vomiting.   3. Coronary artery disease.  History of RCA stent and  of LCx and small caliber, tortuous LAD with mid stenosis not easily amenable for PCI noted in 2018.   4. Hypertension.  BP relatively low here.   5. Mitral regurgiation  6. Diabetes mellitus type 2  7. COPD  8. History of recurrent syncope.  Felt to be orthostatic or vasovagal in the past.     -  Proceed with stress test today  -  Check urine sodium.  May need some IV fluids.      Melissa Templeton MD  02/21/20  9:25 AM

## 2020-02-21 NOTE — ED NOTES
Nursing report ED to floor  Yoni Bonner Jr.  86 y.o.  male    HPI (triage note):   Chief Complaint   Patient presents with   • Chest Pain       Admitting doctor:   Janak Junior III, MD    Admitting diagnosis:   The encounter diagnosis was Chest pain, unspecified type.    Code status:   Current Code Status     Date Active Code Status Order ID Comments User Context       Prior          Allergies:   No known drug allergy    Weight:       02/20/20  1515   Weight: 85.7 kg (189 lb)       Most recent vitals:   Vitals:    02/20/20 1745 02/20/20 1838 02/20/20 1925 02/20/20 2015   BP: 115/64 127/56 130/64 109/65   Pulse: 61 59 61 59   Resp: 18 16     Temp:       SpO2: 97% 97% 97% 96%   Weight:       Height:           Active LDAs/IV Access:   Lines, Drains & Airways    Active LDAs     Name:   Placement date:   Placement time:   Site:   Days:    Peripheral IV 02/20/20 1519 Right Forearm   02/20/20    1519    Forearm   less than 1                Labs (abnormal labs have a star):   Labs Reviewed   COMPREHENSIVE METABOLIC PANEL - Abnormal; Notable for the following components:       Result Value    BUN 46 (*)     Sodium 130 (*)     Chloride 96 (*)     CO2 21.3 (*)     eGFR Non African Amer 56 (*)     BUN/Creatinine Ratio 37.4 (*)     All other components within normal limits    Narrative:     GFR Normal >60  Chronic Kidney Disease <60  Kidney Failure <15     CBC WITH AUTO DIFFERENTIAL - Abnormal; Notable for the following components:    WBC 13.08 (*)     Hemoglobin 12.1 (*)     Hematocrit 36.9 (*)     Neutrophils, Absolute 8.42 (*)     Lymphocytes, Absolute 3.62 (*)     All other components within normal limits   LIPASE - Normal   TROPONIN (IN-HOUSE) - Normal    Narrative:     Troponin T Reference Range:  <= 0.03 ng/mL-   Negative for AMI  >0.03 ng/mL-     Abnormal for myocardial necrosis.  Clinicians would have to utilize clinical acumen, EKG, Troponin and serial changes to determine if it is an Acute Myocardial Infarction or  myocardial injury due to an underlying chronic condition.       Results may be falsely decreased if patient taking Biotin.     MAGNESIUM - Normal   PROTIME-INR - Normal   BNP (IN-HOUSE) - Normal    Narrative:     Among patients with dyspnea, NT-proBNP is highly sensitive for the detection of acute congestive heart failure. In addition NT-proBNP of <300 pg/ml effectively rules out acute congestive heart failure with 99% negative predictive value.    Results may be falsely decreased if patient taking Biotin.     TROPONIN (IN-HOUSE) - Normal    Narrative:     Troponin T Reference Range:  <= 0.03 ng/mL-   Negative for AMI  >0.03 ng/mL-     Abnormal for myocardial necrosis.  Clinicians would have to utilize clinical acumen, EKG, Troponin and serial changes to determine if it is an Acute Myocardial Infarction or myocardial injury due to an underlying chronic condition.       Results may be falsely decreased if patient taking Biotin.     CBC AND DIFFERENTIAL    Narrative:     The following orders were created for panel order CBC & Differential.  Procedure                               Abnormality         Status                     ---------                               -----------         ------                     CBC Auto Differential[[id number]]        Abnormal            Final result                 Please view results for these tests on the individual orders.       EKG:   ECG 12 Lead   Final Result   HEART RATE= 58  bpm   RR Interval= 1032  ms   IN Interval= 211  ms   P Horizontal Axis= 6  deg   P Front Axis= 35  deg   QRSD Interval= 94  ms   QT Interval= 412  ms   QRS Axis= -15  deg   T Wave Axis= 103  deg   - ABNORMAL ECG -   Sinus rhythm   Nonspecific T abnormalities, lateral leads   When compared with ECG of 20-Feb-2020 15:05:39,   No significant change   Electronically Signed By: Alejandro Jenkins ([facility] 20-Feb-2020 21:11:50   Date and Time of Study: 2020-02-20 18:33:42      ECG 12 Lead   Final Result   HEART RATE=  73  bpm   RR Interval= 824  ms   WI Interval= 190  ms   P Horizontal Axis= -14  deg   P Front Axis= 23  deg   QRSD Interval= 91  ms   QT Interval= 370  ms   QRS Axis= -11  deg   T Wave Axis= 103  deg   - NORMAL ECG -   Sinus rhythm   When compared with ECG of 24-Jul-2019 19:39:26,   No significant change    Electronically Signed By: Alejandro Jenkins ([facility]) 20-Feb-2020 19:27:24   Date and Time of Study: 2020-02-20 15:05:39          Meds given in ED:   Medications   nitroglycerin (NITROSTAT) SL tablet 0.4 mg (has no administration in time range)   aspirin chewable tablet 324 mg (324 mg Oral Given 2/20/20 1529)       Imaging results:  No radiology results for the last day    Ambulatory status:   - with assist    Social issues:   Social History     Socioeconomic History   • Marital status:      Spouse name: Not on file   • Number of children: Not on file   • Years of education: Not on file   • Highest education level: Not on file   Tobacco Use   • Smoking status: Never Smoker   • Smokeless tobacco: Current User     Types: Chew   • Tobacco comment: chewing tobacco since 4 yearrs old   Substance and Sexual Activity   • Alcohol use: No     Comment: caffeine use   • Drug use: No   • Sexual activity: Defer        Rosaura Cannon, RN  02/20/20 5936

## 2020-02-21 NOTE — DISCHARGE INSTRUCTIONS
[facility]  [address] [place] [address]    Coronary Angiogram (Radial/Ulnar Approach) After Care    Refer to this sheet in the next few weeks. These instructions provide you with information on caring for yourself after your procedure. Your caregiver may also give you more specific instructions. Your treatment has been planned according to current medical practices, but problems sometimes occur. Call your caregiver if you have any problems or questions after your procedure.    Home Care Instructions:  · You may shower the day after the procedure. Remove the bandage (dressing) and gently wash the site with plain soap and water. Gently pat the site dry. You may apply a band aid daily for 2 days if desired.    · Do not apply powder or lotion to the site.  · Do not submerge the affected site in water for 3 to 5 days or until the site is completely healed.   · Do not lift, push or pull anything over 10 pounds for 2 days after your procedure.  · Inspect the site at least twice daily. You may notice some bruising at the site and it may be tender for 1 to 2 weeks.     · Increase your fluid intake for the next 2 days.    · Keep arm elevated for 24 hours. For the remainder of the day, keep your arm in “Pledge of Allegiance” position when up and about.     · You may drive 24 hours after the procedure unless otherwise instructed by your caregiver.  · Do not operate machinery or power tools for 24 hours.  · A responsible adult should be with you for the first 24 hours after you arrive home. Do not make any important legal decisions or sign legal papers for 24 hours.  Do not drink alcohol for 24 hours.    · Metformin or any medications containing Metformin should not be taken for 48 hours after your procedure.      Call Your Doctor if:   · You have unusual pain at the radial/ulnar (wrist) site.  · You have redness, warmth, swelling, or pain at the radial/ulnar (wrist) site.  · You have drainage (other  than a small amount of blood on the dressing).  · You have chills or a fever > 101.  · Your arm becomes pale or dark, cool, tingly, or numb.  · You have heavy bleeding from the site, hold pressure on the site for 20 minutes.  If the bleeding stops, apply a fresh bandage and call your cardiologist.  However, if you continue to have bleeding, call 911.

## 2020-02-21 NOTE — INTERVAL H&P NOTE
H&P reviewed. The patient was examined and there are no changes to the H&P.   Repeat stress test demonstrating worsening ischemia in lateral wall and will re-eval CAD

## 2020-02-21 NOTE — PROGRESS NOTES
Discharge Planning Assessment  [facility]     Patient Name: Yoni Bonner Jr.  MRN: [id number]  Today's Date: 2/21/2020    Admit Date: 2/20/2020    Discharge Needs Assessment     Row Name 02/21/20 1031       Living Environment    Lives With  spouse    Primary Care Provided by  self    Provides Primary Care For  no one    Family Caregiver Names  Danielle Bonner [phone]    Quality of Family Relationships  helpful;involved;supportive       Resource/Environmental Concerns    Resource/Environmental Concerns  none    Transportation Concerns  car, none       Transition Planning    Patient/Family Anticipated Services at Transition  home health care;none    Transportation Anticipated  family or friend will provide       Discharge Needs Assessment    Readmission Within the Last 30 Days  no previous admission in last 30 days    Concerns to be Addressed  no discharge needs identified    Equipment Currently Used at Home  grab bar;shower chair;walker, rolling;rollator;cane, quad    Anticipated Changes Related to Illness  none    Equipment Needed After Discharge  none        Discharge Plan     Row Name 02/21/20 1020       Plan    Plan  return home with wife- CCp will follow    Patient/Family in Agreement with Plan  yes    Plan Comments  Spoke with patient and wife, Danielle Bonner ( [phone]),  at bedside.  Facesheet, PCP, and pharmacy verified.  Patient lives with his wife, adult grandson, and adult granddaughter in a single story house.  Patient has a cane, rollator, and walker, but he rarely uses them.  He no longer drives so his wife takes him to appointments.  He has used [facility] in the past, but is not current with them.  He does not have a SNF history.  Discussed HH at [place], but wife does not think it will be needed.  Provided wife with CCP contact information.  At this time, plan is for patient to return home.  CCP will follow. Cadence Honeycutt RN        Destination      Coordination has not been started for  this encounter.      Durable Medical Equipment      Coordination has not been started for this encounter.      Dialysis/Infusion      Coordination has not been started for this encounter.      Home Medical Care      Coordination has not been started for this encounter.      Therapy      Coordination has not been started for this encounter.      Community Resources      Coordination has not been started for this encounter.          Demographic Summary     Row Name 02/21/20 1031       General Information    Admission Type  inpatient    Arrived From  emergency department    Required Notices Provided  Important Message from Medicare    Referral Source  admission list    Reason for Consult  discharge planning    Preferred Language  English        Functional Status     Row Name 02/21/20 1031       Functional Status    Usual Activity Tolerance  good    Current Activity Tolerance  moderate       Functional Status, IADL    Medications  independent    Meal Preparation  independent    Laundry  independent    Shopping  independent       Mental Status    General Appearance WDL  WDL       Mental Status Summary    Recent Changes in Mental Status/Cognitive Functioning  no changes        Psychosocial    No documentation.       Abuse/Neglect    No documentation.       Legal    No documentation.       Substance Abuse    No documentation.       Patient Forms    No documentation.           Cadence Honeycutt RN

## 2020-02-22 VITALS
WEIGHT: 184.1 LBS | TEMPERATURE: 97.9 F | DIASTOLIC BLOOD PRESSURE: 69 MMHG | RESPIRATION RATE: 18 BRPM | BODY MASS INDEX: 27.9 KG/M2 | OXYGEN SATURATION: 94 % | HEART RATE: 68 BPM | SYSTOLIC BLOOD PRESSURE: 102 MMHG | HEIGHT: 68 IN

## 2020-02-22 LAB
ANION GAP SERPL CALCULATED.3IONS-SCNC: 13.4 MMOL/L (ref 5–15)
BUN BLD-MCNC: 42 MG/DL (ref 8–23)
BUN/CREAT SERPL: 35 (ref 7–25)
CALCIUM SPEC-SCNC: 8.3 MG/DL (ref 8.6–10.5)
CHLORIDE SERPL-SCNC: 99 MMOL/L (ref 98–107)
CO2 SERPL-SCNC: 18.6 MMOL/L (ref 22–29)
CREAT BLD-MCNC: 1.2 MG/DL (ref 0.76–1.27)
DEPRECATED RDW RBC AUTO: 43.8 FL (ref 37–54)
ERYTHROCYTE [DISTWIDTH] IN BLOOD BY AUTOMATED COUNT: 14.1 % (ref 12.3–15.4)
GFR SERPL CREATININE-BSD FRML MDRD: 57 ML/MIN/1.73
GLUCOSE BLD-MCNC: 97 MG/DL (ref 65–99)
HCT VFR BLD AUTO: 35.4 % (ref 37.5–51)
HGB BLD-MCNC: 11.4 G/DL (ref 13–17.7)
MCH RBC QN AUTO: 27.3 PG (ref 26.6–33)
MCHC RBC AUTO-ENTMCNC: 32.2 G/DL (ref 31.5–35.7)
MCV RBC AUTO: 84.9 FL (ref 79–97)
PLATELET # BLD AUTO: 187 10*3/MM3 (ref 140–450)
PMV BLD AUTO: 10.9 FL (ref 6–12)
POTASSIUM BLD-SCNC: 4.6 MMOL/L (ref 3.5–5.2)
RBC # BLD AUTO: 4.17 10*6/MM3 (ref 4.14–5.8)
SODIUM BLD-SCNC: 131 MMOL/L (ref 136–145)
WBC NRBC COR # BLD: 8.17 10*3/MM3 (ref 3.4–10.8)

## 2020-02-22 PROCEDURE — 63710000001 CLOPIDOGREL 75 MG TABLET: Performed by: INTERNAL MEDICINE

## 2020-02-22 PROCEDURE — 85027 COMPLETE CBC AUTOMATED: CPT | Performed by: INTERNAL MEDICINE

## 2020-02-22 PROCEDURE — A9270 NON-COVERED ITEM OR SERVICE: HCPCS | Performed by: INTERNAL MEDICINE

## 2020-02-22 PROCEDURE — 99217 PR OBSERVATION CARE DISCHARGE MANAGEMENT: CPT | Performed by: INTERNAL MEDICINE

## 2020-02-22 PROCEDURE — 63710000001 ASPIRIN 81 MG TABLET DELAYED-RELEASE: Performed by: INTERNAL MEDICINE

## 2020-02-22 PROCEDURE — G0378 HOSPITAL OBSERVATION PER HR: HCPCS

## 2020-02-22 PROCEDURE — 63710000001 ISOSORBIDE MONONITRATE 30 MG TABLET SUSTAINED-RELEASE 24 HOUR: Performed by: INTERNAL MEDICINE

## 2020-02-22 PROCEDURE — 80048 BASIC METABOLIC PNL TOTAL CA: CPT | Performed by: INTERNAL MEDICINE

## 2020-02-22 PROCEDURE — 63710000001 CARVEDILOL 3.125 MG TABLET: Performed by: INTERNAL MEDICINE

## 2020-02-22 RX ADMIN — SODIUM CHLORIDE 100 ML/HR: 9 INJECTION, SOLUTION INTRAVENOUS at 03:50

## 2020-02-22 RX ADMIN — ASPIRIN 81 MG: 81 TABLET, COATED ORAL at 09:35

## 2020-02-22 RX ADMIN — CARVEDILOL 3.12 MG: 3.12 TABLET, FILM COATED ORAL at 09:35

## 2020-02-22 RX ADMIN — SODIUM CHLORIDE, PRESERVATIVE FREE 10 ML: 5 INJECTION INTRAVENOUS at 09:36

## 2020-02-22 RX ADMIN — ISOSORBIDE MONONITRATE 30 MG: 30 TABLET ORAL at 09:35

## 2020-02-22 RX ADMIN — CLOPIDOGREL 75 MG: 75 TABLET, FILM COATED ORAL at 09:35

## 2020-02-22 NOTE — PLAN OF CARE
Problem: Patient Care Overview  Goal: Plan of Care Review  Outcome: Ongoing (interventions implemented as appropriate)  Flowsheets  Taken 2/22/2020 1557  Progress: no change  Taken 2/22/2020 1410  Plan of Care Reviewed With: patient;spouse  Note:   VSS, no complaints of pain.  Plan is for pt to be discharged home today with spouse.  Pt is to follow-up with Dr. Templeton in a month.  Will continue to monitor.

## 2020-02-22 NOTE — PLAN OF CARE
Problem: Patient Care Overview  Goal: Plan of Care Review  Outcome: Ongoing (interventions implemented as appropriate)  Flowsheets (Taken 2/22/2020 0512)  Progress: improving  Plan of Care Reviewed With: patient; spouse  Outcome Summary: Pt returned from cardiac cath recovery at 1945 last PM. VSS. Wife at BS. Pt and wife instructed on bleeding precautions and elevating RUE on pillow. Pt instructed on IS use and encouraged to use while awake. Pt turns self in bed. SCDs on. No c/o pain all night. Pt slept well in between care. NS at 100 ml/hr until 0700, then will decrease to 75 ml/hr. Pt up with stand-by assist to BR or uses urinal. Adequate UO. Pt up to chair with alarm and wife at BS at 0400. No other issues. No intervention done in cardiac cath, so unsure of plan-possible DC today d/t obs status-but no MD notes about DC. Will CTM.

## 2020-02-22 NOTE — DISCHARGE SUMMARY
[facility] Discharge    Patient Name: Yoni Bonner Jr.  Age/Sex: 86 y.o. male  : 1933  MRN: [id number]    Encounter Provider: Melissa Templeton MD  Referring Provider: Janak Junior III, MD  Place of Service: [facility] CARDIOLOGY  Patient Care Team:  Patric Cameron DO as PCP - General (Family Medicine)         Date of Discharge:  2020     Date of Admit: 2020    Discharge Condition: Stable    Discharge Diagnosis:  1. Stable coronary artery disease  2. Hyponatremia, improved  3. Volume depletion      Hospital Course:   Yoni Bonner Jr. is a 86 y.o. male with history of tobacco abuse, diabetes mellitus type 2, COPD, hypertension, recurrent syncope due to orthostatic hypotension/vasovagal syncope, coronary artery disease status post prior inferior myocardial infarction and drug eluting stent placement of his proximal RCA who is admitted with chest pain on 2020.      His wife reports that the night prior to this admission he began having mild chest pain that continued overnight and associated with nausea and vomiting.  The morning of his admission the pain became more severe, at which point he took 2 sublingual nitroglycerin with resolution of his pain en route to the hospital.  He has had no recurrent pain since.  His wife denies any recent low blood pressures.  The patient current denies any lightheadedness, dizziness, palpitations, chest pain or dyspnea.   His wife states that they are looking into moving into a smaller home. She states that he has not been very active and has been slowing down.  He ruled out for myocardial infarction.  His EKGs were unchanged.  We proceeded with a stress test that showed incidence of an inferior lateral infarct with angela-infarct lateral ischemia which is new compared to prior stress test.  Based on these findings we opted to proceed with a cardiac catheterization.  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  Based on the stable findings I recommended to continue with medical management.    The patient was noted to have relatively low blood pressures on admission.  He was also noted to be hyponatremic.  His left ventricular filling pressure on his cardiac catheterization was low around 3 mmHg.  Based on these findings I started him on IV fluids and monitor him overnight.  I also discontinued his lisinopril and continued him on metoprolol and isosorbide mononitrate only.  On the day of discharge she was feeling well without any further chest pain.  His blood pressures had improved and he was tolerating the metoprolol and isosorbide.  His sodium level had also improved from 128 to 131.      He will follow-up with myself or BOOGIE Mejia in 1 month.    Objective:  Temp:  [97.3 °F (36.3 °C)-98 °F (36.7 °C)] 97.9 °F (36.6 °C)  Heart Rate:  [55-68] 68  Resp:  [16-18] 18  BP: (102-172)/(54-86) 102/69    Intake/Output Summary (Last 24 hours) at 2/22/2020 1452  Last data filed at 2/22/2020 1336  Gross per 24 hour   Intake 1812.67 ml   Output 375 ml   Net 1437.67 ml     Body mass index is 27.99 kg/m².      02/20/20  1515 02/20/20  2139   Weight: 85.7 kg (189 lb) 83.5 kg (184 lb 1.6 oz)     Weight change:     Physical Exam:   General Appearance:    No acute distress, alert and oriented x4   Lungs:     Clear to auscultation bilaterally     Heart:    Regular rhythm and normal rate.  No murmurs, gallops, or       rubs.   Abdomen:     Soft, non-tender, non-distended.    Extremities:   Moves all extremities well.  No clubbing, cyanosis, or edema.       Procedures Performed  Procedure(s):  Cardiac Catheterization/Vascular Study  Coronary angiography       Consults:  Consults     No orders found from 1/22/2020 to 2/21/2020.          Pertinent Test Results:  Results from  last 7 days   Lab Units 02/22/20  0601 02/21/20  0437 02/20/20  1530   SODIUM mmol/L 131* 128* 130*   POTASSIUM mmol/L 4.6 4.5 4.8   CHLORIDE mmol/L 99 98 96*   CO2 mmol/L 18.6* 20.9* 21.3*   BUN mg/dL 42* 46* 46*   CREATININE mg/dL 1.20 1.21 1.23   GLUCOSE mg/dL 97 103* 98   CALCIUM mg/dL 8.3* 8.4* 8.7   AST (SGOT) U/L  --  15 18   ALT (SGPT) U/L  --  14 14     Results from last 7 days   Lab Units 02/21/20  0437 02/20/20  1658 02/20/20  1530   TROPONIN T ng/mL 0.010 <0.010 <0.010     Results from last 7 days   Lab Units 02/22/20  0601 02/21/20  0437 02/20/20  1530   WBC 10*3/mm3 8.17 9.79 13.08*   HEMOGLOBIN g/dL 11.4* 11.4* 12.1*   HEMATOCRIT % 35.4* 34.9* 36.9*   PLATELETS 10*3/mm3 187 171 206     Results from last 7 days   Lab Units 02/20/20  1530   INR  1.02     Results from last 7 days   Lab Units 02/20/20  1530   MAGNESIUM mg/dL 2.2           Invalid input(s): LDLCALC  Results from last 7 days   Lab Units 02/20/20  1530   PROBNP pg/mL 556.7           Discharge Medications     Discharge Medications      Continue These Medications      Instructions Start Date   albuterol sulfate  (90 Base) MCG/ACT inhaler  Commonly known as:  PROVENTIL HFA;VENTOLIN HFA;PROAIR HFA   2 puffs, Inhalation, Every 4 Hours PRN      albuterol (2.5 MG/3ML) 0.083% nebulizer solution  Commonly known as:  PROVENTIL   2.5 mg, Nebulization, Every 4 Hours PRN      aspirin 81 MG EC tablet   81 mg, Oral, Daily      atorvastatin 10 MG tablet  Commonly known as:  LIPITOR   10 mg, Oral, Nightly      carvedilol 3.125 MG tablet  Commonly known as:  COREG   3.125 mg, Oral, Every 12 Hours Scheduled      clopidogrel 75 MG tablet  Commonly known as:  PLAVIX   75 mg, Oral, Daily      isosorbide mononitrate 30 MG 24 hr tablet  Commonly known as:  IMDUR   30 mg, Oral, Daily      nitroglycerin 0.4 MG SL tablet  Commonly known as:  NITROSTAT   1 under the tongue as needed for angina, may repeat q5mins for up three doses         Stop These Medications     lisinopril 5 MG tablet  Commonly known as:  PRINIVIL,ZESTRIL            Discharge Diet:    Dietary Orders (From admission, onward)     Start     Ordered    02/21/20 2006  Diet Regular; Cardiac  Diet Effective Now     Question Answer Comment   Diet Texture / Consistency Regular    Common Modifiers Cardiac        02/21/20 2006                Activity at Discharge:  as instructed     Discharge disposition: home     Discharge Instructions and Follow ups:  Future Appointments   Date Time Provider Department Center   5/12/2020 10:45 AM Melissa Templeton MD [facility] None     Additional Instructions for the Follow-ups that You Need to Schedule     Ambulatory Referral to Cardiac Rehab   As directed      Discharge Follow-up with Specified Provider: BOOGIE Mejia or Dr. Templeton; 1 Month   As directed      To:  BOOGIE Mejia or Dr. Templeton    Follow Up:  1 Month           Follow-up Information     [facility] .    Specialty:  Cardiac Rehabilitation  Contact information:  [address] [facility] [address] [phone]           Patric Cameron DO .    Specialty:  Family Medicine  Contact information:  [address] [place] [id number] [phone]                      Melissa Templeton MD  02/22/20  2:52 PM    Time: Discharge 45 min

## 2020-02-23 ENCOUNTER — READMISSION MANAGEMENT (OUTPATIENT)
Dept: CALL CENTER | Facility: HOSPITAL | Age: 85
End: 2020-02-23

## 2020-02-23 NOTE — OUTREACH NOTE
Prep Survey      Responses   Facility patient discharged from?  [facility]   Is patient eligible?  Yes   Discharge diagnosis  chest pain, heart cath - medical management, Hx stent   Does the patient have one of the following disease processes/diagnoses(primary or secondary)?  Other   Does the patient have Home health ordered?  No   Is there a DME ordered?  No   Prep survey completed?  Yes          Adamaris Orellana RN

## 2020-02-24 ENCOUNTER — READMISSION MANAGEMENT (OUTPATIENT)
Dept: CALL CENTER | Facility: HOSPITAL | Age: 85
End: 2020-02-24

## 2020-02-24 RX ORDER — CLOPIDOGREL BISULFATE 75 MG/1
TABLET ORAL
Qty: 90 TABLET | Refills: 1 | Status: SHIPPED | OUTPATIENT
Start: 2020-02-24 | End: 2020-09-22

## 2020-02-24 NOTE — PROGRESS NOTES
Case Management Discharge Note      Final Note: DC'd home on 2/22. Cadence Honeycutt RN         Destination      No service has been selected for the patient.      Durable Medical Equipment      No service has been selected for the patient.      Dialysis/Infusion      No service has been selected for the patient.      Home Medical Care      No service has been selected for the patient.      Therapy      No service has been selected for the patient.      Community Resources      No service has been selected for the patient.        Transportation Services  Private: Car    Final Discharge Disposition Code: 01 - home or self-care

## 2020-02-24 NOTE — OUTREACH NOTE
Medical Week 1 Survey      Responses   Facility patient discharged from?  [facility]   Does the patient have one of the following disease processes/diagnoses(primary or secondary)?  Other   Is there a successful TCM telephone encounter documented?  No   Week 1 attempt successful?  Yes   Call start time  1406   Rescheduled  Rescheduled-pt requested   Discharge diagnosis  chest pain, heart cath - medical management, Hx stent          Lucinda Gilmore RN

## 2020-02-25 ENCOUNTER — READMISSION MANAGEMENT (OUTPATIENT)
Dept: CALL CENTER | Facility: HOSPITAL | Age: 85
End: 2020-02-25

## 2020-02-25 NOTE — OUTREACH NOTE
Medical Week 1 Survey      Responses   Facility patient discharged from?  [facility]   Does the patient have one of the following disease processes/diagnoses(primary or secondary)?  Other   Is there a successful TCM telephone encounter documented?  No   Week 1 attempt successful?  No   Rescheduled  Revoked [No answer after asking to reschedule]          Andre Barnhart RN

## 2020-03-02 ENCOUNTER — TELEPHONE (OUTPATIENT)
Dept: CARDIAC REHAB | Facility: HOSPITAL | Age: 85
End: 2020-03-02

## 2020-03-02 NOTE — TELEPHONE ENCOUNTER
Order received on workque from Dr. Jenkins.  I notice that the patient lives in [place].  I left a message that we have a referral and for patient/wife to call me to discuss options re: cardiac rehab.  Thank you for the referral!

## 2020-03-18 RX ORDER — ATORVASTATIN CALCIUM 10 MG/1
TABLET, FILM COATED ORAL
Qty: 30 TABLET | Refills: 1 | Status: SHIPPED | OUTPATIENT
Start: 2020-03-18 | End: 2020-05-19

## 2020-03-23 ENCOUNTER — TELEPHONE (OUTPATIENT)
Dept: CARDIOLOGY | Facility: CLINIC | Age: 85
End: 2020-03-23

## 2020-03-24 NOTE — TELEPHONE ENCOUNTER
I called and spoke with patient's wife while patietnt was listening and discussed their options for their upcoming appointment which includes either an office if they are having significant issues or telehealth visit either by telephone or video (if they have the proper technology).  The patient's wife feels he is doing well and did not have any questions or concerns and did not want to have a telephone or video visit with me that was offered and highly suggested.  They prefer to cancel 4/2/2020 appointment with me and keep their 5/12/2020 appointment with Dr. Templeton (they were informed of the possibility that this appointment could be shifted based on new information and changes necessary during the COVID19 pandemic).  Any questions they had were answered and they were encouraged to call the office sooner if he develops any new or worsening cardiac problems.    Schedulers-please cancel 4/2/2020 appointment with me.

## 2020-03-25 ENCOUNTER — APPOINTMENT (OUTPATIENT)
Dept: GENERAL RADIOLOGY | Facility: HOSPITAL | Age: 85
End: 2020-03-25

## 2020-03-25 ENCOUNTER — HOSPITAL ENCOUNTER (EMERGENCY)
Facility: HOSPITAL | Age: 85
Discharge: HOME | End: 2020-03-25
Attending: EMERGENCY MEDICINE | Admitting: EMERGENCY MEDICINE

## 2020-03-25 VITALS
SYSTOLIC BLOOD PRESSURE: 114 MMHG | TEMPERATURE: 97.8 F | OXYGEN SATURATION: 97 % | HEART RATE: 74 BPM | RESPIRATION RATE: 18 BRPM | BODY MASS INDEX: 29.19 KG/M2 | DIASTOLIC BLOOD PRESSURE: 70 MMHG | WEIGHT: 192.6 LBS | HEIGHT: 68 IN

## 2020-03-25 DIAGNOSIS — R07.2 PRECORDIAL CHEST PAIN: Primary | ICD-10-CM

## 2020-03-25 DIAGNOSIS — E78.5 HYPERLIPIDEMIA, UNSPECIFIED HYPERLIPIDEMIA TYPE: ICD-10-CM

## 2020-03-25 DIAGNOSIS — I10 ESSENTIAL HYPERTENSION: ICD-10-CM

## 2020-03-25 LAB
ALBUMIN SERPL-MCNC: 4 G/DL (ref 3.5–5.2)
ALBUMIN/GLOB SERPL: 1.5 G/DL
ALP SERPL-CCNC: 63 U/L (ref 39–117)
ALT SERPL W P-5'-P-CCNC: 22 U/L (ref 1–41)
ANION GAP SERPL CALCULATED.3IONS-SCNC: 11.2 MMOL/L (ref 5–15)
AST SERPL-CCNC: 20 U/L (ref 1–40)
BASOPHILS # BLD AUTO: 0.06 10*3/MM3 (ref 0–0.2)
BASOPHILS NFR BLD AUTO: 0.7 % (ref 0–1.5)
BILIRUB SERPL-MCNC: 0.3 MG/DL (ref 0.2–1.2)
BUN BLD-MCNC: 39 MG/DL (ref 8–23)
BUN/CREAT SERPL: 35.5 (ref 7–25)
CALCIUM SPEC-SCNC: 9.2 MG/DL (ref 8.6–10.5)
CHLORIDE SERPL-SCNC: 97 MMOL/L (ref 98–107)
CO2 SERPL-SCNC: 22.8 MMOL/L (ref 22–29)
CREAT BLD-MCNC: 1.1 MG/DL (ref 0.76–1.27)
DEPRECATED RDW RBC AUTO: 42.4 FL (ref 37–54)
EOSINOPHIL # BLD AUTO: 0.11 10*3/MM3 (ref 0–0.4)
EOSINOPHIL NFR BLD AUTO: 1.3 % (ref 0.3–6.2)
ERYTHROCYTE [DISTWIDTH] IN BLOOD BY AUTOMATED COUNT: 14.4 % (ref 12.3–15.4)
GFR SERPL CREATININE-BSD FRML MDRD: 63 ML/MIN/1.73
GLOBULIN UR ELPH-MCNC: 2.6 GM/DL
GLUCOSE BLD-MCNC: 111 MG/DL (ref 65–99)
HCT VFR BLD AUTO: 35.4 % (ref 37.5–51)
HGB BLD-MCNC: 12.2 G/DL (ref 13–17.7)
IMM GRANULOCYTES # BLD AUTO: 0.04 10*3/MM3 (ref 0–0.05)
IMM GRANULOCYTES NFR BLD AUTO: 0.5 % (ref 0–0.5)
LYMPHOCYTES # BLD AUTO: 2.97 10*3/MM3 (ref 0.7–3.1)
LYMPHOCYTES NFR BLD AUTO: 34.7 % (ref 19.6–45.3)
MCH RBC QN AUTO: 28.4 PG (ref 26.6–33)
MCHC RBC AUTO-ENTMCNC: 34.5 G/DL (ref 31.5–35.7)
MCV RBC AUTO: 82.5 FL (ref 79–97)
MONOCYTES # BLD AUTO: 0.61 10*3/MM3 (ref 0.1–0.9)
MONOCYTES NFR BLD AUTO: 7.1 % (ref 5–12)
NEUTROPHILS # BLD AUTO: 4.78 10*3/MM3 (ref 1.7–7)
NEUTROPHILS NFR BLD AUTO: 55.7 % (ref 42.7–76)
NRBC BLD AUTO-RTO: 0.1 /100 WBC (ref 0–0.2)
PLATELET # BLD AUTO: 186 10*3/MM3 (ref 140–450)
PMV BLD AUTO: 10.2 FL (ref 6–12)
POTASSIUM BLD-SCNC: 4.9 MMOL/L (ref 3.5–5.2)
PROT SERPL-MCNC: 6.6 G/DL (ref 6–8.5)
RBC # BLD AUTO: 4.29 10*6/MM3 (ref 4.14–5.8)
SODIUM BLD-SCNC: 131 MMOL/L (ref 136–145)
TROPONIN T SERPL-MCNC: <0.01 NG/ML (ref 0–0.03)
TROPONIN T SERPL-MCNC: <0.01 NG/ML (ref 0–0.03)
WBC NRBC COR # BLD: 8.57 10*3/MM3 (ref 3.4–10.8)

## 2020-03-25 PROCEDURE — 93010 ELECTROCARDIOGRAM REPORT: CPT | Performed by: INTERNAL MEDICINE

## 2020-03-25 PROCEDURE — 80053 COMPREHEN METABOLIC PANEL: CPT | Performed by: EMERGENCY MEDICINE

## 2020-03-25 PROCEDURE — 99285 EMERGENCY DEPT VISIT HI MDM: CPT

## 2020-03-25 PROCEDURE — 85025 COMPLETE CBC W/AUTO DIFF WBC: CPT | Performed by: EMERGENCY MEDICINE

## 2020-03-25 PROCEDURE — 71046 X-RAY EXAM CHEST 2 VIEWS: CPT

## 2020-03-25 PROCEDURE — 93005 ELECTROCARDIOGRAM TRACING: CPT | Performed by: EMERGENCY MEDICINE

## 2020-03-25 PROCEDURE — 84484 ASSAY OF TROPONIN QUANT: CPT | Performed by: EMERGENCY MEDICINE

## 2020-03-25 RX ORDER — ASPIRIN 81 MG/1
324 TABLET, CHEWABLE ORAL ONCE
Status: COMPLETED | OUTPATIENT
Start: 2020-03-25 | End: 2020-03-25

## 2020-03-25 RX ORDER — SODIUM CHLORIDE 0.9 % (FLUSH) 0.9 %
10 SYRINGE (ML) INJECTION AS NEEDED
Status: DISCONTINUED | OUTPATIENT
Start: 2020-03-25 | End: 2020-03-25 | Stop reason: HOSPADM

## 2020-03-25 RX ADMIN — NITROGLYCERIN 1 INCH: 20 OINTMENT TOPICAL at 17:27

## 2020-03-25 RX ADMIN — ASPIRIN 324 MG: 81 TABLET, CHEWABLE ORAL at 17:26

## 2020-05-19 RX ORDER — ATORVASTATIN CALCIUM 10 MG/1
TABLET, FILM COATED ORAL
Qty: 90 TABLET | Refills: 0 | Status: SHIPPED | OUTPATIENT
Start: 2020-05-19 | End: 2020-09-22

## 2020-06-08 ENCOUNTER — TELEPHONE (OUTPATIENT)
Dept: CARDIAC REHAB | Facility: HOSPITAL | Age: 85
End: 2020-06-08

## 2020-06-10 ENCOUNTER — TELEPHONE (OUTPATIENT)
Dept: CARDIOLOGY | Facility: CLINIC | Age: 85
End: 2020-06-10

## 2020-06-10 NOTE — TELEPHONE ENCOUNTER
Called patient to confirm appt for tomorrow 6/11, and his wife requested to cancel and reschedule due to him being sick.  He is on antibiotics and has been told to stay at home.  They would like to reschedule for about a month or so out.    Please call [phone] to reschedule.    Thank you!

## 2020-06-29 ENCOUNTER — HOSPITAL ENCOUNTER (EMERGENCY)
Facility: HOSPITAL | Age: 85
Discharge: HOME | End: 2020-06-29
Attending: EMERGENCY MEDICINE | Admitting: EMERGENCY MEDICINE

## 2020-06-29 ENCOUNTER — APPOINTMENT (OUTPATIENT)
Dept: CT IMAGING | Facility: HOSPITAL | Age: 85
End: 2020-06-29

## 2020-06-29 VITALS
TEMPERATURE: 97.9 F | DIASTOLIC BLOOD PRESSURE: 55 MMHG | RESPIRATION RATE: 20 BRPM | WEIGHT: 195 LBS | HEIGHT: 69 IN | SYSTOLIC BLOOD PRESSURE: 96 MMHG | OXYGEN SATURATION: 95 % | HEART RATE: 60 BPM | BODY MASS INDEX: 28.88 KG/M2

## 2020-06-29 DIAGNOSIS — E86.0 DEHYDRATION: Primary | ICD-10-CM

## 2020-06-29 DIAGNOSIS — S00.81XA ABRASION OF FACE, INITIAL ENCOUNTER: ICD-10-CM

## 2020-06-29 LAB
ALBUMIN SERPL-MCNC: 3.2 G/DL (ref 3.5–5.2)
ALBUMIN/GLOB SERPL: 1.4 G/DL
ALP SERPL-CCNC: 44 U/L (ref 39–117)
ALT SERPL W P-5'-P-CCNC: 25 U/L (ref 1–41)
ANION GAP SERPL CALCULATED.3IONS-SCNC: 12.6 MMOL/L (ref 5–15)
AST SERPL-CCNC: 31 U/L (ref 1–40)
BASOPHILS # BLD AUTO: 0.02 10*3/MM3 (ref 0–0.2)
BASOPHILS NFR BLD AUTO: 0.4 % (ref 0–1.5)
BILIRUB SERPL-MCNC: 0.3 MG/DL (ref 0.2–1.2)
BUN SERPL-MCNC: 44 MG/DL (ref 8–23)
BUN/CREAT SERPL: 32.8 (ref 7–25)
CALCIUM SPEC-SCNC: 8.6 MG/DL (ref 8.6–10.5)
CHLORIDE SERPL-SCNC: 98 MMOL/L (ref 98–107)
CO2 SERPL-SCNC: 21.4 MMOL/L (ref 22–29)
CREAT SERPL-MCNC: 1.34 MG/DL (ref 0.76–1.27)
DEPRECATED RDW RBC AUTO: 43.4 FL (ref 37–54)
EOSINOPHIL # BLD AUTO: 0.02 10*3/MM3 (ref 0–0.4)
EOSINOPHIL NFR BLD AUTO: 0.4 % (ref 0.3–6.2)
ERYTHROCYTE [DISTWIDTH] IN BLOOD BY AUTOMATED COUNT: 13.8 % (ref 12.3–15.4)
GFR SERPL CREATININE-BSD FRML MDRD: 50 ML/MIN/1.73
GLOBULIN UR ELPH-MCNC: 2.3 GM/DL
GLUCOSE SERPL-MCNC: 120 MG/DL (ref 65–99)
HCT VFR BLD AUTO: 31.5 % (ref 37.5–51)
HGB BLD-MCNC: 10.6 G/DL (ref 13–17.7)
HOLD SPECIMEN: NORMAL
IMM GRANULOCYTES # BLD AUTO: 0.01 10*3/MM3 (ref 0–0.05)
IMM GRANULOCYTES NFR BLD AUTO: 0.2 % (ref 0–0.5)
LIPASE SERPL-CCNC: 24 U/L (ref 13–60)
LYMPHOCYTES # BLD AUTO: 2.21 10*3/MM3 (ref 0.7–3.1)
LYMPHOCYTES NFR BLD AUTO: 44.2 % (ref 19.6–45.3)
MAGNESIUM SERPL-MCNC: 2 MG/DL (ref 1.6–2.4)
MCH RBC QN AUTO: 29.2 PG (ref 26.6–33)
MCHC RBC AUTO-ENTMCNC: 33.7 G/DL (ref 31.5–35.7)
MCV RBC AUTO: 86.8 FL (ref 79–97)
MONOCYTES # BLD AUTO: 0.47 10*3/MM3 (ref 0.1–0.9)
MONOCYTES NFR BLD AUTO: 9.4 % (ref 5–12)
NEUTROPHILS NFR BLD AUTO: 2.27 10*3/MM3 (ref 1.7–7)
NEUTROPHILS NFR BLD AUTO: 45.4 % (ref 42.7–76)
NRBC BLD AUTO-RTO: 0 /100 WBC (ref 0–0.2)
PLATELET # BLD AUTO: 136 10*3/MM3 (ref 140–450)
PMV BLD AUTO: 10.7 FL (ref 6–12)
POTASSIUM SERPL-SCNC: 4 MMOL/L (ref 3.5–5.2)
PROT SERPL-MCNC: 5.5 G/DL (ref 6–8.5)
RBC # BLD AUTO: 3.63 10*6/MM3 (ref 4.14–5.8)
SODIUM SERPL-SCNC: 132 MMOL/L (ref 136–145)
WBC # BLD AUTO: 5 10*3/MM3 (ref 3.4–10.8)
WHOLE BLOOD HOLD SPECIMEN: NORMAL

## 2020-06-29 PROCEDURE — 83735 ASSAY OF MAGNESIUM: CPT | Performed by: EMERGENCY MEDICINE

## 2020-06-29 PROCEDURE — 99284 EMERGENCY DEPT VISIT MOD MDM: CPT

## 2020-06-29 PROCEDURE — 85025 COMPLETE CBC W/AUTO DIFF WBC: CPT | Performed by: EMERGENCY MEDICINE

## 2020-06-29 PROCEDURE — 80053 COMPREHEN METABOLIC PANEL: CPT | Performed by: EMERGENCY MEDICINE

## 2020-06-29 PROCEDURE — 72125 CT NECK SPINE W/O DYE: CPT

## 2020-06-29 PROCEDURE — 70450 CT HEAD/BRAIN W/O DYE: CPT

## 2020-06-29 PROCEDURE — 70486 CT MAXILLOFACIAL W/O DYE: CPT

## 2020-06-29 PROCEDURE — 83690 ASSAY OF LIPASE: CPT | Performed by: EMERGENCY MEDICINE

## 2020-06-29 RX ORDER — SODIUM CHLORIDE 0.9 % (FLUSH) 0.9 %
10 SYRINGE (ML) INJECTION AS NEEDED
Status: DISCONTINUED | OUTPATIENT
Start: 2020-06-29 | End: 2020-06-29 | Stop reason: HOSPADM

## 2020-06-29 RX ADMIN — SODIUM CHLORIDE 500 ML: 9 INJECTION, SOLUTION INTRAVENOUS at 19:31

## 2020-06-29 RX ADMIN — SODIUM CHLORIDE, PRESERVATIVE FREE 10 ML: 5 INJECTION INTRAVENOUS at 17:25

## 2020-06-29 RX ADMIN — SODIUM CHLORIDE 500 ML: 9 INJECTION, SOLUTION INTRAVENOUS at 17:41

## 2020-08-12 RX ORDER — ISOSORBIDE MONONITRATE 30 MG/1
TABLET, EXTENDED RELEASE ORAL
Qty: 90 TABLET | Refills: 0 | Status: SHIPPED | OUTPATIENT
Start: 2020-08-12 | End: 2020-12-21

## 2020-09-22 RX ORDER — ATORVASTATIN CALCIUM 10 MG/1
TABLET, FILM COATED ORAL
Qty: 30 TABLET | Refills: 0 | Status: SHIPPED | OUTPATIENT
Start: 2020-09-22 | End: 2020-10-19

## 2020-09-22 RX ORDER — CLOPIDOGREL BISULFATE 75 MG/1
TABLET ORAL
Qty: 90 TABLET | Refills: 0 | Status: SHIPPED | OUTPATIENT
Start: 2020-09-22 | End: 2021-01-25

## 2020-10-08 ENCOUNTER — OFFICE VISIT (OUTPATIENT)
Dept: CARDIOLOGY | Facility: CLINIC | Age: 85
End: 2020-10-08

## 2020-10-08 VITALS
WEIGHT: 190.6 LBS | BODY MASS INDEX: 28.89 KG/M2 | DIASTOLIC BLOOD PRESSURE: 62 MMHG | HEIGHT: 68 IN | SYSTOLIC BLOOD PRESSURE: 122 MMHG | HEART RATE: 65 BPM

## 2020-10-08 DIAGNOSIS — E11.59 TYPE 2 DIABETES MELLITUS WITH OTHER CIRCULATORY COMPLICATION, WITHOUT LONG-TERM CURRENT USE OF INSULIN (HCC): ICD-10-CM

## 2020-10-08 DIAGNOSIS — I35.0 AORTIC STENOSIS, MILD: ICD-10-CM

## 2020-10-08 DIAGNOSIS — I25.10 CORONARY ARTERY DISEASE INVOLVING NATIVE CORONARY ARTERY OF NATIVE HEART WITHOUT ANGINA PECTORIS: Primary | ICD-10-CM

## 2020-10-08 DIAGNOSIS — J44.9 CHRONIC OBSTRUCTIVE PULMONARY DISEASE, UNSPECIFIED COPD TYPE (HCC): ICD-10-CM

## 2020-10-08 DIAGNOSIS — I10 ESSENTIAL HYPERTENSION: ICD-10-CM

## 2020-10-08 DIAGNOSIS — N18.30 STAGE 3 CHRONIC KIDNEY DISEASE, UNSPECIFIED WHETHER STAGE 3A OR 3B CKD (HCC): ICD-10-CM

## 2020-10-08 DIAGNOSIS — E78.5 HYPERLIPIDEMIA, UNSPECIFIED HYPERLIPIDEMIA TYPE: ICD-10-CM

## 2020-10-08 PROCEDURE — 93000 ELECTROCARDIOGRAM COMPLETE: CPT | Performed by: INTERNAL MEDICINE

## 2020-10-08 PROCEDURE — 99214 OFFICE O/P EST MOD 30 MIN: CPT | Performed by: INTERNAL MEDICINE

## 2020-10-08 RX ORDER — CEFUROXIME AXETIL 250 MG/1
TABLET ORAL
COMMUNITY
Start: 2020-09-29 | End: 2021-04-08

## 2020-10-08 RX ORDER — PREDNISONE 10 MG/1
TABLET ORAL
COMMUNITY
Start: 2020-09-29 | End: 2021-04-08

## 2020-10-08 NOTE — PROGRESS NOTES
Subjective:     Encounter Date:10/08/2020      Patient ID: Yoni Bonner Jr. is a 87 y.o. male.    Chief Complaint:  History of Present Illness    This is an 87-year-old man with a history of hypertension, chronic bronchitis, dyslipidemia, tobacco use, coronary artery disease status post inferior myocardial infarction and drug eluting stent placement of his proximal RCA, who presents for follow up.      He presents today for routine follow-up.  Since his hospital admission earlier this year he has been into the emergency room a couple of times.  He was in the ER in 3/2020 with chest pain.  He ruled out for myocardial infarction at that time was chest pain-free by his arrival to the ER.  The patient tells me that when he was in the ER at that time he was told to take medication twice a week and that he would have no further symptoms.  He has been doing that and has not had any chest pain in the last few months per his report.  Due to his severe issues with his hearing I am unable to determine which medication he is referring to.  His last ER visit was in 6/2020 with lightheadedness and near syncope.  He was hypotensive at that time and had had issues with diarrhea.  He was able to be discharged from the emergency room.    As above he has not had any recent chest pain.  He continues have dyspnea on exertion which they feel is largely stable.  He has not had any syncopal episodes that they are aware of.  He does have a tendency to fall.  He reports intermittent episodes of palpitations.  Denies any worsening lower extremity edema.  They deny any significant changes in medications.      Prior History:  I saw the patient back on 02/04/2015 when he presented to establish care.  At that time the patient reported a history of recurrent syncopal episodes.  I felt that his symptoms were due to orthostatic syncope at that time.  He had a history of rheumatic fever and a thickened aortic valve along with carotid artery  disease so I sat him up for both an echocardiogram and a carotid ultrasound around that time.  His echocardiogram was performed on 03/11/2015 and revealed normal left ventricular systolic function and wall motion, an ejection fraction of 54%, grade IA diastolic dysfunction and no significant valvular disease.  His carotid ultrasound revealed moderate bilateral stenosis.  He was last seen in follow up in 3/2017 for pre-operative evaluation.  At that time he reported some worsening of his baseline dyspnea on exertion though we proceeded with a dobutamine stress test that was negative for ischemia and the patient proceeded with his shoulder surgery.     On 4/8/2018 he was admitted with an inferior myocardial infarction.  The patient was accompanying his wife to the [facility] emergency room where he suffered a near syncopal episode associated with hypotension and bradycardia.  An EKG was performed at that time showing acute inferior ST elevations.  He was transferred to [facility] and was found to have a thrombotic subtotal occlusion of his proximal RCA for which she underwent thrombectomy and drug-eluting stent placement.  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  Following his catheterization he underwent an echocardiogram that showed mildly depressed left ventricular systolic function with an ejection fraction of 42%, inferior wall motion abnormalities, mild to moderate mitral regurgitation, mild aortic stenosis and regurgitation, and grade 1A diastolic dysfunction.       In 7/2018 he was readmitted for chest pain.  He described it as old sharp left-sided chest pain lasting anywhere from a few minutes to 30 minutes.  Symptoms persisted with dyspnea and nausea.  Into being in admitted to the hospital at that time.  He underwent a stress test during that admission showed an inferior lateral infarct but no evidence of ischemia.  I  started him on isosorbide mononitrate and discharged him home.  In 8/2018 he presented back for hospital follow at which time he was feeling well.  He had a repeat echocardiogram today that showed normalization of his left ventricular function with an EF of 60%, mild aortic stenosis, mild mitral regurgitation, and grade 1 diastolic dysfunction.        Was admitted on 12/6/2018 with recurrent chest pain.  Patient reported that the pain started when he was watching TV and bent over to tie his shoes.  This pain was associated with diaphoresis and dyspnea.  He could not tell me if the pain was somewhat to his prior symptoms with his myocardial infarction.  The pain lasted several hours and resolved after he got to the emergency room.  After resolution he had no further pain.  His EKGs were unremarkable and troponins were normal.  With no evidence of a myocardial infarction and a single episode of pain that sounded somewhat atypical we decided to continue with medical management at that time.     He was then admitted to the hospital in 7/2019 with shortness of breath.  He was noted to have elevated troponin in the indeterminate level during that admission and was evaluated by Dr. Freeman.  He was not having any symptoms concerning for angina and no further work up was recommended at that time.  Following that admission he saw BOOGIE Mejia in follow-up on 7/30/2019.  At that time he appeared to be doing well.  A repeat echocardiogram was ordered in order to reevaluate his aortic stenosis.  This showed normal left ventricular systolic function and wall motion with an EF 30 to 60%, grade 1A diastolic dysfunction, and moderate mitral regurgitation.    He was admitted in 2/2020 with chest pain.  Presented with pain that started the night prior to his admission.  He took 2 sublingual nitroglycerin with resolution of his symptoms.  He ruled out for myocardial infarction his EKG was unchanged.  We proceeded with a stress test  that showed inferior lateral infarct with angela-infarct lateral ischemia which was new compared to the prior.  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  I recommended continue with medical management at that time.  He was having some issues with low blood pressures and I discontinued his lisinopril at that time and continued him on carvedilol and isosorbide mononitrate only.    Review of Systems   Constitution: Positive for malaise/fatigue.   HENT: Negative for hearing loss, hoarse voice, nosebleeds and sore throat.    Eyes: Negative for pain.   Cardiovascular: Positive for dyspnea on exertion. Negative for chest pain, claudication, cyanosis, irregular heartbeat, leg swelling, near-syncope, orthopnea, palpitations, paroxysmal nocturnal dyspnea and syncope.   Respiratory: Negative for shortness of breath and snoring.    Endocrine: Negative for cold intolerance, heat intolerance, polydipsia, polyphagia and polyuria.   Skin: Negative for itching and rash.   Musculoskeletal: Negative for arthritis, falls, joint pain, joint swelling, muscle cramps, muscle weakness and myalgias.   Gastrointestinal: Negative for constipation, diarrhea, dysphagia, heartburn, hematemesis, hematochezia, melena, nausea and vomiting.   Genitourinary: Negative for frequency, hematuria and hesitancy.   Neurological: Positive for light-headedness. Negative for excessive daytime sleepiness, dizziness, headaches, numbness and weakness.   Psychiatric/Behavioral: Negative for depression. The patient is not nervous/anxious.          Current Outpatient Medications:   •  albuterol (PROVENTIL) (2.5 MG/3ML) 0.083% nebulizer solution, Take 2.5 mg by nebulization Every 4 (Four) Hours As Needed for Wheezing or Shortness of Air., Disp: , Rfl: 12  •  albuterol sulfate   (90 Base) MCG/ACT inhaler, Inhale 2 puffs Every 4 (Four) Hours As Needed for Wheezing., Disp: , Rfl:   •  aspirin 81 MG EC tablet, Take 1 tablet by mouth Daily., Disp: 30 tablet, Rfl: 5  •  atorvastatin (LIPITOR) 10 MG tablet, TAKE ONE TABLET BY MOUTH ONCE NIGHTLY, Disp: 30 tablet, Rfl: 0  •  carvedilol (COREG) 3.125 MG tablet, Take 1 tablet by mouth Every 12 (Twelve) Hours., Disp: 180 tablet, Rfl: 2  •  cefuroxime (CEFTIN) 250 MG tablet, , Disp: , Rfl:   •  clopidogrel (PLAVIX) 75 MG tablet, TAKE ONE TABLET BY MOUTH DAILY, Disp: 90 tablet, Rfl: 0  •  isosorbide mononitrate (IMDUR) 30 MG 24 hr tablet, TAKE ONE TABLET BY MOUTH DAILY, Disp: 90 tablet, Rfl: 0  •  nitroglycerin (NITROSTAT) 0.4 MG SL tablet, 1 under the tongue as needed for angina, may repeat q5mins for up three doses, Disp: 30 tablet, Rfl: 5  •  predniSONE (DELTASONE) 10 MG tablet, , Disp: , Rfl:     Past Medical History:   Diagnosis Date   • Bronchitis    • CAD (coronary artery disease)    • Carotid arterial disease (CMS/HCC)    • Chronic bronchitis (CMS/HCC)    • COPD (chronic obstructive pulmonary disease) (CMS/[facility])    • Gout    • Hyperlipidemia    • Hypertension    • Ischemic cardiomyopathy     resolved, EF was 60% in 2018   • Mild aortic stenosis    • Mitral regurgitation    • Precordial chest pain    • Rheumatic fever    • Syncopal episodes    • Tobacco use    • Type 2 diabetes mellitus (CMS/HCC)        Past Surgical History:   Procedure Laterality Date   • CARDIAC CATHETERIZATION     • CARDIAC CATHETERIZATION N/A 4/8/2018    Procedure: Left Heart Cath;  Surgeon: Nicolas Jerez MD;  Location: [facility] CATH INVASIVE LOCATION;  Service: Cardiovascular   • CARDIAC CATHETERIZATION  4/8/2018    Procedure: Percutaneous Manual Thrombectomy;  Surgeon: Nicolas Jerez MD;  Location: [facility] CATH INVASIVE LOCATION;  Service: Cardiovascular   • CARDIAC CATHETERIZATION N/A 4/8/2018    Procedure: Stent CARLENE coronary;  Surgeon: Nicolas Jerez MD;  Location:   "INVASIVE LOCATION;  Service: Cardiovascular   • CARDIAC CATHETERIZATION N/A 4/8/2018    Procedure: Right Heart Cath;  Surgeon: Nicolas Jerez MD;  Location:  [facility] CATH INVASIVE LOCATION;  Service: Cardiovascular   • CARDIAC CATHETERIZATION Left 2/21/2020    Procedure: Cardiac Catheterization/Vascular Study;  Surgeon: Alejandro Jenkins MD;  Location:  [facility] CATH INVASIVE LOCATION;  Service: Cardiovascular;  Laterality: Left;   • CARDIAC CATHETERIZATION N/A 2/21/2020    Procedure: Coronary angiography;  Surgeon: Alejandro Jenkins MD;  Location:  [facility] CATH INVASIVE LOCATION;  Service: Cardiovascular;  Laterality: N/A;   • EYE SURGERY      cataract surg   • HAND SURGERY     • HERNIA REPAIR     • KNEE SURGERY     • TESTICLE SURGERY         Family History   Problem Relation Age of Onset   • Heart disease Father    • Hypertension Father    • Stroke Father    • Diabetes Sister    • Cancer Brother    • Heart disease Brother    • Hypertension Brother    • No Known Problems Maternal Grandmother    • No Known Problems Maternal Grandfather    • No Known Problems Paternal Grandmother    • No Known Problems Paternal Grandfather        Social History     Tobacco Use   • Smoking status: Never Smoker   • Smokeless tobacco: Current User     Types: Chew   • Tobacco comment: chewing tobacco since 4 yearrs old   Substance Use Topics   • Alcohol use: No     Comment: caffeine use   • Drug use: No         ECG 12 Lead    Date/Time: 10/8/2020 4:12 PM  Performed by: Melissa Templeton MD  Authorized by: Melissa Templeton MD   Comparison: compared with previous ECG   Similar to previous ECG  Rhythm: sinus rhythm               Objective:     Visit Vitals  /62 (BP Location: Right arm, Patient Position: Sitting)   Pulse 65   Ht 172.7 cm (68\")   Wt 86.5 kg (190 lb 9.6 oz)   BMI 28.98 kg/m²         Constitutional:       Appearance: Normal appearance. Well-developed.   HENT:      Head: Normocephalic and atraumatic.   Neck:      Vascular: No " carotid bruit or JVD.   Pulmonary:      Effort: Pulmonary effort is normal.      Breath sounds: Normal breath sounds.   Cardiovascular:      Normal rate. Regular rhythm.      No gallop.   Pulses:     Radial: 2+ bilaterally.  Edema:     Peripheral edema absent.   Abdominal:      Palpations: Abdomen is soft.   Skin:     General: Skin is warm and dry.   Neurological:      Mental Status: Alert and oriented to person, place, and time.             Assessment:          Diagnosis Plan   1. Coronary artery disease involving native coronary artery of native heart without angina pectoris     2. Essential hypertension     3. Hyperlipidemia, unspecified hyperlipidemia type     4. Type 2 diabetes mellitus with other circulatory complication, without long-term current use of insulin (CMS/[facility])     5. Chronic obstructive pulmonary disease, unspecified COPD type (CMS/[facility])     6. Stage 3 chronic kidney disease, unspecified whether stage 3a or 3b CKD     7. Aortic stenosis, mild            Plan:       1.  Coronary artery disease.  Cardiac catheterization earlier this year showed stable disease.  Overall he appears to the stable.  No recent anginal symptoms.  Continue management with aspirin, statin, clopidogrel, carvedilol and isosorbide mononitrate.  2.  Hypertension.  Well-controlled on his current medications.  3.  Hyperlipidemia.  On a low-dose of atorvastatin to keep his LDL around 70 or below.  4.  COPD  5.  Diabetes mellitus type 2  6.  Chronic kidney disease  7.  Aortic stenosis.  Noted to be mild on his last echocardiogram.  We will consider reevaluating in the next year or 2.    We will plan on seeing the patient back again in 6 months.

## 2020-10-19 RX ORDER — ATORVASTATIN CALCIUM 10 MG/1
TABLET, FILM COATED ORAL
Qty: 30 TABLET | Refills: 0 | Status: SHIPPED | OUTPATIENT
Start: 2020-10-19 | End: 2021-01-18

## 2020-10-19 NOTE — TELEPHONE ENCOUNTER
Reviewed labs and his lipids are at goal (except TG which is at 187) on current dose of atorvastatin.

## 2020-11-10 RX ORDER — CARVEDILOL 3.12 MG/1
TABLET ORAL
Qty: 180 TABLET | Refills: 1 | Status: SHIPPED | OUTPATIENT
Start: 2020-11-10 | End: 2022-01-13

## 2020-12-21 RX ORDER — ISOSORBIDE MONONITRATE 30 MG/1
TABLET, EXTENDED RELEASE ORAL
Qty: 90 TABLET | Refills: 1 | Status: SHIPPED | OUTPATIENT
Start: 2020-12-21 | End: 2021-04-08 | Stop reason: SDUPTHER

## 2021-01-18 RX ORDER — ATORVASTATIN CALCIUM 10 MG/1
TABLET, FILM COATED ORAL
Qty: 90 TABLET | Refills: 2 | Status: SHIPPED | OUTPATIENT
Start: 2021-01-18 | End: 2021-12-14

## 2021-01-25 RX ORDER — CLOPIDOGREL BISULFATE 75 MG/1
TABLET ORAL
Qty: 90 TABLET | Refills: 1 | Status: SHIPPED | OUTPATIENT
Start: 2021-01-25 | End: 2021-08-19

## 2021-01-28 RX ORDER — NITROGLYCERIN 0.4 MG/1
TABLET SUBLINGUAL
Qty: 25 TABLET | Refills: 4 | Status: SHIPPED | OUTPATIENT
Start: 2021-01-28 | End: 2022-09-23

## 2021-03-02 DIAGNOSIS — Z23 IMMUNIZATION DUE: ICD-10-CM

## 2021-03-27 ENCOUNTER — APPOINTMENT (OUTPATIENT)
Dept: GENERAL RADIOLOGY | Facility: HOSPITAL | Age: 86
End: 2021-03-27

## 2021-03-27 ENCOUNTER — HOSPITAL ENCOUNTER (EMERGENCY)
Facility: HOSPITAL | Age: 86
Discharge: HOME | End: 2021-03-27
Attending: EMERGENCY MEDICINE | Admitting: EMERGENCY MEDICINE

## 2021-03-27 VITALS
OXYGEN SATURATION: 99 % | RESPIRATION RATE: 18 BRPM | DIASTOLIC BLOOD PRESSURE: 65 MMHG | SYSTOLIC BLOOD PRESSURE: 134 MMHG | HEART RATE: 68 BPM

## 2021-03-27 DIAGNOSIS — R07.9 CHEST PAIN, UNSPECIFIED TYPE: Primary | ICD-10-CM

## 2021-03-27 LAB
ALBUMIN SERPL-MCNC: 4 G/DL (ref 3.5–5.2)
ALBUMIN/GLOB SERPL: 1.5 G/DL
ALP SERPL-CCNC: 53 U/L (ref 39–117)
ALT SERPL W P-5'-P-CCNC: 16 U/L (ref 1–41)
ANION GAP SERPL CALCULATED.3IONS-SCNC: 10.5 MMOL/L (ref 5–15)
AST SERPL-CCNC: 19 U/L (ref 1–40)
BASOPHILS # BLD MANUAL: 0.13 10*3/MM3 (ref 0–0.2)
BASOPHILS NFR BLD AUTO: 1.1 % (ref 0–1.5)
BILIRUB SERPL-MCNC: 0.2 MG/DL (ref 0–1.2)
BUN SERPL-MCNC: 33 MG/DL (ref 8–23)
BUN/CREAT SERPL: 31.1 (ref 7–25)
CALCIUM SPEC-SCNC: 9.4 MG/DL (ref 8.6–10.5)
CHLORIDE SERPL-SCNC: 99 MMOL/L (ref 98–107)
CO2 SERPL-SCNC: 26.5 MMOL/L (ref 22–29)
CREAT SERPL-MCNC: 1.06 MG/DL (ref 0.76–1.27)
D DIMER PPP FEU-MCNC: 0.48 MCGFEU/ML (ref 0–0.49)
DEPRECATED RDW RBC AUTO: 44.7 FL (ref 37–54)
EOSINOPHIL # BLD MANUAL: 0.13 10*3/MM3 (ref 0–0.4)
EOSINOPHIL NFR BLD MANUAL: 1.1 % (ref 0.3–6.2)
ERYTHROCYTE [DISTWIDTH] IN BLOOD BY AUTOMATED COUNT: 14.3 % (ref 12.3–15.4)
GFR SERPL CREATININE-BSD FRML MDRD: 66 ML/MIN/1.73
GLOBULIN UR ELPH-MCNC: 2.6 GM/DL
GLUCOSE SERPL-MCNC: 102 MG/DL (ref 65–99)
HCT VFR BLD AUTO: 40.9 % (ref 37.5–51)
HGB BLD-MCNC: 13.3 G/DL (ref 13–17.7)
INR PPP: 0.93 (ref 0.9–1.1)
LYMPHOCYTES # BLD MANUAL: 2.8 10*3/MM3 (ref 0.7–3.1)
LYMPHOCYTES NFR BLD MANUAL: 13.7 % (ref 5–12)
LYMPHOCYTES NFR BLD MANUAL: 23.2 % (ref 19.6–45.3)
MCH RBC QN AUTO: 28.1 PG (ref 26.6–33)
MCHC RBC AUTO-ENTMCNC: 32.5 G/DL (ref 31.5–35.7)
MCV RBC AUTO: 86.5 FL (ref 79–97)
MONOCYTES # BLD AUTO: 1.65 10*3/MM3 (ref 0.1–0.9)
MYELOCYTES NFR BLD MANUAL: 4.2 % (ref 0–0)
NEUTROPHILS # BLD AUTO: 6.86 10*3/MM3 (ref 1.7–7)
NEUTROPHILS NFR BLD MANUAL: 56.8 % (ref 42.7–76)
NRBC BLD AUTO-RTO: 0.1 /100 WBC (ref 0–0.2)
PLAT MORPH BLD: NORMAL
PLATELET # BLD AUTO: 247 10*3/MM3 (ref 140–450)
PMV BLD AUTO: 9.9 FL (ref 6–12)
POTASSIUM SERPL-SCNC: 4.2 MMOL/L (ref 3.5–5.2)
PROT SERPL-MCNC: 6.6 G/DL (ref 6–8.5)
PROTHROMBIN TIME: 12.3 SECONDS (ref 11.7–14.2)
QT INTERVAL: 358 MS
RBC # BLD AUTO: 4.73 10*6/MM3 (ref 4.14–5.8)
RBC MORPH BLD: NORMAL
SMUDGE CELLS BLD QL SMEAR: ABNORMAL
SODIUM SERPL-SCNC: 136 MMOL/L (ref 136–145)
TROPONIN T SERPL-MCNC: <0.01 NG/ML (ref 0–0.03)
TROPONIN T SERPL-MCNC: <0.01 NG/ML (ref 0–0.03)
WBC # BLD AUTO: 12.07 10*3/MM3 (ref 3.4–10.8)

## 2021-03-27 PROCEDURE — 80053 COMPREHEN METABOLIC PANEL: CPT | Performed by: EMERGENCY MEDICINE

## 2021-03-27 PROCEDURE — 93005 ELECTROCARDIOGRAM TRACING: CPT | Performed by: EMERGENCY MEDICINE

## 2021-03-27 PROCEDURE — 85025 COMPLETE CBC W/AUTO DIFF WBC: CPT | Performed by: EMERGENCY MEDICINE

## 2021-03-27 PROCEDURE — 99283 EMERGENCY DEPT VISIT LOW MDM: CPT

## 2021-03-27 PROCEDURE — 84484 ASSAY OF TROPONIN QUANT: CPT | Performed by: EMERGENCY MEDICINE

## 2021-03-27 PROCEDURE — 93010 ELECTROCARDIOGRAM REPORT: CPT | Performed by: INTERNAL MEDICINE

## 2021-03-27 PROCEDURE — 85610 PROTHROMBIN TIME: CPT | Performed by: EMERGENCY MEDICINE

## 2021-03-27 PROCEDURE — 85007 BL SMEAR W/DIFF WBC COUNT: CPT | Performed by: EMERGENCY MEDICINE

## 2021-03-27 PROCEDURE — 85379 FIBRIN DEGRADATION QUANT: CPT | Performed by: EMERGENCY MEDICINE

## 2021-03-27 PROCEDURE — 71045 X-RAY EXAM CHEST 1 VIEW: CPT

## 2021-04-08 ENCOUNTER — OFFICE VISIT (OUTPATIENT)
Dept: CARDIOLOGY | Facility: CLINIC | Age: 86
End: 2021-04-08

## 2021-04-08 VITALS
DIASTOLIC BLOOD PRESSURE: 82 MMHG | HEART RATE: 71 BPM | BODY MASS INDEX: 27.99 KG/M2 | WEIGHT: 189 LBS | SYSTOLIC BLOOD PRESSURE: 160 MMHG | HEIGHT: 69 IN

## 2021-04-08 DIAGNOSIS — E11.59 TYPE 2 DIABETES MELLITUS WITH OTHER CIRCULATORY COMPLICATION, WITHOUT LONG-TERM CURRENT USE OF INSULIN (HCC): ICD-10-CM

## 2021-04-08 DIAGNOSIS — J44.9 CHRONIC OBSTRUCTIVE PULMONARY DISEASE, UNSPECIFIED COPD TYPE (HCC): ICD-10-CM

## 2021-04-08 DIAGNOSIS — I35.0 AORTIC STENOSIS, MILD: ICD-10-CM

## 2021-04-08 DIAGNOSIS — E78.5 HYPERLIPIDEMIA, UNSPECIFIED HYPERLIPIDEMIA TYPE: ICD-10-CM

## 2021-04-08 DIAGNOSIS — I10 ESSENTIAL HYPERTENSION: ICD-10-CM

## 2021-04-08 DIAGNOSIS — N18.30 STAGE 3 CHRONIC KIDNEY DISEASE, UNSPECIFIED WHETHER STAGE 3A OR 3B CKD (HCC): ICD-10-CM

## 2021-04-08 DIAGNOSIS — Z72.0 TOBACCO ABUSE: ICD-10-CM

## 2021-04-08 DIAGNOSIS — I25.10 CORONARY ARTERY DISEASE INVOLVING NATIVE CORONARY ARTERY OF NATIVE HEART WITHOUT ANGINA PECTORIS: Primary | ICD-10-CM

## 2021-04-08 PROCEDURE — 99214 OFFICE O/P EST MOD 30 MIN: CPT | Performed by: INTERNAL MEDICINE

## 2021-04-08 PROCEDURE — 93000 ELECTROCARDIOGRAM COMPLETE: CPT | Performed by: INTERNAL MEDICINE

## 2021-04-08 RX ORDER — ISOSORBIDE MONONITRATE 30 MG/1
30 TABLET, EXTENDED RELEASE ORAL 2 TIMES DAILY
Qty: 180 TABLET | Refills: 1 | Status: SHIPPED | OUTPATIENT
Start: 2021-04-08 | End: 2022-01-13

## 2021-04-08 NOTE — PROGRESS NOTES
Subjective:     Encounter Date:04/08/2021      Patient ID: Yoni Bonner Jr. is a 87 y.o. male.    Chief Complaint:  History of Present Illness     This is an 87-year-old man with a history of hypertension, chronic bronchitis, dyslipidemia, tobacco use, coronary artery disease status post inferior myocardial infarction and drug eluting stent placement of his proximal RCA, who presents for follow up.     He presents today for ER follow-up.  He presented to the emergency room on 3/27/2021 with complaints of aching chest pain.  Symptoms resolved after 30 minutes.  He was pain-free by the time he came to the emergency room.  His work-up in the emergency room was unremarkable.  He did admit to increased stress because of his wife's illness.    He denies any symptoms like what he experienced at the ER.  However he does continue to have episodes of chest pain with exertion.  He also reports dyspnea on exertion which his wife feels may also be in part due to his history of COPD.  It is unclear if his dyspnea on exertion is worse with the patient reports that it may be a little worse.  He denies any palpitations or orthopnea.  He reports lightheadedness especially with position changes.  It appears that at times that this causes him to fall especially when he is going from laying to a sitting position in bed.  Sometimes he will fall backwards back on the bed because of the lightheadedness.  However does not appear that he has had any syncopal episodes.  His wife believes his blood pressures have been doing okay at home.  He had not taken his blood pressure medications today.        Prior History:  I saw the patient back on 02/04/2015 when he presented to establish care.  At that time the patient reported a history of recurrent syncopal episodes.  I felt that his symptoms were due to orthostatic syncope at that time.  He had a history of rheumatic fever and a thickened aortic valve along with carotid artery disease so I  sat him up for both an echocardiogram and a carotid ultrasound around that time.  His echocardiogram was performed on 03/11/2015 and revealed normal left ventricular systolic function and wall motion, an ejection fraction of 54%, grade IA diastolic dysfunction and no significant valvular disease.  His carotid ultrasound revealed moderate bilateral stenosis.  He was last seen in follow up in 3/2017 for pre-operative evaluation.  At that time he reported some worsening of his baseline dyspnea on exertion though we proceeded with a dobutamine stress test that was negative for ischemia and the patient proceeded with his shoulder surgery.     On 4/8/2018 he was admitted with an inferior myocardial infarction.  The patient was accompanying his wife to the [facility] emergency room where he suffered a near syncopal episode associated with hypotension and bradycardia.  An EKG was performed at that time showing acute inferior ST elevations.  He was transferred to [facility] and was found to have a thrombotic subtotal occlusion of his proximal RCA for which she underwent thrombectomy and drug-eluting stent placement.  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  Following his catheterization he underwent an echocardiogram that showed mildly depressed left ventricular systolic function with an ejection fraction of 42%, inferior wall motion abnormalities, mild to moderate mitral regurgitation, mild aortic stenosis and regurgitation, and grade 1A diastolic dysfunction.       In 7/2018 he was readmitted for chest pain.  He described it as old sharp left-sided chest pain lasting anywhere from a few minutes to 30 minutes.  Symptoms persisted with dyspnea and nausea.  Into being in admitted to the hospital at that time.  He underwent a stress test during that admission showed an inferior lateral infarct but no evidence of ischemia.  I started him on  isosorbide mononitrate and discharged him home.  In 8/2018 he presented back for hospital follow at which time he was feeling well.  He had a repeat echocardiogram today that showed normalization of his left ventricular function with an EF of 60%, mild aortic stenosis, mild mitral regurgitation, and grade 1 diastolic dysfunction.        Was admitted on 12/6/2018 with recurrent chest pain.  Patient reported that the pain started when he was watching TV and bent over to tie his shoes.  This pain was associated with diaphoresis and dyspnea.  He could not tell me if the pain was somewhat to his prior symptoms with his myocardial infarction.  The pain lasted several hours and resolved after he got to the emergency room.  After resolution he had no further pain.  His EKGs were unremarkable and troponins were normal.  With no evidence of a myocardial infarction and a single episode of pain that sounded somewhat atypical we decided to continue with medical management at that time.     He was then admitted to the hospital in 7/2019 with shortness of breath.  He was noted to have elevated troponin in the indeterminate level during that admission and was evaluated by Dr. Freeman.  He was not having any symptoms concerning for angina and no further work up was recommended at that time.  Following that admission he saw BOOGIE Mejia in follow-up on 7/30/2019.  At that time he appeared to be doing well.  A repeat echocardiogram was ordered in order to reevaluate his aortic stenosis.  This showed normal left ventricular systolic function and wall motion with an EF 30 to 60%, grade 1A diastolic dysfunction, and moderate mitral regurgitation.     He was admitted in 2/2020 with chest pain.  Presented with pain that started the night prior to his admission.  He took 2 sublingual nitroglycerin with resolution of his symptoms.  He ruled out for myocardial infarction his EKG was unchanged.  We proceeded with a stress test that showed  inferior lateral infarct with angela-infarct lateral ischemia which was new compared to the prior.  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  I recommended continue with medical management at that time.  He was having some issues with low blood pressures and I discontinued his lisinopril at that time and continued him on carvedilol and isosorbide mononitrate only.    Review of Systems   Constitutional: Positive for malaise/fatigue.   HENT: Negative for hearing loss, hoarse voice, nosebleeds and sore throat.    Eyes: Negative for pain.   Cardiovascular: Positive for chest pain and dyspnea on exertion. Negative for claudication, cyanosis, irregular heartbeat, leg swelling, near-syncope, orthopnea, palpitations, paroxysmal nocturnal dyspnea and syncope.   Respiratory: Negative for shortness of breath and snoring.    Endocrine: Negative for cold intolerance, heat intolerance, polydipsia, polyphagia and polyuria.   Skin: Negative for itching and rash.   Musculoskeletal: Negative for arthritis, falls, joint pain, joint swelling, muscle cramps, muscle weakness and myalgias.   Gastrointestinal: Negative for constipation, diarrhea, dysphagia, heartburn, hematemesis, hematochezia, melena, nausea and vomiting.   Genitourinary: Negative for frequency, hematuria and hesitancy.   Neurological: Positive for light-headedness. Negative for excessive daytime sleepiness, dizziness, headaches, numbness and weakness.   Psychiatric/Behavioral: Negative for depression. The patient is not nervous/anxious.          Current Outpatient Medications:   •  albuterol (PROVENTIL) (2.5 MG/3ML) 0.083% nebulizer solution, Take 2.5 mg by nebulization Every 4 (Four) Hours As Needed for Wheezing or Shortness of Air., Disp: , Rfl: 12  •  albuterol sulfate   (90 Base) MCG/ACT inhaler, Inhale 2 puffs Every 4 (Four) Hours As Needed for Wheezing., Disp: , Rfl:   •  aspirin 81 MG EC tablet, Take 1 tablet by mouth Daily., Disp: 30 tablet, Rfl: 5  •  atorvastatin (LIPITOR) 10 MG tablet, TAKE ONE TABLET BY MOUTH ONCE NIGHTLY, Disp: 90 tablet, Rfl: 2  •  carvedilol (COREG) 3.125 MG tablet, TAKE ONE TABLET BY MOUTH EVERY 12 HOURS, Disp: 180 tablet, Rfl: 1  •  clopidogrel (PLAVIX) 75 MG tablet, TAKE ONE TABLET BY MOUTH DAILY, Disp: 90 tablet, Rfl: 1  •  isosorbide mononitrate (IMDUR) 30 MG 24 hr tablet, TAKE ONE TABLET BY MOUTH DAILY, Disp: 90 tablet, Rfl: 1  •  nitroglycerin (NITROSTAT) 0.4 MG SL tablet, DISSOLVE 1 TAB UNDER TONGUE FOR CHEST PAIN - IF PAIN REMAINS AFTER 5 MIN, CALL 911 AND REPEAT DOSE. MAX 3 TABS IN 15 MINUTES, Disp: 25 tablet, Rfl: 4    Past Medical History:   Diagnosis Date   • Bronchitis    • CAD (coronary artery disease)    • Carotid arterial disease (CMS/HCC)    • Chronic bronchitis (CMS/HCC)    • COPD (chronic obstructive pulmonary disease) (CMS/[facility])    • Gout    • Hyperlipidemia    • Hypertension    • Ischemic cardiomyopathy     resolved, EF was 60% in 2018   • Mild aortic stenosis    • Mitral regurgitation    • Precordial chest pain    • Rheumatic fever    • Syncopal episodes    • Tobacco use    • Type 2 diabetes mellitus (CMS/[facility])        Past Surgical History:   Procedure Laterality Date   • CARDIAC CATHETERIZATION     • CARDIAC CATHETERIZATION N/A 4/8/2018    Procedure: Left Heart Cath;  Surgeon: Nicolas Jerez MD;  Location: [facility] CATH INVASIVE LOCATION;  Service: Cardiovascular   • CARDIAC CATHETERIZATION  4/8/2018    Procedure: Percutaneous Manual Thrombectomy;  Surgeon: Nicolas Jerez MD;  Location: [facility] CATH INVASIVE LOCATION;  Service: Cardiovascular   • CARDIAC CATHETERIZATION N/A 4/8/2018    Procedure: Stent CARLENE coronary;  Surgeon: Nicolas Jerez MD;  Location: [facility] CATH INVASIVE LOCATION;  Service: Cardiovascular   • CARDIAC CATHETERIZATION N/A  "4/8/2018    Procedure: Right Heart Cath;  Surgeon: Nicolas Jerez MD;  Location:  [facility] CATH INVASIVE LOCATION;  Service: Cardiovascular   • CARDIAC CATHETERIZATION Left 2/21/2020    Procedure: Cardiac Catheterization/Vascular Study;  Surgeon: Alejandro Jenkins MD;  Location:  [facility] CATH INVASIVE LOCATION;  Service: Cardiovascular;  Laterality: Left;   • CARDIAC CATHETERIZATION N/A 2/21/2020    Procedure: Coronary angiography;  Surgeon: Alejandro Jenkins MD;  Location:  [facility] CATH INVASIVE LOCATION;  Service: Cardiovascular;  Laterality: N/A;   • EYE SURGERY      cataract surg   • HAND SURGERY     • HERNIA REPAIR     • KNEE SURGERY     • TESTICLE SURGERY         Family History   Problem Relation Age of Onset   • Heart disease Father    • Hypertension Father    • Stroke Father    • Diabetes Sister    • Cancer Brother    • Heart disease Brother    • Hypertension Brother    • No Known Problems Maternal Grandmother    • No Known Problems Maternal Grandfather    • No Known Problems Paternal Grandmother    • No Known Problems Paternal Grandfather        Social History     Tobacco Use   • Smoking status: Never Smoker   • Smokeless tobacco: Current User     Types: Chew   • Tobacco comment: chewing tobacco since 4 yearrs old   Substance Use Topics   • Alcohol use: No     Comment: caffeine use   • Drug use: No         ECG 12 Lead    Date/Time: 4/8/2021 4:15 PM  Performed by: Melissa Templeton MD  Authorized by: Melissa Templeton MD   Comparison: compared with previous ECG   Similar to previous ECG  Rhythm: sinus rhythm  ST Flattening: I and aVL                 Objective:     Visit Vitals  /82 (BP Location: Left arm, Patient Position: Sitting, Cuff Size: Adult)   Pulse 71   Ht 175.3 cm (69\")   Wt 85.7 kg (189 lb)   BMI 27.91 kg/m²         Constitutional:       Appearance: Normal appearance. Well-developed.   HENT:      Head: Normocephalic and atraumatic.   Neck:      Vascular: No carotid bruit or JVD.   Pulmonary:      " Effort: Pulmonary effort is normal.      Breath sounds: Normal breath sounds.   Cardiovascular:      Normal rate. Regular rhythm.      No gallop.   Pulses:     Radial: 2+ bilaterally.  Edema:     Peripheral edema absent.   Abdominal:      Palpations: Abdomen is soft.   Skin:     General: Skin is warm and dry.   Neurological:      Mental Status: Alert and oriented to person, place, and time.             Assessment:          Diagnosis Plan   1. Coronary artery disease involving native coronary artery of native heart without angina pectoris     2. Aortic stenosis, mild     3. Hyperlipidemia, unspecified hyperlipidemia type     4. Essential hypertension     5. Type 2 diabetes mellitus with other circulatory complication, without long-term current use of insulin (CMS/[facility])     6. Stage 3 chronic kidney disease, unspecified whether stage 3a or 3b CKD (CMS/[facility])     7. Chronic obstructive pulmonary disease, unspecified COPD type (CMS/[facility])     8. Tobacco abuse            Plan:       1.  Exertional chest pain/dyspnea.  Suspected these were stable symptoms for the patient.  May be related to his residual disease of his LAD and  of his left circumflex artery.  Would like to continue with medical management since his last cardiac catheterization 2/2020 did not show any new findings amenable to PCI.  We will start with increasing his isosorbide to 30 mg twice a day.  If his symptoms continue to progress may need to consider repeat ischemic work-up.  2.  Coronary artery disease.  Plan as per #1.  Otherwise continue the remainder of his medical management.  3.  Hypertension.  Elevated in the office today with the patient reports he did not take his medications earlier today.  4.  Hyperlipidemia.  On low-dose atorvastatin for goal LDL of 70 or below.  5.  Lightheadedness.  Suspect this is due to orthostatic hypotension.  A witness to an episode when the patient got up from his chair to leave the office.  I reiterated the  importance of taking his time with position changes.  6.  COPD.  He does have some wheezing on exam.  I believe this is responsible for at least some of his dyspnea.  7.  Diabetes mellitus type 2  8.  Chronic kidney disease  9.  Aortic stenosis.  Mild on his last echocardiogram.    We will plan on seeing the patient back again in 3 months.

## 2021-08-19 RX ORDER — CLOPIDOGREL BISULFATE 75 MG/1
TABLET ORAL
Qty: 90 TABLET | Refills: 1 | Status: SHIPPED | OUTPATIENT
Start: 2021-08-19 | End: 2022-01-13

## 2021-08-30 ENCOUNTER — OFFICE VISIT (OUTPATIENT)
Dept: CARDIOLOGY | Facility: CLINIC | Age: 86
End: 2021-08-30

## 2021-08-30 VITALS
DIASTOLIC BLOOD PRESSURE: 78 MMHG | HEIGHT: 69 IN | HEART RATE: 60 BPM | OXYGEN SATURATION: 98 % | SYSTOLIC BLOOD PRESSURE: 118 MMHG | BODY MASS INDEX: 27.4 KG/M2 | WEIGHT: 185 LBS

## 2021-08-30 DIAGNOSIS — N18.30 STAGE 3 CHRONIC KIDNEY DISEASE, UNSPECIFIED WHETHER STAGE 3A OR 3B CKD (HCC): ICD-10-CM

## 2021-08-30 DIAGNOSIS — I25.10 CORONARY ARTERY DISEASE INVOLVING NATIVE CORONARY ARTERY OF NATIVE HEART WITHOUT ANGINA PECTORIS: Primary | ICD-10-CM

## 2021-08-30 DIAGNOSIS — E78.5 HYPERLIPIDEMIA, UNSPECIFIED HYPERLIPIDEMIA TYPE: ICD-10-CM

## 2021-08-30 DIAGNOSIS — I35.0 AORTIC STENOSIS, MILD: ICD-10-CM

## 2021-08-30 DIAGNOSIS — I10 ESSENTIAL HYPERTENSION: ICD-10-CM

## 2021-08-30 DIAGNOSIS — J44.9 CHRONIC OBSTRUCTIVE PULMONARY DISEASE, UNSPECIFIED COPD TYPE (HCC): ICD-10-CM

## 2021-08-30 PROBLEM — M17.12 PRIMARY OSTEOARTHRITIS OF LEFT KNEE: Status: ACTIVE | Noted: 2020-02-11

## 2021-08-30 PROBLEM — M25.569 KNEE PAIN: Status: ACTIVE | Noted: 2020-02-11

## 2021-08-30 PROBLEM — S80.11XA CONTUSION OF RIGHT LEG: Status: ACTIVE | Noted: 2021-05-19

## 2021-08-30 PROCEDURE — 93000 ELECTROCARDIOGRAM COMPLETE: CPT | Performed by: NURSE PRACTITIONER

## 2021-08-30 PROCEDURE — 99214 OFFICE O/P EST MOD 30 MIN: CPT | Performed by: NURSE PRACTITIONER

## 2021-08-30 NOTE — PROGRESS NOTES
Date of Office Visit: 21  Encounter Provider: BOOGIE Sosa  Primary Cardiologist: Dr. Templeton  Place of Service: [facility] CARDIOLOGY  Patient Name: Yoni Bonner Jr.  :1933      Subjective:     Chief Complaint:  3 months CAD follow-up.    History of Present Illness:  Yoni Bonner Jr. is a pleasant 88 y.o. male who I have seen once before.   Outside records have been requested and reviewed by me if available.     This is a patient with a history of coronary artery disease with previous lateral infarct and chronic left circumflex occlusion, inferior STEMI with CARLENE to occluded RCA 2018, ischemic cardiomyopathy with recent normal EF , COPD/tobacco abuse with chewing tobacco, recurrent syncope felt orthostatic versus vasovagal, difficulty hearing.    2019 I first saw him for hospital follow-up.  He still had some weakness but was overall doing better no further stabbing chest pain that he had when he was in the hospital a few times earlier in July.  He had good days and bad days as far as breathing went was doing better on the steroids and antibiotics.  He had a repeat echo that same day  that was pending but ended up showing normal LV systolic function EF 60% grade 1A diastolic dysfunction, mild concentric LVH, normal LV cavity size and wall contractility, calcification of the aortic valve with trace aortic valve regurgitation no reported aortic stenosis, MAC with moderate mitral regurgitation trace TR with RVSP able to be calculated.    2020 patient was admitted with chest pain.  He ruled out for MI EKG was unchanged.  Stress test showed inferolateral infarct with angela-infarct lateral ischemia which is new compared to prior.  He had a repeat catheterization that showed stable disease including 70% mid LAD stenosis, chronic total occlusion of left circumflex artery with right to left collateral filling and a patent RCA stent.  Medical management was  recommended at that time.  He was having some issues with low blood pressures and lisinopril was decreased at that time he was continued on carvedilol and isosorbide mononitrate only.    3/27/2021 patient went to the ER with complaints of aching chest pain.  Symptoms resolved after 30 minutes.  He was pain-free by the time he came to the ER.  He had increased stress because of his wife's illness.    4/8/2021 patient saw Dr. Templeton for ER follow-up.  He continued to have episodes of chest pain with exertion as well as dyspnea on exertion.  They thought some of that might be in part due to his COPD.  It was unclear if dyspnea was worse though felt possibly it was a little worse.  He denied any palpitations orthopnea and reported lightheadedness with position changes that would cause him to fall especially with going from laying to sitting position he would fall backwards on the bed because of lightheadedness.  Apparently had not had any syncopal episodes however.  It was suspected his lightheadedness was due to orthostatic hypotension.  Was recommended that he take his time with position changes.  He was felt his symptoms were stable for the patient but may be related to his residual disease of LAD and  of his left circumflex.  Medical management was recommended with plan to increase isosorbide 30 mg twice a day.  If his symptoms continued to progress the plan was to consider repeat ischemic work-up.      He is presenting today for 3-month follow-up.  He is present today with his wife.  He has been feeling better.  He has had no further chest pain since taking isosorbide mononitrate 30 mg twice daily.  He continues to have dyspnea on exertion related to COPD that is overall better.  He was recently treated for bronchitis and has recovered.  He denies rapid irregular heartbeat.  He does not feel dizzy.  He had one fall where he bent over and fell forward he did not believe he got dizzy.  He has not had any syncopal  episodes since his last visit.  He knows to make position changes very slowly.  Overall they feel his lightheadedness and propensity to fall with position changes is overall better.  He has occasional intermittent edema with prolonged sitting, none currently.    Past Medical History:   Diagnosis Date   • Bronchitis    • CAD (coronary artery disease)    • Carotid arterial disease (CMS/[facility])    • Chronic bronchitis (CMS/[facility])    • COPD (chronic obstructive pulmonary disease) (CMS/[facility])    • Gout    • Hyperlipidemia    • Hypertension    • Ischemic cardiomyopathy     resolved, EF was 60% in 2018   • Mild aortic stenosis    • Mitral regurgitation    • Precordial chest pain    • Rheumatic fever    • Syncopal episodes    • Tobacco use    • Type 2 diabetes mellitus (CMS/[facility])      Past Surgical History:   Procedure Laterality Date   • CARDIAC CATHETERIZATION     • CARDIAC CATHETERIZATION N/A 4/8/2018    Procedure: Left Heart Cath;  Surgeon: Nicolas Jerez MD;  Location: [facility] CATH INVASIVE LOCATION;  Service: Cardiovascular   • CARDIAC CATHETERIZATION  4/8/2018    Procedure: Percutaneous Manual Thrombectomy;  Surgeon: Nicolas Jerez MD;  Location: [facility] CATH INVASIVE LOCATION;  Service: Cardiovascular   • CARDIAC CATHETERIZATION N/A 4/8/2018    Procedure: Stent CARLENE coronary;  Surgeon: Nicolas Jerez MD;  Location: [facility] CATH INVASIVE LOCATION;  Service: Cardiovascular   • CARDIAC CATHETERIZATION N/A 4/8/2018    Procedure: Right Heart Cath;  Surgeon: Nicolas Jerez MD;  Location: [facility] CATH INVASIVE LOCATION;  Service: Cardiovascular   • CARDIAC CATHETERIZATION Left 2/21/2020    Procedure: Cardiac Catheterization/Vascular Study;  Surgeon: Alejandro Jenkins MD;  Location: [facility] CATH INVASIVE LOCATION;  Service: Cardiovascular;  Laterality: Left;   • CARDIAC CATHETERIZATION N/A 2/21/2020    Procedure: Coronary angiography;  Surgeon: Alejandro Jenkins MD;  Location: [facility] CATH INVASIVE LOCATION;  Service: Cardiovascular;  Laterality: N/A;    • EYE SURGERY      cataract surg   • HAND SURGERY     • HERNIA REPAIR     • KNEE SURGERY     • TESTICLE SURGERY       Outpatient Medications Prior to Visit   Medication Sig Dispense Refill   • albuterol (PROVENTIL) (2.5 MG/3ML) 0.083% nebulizer solution Take 2.5 mg by nebulization Every 4 (Four) Hours As Needed for Wheezing or Shortness of Air.  12   • albuterol sulfate  (90 Base) MCG/ACT inhaler Inhale 2 puffs Every 4 (Four) Hours As Needed for Wheezing.     • aspirin 81 MG EC tablet Take 1 tablet by mouth Daily. 30 tablet 5   • atorvastatin (LIPITOR) 10 MG tablet TAKE ONE TABLET BY MOUTH ONCE NIGHTLY 90 tablet 2   • carvedilol (COREG) 3.125 MG tablet TAKE ONE TABLET BY MOUTH EVERY 12 HOURS 180 tablet 1   • clopidogrel (PLAVIX) 75 MG tablet TAKE ONE TABLET BY MOUTH DAILY 90 tablet 1   • isosorbide mononitrate (IMDUR) 30 MG 24 hr tablet Take 1 tablet by mouth 2 (two) times a day. 180 tablet 1   • nitroglycerin (NITROSTAT) 0.4 MG SL tablet DISSOLVE 1 TAB UNDER TONGUE FOR CHEST PAIN - IF PAIN REMAINS AFTER 5 MIN, CALL 911 AND REPEAT DOSE. MAX 3 TABS IN 15 MINUTES 25 tablet 4     No facility-administered medications prior to visit.       Allergies as of 08/30/2021 - Reviewed 08/30/2021   Allergen Reaction Noted   • No known drug allergy Unknown (See Comments) 02/04/2015     Social History     Socioeconomic History   • Marital status:      Spouse name: Not on file   • Number of children: Not on file   • Years of education: Not on file   • Highest education level: Not on file   Tobacco Use   • Smoking status: Never Smoker   • Smokeless tobacco: Current User     Types: Chew   • Tobacco comment: chewing tobacco since 4 yearrs old   Substance and Sexual Activity   • Alcohol use: No     Comment: caffeine use   • Drug use: No   • Sexual activity: Defer     Family History   Problem Relation Age of Onset   • Heart disease Father    • Hypertension Father    • Stroke Father    • Diabetes Sister    • Cancer Brother  "   • Heart disease Brother    • Hypertension Brother    • No Known Problems Maternal Grandmother    • No Known Problems Maternal Grandfather    • No Known Problems Paternal Grandmother    • No Known Problems Paternal Grandfather      Review of Systems   Constitutional: Positive for decreased appetite and malaise/fatigue. Negative for chills, fever, weight gain and weight loss.   Cardiovascular: Positive for dyspnea on exertion and leg swelling. Negative for chest pain, irregular heartbeat, near-syncope, orthopnea, palpitations, paroxysmal nocturnal dyspnea and syncope.   Respiratory: Positive for shortness of breath. Negative for cough, snoring and wheezing.    Hematologic/Lymphatic: Negative for bleeding problem. Bruises/bleeds easily.   Skin: Negative for color change.   Musculoskeletal: Positive for joint pain. Negative for falls and myalgias.   Gastrointestinal: Positive for nausea. Negative for diarrhea, melena and vomiting.   Genitourinary: Negative for hematuria.   Neurological: Positive for light-headedness. Negative for excessive daytime sleepiness, dizziness and headaches.   Psychiatric/Behavioral: Negative for depression. The patient is nervous/anxious.           Objective:     Vitals:    08/30/21 1338   BP: 118/78   BP Location: Left arm   Patient Position: Sitting   Pulse: 60   SpO2: 98%   Weight: 83.9 kg (185 lb)   Height: 175.3 cm (69\")     Body mass index is 27.32 kg/m².    PHYSICAL EXAM:  Vitals and nursing note reviewed.   Constitutional:       General: Not in acute distress.     Appearance: Well-developed and not in distress. Not diaphoretic.   HENT:      Head: Normocephalic and atraumatic.      Ears:      Comments: Hard of hearing  Neck:      Vascular: No JVD.   Pulmonary:      Effort: Pulmonary effort is normal. No respiratory distress.      Breath sounds: Normal breath sounds. No decreased breath sounds. No wheezing. No rhonchi. No rales.   Cardiovascular:      Normal rate. Regular rhythm.      " Murmurs: There is a harsh midsystolic murmur at the URSB, radiating to the neck.   Pulses:     Radial: 2+ bilaterally.     Dorsalis pedis: 2+ bilaterally.  Edema:     Peripheral edema absent.   Abdominal:      General: Bowel sounds are normal. There is no distension.      Palpations: Abdomen is soft.      Tenderness: There is no abdominal tenderness.   Musculoskeletal:      Comments: Uses Rollator walker chair  Missing partial digits on bilateral hands Skin:     General: Skin is warm and dry.      Findings: Bruising present. No erythema.      Comments: Scattered bilateral bruising on forearms   Neurological:      Mental Status: Alert and oriented to person, place, and time.   Psychiatric:         Attention and Perception: Attention normal.         Mood and Affect: Mood normal.         Speech: Speech normal.         Behavior: Behavior normal.         Thought Content: Thought content normal.         Judgment: Judgment normal.           ECG 12 Lead    Date/Time: 8/30/2021 1:33 PM  Performed by: Concepcion Leyva APRN  Authorized by: Concepcion Leyva APRN   Comparison: compared with previous ECG from 4/8/2021  Rhythm: sinus rhythm  Rate: normal  BPM: 60  Conduction comments: Prolonged MN interval  T inversion: aVL  Other findings: non-specific ST-T wave changes and early transition    Clinical impression: abnormal EKG  Comments: Indication: CAD, valvular disease                Assessment:       Diagnosis Plan   1. Coronary artery disease involving native coronary artery of native heart without angina pectoris     2. Aortic stenosis, mild     3. Essential hypertension     4. Hyperlipidemia, unspecified hyperlipidemia type     5. Stage 3 chronic kidney disease, unspecified whether stage 3a or 3b CKD (CMS/[facility])     6. Chronic obstructive pulmonary disease, unspecified COPD type (CMS/[facility])         Plan:         1.  Exertional chest pain/dyspnea:   His symptoms have improved with increasing isosorbide mononitrate to 30 mg twice  daily at last appointment 4/2021.  He has chronic dyspnea on exertion related to COPD but this is overall better and he is having no further chest pain.    2.  Coronary artery disease:previous lateral infarct and chronic left circumflex occlusion, inferior STEMI with CARLENE to occluded RCA April 2018. He has residual disease of his LAD and  of his left circumflex artery last cardiac catheterization 2/2020 did not show any new findings amenable to PCI thus he is medically managed.  Continue current management with aspirin, Plavix, beta-blocker, statin and isosorbide mononitrate.  3.  Hypertension: BP stable in office today.  Sounds like it is running okay at home without any significantly high or low readings  4.  Hyperlipidemia:  On low-dose atorvastatin for goal LDL of 70 or below.  Recommend discussion of updated lipid panel at next visit.  We will hold off today due to inclement weather.  5.  Lightheadedness: Stable.  Likely orthostasis.  Overall better since last visit.  No recent syncope.  He had a fall with bending over but does not recall this was due to lightheadedness or dizziness.  6.  COPD: Does not follow with pulmonology.  Managed by Dr. Cameron PCP  7.  Diabetes mellitus type 2  8.  Chronic kidney disease  9.  Aortic stenosis.:  Mild on his last echocardiogram in 2019.  It does not sound like he is having progression of symptoms.  Murmur does not to be severe.  Will defer timing of repeat echo to Dr. Templeton at next appointment.  10.  Chewing tobacco: I have counseled him on the importance of stopping.  He is not interested in quitting and has been chewing tobacco since a very young age.      Overall he stable and sounds much improved.  Continue current management.  No changes.  They were advised on signs and symptoms for which to call.  They denied the need for any medication refills.  I advised on the importance of medication compliance, good blood pressure, blood sugar and cholesterol control, as well  as heart healthy diet, regular exercise and avoidance of tobacco for cardiovascular disease risk factor modification.     Follow up with Dr. Templeton in 3 months, unless otherwise needed sooner.  I advised the patient to contact our office with any questions or concerns.       It has been a pleasure to participate in this patient's care. Please feel free to contact me with any questions or concerns.     Concepcion Leyva, BOOGIE  08/30/21             Your medication list          Accurate as of August 30, 2021  1:43 PM. If you have any questions, ask your nurse or doctor.            CONTINUE taking these medications      Instructions Last Dose Given Next Dose Due   albuterol sulfate  (90 Base) MCG/ACT inhaler  Commonly known as: PROVENTIL HFA;VENTOLIN HFA;PROAIR HFA      Inhale 2 puffs Every 4 (Four) Hours As Needed for Wheezing.       albuterol (2.5 MG/3ML) 0.083% nebulizer solution  Commonly known as: PROVENTIL      Take 2.5 mg by nebulization Every 4 (Four) Hours As Needed for Wheezing or Shortness of Air.       aspirin 81 MG EC tablet      Take 1 tablet by mouth Daily.       atorvastatin 10 MG tablet  Commonly known as: LIPITOR      TAKE ONE TABLET BY MOUTH ONCE NIGHTLY       carvedilol 3.125 MG tablet  Commonly known as: COREG      TAKE ONE TABLET BY MOUTH EVERY 12 HOURS       clopidogrel 75 MG tablet  Commonly known as: PLAVIX      TAKE ONE TABLET BY MOUTH DAILY       isosorbide mononitrate 30 MG 24 hr tablet  Commonly known as: IMDUR      Take 1 tablet by mouth 2 (two) times a day.       nitroglycerin 0.4 MG SL tablet  Commonly known as: NITROSTAT      DISSOLVE 1 TAB UNDER TONGUE FOR CHEST PAIN - IF PAIN REMAINS AFTER 5 MIN, CALL 911 AND REPEAT DOSE. MAX 3 TABS IN 15 MINUTES              The above medication changes may not have been made by this provider.  Medication list was updated to reflect medications patient is currently taking including medication changes and discontinuations made by other healthcare  providers or the patients themselves.     Dictated utilizing Dragon Dictation System.

## 2021-12-14 RX ORDER — ATORVASTATIN CALCIUM 10 MG/1
TABLET, FILM COATED ORAL
Qty: 90 TABLET | Refills: 2 | Status: SHIPPED | OUTPATIENT
Start: 2021-12-14 | End: 2022-09-20

## 2022-01-13 ENCOUNTER — OFFICE VISIT (OUTPATIENT)
Dept: CARDIOLOGY | Facility: CLINIC | Age: 87
End: 2022-01-13

## 2022-01-13 VITALS
DIASTOLIC BLOOD PRESSURE: 58 MMHG | HEIGHT: 69 IN | HEART RATE: 93 BPM | SYSTOLIC BLOOD PRESSURE: 88 MMHG | BODY MASS INDEX: 26.57 KG/M2 | WEIGHT: 179.4 LBS

## 2022-01-13 DIAGNOSIS — J44.9 CHRONIC OBSTRUCTIVE PULMONARY DISEASE, UNSPECIFIED COPD TYPE: ICD-10-CM

## 2022-01-13 DIAGNOSIS — E78.5 HYPERLIPIDEMIA, UNSPECIFIED HYPERLIPIDEMIA TYPE: ICD-10-CM

## 2022-01-13 DIAGNOSIS — E11.59 TYPE 2 DIABETES MELLITUS WITH OTHER CIRCULATORY COMPLICATION, WITHOUT LONG-TERM CURRENT USE OF INSULIN: ICD-10-CM

## 2022-01-13 DIAGNOSIS — I25.10 CORONARY ARTERY DISEASE INVOLVING NATIVE CORONARY ARTERY OF NATIVE HEART WITHOUT ANGINA PECTORIS: Primary | ICD-10-CM

## 2022-01-13 DIAGNOSIS — I10 PRIMARY HYPERTENSION: ICD-10-CM

## 2022-01-13 DIAGNOSIS — I95.1 ORTHOSTATIC HYPOTENSION: ICD-10-CM

## 2022-01-13 DIAGNOSIS — N18.30 STAGE 3 CHRONIC KIDNEY DISEASE, UNSPECIFIED WHETHER STAGE 3A OR 3B CKD: ICD-10-CM

## 2022-01-13 DIAGNOSIS — I35.0 AORTIC STENOSIS, MILD: ICD-10-CM

## 2022-01-13 PROCEDURE — 99214 OFFICE O/P EST MOD 30 MIN: CPT | Performed by: INTERNAL MEDICINE

## 2022-01-13 PROCEDURE — 93000 ELECTROCARDIOGRAM COMPLETE: CPT | Performed by: INTERNAL MEDICINE

## 2022-01-13 RX ORDER — MIDODRINE HYDROCHLORIDE 5 MG/1
5 TABLET ORAL
Qty: 60 TABLET | Refills: 2 | Status: SHIPPED | OUTPATIENT
Start: 2022-01-13 | End: 2022-04-15

## 2022-01-13 NOTE — PROGRESS NOTES
Subjective:     Encounter Date:01/13/2022      Patient ID: Yoni Bonner Jr. is a 88 y.o. male.    Chief Complaint:  History of Present Illness    This is an 88-year-old man with a history of hypertension, chronic bronchitis, dyslipidemia, tobacco use, coronary artery disease status post inferior myocardial infarction and drug eluting stent placement of his proximal RCA, who presents for follow up.      He presented to the emergency room on 3/27/2021 with complaints of aching chest pain.  Symptoms resolved after 30 minutes.  He was pain-free by the time he came to the emergency room.  His work-up in the emergency room was unremarkable.  He did admit to increased stress because of his wife's illness.  In office follow-up I recommended increasing his isosorbide mononitrate to 30 mg twice a day.  He was then seen in follow-up by BOOGIE Mejia on 8/30/2021.  He reported improvement in his episodes of chest pain.  He had not been having any syncopal episodes or falls at the time of that office visit.    Unfortunately shortly after that office visit the patient and his wife report that he began having more issues with falls.  This primarily occurs after he stands up.  He reports he just gets generally weak and is unable to hold himself up anymore.  With this episode his wife has been checking his blood pressures and is noted that have been low with systolics often in the 90s.  As a result she has been checking his blood pressures every day before giving him his blood pressure medications.  She does admit that more often than not his blood pressures are too low for his medications.  She suspects that he is not receiving blood pressure medications most days.    He continues to have episodes of chest pain.  This requires taking 2 or 3 nitroglycerin about once or twice a week.  He continues to have dyspnea on exertion.  He denies any palpitations.  He and his wife report that they lost their youngest son following a  prolonged hospitalization for COVID-19 recently.  Out of their 6 children and they only have 2 surviving daughters left.     Orthostatics were checked in the office today.  His blood pressure sitting were 100/68 and with standing they dropped to 88/58.  This was associated with symptoms of dizziness.     Prior History:  I saw the patient back on 02/04/2015 when he presented to establish care.  At that time the patient reported a history of recurrent syncopal episodes.  I felt that his symptoms were due to orthostatic syncope at that time.  He had a history of rheumatic fever and a thickened aortic valve along with carotid artery disease so I sat him up for both an echocardiogram and a carotid ultrasound around that time.  His echocardiogram was performed on 03/11/2015 and revealed normal left ventricular systolic function and wall motion, an ejection fraction of 54%, grade IA diastolic dysfunction and no significant valvular disease.  His carotid ultrasound revealed moderate bilateral stenosis.  He was last seen in follow up in 3/2017 for pre-operative evaluation.  At that time he reported some worsening of his baseline dyspnea on exertion though we proceeded with a dobutamine stress test that was negative for ischemia and the patient proceeded with his shoulder surgery.     On 4/8/2018 he was admitted with an inferior myocardial infarction.  The patient was accompanying his wife to the [facility] emergency room where he suffered a near syncopal episode associated with hypotension and bradycardia.  An EKG was performed at that time showing acute inferior ST elevations.  He was transferred to [facility] and was found to have a thrombotic subtotal occlusion of his proximal RCA for which she underwent thrombectomy and drug-eluting stent placement.  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  Following his catheterization he underwent an  echocardiogram that showed mildly depressed left ventricular systolic function with an ejection fraction of 42%, inferior wall motion abnormalities, mild to moderate mitral regurgitation, mild aortic stenosis and regurgitation, and grade 1A diastolic dysfunction.       In 7/2018 he was readmitted for chest pain.  He described it as old sharp left-sided chest pain lasting anywhere from a few minutes to 30 minutes.  Symptoms persisted with dyspnea and nausea.  Into being in admitted to the hospital at that time.  He underwent a stress test during that admission showed an inferior lateral infarct but no evidence of ischemia.  I started him on isosorbide mononitrate and discharged him home.  In 8/2018 he presented back for hospital follow at which time he was feeling well.  He had a repeat echocardiogram today that showed normalization of his left ventricular function with an EF of 60%, mild aortic stenosis, mild mitral regurgitation, and grade 1 diastolic dysfunction.        Was admitted on 12/6/2018 with recurrent chest pain.  Patient reported that the pain started when he was watching TV and bent over to tie his shoes.  This pain was associated with diaphoresis and dyspnea.  He could not tell me if the pain was somewhat to his prior symptoms with his myocardial infarction.  The pain lasted several hours and resolved after he got to the emergency room.  After resolution he had no further pain.  His EKGs were unremarkable and troponins were normal.  With no evidence of a myocardial infarction and a single episode of pain that sounded somewhat atypical we decided to continue with medical management at that time.     He was then admitted to the hospital in 7/2019 with shortness of breath.  He was noted to have elevated troponin in the indeterminate level during that admission and was evaluated by Dr. Freeman.  He was not having any symptoms concerning for angina and no further work up was recommended at that time.   Following that admission he saw BOOGIE Mejia in follow-up on 7/30/2019.  At that time he appeared to be doing well.  A repeat echocardiogram was ordered in order to reevaluate his aortic stenosis.  This showed normal left ventricular systolic function and wall motion with an EF 30 to 60%, grade 1A diastolic dysfunction, and moderate mitral regurgitation.     He was admitted in 2/2020 with chest pain.  Presented with pain that started the night prior to his admission.  He took 2 sublingual nitroglycerin with resolution of his symptoms.  He ruled out for myocardial infarction his EKG was unchanged.  We proceeded with a stress test that showed inferior lateral infarct with angela-infarct lateral ischemia which was new compared to the prior.  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  I recommended continue with medical management at that time.  He was having some issues with low blood pressures and I discontinued his lisinopril at that time and continued him on carvedilol and isosorbide mononitrate only.    Review of Systems   Constitutional: Positive for malaise/fatigue.   HENT: Negative for hearing loss, hoarse voice, nosebleeds and sore throat.    Eyes: Negative for pain.   Cardiovascular: Positive for chest pain and dyspnea on exertion. Negative for claudication, cyanosis, irregular heartbeat, leg swelling, near-syncope, orthopnea, palpitations, paroxysmal nocturnal dyspnea and syncope.   Respiratory: Negative for shortness of breath and snoring.    Endocrine: Negative for cold intolerance, heat intolerance, polydipsia, polyphagia and polyuria.   Skin: Negative for itching and rash.   Musculoskeletal: Positive for falls and joint pain. Negative for arthritis, joint swelling, muscle cramps, muscle weakness and myalgias.    Gastrointestinal: Negative for constipation, diarrhea, dysphagia, heartburn, hematemesis, hematochezia, melena, nausea and vomiting.   Genitourinary: Negative for frequency, hematuria and hesitancy.   Neurological: Positive for light-headedness. Negative for excessive daytime sleepiness, dizziness, headaches, numbness and weakness.   Psychiatric/Behavioral: Negative for depression. The patient is not nervous/anxious.          Current Outpatient Medications:   •  albuterol (PROVENTIL) (2.5 MG/3ML) 0.083% nebulizer solution, Take 2.5 mg by nebulization Every 4 (Four) Hours As Needed for Wheezing or Shortness of Air., Disp: , Rfl: 12  •  albuterol sulfate  (90 Base) MCG/ACT inhaler, Inhale 2 puffs Every 4 (Four) Hours As Needed for Wheezing., Disp: , Rfl:   •  aspirin 81 MG EC tablet, Take 1 tablet by mouth Daily., Disp: 30 tablet, Rfl: 5  •  atorvastatin (LIPITOR) 10 MG tablet, TAKE ONE TABLET BY MOUTH ONCE NIGHTLY, Disp: 90 tablet, Rfl: 2  •  carvedilol (COREG) 3.125 MG tablet, TAKE ONE TABLET BY MOUTH EVERY 12 HOURS, Disp: 180 tablet, Rfl: 1  •  isosorbide mononitrate (IMDUR) 30 MG 24 hr tablet, Take 1 tablet by mouth 2 (two) times a day., Disp: 180 tablet, Rfl: 1  •  nitroglycerin (NITROSTAT) 0.4 MG SL tablet, DISSOLVE 1 TAB UNDER TONGUE FOR CHEST PAIN - IF PAIN REMAINS AFTER 5 MIN, CALL 911 AND REPEAT DOSE. MAX 3 TABS IN 15 MINUTES, Disp: 25 tablet, Rfl: 4  •  clopidogrel (PLAVIX) 75 MG tablet, TAKE ONE TABLET BY MOUTH DAILY, Disp: 90 tablet, Rfl: 1    Past Medical History:   Diagnosis Date   • Bronchitis    • CAD (coronary artery disease)    • Carotid arterial disease (HCC)    • Chronic bronchitis (HCC)    • COPD (chronic obstructive pulmonary disease) (HCC)    • Gout    • Hyperlipidemia    • Hypertension    • Ischemic cardiomyopathy     resolved, EF was 60% in 2018   • Mild aortic stenosis    • Mitral regurgitation    • Precordial chest pain    • Rheumatic fever    • Syncopal episodes    • Tobacco use     • Type 2 diabetes mellitus (HCC)        Past Surgical History:   Procedure Laterality Date   • CARDIAC CATHETERIZATION     • CARDIAC CATHETERIZATION N/A 4/8/2018    Procedure: Left Heart Cath;  Surgeon: Nicolas Jerez MD;  Location:  [facility];  Service: Cardiovascular   • CARDIAC CATHETERIZATION  4/8/2018    Procedure: Percutaneous Manual Thrombectomy;  Surgeon: Nicolas Jerez MD;  Location:  [facility];  Service: Cardiovascular   • CARDIAC CATHETERIZATION N/A 4/8/2018    Procedure: Stent CARLENE coronary;  Surgeon: Nicolas Jerez MD;  Location:  [facility];  Service: Cardiovascular   • CARDIAC CATHETERIZATION N/A 4/8/2018    Procedure: Right Heart Cath;  Surgeon: Nicolas Jerez MD;  Location:  [facility];  Service: Cardiovascular   • CARDIAC CATHETERIZATION Left 2/21/2020    Procedure: Cardiac Catheterization/Vascular Study;  Surgeon: Alejandro Jenkins MD;  Location:  [facility];  Service: Cardiovascular;  Laterality: Left;   • CARDIAC CATHETERIZATION N/A 2/21/2020    Procedure: Coronary angiography;  Surgeon: Alejandro Jenkins MD;  Location:  [facility];  Service: Cardiovascular;  Laterality: N/A;   • EYE SURGERY      cataract surg   • HAND SURGERY     • HERNIA REPAIR     • KNEE SURGERY     • TESTICLE SURGERY         Family History   Problem Relation Age of Onset   • Heart disease Father    • Hypertension Father    • Stroke Father    • Diabetes Sister    • Cancer Brother    • Heart disease Brother    • Hypertension Brother    • No Known Problems Maternal Grandmother    • No Known Problems Maternal Grandfather    • No Known Problems Paternal Grandmother    • No Known Problems Paternal Grandfather        Social History     Tobacco Use   • Smoking status: Never Smoker   • Smokeless tobacco: Current User     Types: Chew   • Tobacco comment: chewing tobacco since 4 years old   Substance Use Topics   • Alcohol use: No      "Comment: caffeine use   • Drug use: No         ECG 12 Lead    Date/Time: 1/13/2022 12:26 PM  Performed by: Melissa Templeton MD  Authorized by: Melissa Templeton MD   Comparison: compared with previous ECG   Similar to previous ECG  Rhythm: sinus rhythm  T inversion: I and aVL                 Objective:     Visit Vitals  BP (!) 88/58 (BP Location: Left arm, Patient Position: Standing)   Pulse 93   Ht 175.3 cm (69\")   Wt 81.4 kg (179 lb 6.4 oz)   BMI 26.49 kg/m²         Constitutional:       Appearance: Normal appearance. Well-developed.   HENT:      Head: Normocephalic and atraumatic.   Neck:      Vascular: No carotid bruit or JVD.   Pulmonary:      Effort: Pulmonary effort is normal.      Breath sounds: Normal breath sounds.   Cardiovascular:      Normal rate. Regular rhythm.      Murmurs: There is a harsh midsystolic murmur at the URSB, radiating to the neck.      No gallop.   Pulses:     Radial: 2+ bilaterally.  Edema:     Peripheral edema absent.   Abdominal:      Palpations: Abdomen is soft.   Skin:     General: Skin is warm and dry.   Neurological:      Mental Status: Alert and oriented to person, place, and time.             Assessment:          Diagnosis Plan   1. Coronary artery disease involving native coronary artery of native heart without angina pectoris     2. Orthostatic hypotension     3. Primary hypertension     4. Hyperlipidemia, unspecified hyperlipidemia type     5. Aortic stenosis, mild     6. Type 2 diabetes mellitus with other circulatory complication, without long-term current use of insulin ([facility])     7. Stage 3 chronic kidney disease, unspecified whether stage 3a or 3b CKD ([facility])     8. Chronic obstructive pulmonary disease, unspecified COPD type ([facility])            Plan:       1.  Orthostatic hypotension.  Documented in the office.  This is despite being off of his antihypertensive medications.  I have advised the patient and his wife to stay off of carvedilol and isosorbide for now.  I am going " to start him on midodrine 5 mg twice a day.  We will also proceed with an echocardiogram to see if his aortic valve stenosis is playing a role in his low blood pressures and symptoms.  2.  Aortic stenosis.  Mild in 2019.  Plan for repeat echocardiogram as above.  3.  Coronary artery disease.  Continues to have episodes of angina.  EKG remained stable and unchanged.  Prior stents to the right coronary artery.  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  Previously on medical management with isosorbide and carvedilol.  However we will need to discontinue both of these medications due to issues with low blood pressures.  We will continue management with aspirin and statin and sublingual nitroglycerin as needed.  Monitor anginal symptoms.  4.  History of hypertension.  No longer an issue.  Plan as per #1.  5.  Hyperlipidemia.  On low-dose atorvastatin for goal LDL of 70 or below.  6.  COPD  7.  Diabetes mellitus type 2  8.  Chronic kidney disease    I will call and discuss results of the echocardiogram.  I will have the patient return in a month for follow-up.

## 2022-01-21 ENCOUNTER — HOSPITAL ENCOUNTER (OUTPATIENT)
Dept: CARDIOLOGY | Facility: HOSPITAL | Age: 87
Discharge: HOME | End: 2022-01-21
Admitting: INTERNAL MEDICINE

## 2022-01-21 VITALS
WEIGHT: 179 LBS | OXYGEN SATURATION: 99 % | HEART RATE: 63 BPM | DIASTOLIC BLOOD PRESSURE: 80 MMHG | BODY MASS INDEX: 26.51 KG/M2 | HEIGHT: 69 IN | SYSTOLIC BLOOD PRESSURE: 120 MMHG

## 2022-01-21 DIAGNOSIS — I25.10 CORONARY ARTERY DISEASE INVOLVING NATIVE CORONARY ARTERY OF NATIVE HEART WITHOUT ANGINA PECTORIS: ICD-10-CM

## 2022-01-21 DIAGNOSIS — I35.0 AORTIC STENOSIS, MILD: ICD-10-CM

## 2022-01-21 DIAGNOSIS — I95.1 ORTHOSTATIC HYPOTENSION: ICD-10-CM

## 2022-01-21 PROCEDURE — 93306 TTE W/DOPPLER COMPLETE: CPT | Performed by: INTERNAL MEDICINE

## 2022-01-21 PROCEDURE — 25010000002 PERFLUTREN (DEFINITY) 8.476 MG IN SODIUM CHLORIDE (PF) 0.9 % 10 ML INJECTION: Performed by: INTERNAL MEDICINE

## 2022-01-21 PROCEDURE — 93306 TTE W/DOPPLER COMPLETE: CPT

## 2022-01-21 RX ADMIN — PERFLUTREN 1.5 ML: 6.52 INJECTION, SUSPENSION INTRAVENOUS at 14:00

## 2022-01-24 LAB
AORTIC ARCH: 2 CM
AORTIC DIMENSIONLESS INDEX: 0.4 (DI)
ASCENDING AORTA: 3.2 CM
BH CV ECHO MEAS - ACS: 1.4 CM
BH CV ECHO MEAS - AO MAX PG (FULL): 29.2 MMHG
BH CV ECHO MEAS - AO MAX PG: 32.8 MMHG
BH CV ECHO MEAS - AO MEAN PG (FULL): 15.8 MMHG
BH CV ECHO MEAS - AO MEAN PG: 17.9 MMHG
BH CV ECHO MEAS - AO ROOT AREA (BSA CORRECTED): 1.8
BH CV ECHO MEAS - AO ROOT AREA: 9.9 CM^2
BH CV ECHO MEAS - AO ROOT DIAM: 3.5 CM
BH CV ECHO MEAS - AO V2 MAX: 286.4 CM/SEC
BH CV ECHO MEAS - AO V2 MEAN: 198.6 CM/SEC
BH CV ECHO MEAS - AO V2 VTI: 54.5 CM
BH CV ECHO MEAS - ASC AORTA: 3.2 CM
BH CV ECHO MEAS - AVA(I,A): 1.1 CM^2
BH CV ECHO MEAS - AVA(I,D): 1.1 CM^2
BH CV ECHO MEAS - AVA(V,A): 1 CM^2
BH CV ECHO MEAS - AVA(V,D): 1 CM^2
BH CV ECHO MEAS - BSA(HAYCOCK): 2 M^2
BH CV ECHO MEAS - BSA: 2 M^2
BH CV ECHO MEAS - BZI_BMI: 26.4 KILOGRAMS/M^2
BH CV ECHO MEAS - BZI_METRIC_HEIGHT: 175.3 CM
BH CV ECHO MEAS - BZI_METRIC_WEIGHT: 81.2 KG
BH CV ECHO MEAS - EDV(MOD-SP2): 82 ML
BH CV ECHO MEAS - EDV(MOD-SP4): 121 ML
BH CV ECHO MEAS - EDV(TEICH): 49 ML
BH CV ECHO MEAS - EF(CUBED): 70.6 %
BH CV ECHO MEAS - EF(MOD-BP): 56.4 %
BH CV ECHO MEAS - EF(MOD-SP2): 57.3 %
BH CV ECHO MEAS - EF(MOD-SP4): 56.2 %
BH CV ECHO MEAS - EF(TEICH): 63.3 %
BH CV ECHO MEAS - ESV(MOD-SP2): 35 ML
BH CV ECHO MEAS - ESV(MOD-SP4): 53 ML
BH CV ECHO MEAS - ESV(TEICH): 18 ML
BH CV ECHO MEAS - FS: 33.5 %
BH CV ECHO MEAS - IVS/LVPW: 1.7
BH CV ECHO MEAS - IVSD: 2.2 CM
BH CV ECHO MEAS - LAT PEAK E' VEL: 9.9 CM/SEC
BH CV ECHO MEAS - LV DIASTOLIC VOL/BSA (35-75): 61.4 ML/M^2
BH CV ECHO MEAS - LV MASS(C)D: 245.4 GRAMS
BH CV ECHO MEAS - LV MASS(C)DI: 124.5 GRAMS/M^2
BH CV ECHO MEAS - LV MAX PG: 3.7 MMHG
BH CV ECHO MEAS - LV MEAN PG: 2.1 MMHG
BH CV ECHO MEAS - LV SYSTOLIC VOL/BSA (12-30): 26.9 ML/M^2
BH CV ECHO MEAS - LV V1 MAX: 95.5 CM/SEC
BH CV ECHO MEAS - LV V1 MEAN: 69.8 CM/SEC
BH CV ECHO MEAS - LV V1 VTI: 19.7 CM
BH CV ECHO MEAS - LVIDD: 3.4 CM
BH CV ECHO MEAS - LVIDS: 2.3 CM
BH CV ECHO MEAS - LVLD AP2: 7.2 CM
BH CV ECHO MEAS - LVLD AP4: 8.2 CM
BH CV ECHO MEAS - LVLS AP2: 5.7 CM
BH CV ECHO MEAS - LVLS AP4: 6.8 CM
BH CV ECHO MEAS - LVOT AREA (M): 2.8 CM^2
BH CV ECHO MEAS - LVOT AREA: 3 CM^2
BH CV ECHO MEAS - LVOT DIAM: 1.9 CM
BH CV ECHO MEAS - LVPWD: 1.3 CM
BH CV ECHO MEAS - MED PEAK E' VEL: 6 CM/SEC
BH CV ECHO MEAS - MV A DUR: 0.1 SEC
BH CV ECHO MEAS - MV A MAX VEL: 125.1 CM/SEC
BH CV ECHO MEAS - MV DEC SLOPE: 407.5 CM/SEC^2
BH CV ECHO MEAS - MV DEC TIME: 0.18 SEC
BH CV ECHO MEAS - MV E MAX VEL: 65.6 CM/SEC
BH CV ECHO MEAS - MV E/A: 0.52
BH CV ECHO MEAS - MV MAX PG: 9.7 MMHG
BH CV ECHO MEAS - MV MEAN PG: 3.3 MMHG
BH CV ECHO MEAS - MV P1/2T MAX VEL: 110.5 CM/SEC
BH CV ECHO MEAS - MV P1/2T: 79.4 MSEC
BH CV ECHO MEAS - MV V2 MAX: 156.1 CM/SEC
BH CV ECHO MEAS - MV V2 MEAN: 82.9 CM/SEC
BH CV ECHO MEAS - MV V2 VTI: 37.2 CM
BH CV ECHO MEAS - MVA P1/2T LCG: 2 CM^2
BH CV ECHO MEAS - MVA(P1/2T): 2.8 CM^2
BH CV ECHO MEAS - MVA(VTI): 1.6 CM^2
BH CV ECHO MEAS - PA ACC TIME: 0.11 SEC
BH CV ECHO MEAS - PA MAX PG (FULL): 0.29 MMHG
BH CV ECHO MEAS - PA MAX PG: 4.5 MMHG
BH CV ECHO MEAS - PA PR(ACCEL): 27.9 MMHG
BH CV ECHO MEAS - PA V2 MAX: 105.9 CM/SEC
BH CV ECHO MEAS - PULM A REVS DUR: 0.14 SEC
BH CV ECHO MEAS - PULM A REVS VEL: 35.6 CM/SEC
BH CV ECHO MEAS - PULM DIAS VEL: 35.6 CM/SEC
BH CV ECHO MEAS - PULM S/D: 1.3
BH CV ECHO MEAS - PULM SYS VEL: 45.4 CM/SEC
BH CV ECHO MEAS - PVA(V,A): 3.1 CM^2
BH CV ECHO MEAS - PVA(V,D): 3.1 CM^2
BH CV ECHO MEAS - QP/QS: 0.9
BH CV ECHO MEAS - RV MAX PG: 4.2 MMHG
BH CV ECHO MEAS - RV MEAN PG: 2 MMHG
BH CV ECHO MEAS - RV V1 MAX: 102.4 CM/SEC
BH CV ECHO MEAS - RV V1 MEAN: 67 CM/SEC
BH CV ECHO MEAS - RV V1 VTI: 16.5 CM
BH CV ECHO MEAS - RVOT AREA: 3.2 CM^2
BH CV ECHO MEAS - RVOT DIAM: 2 CM
BH CV ECHO MEAS - SI(AO): 273 ML/M^2
BH CV ECHO MEAS - SI(CUBED): 14.6 ML/M^2
BH CV ECHO MEAS - SI(LVOT): 29.8 ML/M^2
BH CV ECHO MEAS - SI(MOD-SP2): 23.9 ML/M^2
BH CV ECHO MEAS - SI(MOD-SP4): 34.5 ML/M^2
BH CV ECHO MEAS - SI(TEICH): 15.7 ML/M^2
BH CV ECHO MEAS - SUP REN AO DIAM: 2.1 CM
BH CV ECHO MEAS - SV(AO): 538 ML
BH CV ECHO MEAS - SV(CUBED): 28.8 ML
BH CV ECHO MEAS - SV(LVOT): 58.8 ML
BH CV ECHO MEAS - SV(MOD-SP2): 47 ML
BH CV ECHO MEAS - SV(MOD-SP4): 68 ML
BH CV ECHO MEAS - SV(RVOT): 52.6 ML
BH CV ECHO MEAS - SV(TEICH): 31 ML
BH CV ECHO MEAS - TAPSE (>1.6): 1.7 CM
BH CV ECHO MEASUREMENTS AVERAGE E/E' RATIO: 8.25
BH CV XLRA - TDI S': 13.4 CM/SEC
LEFT ATRIUM VOLUME INDEX: 20 ML/M2
MAXIMAL PREDICTED HEART RATE: 132 BPM
SINUS: 3.5 CM
STJ: 2.9 CM
STRESS TARGET HR: 112 BPM

## 2022-01-24 NOTE — PROGRESS NOTES
I called and discussed echocardiogram results with the patient's wife.  I will see him back again as scheduled for his follow-up.

## 2022-02-15 ENCOUNTER — OFFICE VISIT (OUTPATIENT)
Dept: CARDIOLOGY | Facility: CLINIC | Age: 87
End: 2022-02-15

## 2022-02-15 VITALS
HEIGHT: 69 IN | DIASTOLIC BLOOD PRESSURE: 86 MMHG | SYSTOLIC BLOOD PRESSURE: 138 MMHG | HEART RATE: 120 BPM | BODY MASS INDEX: 25.56 KG/M2 | WEIGHT: 172.6 LBS | OXYGEN SATURATION: 99 %

## 2022-02-15 DIAGNOSIS — J44.9 CHRONIC OBSTRUCTIVE PULMONARY DISEASE, UNSPECIFIED COPD TYPE: ICD-10-CM

## 2022-02-15 DIAGNOSIS — E11.59 TYPE 2 DIABETES MELLITUS WITH OTHER CIRCULATORY COMPLICATION, WITHOUT LONG-TERM CURRENT USE OF INSULIN: ICD-10-CM

## 2022-02-15 DIAGNOSIS — I95.1 ORTHOSTATIC HYPOTENSION: ICD-10-CM

## 2022-02-15 DIAGNOSIS — E78.5 HYPERLIPIDEMIA, UNSPECIFIED HYPERLIPIDEMIA TYPE: ICD-10-CM

## 2022-02-15 DIAGNOSIS — I25.10 CORONARY ARTERY DISEASE INVOLVING NATIVE CORONARY ARTERY OF NATIVE HEART WITHOUT ANGINA PECTORIS: Primary | ICD-10-CM

## 2022-02-15 DIAGNOSIS — I35.0 NONRHEUMATIC AORTIC VALVE STENOSIS: ICD-10-CM

## 2022-02-15 DIAGNOSIS — N18.30 STAGE 3 CHRONIC KIDNEY DISEASE, UNSPECIFIED WHETHER STAGE 3A OR 3B CKD: ICD-10-CM

## 2022-02-15 DIAGNOSIS — R07.2 PRECORDIAL PAIN: ICD-10-CM

## 2022-02-15 DIAGNOSIS — I10 PRIMARY HYPERTENSION: ICD-10-CM

## 2022-02-15 PROBLEM — R79.89 ELEVATED TROPONIN: Status: RESOLVED | Noted: 2019-07-25 | Resolved: 2022-02-15

## 2022-02-15 PROBLEM — R77.8 ELEVATED TROPONIN: Status: RESOLVED | Noted: 2019-07-25 | Resolved: 2022-02-15

## 2022-02-15 PROBLEM — Z72.0 TOBACCO ABUSE: Status: RESOLVED | Noted: 2018-04-16 | Resolved: 2022-02-15

## 2022-02-15 PROCEDURE — 99214 OFFICE O/P EST MOD 30 MIN: CPT | Performed by: INTERNAL MEDICINE

## 2022-02-15 PROCEDURE — 93000 ELECTROCARDIOGRAM COMPLETE: CPT | Performed by: INTERNAL MEDICINE

## 2022-02-15 RX ORDER — METOPROLOL SUCCINATE 25 MG/1
25 TABLET, EXTENDED RELEASE ORAL EVERY EVENING
Qty: 30 TABLET | Refills: 5 | Status: SHIPPED | OUTPATIENT
Start: 2022-02-15 | End: 2022-04-15

## 2022-02-15 RX ORDER — ISOSORBIDE MONONITRATE 30 MG/1
30 TABLET, EXTENDED RELEASE ORAL EVERY EVENING
Qty: 30 TABLET | Refills: 5 | Status: SHIPPED | OUTPATIENT
Start: 2022-02-15 | End: 2022-04-15

## 2022-02-15 NOTE — PROGRESS NOTES
Subjective:     Encounter Date:02/15/2022      Patient ID: Yoni Bonner Jr. is a 88 y.o. male.    Chief Complaint:  History of Present Illness    This is an 88-year-old man with a history of hypertension, chronic bronchitis, dyslipidemia, tobacco use, coronary artery disease status post inferior myocardial infarction and drug eluting stent placement of his proximal RCA, who presents for follow up.      He presented to the emergency room on 3/27/2021 with complaints of aching chest pain.  Symptoms resolved after 30 minutes.  He was pain-free by the time he came to the emergency room.  His work-up in the emergency room was unremarkable.  He did admit to increased stress because of his wife's illness.  In office follow-up I recommended increasing his isosorbide mononitrate to 30 mg twice a day.  He was then seen in follow-up by BOOGIE Mejia on 8/30/2021.  He reported improvement in his episodes of chest pain.  He had not been having any syncopal episodes or falls at the time of that office visit.     At his follow up in 1/2022 he reported that shortly after his last follow up he began having more issues with falls.  This primarily occurs after he stands up.  He reports he just gets generally weak and is unable to hold himself up anymore.  With this episode his wife has been checking his blood pressures and is noted that have been low with systolics often in the 90s.  As a result she has been checking his blood pressures every day before giving him his blood pressure medications.  She does admit that more often than not his blood pressures are too low for his medications.  She suspects that he is not receiving blood pressure medications most days.  He was still having episodes of chest pain requiring sublingual nitroglycerin.  Dyspnea on exertion was stable.  He was orthostatic at that office visit with blood pressure sitting were 100/68 and with standing they dropped to 88/58.  This was associated with  symptoms of dizziness.  At that office visit I recommended that he stop the isosorbide and carvedilol and I started him on midodrine 5 mg twice a day.  I also ordered a repeat echocardiogram.    His echocardiogram was performed on 1/21/2022.  And showed normal left ventricular systolic function with an EF of 56%, moderate left ventricular hypertrophy, normal diastolic function, and moderate aortic stenosis with a peak velocity of 2.86, mean gradient of 18, valve area 1.1, and a dimensionless index of 0.4.  When I called the patient's wife to go over the result she indicated that he was doing better and was having less lightheadedness and falling episodes.    He returns today for 1 month follow-up.  He reports that he is not doing as well.  The patient's wife indicates that shortly after I spoke to him over the phone he began having more issues with chest pain and shortness of breath.  He also has been having episodes of breaking out into a sweat.  He has had to take sublingual nitroglycerin more often than previously.  He feels like his dyspnea has worsened.  However it sounds like his issues with lightheadedness and falling have improved.     Prior History:  I saw the patient back on 02/04/2015 when he presented to establish care.  At that time the patient reported a history of recurrent syncopal episodes.  I felt that his symptoms were due to orthostatic syncope at that time.  He had a history of rheumatic fever and a thickened aortic valve along with carotid artery disease so I sat him up for both an echocardiogram and a carotid ultrasound around that time.  His echocardiogram was performed on 03/11/2015 and revealed normal left ventricular systolic function and wall motion, an ejection fraction of 54%, grade IA diastolic dysfunction and no significant valvular disease.  His carotid ultrasound revealed moderate bilateral stenosis.  He was last seen in follow up in 3/2017 for pre-operative evaluation.  At that  time he reported some worsening of his baseline dyspnea on exertion though we proceeded with a dobutamine stress test that was negative for ischemia and the patient proceeded with his shoulder surgery.     On 4/8/2018 he was admitted with an inferior myocardial infarction.  The patient was accompanying his wife to the [facility] emergency room where he suffered a near syncopal episode associated with hypotension and bradycardia.  An EKG was performed at that time showing acute inferior ST elevations.  He was transferred to [facility] and was found to have a thrombotic subtotal occlusion of his proximal RCA for which she underwent thrombectomy and drug-eluting stent placement.  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  Following his catheterization he underwent an echocardiogram that showed mildly depressed left ventricular systolic function with an ejection fraction of 42%, inferior wall motion abnormalities, mild to moderate mitral regurgitation, mild aortic stenosis and regurgitation, and grade 1A diastolic dysfunction.       In 7/2018 he was readmitted for chest pain.  He described it as old sharp left-sided chest pain lasting anywhere from a few minutes to 30 minutes.  Symptoms persisted with dyspnea and nausea.  Into being in admitted to the hospital at that time.  He underwent a stress test during that admission showed an inferior lateral infarct but no evidence of ischemia.  I started him on isosorbide mononitrate and discharged him home.  In 8/2018 he presented back for hospital follow at which time he was feeling well.  He had a repeat echocardiogram today that showed normalization of his left ventricular function with an EF of 60%, mild aortic stenosis, mild mitral regurgitation, and grade 1 diastolic dysfunction.        He was then admitted to the hospital in 7/2019 with shortness of breath.  He was noted to have  elevated troponin in the indeterminate level during that admission and was evaluated by Dr. Freeman.  He was not having any symptoms concerning for angina and no further work up was recommended at that time.  Following that admission he saw BOOGIE Mejia in follow-up on 7/30/2019.  At that time he appeared to be doing well.  A repeat echocardiogram was ordered in order to reevaluate his aortic stenosis.  This showed normal left ventricular systolic function and wall motion with an EF 30 to 60%, grade 1A diastolic dysfunction, and moderate mitral regurgitation.     He was admitted in 2/2020 with chest pain.  Presented with pain that started the night prior to his admission.  He took 2 sublingual nitroglycerin with resolution of his symptoms.  He ruled out for myocardial infarction his EKG was unchanged.  We proceeded with a stress test that showed inferior lateral infarct with angela-infarct lateral ischemia which was new compared to the prior.  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  I recommended continue with medical management at that time.  He was having some issues with low blood pressures and I discontinued his lisinopril at that time and continued him on carvedilol and isosorbide mononitrate only.    Review of Systems   Constitutional: Negative for malaise/fatigue.   HENT: Negative for hearing loss, hoarse voice, nosebleeds and sore throat.    Eyes: Negative for pain.   Cardiovascular: Positive for chest pain and dyspnea on exertion. Negative for claudication, cyanosis, irregular heartbeat, leg swelling, near-syncope, orthopnea, palpitations, paroxysmal nocturnal dyspnea and syncope.   Respiratory: Negative for shortness of breath and snoring.    Endocrine: Negative for cold intolerance, heat intolerance,  polydipsia, polyphagia and polyuria.   Skin: Negative for itching and rash.   Musculoskeletal: Negative for arthritis, falls, joint pain, joint swelling, muscle cramps, muscle weakness and myalgias.   Gastrointestinal: Negative for constipation, diarrhea, dysphagia, heartburn, hematemesis, hematochezia, melena, nausea and vomiting.   Genitourinary: Negative for frequency, hematuria and hesitancy.   Neurological: Negative for excessive daytime sleepiness, dizziness, headaches, light-headedness, numbness and weakness.   Psychiatric/Behavioral: Negative for depression. The patient is not nervous/anxious.          Current Outpatient Medications:   •  albuterol (PROVENTIL) (2.5 MG/3ML) 0.083% nebulizer solution, Take 2.5 mg by nebulization Every 4 (Four) Hours As Needed for Wheezing or Shortness of Air., Disp: , Rfl: 12  •  albuterol sulfate  (90 Base) MCG/ACT inhaler, Inhale 2 puffs Every 4 (Four) Hours As Needed for Wheezing., Disp: , Rfl:   •  aspirin 81 MG EC tablet, Take 1 tablet by mouth Daily., Disp: 30 tablet, Rfl: 5  •  atorvastatin (LIPITOR) 10 MG tablet, TAKE ONE TABLET BY MOUTH ONCE NIGHTLY, Disp: 90 tablet, Rfl: 2  •  midodrine (PROAMATINE) 5 MG tablet, Take 1 tablet by mouth 2 (Two) Times a Day Before Meals., Disp: 60 tablet, Rfl: 2  •  nitroglycerin (NITROSTAT) 0.4 MG SL tablet, DISSOLVE 1 TAB UNDER TONGUE FOR CHEST PAIN - IF PAIN REMAINS AFTER 5 MIN, CALL 911 AND REPEAT DOSE. MAX 3 TABS IN 15 MINUTES, Disp: 25 tablet, Rfl: 4    Past Medical History:   Diagnosis Date   • Bronchitis    • CAD (coronary artery disease)    • Carotid arterial disease (HCC)    • Chronic bronchitis (HCC)    • COPD (chronic obstructive pulmonary disease) (HCC)    • Gout    • Hyperlipidemia    • Hypertension    • Ischemic cardiomyopathy     resolved, EF was 60% in 2018   • Mild aortic stenosis    • Mitral regurgitation    • Precordial chest pain    • Rheumatic fever    • Syncopal episodes    • Tobacco use    • Type 2  diabetes mellitus (HCC)        Past Surgical History:   Procedure Laterality Date   • CARDIAC CATHETERIZATION     • CARDIAC CATHETERIZATION N/A 4/8/2018    Procedure: Left Heart Cath;  Surgeon: Nicolas Jerez MD;  Location:  [facility];  Service: Cardiovascular   • CARDIAC CATHETERIZATION  4/8/2018    Procedure: Percutaneous Manual Thrombectomy;  Surgeon: Nicolas Jerez MD;  Location:  [facility];  Service: Cardiovascular   • CARDIAC CATHETERIZATION N/A 4/8/2018    Procedure: Stent CARLENE coronary;  Surgeon: Nicolas Jerez MD;  Location: [facility];  Service: Cardiovascular   • CARDIAC CATHETERIZATION N/A 4/8/2018    Procedure: Right Heart Cath;  Surgeon: Nicolas Jerez MD;  Location: [facility] CATH [facility] LOC[facility];  Service: Cardiovascular   • CARDIAC CATHETERIZATION Left 2/21/2020    Procedure: Cardiac Catheterization/Vascular Study;  Surgeon: Alejandro Jenkins MD;  Location:  [facility];  Service: Cardiovascular;  Laterality: Left;   • CARDIAC CATHETERIZATION N/A 2/21/2020    Procedure: Coronary angiography;  Surgeon: Alejandro Jenkins MD;  Location:  [facility];  Service: Cardiovascular;  Laterality: N/A;   • EYE SURGERY      cataract surg   • HAND SURGERY     • HERNIA REPAIR     • KNEE SURGERY     • TESTICLE SURGERY         Family History   Problem Relation Age of Onset   • Heart disease Father    • Hypertension Father    • Stroke Father    • Diabetes Sister    • Cancer Brother    • Heart disease Brother    • Hypertension Brother    • No Known Problems Maternal Grandmother    • No Known Problems Maternal Grandfather    • No Known Problems Paternal Grandmother    • No Known Problems Paternal Grandfather        Social History     Tobacco Use   • Smoking status: Never Smoker   • Smokeless tobacco: Current User     Types: Chew   • Tobacco comment: chewing tobacco since 4 years old   Substance Use Topics   • Alcohol use: No     Comment:  "caffeine use   • Drug use: No         ECG 12 Lead    Date/Time: 2/15/2022 4:36 PM  Performed by: Melissa Templeton MD  Authorized by: Melissa Templeton MD   Comparison: compared with previous ECG   Comparison to previous ECG: Rate has increased  Rhythm: sinus tachycardia  T inversion: I and aVL                 Objective:     Visit Vitals  /86 (BP Location: Left arm, Patient Position: Sitting, Cuff Size: Large Adult)   Pulse 120   Ht 175.3 cm (69\")   Wt 78.3 kg (172 lb 9.6 oz)   SpO2 99%   BMI 25.49 kg/m²         Constitutional:       Appearance: Normal appearance. Well-developed.   HENT:      Head: Normocephalic and atraumatic.   Neck:      Vascular: No carotid bruit or JVD.   Pulmonary:      Effort: Pulmonary effort is normal.      Breath sounds: Normal breath sounds.   Cardiovascular:      Tachycardia present. Regular rhythm.      Murmurs: There is a harsh midsystolic murmur at the URSB, radiating to the neck.      No gallop.   Pulses:     Radial: 2+ bilaterally.  Edema:     Peripheral edema absent.   Abdominal:      Palpations: Abdomen is soft.   Skin:     General: Skin is warm and dry.   Neurological:      Mental Status: Alert and oriented to person, place, and time.             Assessment:          Diagnosis Plan   1. Coronary artery disease involving native coronary artery of native heart without angina pectoris     2. Precordial pain     3. Nonrheumatic aortic valve stenosis     4. Hyperlipidemia, unspecified hyperlipidemia type     5. Orthostatic hypotension     6. Primary hypertension     7. Type 2 diabetes mellitus with other circulatory complication, without long-term current use of insulin ([facility])     8. Stage 3 chronic kidney disease, unspecified whether stage 3a or 3b CKD ([facility])     9. Chronic obstructive pulmonary disease, unspecified COPD type ([facility])            Plan:       1.  Chest pain/dyspnea on exertion.  Symptom worsening may be due to the discontinuation of carvedilol and isosorbide.  His wife " indicated previously that he was not receiving his medications more often than not due to low blood pressures.  I wonder if he was receiving at more often than she initially indicated.  EKG is otherwise unchanged except for sinus tachycardia.  I will resume him on isosorbide mononitrate at 30 mg daily and start him on metoprolol succinate 25 mg.  Hopefully he will not develop recurrent orthostatic hypotension now that he is on the midodrine.  I have also asked them to take these medications at nighttime in order to avoid issues with low blood pressures during the day.  2.  Coronary artery disease.  Plan as per #1.  Otherwise continue with remainder of his current medical management.  3.  Orthostatic hypotension.  Appears to have improved with midodrine.  Continue current dose for now.  4.  Aortic stenosis.  In the mild to moderate range now.  I do not feel that this is responsible for his hypotensive symptoms but may be in part responsible for his dyspnea and chest discomfort especially being off of any beta-blocker therapy.  5.  Hyperlipidemia.  On low-dose atorvastatin for goal LDL of 70 or below.  6.  COPD  7.  Diabetes mellitus type 2  8.  Chronic kidney disease    I will plan on seeing the patient back again in 2 months.

## 2022-04-04 RX ORDER — CLOPIDOGREL BISULFATE 75 MG/1
TABLET ORAL
Qty: 90 TABLET | Refills: 1 | Status: SHIPPED | OUTPATIENT
Start: 2022-04-04 | End: 2022-09-20

## 2022-04-04 NOTE — TELEPHONE ENCOUNTER
Rx Refill Note  Requested Prescriptions     Pending Prescriptions Disp Refills    clopidogrel (PLAVIX) 75 MG tablet [Pharmacy Med Name: CLOPIDOGREL 75 MG TABLET] 90 tablet      Sig: TAKE ONE TABLET BY MOUTH DAILY      Last office visit with prescribing clinician: 2/15/2022      Next office visit with prescribing clinician: Visit date not found            Rosaura Simon RN  04/04/22, 11:36 EDT

## 2022-04-15 ENCOUNTER — OFFICE VISIT (OUTPATIENT)
Dept: CARDIOLOGY | Facility: CLINIC | Age: 87
End: 2022-04-15

## 2022-04-15 ENCOUNTER — TELEPHONE (OUTPATIENT)
Dept: CARDIOLOGY | Facility: CLINIC | Age: 87
End: 2022-04-15

## 2022-04-15 VITALS
OXYGEN SATURATION: 99 % | DIASTOLIC BLOOD PRESSURE: 72 MMHG | HEART RATE: 105 BPM | SYSTOLIC BLOOD PRESSURE: 116 MMHG | BODY MASS INDEX: 26.75 KG/M2 | HEIGHT: 69 IN | WEIGHT: 180.6 LBS

## 2022-04-15 DIAGNOSIS — I35.0 NONRHEUMATIC AORTIC VALVE STENOSIS: ICD-10-CM

## 2022-04-15 DIAGNOSIS — N18.30 STAGE 3 CHRONIC KIDNEY DISEASE, UNSPECIFIED WHETHER STAGE 3A OR 3B CKD: ICD-10-CM

## 2022-04-15 DIAGNOSIS — I25.10 CORONARY ARTERY DISEASE INVOLVING NATIVE CORONARY ARTERY OF NATIVE HEART WITHOUT ANGINA PECTORIS: Primary | ICD-10-CM

## 2022-04-15 DIAGNOSIS — I10 PRIMARY HYPERTENSION: ICD-10-CM

## 2022-04-15 DIAGNOSIS — I95.1 ORTHOSTATIC HYPOTENSION: ICD-10-CM

## 2022-04-15 DIAGNOSIS — R55 SYNCOPE, UNSPECIFIED SYNCOPE TYPE: ICD-10-CM

## 2022-04-15 DIAGNOSIS — E78.5 HYPERLIPIDEMIA, UNSPECIFIED HYPERLIPIDEMIA TYPE: ICD-10-CM

## 2022-04-15 DIAGNOSIS — E11.59 TYPE 2 DIABETES MELLITUS WITH OTHER CIRCULATORY COMPLICATION, WITHOUT LONG-TERM CURRENT USE OF INSULIN: ICD-10-CM

## 2022-04-15 PROCEDURE — 93000 ELECTROCARDIOGRAM COMPLETE: CPT | Performed by: NURSE PRACTITIONER

## 2022-04-15 PROCEDURE — 99214 OFFICE O/P EST MOD 30 MIN: CPT | Performed by: NURSE PRACTITIONER

## 2022-04-15 RX ORDER — METOPROLOL SUCCINATE 25 MG/1
12.5 TABLET, EXTENDED RELEASE ORAL 2 TIMES DAILY
Qty: 30 TABLET | Refills: 5 | Status: SHIPPED | OUTPATIENT
Start: 2022-04-15 | End: 2022-07-29 | Stop reason: HOSPADM

## 2022-04-15 RX ORDER — MIDODRINE HYDROCHLORIDE 5 MG/1
5 TABLET ORAL
Qty: 90 TABLET | Refills: 2 | Status: SHIPPED | OUTPATIENT
Start: 2022-04-15 | End: 2022-10-20 | Stop reason: HOSPADM

## 2022-04-15 RX ORDER — RANOLAZINE 500 MG/1
500 TABLET, EXTENDED RELEASE ORAL 2 TIMES DAILY
Qty: 60 TABLET | Refills: 11 | Status: SHIPPED | OUTPATIENT
Start: 2022-04-15 | End: 2022-09-20

## 2022-04-15 NOTE — TELEPHONE ENCOUNTER
Pt. Orders were for a  3 month f/u appt.. Dr. Templeton first available follow up isn't until 09/01/2022 is this ok for pt to go this far out?    Thanks,  Antionette

## 2022-04-15 NOTE — PROGRESS NOTES
Date of Office Visit: 04/15/2022  Encounter Provider: BOOGIE Loomis  Place of Service: [facility] CARDIOLOGY  Patient Name: Yoni Bonner Jr.  :1933    Chief Complaint   Patient presents with   • Coronary Artery Disease   • Follow-up   :     HPI: Yoni Bonner Jr. is a 88 y.o. male who is a patient of Dr. Templeton and is new to me today.  She has a history of hypertension, bronchitis, dyslipidemia, tobacco abuse, coronary disease with previous inferior MI and drug-eluting stent placed to the proximal RCA.  In 2021 he came in with aching in the left chest that it resolved after 30 minutes.  He admitted to being under a lot of stress because of his wife being chronically ill.  He was treated medically and his symptoms improved.  He was put on isosorbide 30 twice a day.  Has follow-up in January of this year he was having a lot of falls.  Primarily occurred when he went from a sitting to standing position his blood pressures were low in the 90s systolic.  She his wife has been checking his blood pressure before giving his medicines and it has been low and for the most part he was not really getting any.  Because his blood pressure was too low.  So we recommended he stopped the isosorbide and the carvedilol and put him on midodrine.  Echocardiogram showed an EF of 56% moderate LVH moderate aortic stenosis with a mean gradient of 18 valve area of 1.1.  He came back to the office in February and was doing better.    He comes in today and he says he is feeling okay but he still getting dizzy and had a fall in the bathroom not too long ago.  He hit his chest wall and it is tender to touch.  It is the left chest under the left axilla.  Every once in a while he has to take a nitroglycerin sometimes it is a few days back to back sometimes it so once a month.  He uses a walker to get around.  He is frail.  Previous testing and notes have been reviewed by me.   Past Medical  History:   Diagnosis Date   • Bronchitis    • CAD (coronary artery disease)    • Carotid arterial disease (HCC)    • Chronic bronchitis (HCC)    • COPD (chronic obstructive pulmonary disease) (HCC)    • Gout    • Hyperlipidemia    • Hypertension    • Ischemic cardiomyopathy     resolved, EF was 60% in 2018   • Mild aortic stenosis    • Mitral regurgitation    • Precordial chest pain    • Rheumatic fever    • Syncopal episodes    • Tobacco use    • Type 2 diabetes mellitus (HCC)        Past Surgical History:   Procedure Laterality Date   • CARDIAC CATHETERIZATION     • CARDIAC CATHETERIZATION N/A 4/8/2018    Procedure: Left Heart Cath;  Surgeon: Nicolas Jerez MD;  [facility];  Service: Cardiovascular   • CARDIAC CATHETERIZATION  4/8/2018    Procedure: Percutaneous Manual Thrombectomy;  Surgeon: Nicolas Jerez MD;  [facility];  Service: Cardiovascular   • CARDIAC CATHETERIZATION N/A 4/8/2018    Procedure: Stent CARLENE coronary;  Surgeon: Nicolas Jerez MD;  [facility];  Service: Cardiovascular   • CARDIAC CATHETERIZATION N/A 4/8/2018    Procedure: Right Heart Cath;  Surgeon: Nicolas Jerez MD;  Location: [facility] CATH INVASIVE LOCATION;  Service: Cardiovascular   • CARDIAC CATHETERIZATION Left 2/21/2020    Procedure: Cardiac Catheterization/Vascular Study;  Surgeon: Alejandro Jenkins MD;  [facility];  Service: Cardiovascular;  Laterality: Left;   • CARDIAC CATHETERIZATION N/A 2/21/2020    Procedure: Coronary angiography;  Surgeon: Alejandro Jenkins MD;  Location: [facility] CATH INVASIVE LOCATION;  Service: Cardiovascular;  Laterality: N/A;   • EYE SURGERY      cataract surg   • HAND SURGERY     • HERNIA REPAIR     • KNEE SURGERY     • TESTICLE SURGERY         Social History     Socioeconomic History   • Marital status:    Tobacco Use   • Smoking status: Never Smoker   • Smokeless tobacco: Current User     Types: Chew    • Tobacco comment: chewing tobacco since 4 years old   Substance and Sexual Activity   • Alcohol use: No     Comment: caffeine use   • Drug use: No   • Sexual activity: Defer       Family History   Problem Relation Age of Onset   • Heart disease Father    • Hypertension Father    • Stroke Father    • Diabetes Sister    • Cancer Brother    • Heart disease Brother    • Hypertension Brother    • No Known Problems Maternal Grandmother    • No Known Problems Maternal Grandfather    • No Known Problems Paternal Grandmother    • No Known Problems Paternal Grandfather        Review of Systems   Constitutional: Negative for diaphoresis and malaise/fatigue.   Cardiovascular: Negative for chest pain, claudication, dyspnea on exertion, irregular heartbeat, leg swelling, near-syncope, orthopnea, palpitations, paroxysmal nocturnal dyspnea and syncope.        Chest pain pain   Respiratory: Negative for cough, shortness of breath and sleep disturbances due to breathing.    Musculoskeletal: Negative for falls.   Neurological: Positive for dizziness and weakness.   Psychiatric/Behavioral: Negative for altered mental status and substance abuse.       Allergies   Allergen Reactions   • No Known Drug Allergy Unknown (See Comments)     NKA         Current Outpatient Medications:   •  albuterol (PROVENTIL) (2.5 MG/3ML) 0.083% nebulizer solution, Take 2.5 mg by nebulization Every 4 (Four) Hours As Needed for Wheezing or Shortness of Air., Disp: , Rfl: 12  •  albuterol sulfate  (90 Base) MCG/ACT inhaler, Inhale 2 puffs Every 4 (Four) Hours As Needed for Wheezing., Disp: , Rfl:   •  aspirin 81 MG EC tablet, Take 1 tablet by mouth Daily., Disp: 30 tablet, Rfl: 5  •  atorvastatin (LIPITOR) 10 MG tablet, TAKE ONE TABLET BY MOUTH ONCE NIGHTLY, Disp: 90 tablet, Rfl: 2  •  clopidogrel (PLAVIX) 75 MG tablet, TAKE ONE TABLET BY MOUTH DAILY, Disp: 90 tablet, Rfl: 1  •  metoprolol succinate XL (TOPROL-XL) 25 MG 24 hr tablet, Take 0.5 tablets  "by mouth 2 (Two) Times a Day., Disp: 30 tablet, Rfl: 5  •  midodrine (PROAMATINE) 5 MG tablet, Take 1 tablet by mouth 3 (Three) Times a Day Before Meals., Disp: 90 tablet, Rfl: 2  •  nitroglycerin (NITROSTAT) 0.4 MG SL tablet, DISSOLVE 1 TAB UNDER TONGUE FOR CHEST PAIN - IF PAIN REMAINS AFTER 5 MIN, CALL 911 AND REPEAT DOSE. MAX 3 TABS IN 15 MINUTES, Disp: 25 tablet, Rfl: 4  •  ranolazine (Ranexa) 500 MG 12 hr tablet, Take 1 tablet by mouth 2 (Two) Times a Day., Disp: 60 tablet, Rfl: 11      Objective:     Vitals:    04/15/22 1304   BP: 116/72   BP Location: Left arm   Patient Position: Sitting   Pulse: 105   SpO2: 99%   Weight: 81.9 kg (180 lb 9.6 oz)   Height: 175.3 cm (69\")     Body mass index is 26.67 kg/m².    PHYSICAL EXAM:    Constitutional:       General: Not in acute distress.     Appearance: Normal appearance. Well-developed.   Eyes:      Pupils: Pupils are equal, round, and reactive to light.   HENT:      Head: Normocephalic.   Neck:      Vascular: No carotid bruit or JVD.   Pulmonary:      Effort: Pulmonary effort is normal. No tachypnea.      Breath sounds: Normal breath sounds. No wheezing. No rales.   Cardiovascular:      Normal rate. Regular rhythm.      No gallop.   Pulses:     Intact distal pulses.   Abdominal:      General: Bowel sounds are normal.      Palpations: Abdomen is soft.      Tenderness: There is no abdominal tenderness.   Musculoskeletal: Normal range of motion.      Cervical back: Normal range of motion and neck supple. No edema. Skin:     General: Skin is warm and dry.   Neurological:      Mental Status: Alert and oriented to person, place, and time.           ECG 12 Lead    Date/Time: 4/15/2022 1:10 PM  Performed by: Jessica Rice APRN  Authorized by: Jessica Rice APRN   Comparison: compared with previous ECG from 2/15/2021  Similar to previous ECG  Rhythm: sinus rhythm  Ectopy: unifocal PVCs and atrial premature contractions  Rate: normal  QRS axis: normal    Clinical " impression: abnormal EKG              Assessment:       Diagnosis Plan   1. Coronary artery disease involving native coronary artery of native heart without angina pectoris     2. Hyperlipidemia, unspecified hyperlipidemia type     3. Primary hypertension     4. Nonrheumatic aortic valve stenosis     5. Orthostatic hypotension     6. Type 2 diabetes mellitus with other circulatory complication, without long-term current use of insulin ([facility])     7. Stage 3 chronic kidney disease, unspecified whether stage 3a or 3b CKD ([facility])     8. Syncope, unspecified syncope type       Orders Placed This Encounter   Procedures   • ECG 12 Lead     This order was created via procedure documentation     Order Specific Question:   Release to patient     Answer:   Immediate          Plan:       I am can make some changes to his medicines he is orthostatic today standing blood pressure drops under 100 systolic.  I will get a stop his isosorbide and put him on Ranexa 500 mg twice a day.  I want to also have him split his metoprolol take 12 point 5 in the morning and 12 point 5 in the evening.  We will see if this helps bring his blood pressure up as well as increasing his midodrine to 3 times a day.  We will bring him back in about 3 months.         Your medication list          Accurate as of April 15, 2022  1:39 PM. If you have any questions, ask your nurse or doctor.            START taking these medications      Instructions Last Dose Given Next Dose Due   ranolazine 500 MG 12 hr tablet  Commonly known as: Ranexa  Started by: BOOGIE Loomis      Take 1 tablet by mouth 2 (Two) Times a Day.          CHANGE how you take these medications      Instructions Last Dose Given Next Dose Due   metoprolol succinate XL 25 MG 24 hr tablet  Commonly known as: TOPROL-XL  What changed:   · how much to take  · when to take this  Changed by: BOOGIE Loomis      Take 0.5 tablets by mouth 2 (Two) Times a Day.       midodrine 5 MG  tablet  Commonly known as: PROAMATINE  What changed: when to take this  Changed by: BOOGIE Loomis      Take 1 tablet by mouth 3 (Three) Times a Day Before Meals.          CONTINUE taking these medications      Instructions Last Dose Given Next Dose Due   albuterol sulfate  (90 Base) MCG/ACT inhaler  Commonly known as: PROVENTIL HFA;VENTOLIN HFA;PROAIR HFA      Inhale 2 puffs Every 4 (Four) Hours As Needed for Wheezing.       albuterol (2.5 MG/3ML) 0.083% nebulizer solution  Commonly known as: PROVENTIL      Take 2.5 mg by nebulization Every 4 (Four) Hours As Needed for Wheezing or Shortness of Air.       aspirin 81 MG EC tablet      Take 1 tablet by mouth Daily.       atorvastatin 10 MG tablet  Commonly known as: LIPITOR      TAKE ONE TABLET BY MOUTH ONCE NIGHTLY       clopidogrel 75 MG tablet  Commonly known as: PLAVIX      TAKE ONE TABLET BY MOUTH DAILY       nitroglycerin 0.4 MG SL tablet  Commonly known as: NITROSTAT      DISSOLVE 1 TAB UNDER TONGUE FOR CHEST PAIN - IF PAIN REMAINS AFTER 5 MIN, CALL 911 AND REPEAT DOSE. MAX 3 TABS IN 15 MINUTES          STOP taking these medications    isosorbide mononitrate 30 MG 24 hr tablet  Commonly known as: IMDUR  Stopped by: BOOGIE Loomis              Where to Get Your Medications      These medications were sent to [facility] - [address] [place] [address] AT [address] - [phone]  - [phone] [address] [address]    Phone: [phone]   · metoprolol succinate XL 25 MG 24 hr tablet  · midodrine 5 MG tablet  · ranolazine 500 MG 12 hr tablet           As always, it has been a pleasure to participate in your patient's care.      Sincerely,     Jessica HYMAN

## 2022-04-21 NOTE — TELEPHONE ENCOUNTER
I would just see if we can overbook the patient to see me in 3 months.  There were some medication changes made recently and I would like to follow up on them.

## 2022-07-27 ENCOUNTER — HOSPITAL ENCOUNTER (OUTPATIENT)
Facility: HOSPITAL | Age: 87
Setting detail: OBSERVATION
Discharge: HOME | End: 2022-07-29
Attending: EMERGENCY MEDICINE | Admitting: HOSPITALIST

## 2022-07-27 ENCOUNTER — APPOINTMENT (OUTPATIENT)
Dept: CT IMAGING | Facility: HOSPITAL | Age: 87
End: 2022-07-27

## 2022-07-27 ENCOUNTER — APPOINTMENT (OUTPATIENT)
Dept: GENERAL RADIOLOGY | Facility: HOSPITAL | Age: 87
End: 2022-07-27

## 2022-07-27 DIAGNOSIS — R55 SYNCOPE, UNSPECIFIED SYNCOPE TYPE: Primary | ICD-10-CM

## 2022-07-27 LAB
ALBUMIN SERPL-MCNC: 3.7 G/DL (ref 3.5–5.2)
ALBUMIN/GLOB SERPL: 1.6 G/DL
ALP SERPL-CCNC: 64 U/L (ref 39–117)
ALT SERPL W P-5'-P-CCNC: 10 U/L (ref 1–41)
ANION GAP SERPL CALCULATED.3IONS-SCNC: 14.4 MMOL/L (ref 5–15)
AST SERPL-CCNC: 14 U/L (ref 1–40)
BACTERIA UR QL AUTO: ABNORMAL /HPF
BASOPHILS # BLD AUTO: 0.04 10*3/MM3 (ref 0–0.2)
BASOPHILS NFR BLD AUTO: 0.5 % (ref 0–1.5)
BILIRUB SERPL-MCNC: 0.5 MG/DL (ref 0–1.2)
BILIRUB UR QL STRIP: NEGATIVE
BUN SERPL-MCNC: 17 MG/DL (ref 8–23)
BUN/CREAT SERPL: 13.9 (ref 7–25)
CALCIUM SPEC-SCNC: 8.7 MG/DL (ref 8.6–10.5)
CHLORIDE SERPL-SCNC: 92 MMOL/L (ref 98–107)
CLARITY UR: CLEAR
CO2 SERPL-SCNC: 24.6 MMOL/L (ref 22–29)
COLOR UR: YELLOW
CREAT SERPL-MCNC: 1.22 MG/DL (ref 0.76–1.27)
DEPRECATED RDW RBC AUTO: 42.4 FL (ref 37–54)
EGFRCR SERPLBLD CKD-EPI 2021: 56.7 ML/MIN/1.73
EOSINOPHIL # BLD AUTO: 0.2 10*3/MM3 (ref 0–0.4)
EOSINOPHIL NFR BLD AUTO: 2.7 % (ref 0.3–6.2)
ERYTHROCYTE [DISTWIDTH] IN BLOOD BY AUTOMATED COUNT: 13.1 % (ref 12.3–15.4)
GLOBULIN UR ELPH-MCNC: 2.3 GM/DL
GLUCOSE SERPL-MCNC: 107 MG/DL (ref 65–99)
GLUCOSE UR STRIP-MCNC: NEGATIVE MG/DL
HCT VFR BLD AUTO: 32.4 % (ref 37.5–51)
HGB BLD-MCNC: 10.7 G/DL (ref 13–17.7)
HGB UR QL STRIP.AUTO: NEGATIVE
HYALINE CASTS UR QL AUTO: ABNORMAL /LPF
IMM GRANULOCYTES # BLD AUTO: 0.02 10*3/MM3 (ref 0–0.05)
IMM GRANULOCYTES NFR BLD AUTO: 0.3 % (ref 0–0.5)
KETONES UR QL STRIP: NEGATIVE
LEUKOCYTE ESTERASE UR QL STRIP.AUTO: ABNORMAL
LYMPHOCYTES # BLD AUTO: 2.95 10*3/MM3 (ref 0.7–3.1)
LYMPHOCYTES NFR BLD AUTO: 40.4 % (ref 19.6–45.3)
MAGNESIUM SERPL-MCNC: 2 MG/DL (ref 1.6–2.4)
MCH RBC QN AUTO: 29 PG (ref 26.6–33)
MCHC RBC AUTO-ENTMCNC: 33 G/DL (ref 31.5–35.7)
MCV RBC AUTO: 87.8 FL (ref 79–97)
MONOCYTES # BLD AUTO: 0.52 10*3/MM3 (ref 0.1–0.9)
MONOCYTES NFR BLD AUTO: 7.1 % (ref 5–12)
NEUTROPHILS NFR BLD AUTO: 3.58 10*3/MM3 (ref 1.7–7)
NEUTROPHILS NFR BLD AUTO: 49 % (ref 42.7–76)
NITRITE UR QL STRIP: NEGATIVE
NRBC BLD AUTO-RTO: 0 /100 WBC (ref 0–0.2)
NT-PROBNP SERPL-MCNC: 2093 PG/ML (ref 0–1800)
PH UR STRIP.AUTO: 6.5 [PH] (ref 5–8)
PLATELET # BLD AUTO: 180 10*3/MM3 (ref 140–450)
PMV BLD AUTO: 9.9 FL (ref 6–12)
POTASSIUM SERPL-SCNC: 3.7 MMOL/L (ref 3.5–5.2)
PROT SERPL-MCNC: 6 G/DL (ref 6–8.5)
PROT UR QL STRIP: NEGATIVE
QT INTERVAL: 458 MS
RBC # BLD AUTO: 3.69 10*6/MM3 (ref 4.14–5.8)
RBC # UR STRIP: ABNORMAL /HPF
REF LAB TEST METHOD: ABNORMAL
SARS-COV-2 ORF1AB RESP QL NAA+PROBE: NOT DETECTED
SODIUM SERPL-SCNC: 131 MMOL/L (ref 136–145)
SP GR UR STRIP: 1.01 (ref 1–1.03)
SQUAMOUS #/AREA URNS HPF: ABNORMAL /HPF
TROPONIN T SERPL-MCNC: <0.01 NG/ML (ref 0–0.03)
TROPONIN T SERPL-MCNC: <0.01 NG/ML (ref 0–0.03)
UROBILINOGEN UR QL STRIP: ABNORMAL
WBC # UR STRIP: ABNORMAL /HPF
WBC NRBC COR # BLD: 7.31 10*3/MM3 (ref 3.4–10.8)

## 2022-07-27 PROCEDURE — C9803 HOPD COVID-19 SPEC COLLECT: HCPCS

## 2022-07-27 PROCEDURE — 81001 URINALYSIS AUTO W/SCOPE: CPT | Performed by: EMERGENCY MEDICINE

## 2022-07-27 PROCEDURE — 71045 X-RAY EXAM CHEST 1 VIEW: CPT

## 2022-07-27 PROCEDURE — 80053 COMPREHEN METABOLIC PANEL: CPT | Performed by: EMERGENCY MEDICINE

## 2022-07-27 PROCEDURE — 99285 EMERGENCY DEPT VISIT HI MDM: CPT

## 2022-07-27 PROCEDURE — G0378 HOSPITAL OBSERVATION PER HR: HCPCS

## 2022-07-27 PROCEDURE — 83735 ASSAY OF MAGNESIUM: CPT | Performed by: EMERGENCY MEDICINE

## 2022-07-27 PROCEDURE — 70450 CT HEAD/BRAIN W/O DYE: CPT

## 2022-07-27 PROCEDURE — 93005 ELECTROCARDIOGRAM TRACING: CPT | Performed by: EMERGENCY MEDICINE

## 2022-07-27 PROCEDURE — 84484 ASSAY OF TROPONIN QUANT: CPT | Performed by: EMERGENCY MEDICINE

## 2022-07-27 PROCEDURE — U0004 COV-19 TEST NON-CDC HGH THRU: HCPCS | Performed by: NURSE PRACTITIONER

## 2022-07-27 PROCEDURE — 85025 COMPLETE CBC W/AUTO DIFF WBC: CPT | Performed by: EMERGENCY MEDICINE

## 2022-07-27 PROCEDURE — 83880 ASSAY OF NATRIURETIC PEPTIDE: CPT | Performed by: EMERGENCY MEDICINE

## 2022-07-27 PROCEDURE — 93010 ELECTROCARDIOGRAM REPORT: CPT | Performed by: INTERNAL MEDICINE

## 2022-07-27 RX ORDER — ACETAMINOPHEN 650 MG/1
650 SUPPOSITORY RECTAL EVERY 4 HOURS PRN
Status: DISCONTINUED | OUTPATIENT
Start: 2022-07-27 | End: 2022-07-29 | Stop reason: HOSPADM

## 2022-07-27 RX ORDER — FUROSEMIDE 40 MG/1
40 TABLET ORAL 2 TIMES DAILY
Status: DISCONTINUED | OUTPATIENT
Start: 2022-07-27 | End: 2022-07-28

## 2022-07-27 RX ORDER — SODIUM CHLORIDE 0.9 % (FLUSH) 0.9 %
10 SYRINGE (ML) INJECTION AS NEEDED
Status: DISCONTINUED | OUTPATIENT
Start: 2022-07-27 | End: 2022-07-29 | Stop reason: HOSPADM

## 2022-07-27 RX ORDER — RANOLAZINE 500 MG/1
500 TABLET, EXTENDED RELEASE ORAL 2 TIMES DAILY
Status: DISCONTINUED | OUTPATIENT
Start: 2022-07-27 | End: 2022-07-29 | Stop reason: HOSPADM

## 2022-07-27 RX ORDER — CLOPIDOGREL BISULFATE 75 MG/1
75 TABLET ORAL DAILY
Status: DISCONTINUED | OUTPATIENT
Start: 2022-07-27 | End: 2022-07-29 | Stop reason: HOSPADM

## 2022-07-27 RX ORDER — SODIUM CHLORIDE 0.9 % (FLUSH) 0.9 %
10 SYRINGE (ML) INJECTION EVERY 12 HOURS SCHEDULED
Status: DISCONTINUED | OUTPATIENT
Start: 2022-07-27 | End: 2022-07-29 | Stop reason: HOSPADM

## 2022-07-27 RX ORDER — ACETAMINOPHEN 325 MG/1
650 TABLET ORAL EVERY 4 HOURS PRN
Status: DISCONTINUED | OUTPATIENT
Start: 2022-07-27 | End: 2022-07-29 | Stop reason: HOSPADM

## 2022-07-27 RX ORDER — METOPROLOL SUCCINATE 25 MG/1
12.5 TABLET, EXTENDED RELEASE ORAL 2 TIMES DAILY
Status: DISCONTINUED | OUTPATIENT
Start: 2022-07-27 | End: 2022-07-29

## 2022-07-27 RX ORDER — ASPIRIN 81 MG/1
81 TABLET ORAL DAILY
Status: DISCONTINUED | OUTPATIENT
Start: 2022-07-27 | End: 2022-07-29 | Stop reason: HOSPADM

## 2022-07-27 RX ORDER — ACETAMINOPHEN 160 MG/5ML
650 SOLUTION ORAL EVERY 4 HOURS PRN
Status: DISCONTINUED | OUTPATIENT
Start: 2022-07-27 | End: 2022-07-29 | Stop reason: HOSPADM

## 2022-07-27 RX ORDER — ONDANSETRON 4 MG/1
4 TABLET, ORALLY DISINTEGRATING ORAL EVERY 6 HOURS PRN
Status: ON HOLD | COMMUNITY
End: 2023-02-04

## 2022-07-27 RX ORDER — FUROSEMIDE 40 MG/1
40 TABLET ORAL 2 TIMES DAILY
COMMUNITY
Start: 2022-07-19 | End: 2022-07-29 | Stop reason: HOSPADM

## 2022-07-27 RX ORDER — ONDANSETRON 4 MG/1
4 TABLET, FILM COATED ORAL EVERY 6 HOURS PRN
Status: DISCONTINUED | OUTPATIENT
Start: 2022-07-27 | End: 2022-07-29 | Stop reason: HOSPADM

## 2022-07-27 RX ORDER — MIDODRINE HYDROCHLORIDE 5 MG/1
5 TABLET ORAL
Status: DISCONTINUED | OUTPATIENT
Start: 2022-07-27 | End: 2022-07-29 | Stop reason: HOSPADM

## 2022-07-27 RX ORDER — ONDANSETRON 2 MG/ML
4 INJECTION INTRAMUSCULAR; INTRAVENOUS EVERY 6 HOURS PRN
Status: DISCONTINUED | OUTPATIENT
Start: 2022-07-27 | End: 2022-07-29 | Stop reason: HOSPADM

## 2022-07-27 RX ORDER — NITROGLYCERIN 0.4 MG/1
0.4 TABLET SUBLINGUAL
Status: DISCONTINUED | OUTPATIENT
Start: 2022-07-27 | End: 2022-07-29 | Stop reason: HOSPADM

## 2022-07-27 RX ORDER — CEPHALEXIN 250 MG/1
250 CAPSULE ORAL 4 TIMES DAILY
COMMUNITY
Start: 2022-07-19 | End: 2022-07-29 | Stop reason: HOSPADM

## 2022-07-27 RX ORDER — ATORVASTATIN CALCIUM 10 MG/1
10 TABLET, FILM COATED ORAL NIGHTLY
Status: DISCONTINUED | OUTPATIENT
Start: 2022-07-27 | End: 2022-07-29 | Stop reason: HOSPADM

## 2022-07-27 RX ADMIN — ASPIRIN 81 MG: 81 TABLET, COATED ORAL at 18:30

## 2022-07-27 RX ADMIN — MIDODRINE HYDROCHLORIDE 5 MG: 5 TABLET ORAL at 21:20

## 2022-07-27 RX ADMIN — CLOPIDOGREL 75 MG: 75 TABLET, FILM COATED ORAL at 18:30

## 2022-07-27 RX ADMIN — Medication 10 ML: at 21:20

## 2022-07-27 RX ADMIN — METOPROLOL SUCCINATE 12.5 MG: 25 TABLET, EXTENDED RELEASE ORAL at 21:20

## 2022-07-27 RX ADMIN — FUROSEMIDE 40 MG: 40 TABLET ORAL at 21:20

## 2022-07-27 RX ADMIN — RANOLAZINE 500 MG: 500 TABLET, FILM COATED, EXTENDED RELEASE ORAL at 21:20

## 2022-07-27 RX ADMIN — ATORVASTATIN CALCIUM 10 MG: 10 TABLET, FILM COATED ORAL at 21:20

## 2022-07-28 ENCOUNTER — APPOINTMENT (OUTPATIENT)
Dept: CARDIOLOGY | Facility: HOSPITAL | Age: 87
End: 2022-07-28

## 2022-07-28 LAB
ANION GAP SERPL CALCULATED.3IONS-SCNC: 10 MMOL/L (ref 5–15)
AORTIC DIMENSIONLESS INDEX: 0.8 (DI)
BASOPHILS # BLD AUTO: 0.04 10*3/MM3 (ref 0–0.2)
BASOPHILS NFR BLD AUTO: 0.7 % (ref 0–1.5)
BH CV ECHO MEAS - AO MAX PG: 2.38 MMHG
BH CV ECHO MEAS - AO MEAN PG: 1.39 MMHG
BH CV ECHO MEAS - AO V2 MAX: 77.2 CM/SEC
BH CV ECHO MEAS - AO V2 VTI: 20.8 CM
BH CV ECHO MEAS - AVA(I,D): 2.5 CM2
BH CV ECHO MEAS - EDV(CUBED): 71.1 ML
BH CV ECHO MEAS - EDV(MOD-SP2): 101 ML
BH CV ECHO MEAS - EDV(MOD-SP4): 122 ML
BH CV ECHO MEAS - EF(MOD-BP): 47.7 %
BH CV ECHO MEAS - EF(MOD-SP2): 59.4 %
BH CV ECHO MEAS - EF(MOD-SP4): 39.3 %
BH CV ECHO MEAS - ESV(CUBED): 27.2 ML
BH CV ECHO MEAS - ESV(MOD-SP2): 41 ML
BH CV ECHO MEAS - ESV(MOD-SP4): 74 ML
BH CV ECHO MEAS - FS: 27.4 %
BH CV ECHO MEAS - IVS/LVPW: 0.9 CM
BH CV ECHO MEAS - IVSD: 1.01 CM
BH CV ECHO MEAS - LAT PEAK E' VEL: 5.6 CM/SEC
BH CV ECHO MEAS - LV DIASTOLIC VOL/BSA (35-75): 61.8 CM2
BH CV ECHO MEAS - LV MASS(C)D: 147.7 GRAMS
BH CV ECHO MEAS - LV MAX PG: 1.88 MMHG
BH CV ECHO MEAS - LV MEAN PG: 1.13 MMHG
BH CV ECHO MEAS - LV SYSTOLIC VOL/BSA (12-30): 37.5 CM2
BH CV ECHO MEAS - LV V1 MAX: 68.6 CM/SEC
BH CV ECHO MEAS - LV V1 VTI: 17.5 CM
BH CV ECHO MEAS - LVIDD: 4.1 CM
BH CV ECHO MEAS - LVIDS: 3 CM
BH CV ECHO MEAS - LVOT AREA: 3 CM2
BH CV ECHO MEAS - LVOT DIAM: 1.95 CM
BH CV ECHO MEAS - LVPWD: 1.13 CM
BH CV ECHO MEAS - MED PEAK E' VEL: 2.8 CM/SEC
BH CV ECHO MEAS - MV A MAX VEL: 124.7 CM/SEC
BH CV ECHO MEAS - MV DEC SLOPE: 221.2 CM/SEC2
BH CV ECHO MEAS - MV DEC TIME: 0.29 MSEC
BH CV ECHO MEAS - MV E MAX VEL: 60.8 CM/SEC
BH CV ECHO MEAS - MV E/A: 0.49
BH CV ECHO MEAS - MV MAX PG: 8.7 MMHG
BH CV ECHO MEAS - MV MEAN PG: 2.42 MMHG
BH CV ECHO MEAS - MV P1/2T: 108.7 MSEC
BH CV ECHO MEAS - MV V2 VTI: 43.6 CM
BH CV ECHO MEAS - MVA(P1/2T): 2.02 CM2
BH CV ECHO MEAS - MVA(VTI): 1.2 CM2
BH CV ECHO MEAS - RAP SYSTOLE: 3 MMHG
BH CV ECHO MEAS - SI(MOD-SP2): 30.4 ML/M2
BH CV ECHO MEAS - SI(MOD-SP4): 24.3 ML/M2
BH CV ECHO MEAS - SV(LVOT): 52.4 ML
BH CV ECHO MEAS - SV(MOD-SP2): 60 ML
BH CV ECHO MEAS - SV(MOD-SP4): 48 ML
BH CV ECHO MEAS - TAPSE (>1.6): 1.66 CM
BH CV ECHO MEASUREMENTS AVERAGE E/E' RATIO: 14.48
BH CV XLRA - RV BASE: 2.9 CM
BH CV XLRA - TDI S': 8 CM/SEC
BH CV XLRA MEAS LEFT DIST CCA EDV: -12.6 CM/SEC
BH CV XLRA MEAS LEFT DIST CCA PSV: -73 CM/SEC
BH CV XLRA MEAS LEFT DIST ICA EDV: -22.7 CM/SEC
BH CV XLRA MEAS LEFT DIST ICA PSV: -87.8 CM/SEC
BH CV XLRA MEAS LEFT ICA/CCA RATIO: 3.29
BH CV XLRA MEAS LEFT MID ICA EDV: -19.1 CM/SEC
BH CV XLRA MEAS LEFT MID ICA PSV: -89.2 CM/SEC
BH CV XLRA MEAS LEFT PROX CCA EDV: -11.5 CM/SEC
BH CV XLRA MEAS LEFT PROX CCA PSV: -70.2 CM/SEC
BH CV XLRA MEAS LEFT PROX ECA PSV: -46.1 CM/SEC
BH CV XLRA MEAS LEFT PROX ICA EDV: -36.4 CM/SEC
BH CV XLRA MEAS LEFT PROX ICA PSV: -239.9 CM/SEC
BH CV XLRA MEAS LEFT PROX SCLA PSV: 71.1 CM/SEC
BH CV XLRA MEAS LEFT VERTEBRAL A EDV: 12.1 CM/SEC
BH CV XLRA MEAS LEFT VERTEBRAL A PSV: 60.4 CM/SEC
BH CV XLRA MEAS RIGHT DIST CCA EDV: 8.8 CM/SEC
BH CV XLRA MEAS RIGHT DIST CCA PSV: 38.6 CM/SEC
BH CV XLRA MEAS RIGHT DIST ICA EDV: -13.1 CM/SEC
BH CV XLRA MEAS RIGHT DIST ICA PSV: -74.2 CM/SEC
BH CV XLRA MEAS RIGHT ICA/CCA RATIO: 9.27
BH CV XLRA MEAS RIGHT MID ICA EDV: -9.9 CM/SEC
BH CV XLRA MEAS RIGHT MID ICA PSV: -92.2 CM/SEC
BH CV XLRA MEAS RIGHT PROX CCA EDV: 7.9 CM/SEC
BH CV XLRA MEAS RIGHT PROX CCA PSV: 36.4 CM/SEC
BH CV XLRA MEAS RIGHT PROX ECA PSV: 81.2 CM/SEC
BH CV XLRA MEAS RIGHT PROX ICA EDV: -74.6 CM/SEC
BH CV XLRA MEAS RIGHT PROX ICA PSV: -357.8 CM/SEC
BH CV XLRA MEAS RIGHT PROX SCLA PSV: 83.4 CM/SEC
BH CV XLRA MEAS RIGHT VERTEBRAL A EDV: -9.3 CM/SEC
BH CV XLRA MEAS RIGHT VERTEBRAL A PSV: -53.8 CM/SEC
BUN SERPL-MCNC: 15 MG/DL (ref 8–23)
BUN/CREAT SERPL: 12.5 (ref 7–25)
CALCIUM SPEC-SCNC: 8.7 MG/DL (ref 8.6–10.5)
CHLORIDE SERPL-SCNC: 95 MMOL/L (ref 98–107)
CO2 SERPL-SCNC: 27 MMOL/L (ref 22–29)
CREAT SERPL-MCNC: 1.2 MG/DL (ref 0.76–1.27)
DEPRECATED RDW RBC AUTO: 40.6 FL (ref 37–54)
EGFRCR SERPLBLD CKD-EPI 2021: 57.8 ML/MIN/1.73
EOSINOPHIL # BLD AUTO: 0.17 10*3/MM3 (ref 0–0.4)
EOSINOPHIL NFR BLD AUTO: 3 % (ref 0.3–6.2)
ERYTHROCYTE [DISTWIDTH] IN BLOOD BY AUTOMATED COUNT: 13 % (ref 12.3–15.4)
GLUCOSE SERPL-MCNC: 91 MG/DL (ref 65–99)
HCT VFR BLD AUTO: 33 % (ref 37.5–51)
HGB BLD-MCNC: 10.8 G/DL (ref 13–17.7)
IMM GRANULOCYTES # BLD AUTO: 0.02 10*3/MM3 (ref 0–0.05)
IMM GRANULOCYTES NFR BLD AUTO: 0.4 % (ref 0–0.5)
LEFT ATRIUM VOLUME INDEX: 21.3 ML/M2
LV EF 2D ECHO EST: 50 %
LYMPHOCYTES # BLD AUTO: 2.12 10*3/MM3 (ref 0.7–3.1)
LYMPHOCYTES NFR BLD AUTO: 37.6 % (ref 19.6–45.3)
MAXIMAL PREDICTED HEART RATE: 131 BPM
MAXIMAL PREDICTED HEART RATE: 131 BPM
MCH RBC QN AUTO: 28.6 PG (ref 26.6–33)
MCHC RBC AUTO-ENTMCNC: 32.7 G/DL (ref 31.5–35.7)
MCV RBC AUTO: 87.3 FL (ref 79–97)
MONOCYTES # BLD AUTO: 0.51 10*3/MM3 (ref 0.1–0.9)
MONOCYTES NFR BLD AUTO: 9 % (ref 5–12)
NEUTROPHILS NFR BLD AUTO: 2.78 10*3/MM3 (ref 1.7–7)
NEUTROPHILS NFR BLD AUTO: 49.3 % (ref 42.7–76)
NRBC BLD AUTO-RTO: 0 /100 WBC (ref 0–0.2)
PLATELET # BLD AUTO: 185 10*3/MM3 (ref 140–450)
PMV BLD AUTO: 10.3 FL (ref 6–12)
POTASSIUM SERPL-SCNC: 3.8 MMOL/L (ref 3.5–5.2)
RBC # BLD AUTO: 3.78 10*6/MM3 (ref 4.14–5.8)
SINUS: 3.3 CM
SODIUM SERPL-SCNC: 132 MMOL/L (ref 136–145)
STRESS TARGET HR: 111 BPM
STRESS TARGET HR: 111 BPM
WBC NRBC COR # BLD: 5.64 10*3/MM3 (ref 3.4–10.8)

## 2022-07-28 PROCEDURE — G0378 HOSPITAL OBSERVATION PER HR: HCPCS

## 2022-07-28 PROCEDURE — 80048 BASIC METABOLIC PNL TOTAL CA: CPT | Performed by: HOSPITALIST

## 2022-07-28 PROCEDURE — 99214 OFFICE O/P EST MOD 30 MIN: CPT | Performed by: NURSE PRACTITIONER

## 2022-07-28 PROCEDURE — 25010000002 FUROSEMIDE PER 20 MG: Performed by: NURSE PRACTITIONER

## 2022-07-28 PROCEDURE — 93306 TTE W/DOPPLER COMPLETE: CPT

## 2022-07-28 PROCEDURE — 85025 COMPLETE CBC W/AUTO DIFF WBC: CPT | Performed by: HOSPITALIST

## 2022-07-28 PROCEDURE — 25010000002 PERFLUTREN (DEFINITY) 8.476 MG IN SODIUM CHLORIDE (PF) 0.9 % 10 ML INJECTION: Performed by: HOSPITALIST

## 2022-07-28 PROCEDURE — 96374 THER/PROPH/DIAG INJ IV PUSH: CPT

## 2022-07-28 PROCEDURE — 93880 EXTRACRANIAL BILAT STUDY: CPT

## 2022-07-28 PROCEDURE — 93306 TTE W/DOPPLER COMPLETE: CPT | Performed by: INTERNAL MEDICINE

## 2022-07-28 RX ORDER — FUROSEMIDE 10 MG/ML
40 INJECTION INTRAMUSCULAR; INTRAVENOUS ONCE
Status: COMPLETED | OUTPATIENT
Start: 2022-07-28 | End: 2022-07-28

## 2022-07-28 RX ORDER — FUROSEMIDE 40 MG/1
40 TABLET ORAL
Status: DISCONTINUED | OUTPATIENT
Start: 2022-07-28 | End: 2022-07-29

## 2022-07-28 RX ADMIN — ATORVASTATIN CALCIUM 10 MG: 10 TABLET, FILM COATED ORAL at 21:31

## 2022-07-28 RX ADMIN — FUROSEMIDE 40 MG: 10 INJECTION, SOLUTION INTRAMUSCULAR; INTRAVENOUS at 16:14

## 2022-07-28 RX ADMIN — PERFLUTREN 2 ML: 6.52 INJECTION, SUSPENSION INTRAVENOUS at 11:30

## 2022-07-28 RX ADMIN — CLOPIDOGREL 75 MG: 75 TABLET, FILM COATED ORAL at 12:46

## 2022-07-28 RX ADMIN — RANOLAZINE 500 MG: 500 TABLET, FILM COATED, EXTENDED RELEASE ORAL at 21:31

## 2022-07-28 RX ADMIN — RANOLAZINE 500 MG: 500 TABLET, FILM COATED, EXTENDED RELEASE ORAL at 12:47

## 2022-07-28 RX ADMIN — Medication 10 ML: at 21:31

## 2022-07-28 RX ADMIN — MIDODRINE HYDROCHLORIDE 5 MG: 5 TABLET ORAL at 12:48

## 2022-07-28 RX ADMIN — ASPIRIN 81 MG: 81 TABLET, COATED ORAL at 12:47

## 2022-07-28 RX ADMIN — METOPROLOL SUCCINATE 12.5 MG: 25 TABLET, EXTENDED RELEASE ORAL at 12:46

## 2022-07-28 RX ADMIN — MIDODRINE HYDROCHLORIDE 5 MG: 5 TABLET ORAL at 17:28

## 2022-07-28 RX ADMIN — Medication 10 ML: at 14:22

## 2022-07-29 ENCOUNTER — READMISSION MANAGEMENT (OUTPATIENT)
Dept: CALL CENTER | Facility: HOSPITAL | Age: 87
End: 2022-07-29

## 2022-07-29 VITALS
HEART RATE: 59 BPM | BODY MASS INDEX: 26.66 KG/M2 | RESPIRATION RATE: 16 BRPM | WEIGHT: 180 LBS | HEIGHT: 69 IN | DIASTOLIC BLOOD PRESSURE: 59 MMHG | TEMPERATURE: 98.1 F | SYSTOLIC BLOOD PRESSURE: 105 MMHG | OXYGEN SATURATION: 97 %

## 2022-07-29 PROCEDURE — G0378 HOSPITAL OBSERVATION PER HR: HCPCS

## 2022-07-29 PROCEDURE — 99214 OFFICE O/P EST MOD 30 MIN: CPT | Performed by: INTERNAL MEDICINE

## 2022-07-29 RX ORDER — FUROSEMIDE 40 MG/1
40 TABLET ORAL DAILY
Qty: 30 TABLET | Refills: 0 | Status: SHIPPED | OUTPATIENT
Start: 2022-07-30 | End: 2022-09-20

## 2022-07-29 RX ORDER — METOPROLOL SUCCINATE 25 MG/1
12.5 TABLET, EXTENDED RELEASE ORAL
Qty: 15 TABLET | Refills: 0 | Status: SHIPPED | OUTPATIENT
Start: 2022-07-30 | End: 2022-09-28 | Stop reason: HOSPADM

## 2022-07-29 RX ORDER — METOPROLOL SUCCINATE 25 MG/1
12.5 TABLET, EXTENDED RELEASE ORAL
Status: DISCONTINUED | OUTPATIENT
Start: 2022-07-30 | End: 2022-07-29 | Stop reason: HOSPADM

## 2022-07-29 RX ORDER — FUROSEMIDE 40 MG/1
40 TABLET ORAL DAILY
Status: DISCONTINUED | OUTPATIENT
Start: 2022-07-30 | End: 2022-07-29 | Stop reason: HOSPADM

## 2022-07-29 RX ADMIN — MIDODRINE HYDROCHLORIDE 5 MG: 5 TABLET ORAL at 09:32

## 2022-07-29 RX ADMIN — CLOPIDOGREL 75 MG: 75 TABLET, FILM COATED ORAL at 09:32

## 2022-07-29 RX ADMIN — Medication 10 ML: at 09:33

## 2022-07-29 RX ADMIN — ASPIRIN 81 MG: 81 TABLET, COATED ORAL at 09:32

## 2022-07-29 RX ADMIN — RANOLAZINE 500 MG: 500 TABLET, FILM COATED, EXTENDED RELEASE ORAL at 09:32

## 2022-07-30 NOTE — OUTREACH NOTE
Prep Survey    Flowsheet Row Responses   [facility] patient discharged from? [facility]   Is LACE score < 7 ? No   Emergency Room discharge w/ pulse ox? No   Eligibility Readm Mgmt   Discharge diagnosis Episode of syncope    Does the patient have one of the following disease processes/diagnoses(primary or secondary)? Other   Does the patient have Home health ordered? No   Is there a DME ordered? No   General alerts for this patient [place]   Prep survey completed? Yes          PATTIE ROMERO - Registered Nurse

## 2022-08-03 ENCOUNTER — READMISSION MANAGEMENT (OUTPATIENT)
Dept: CALL CENTER | Facility: HOSPITAL | Age: 87
End: 2022-08-03

## 2022-08-03 NOTE — OUTREACH NOTE
Medical Week 1 Survey    Flowsheet Row Responses   [facility] patient discharged from? [facility]   Does the patient have one of the following disease processes/diagnoses(primary or secondary)? Other   Week 1 attempt successful? No   Unsuccessful attempts Attempt 1          BERTO FITZPATRICK - Registered Nurse   Patient c/o HTN (194/135 at home). Patient c/o headache, denies any chest pain. Patient reports he ran out of his amlodipine and has not taken it x2days. Patient reports blurred vision earlier which currently subsided. Hx. HTN.

## 2022-08-09 ENCOUNTER — READMISSION MANAGEMENT (OUTPATIENT)
Dept: CALL CENTER | Facility: HOSPITAL | Age: 87
End: 2022-08-09

## 2022-08-09 NOTE — OUTREACH NOTE
Medical Week 2 Survey    Flowsheet Row Responses   [facility] patient discharged from? [place]   Does the patient have one of the following disease processes/diagnoses(primary or secondary)? Other   Week 2 attempt successful? Yes   Call start time 1252   Unsuccessful attempts Attempt 1   Discharge diagnosis Episode of syncope    Rescheduled Rescheduled-pt requested   Is patient permission given to speak with other caregiver? Yes   Person spoke with today (if not patient) and relationship Kory JACKSON - Registered Nurse

## 2022-08-10 NOTE — ED PROVIDER NOTES
EMERGENCY DEPARTMENT ENCOUNTER    Room Number:  127/1  Date seen:  8/10/2022  Time seen: 13:19 EDT  PCP: Patric Cameron DO  Historian: Patient    HPI:  Chief complaint:Syncope  A complete HPI/ROS/PMH/PSH/SH/FH are unobtainable due to: vague historian  Context:Yoni Bonner Jr. is a 89 y.o. male with a PHM of htn, copd, CAD and cardiomyopathy who presents to the ED with c/o syncopal episode that occurred at home this morning. Patient unsure how long he was uncoinscious for, states this does not happen frequently. Denies any chest pain, soa or post ictal symptoms as far as he is aware. 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   • Episode of syncope 04/16/2018   • Type 2 diabetes mellitus with circulatory disorder, without long-term current use of insulin ([facility]) 04/27/2015   • Coronary artery disease involving native coronary artery of native heart without angina pectoris 04/16/2018   • Chest pain 12/06/2018   • CKD (chronic kidney disease) stage 3, GFR 30-59 ml/min ([facility]) 01/06/2019   • Nonrheumatic aortic valve stenosis 03/20/2019   • COPD (chronic obstructive pulmonary disease) ([facility]) 07/24/2019   • HLD (hyperlipidemia) 07/25/2019   • Contusion of right leg 05/19/2021   • Knee pain 02/11/2020   • Primary osteoarthritis of left knee 02/11/2020   • Orthostatic hypotension 01/13/2022     Resolved Ambulatory Problems     Diagnosis Date  Noted   • Acute ST elevation myocardial infarction (STEMI) of inferior wall (HCC) 04/08/2018   • Contusion of left knee 01/09/2016   • Left shoulder pain 04/13/2016   • Postoperative anemia 03/29/2017   • Tobacco abuse 04/16/2018   • History of coronary artery stent placement 04/16/2018   • Chronic systolic congestive heart failure (HCC) 04/16/2018   • Ischemic cardiomyopathy 05/08/2018   • Exertional chest pain 07/18/2018   • Acute bronchitis due to Rhinovirus 01/09/2019   • Elevated troponin 07/25/2019     Past Medical History:   Diagnosis Date   • Bronchitis    • CAD (coronary artery disease)    • Carotid arterial disease (HCC)    • Chronic bronchitis (HCC)    • Gout    • Hyperlipidemia    • Hypertension    • Mild aortic stenosis    • Mitral regurgitation    • Precordial chest pain    • Rheumatic fever    • Syncopal episodes    • Tobacco use    • Type 2 diabetes mellitus ([facility])        PAST SURGICAL HISTORY  Past Surgical History:   Procedure Laterality Date   • CARDIAC CATHETERIZATION     • CARDIAC CATHETERIZATION N/A 4/8/2018    Procedure: Left Heart Cath;  Surgeon: Nicolas Jerez MD;  Location: [facility] CATH INVASIVE LOCATION;  Service: Cardiovascular   • CARDIAC CATHETERIZATION  4/8/2018    Procedure: Percutaneous Manual Thrombectomy;  Surgeon: Nicolas Jerez MD;  Location: [facility] CATH INVASIVE LOCATION;  Service: Cardiovascular   • CARDIAC CATHETERIZATION N/A 4/8/2018    Procedure: Stent CARLENE coronary;  Surgeon: Nicolas Jerez MD;  Location: [facility] CATH INVASIVE LOCATION;  Service: Cardiovascular   • CARDIAC CATHETERIZATION N/A 4/8/2018    Procedure: Right Heart Cath;  Surgeon: Nicolas Jerez MD;  Location: [facility] CATH INVASIVE LOCATION;  Service: Cardiovascular   • CARDIAC CATHETERIZATION Left 2/21/2020    Procedure: Cardiac Catheterization/Vascular Study;  Surgeon: Alejandro Jenkins MD;  Location: [facility] CATH INVASIVE LOCATION;  Service: Cardiovascular;  Laterality: Left;   • CARDIAC CATHETERIZATION N/A 2/21/2020     Procedure: Coronary angiography;  Surgeon: Alejandro Jenkins MD;  Location: [facility] INVASIVE LOCATION;  Service: Cardiovascular;  Laterality: N/A;   • EYE SURGERY      cataract surg   • HAND SURGERY     • HERNIA REPAIR     • KNEE SURGERY     • TESTICLE SURGERY         FAMILY HISTORY  Family History   Problem Relation Age of Onset   • Heart disease Father    • Hypertension Father    • Stroke Father    • Diabetes Sister    • Cancer Brother    • Heart disease Brother    • Hypertension Brother    • No Known Problems Maternal Grandmother    • No Known Problems Maternal Grandfather    • No Known Problems Paternal Grandmother    • No Known Problems Paternal Grandfather        SOCIAL HISTORY  Social History     Socioeconomic History   • Marital status:    Tobacco Use   • Smoking status: Never Smoker   • Smokeless tobacco: Current User     Types: Chew   • Tobacco comment: chewing tobacco since 4 years old   Substance and Sexual Activity   • Alcohol use: No     Comment: caffeine use   • Drug use: No   • Sexual activity: Defer       REVIEW OF SYSTEMS  Review of Systems    All systems reviewed and negative except for those discussed in HPI.     PHYSICAL EXAM    ED Triage Vitals   Temp Heart Rate Resp BP SpO2   07/27/22 0523 07/27/22 0523 07/27/22 0523 07/27/22 0523 07/27/22 0523   97.5 °F (36.4 °C) 59 18 157/83 97 %      Temp src Heart Rate Source Patient Position BP Location FiO2 (%)   07/27/22 0523 07/27/22 0523 07/27/22 1532 07/27/22 1532 --   Tympanic Monitor Lying Left arm      Physical Exam    I have reviewed the triage vital signs and nursing notes.      GENERAL: Alert & oriented x 4, frail appearing,not distressed  HENT: nares patent, atraumatic, no epistaxis  EYES: no scleral icterus, no injection  NECK: no ROM limitations, no overt JVD  CV: regular rhythm, regular rate, no murmur  RESPIRATORY: normal effort, clear to auscultation to all fields bilaterally  ABDOMEN: soft and nontender, atraumatic, no reboud  or guarding  : deferred  MUSCULOSKELETAL: no gross deformity appreciated, able to move extremities w/o pain  NEURO: alert, moves all extremities, follows commands  SKIN: warm, dry and intact    LAB RESULTS  Recent Results (from the past 504 hour(s))   Comprehensive Metabolic Panel    Collection Time: 07/27/22  5:39 AM    Specimen: Arm, Left; Blood   Result Value Ref Range    Glucose 107 (H) 65 - 99 mg/dL    BUN 17 8 - 23 mg/dL    Creatinine 1.22 0.76 - 1.27 mg/dL    Sodium 131 (L) 136 - 145 mmol/L    Potassium 3.7 3.5 - 5.2 mmol/L    Chloride 92 (L) 98 - 107 mmol/L    CO2 24.6 22.0 - 29.0 mmol/L    Calcium 8.7 8.6 - 10.5 mg/dL    Total Protein 6.0 6.0 - 8.5 g/dL    Albumin 3.70 3.50 - 5.20 g/dL    ALT (SGPT) 10 1 - 41 U/L    AST (SGOT) 14 1 - 40 U/L    Alkaline Phosphatase 64 39 - 117 U/L    Total Bilirubin 0.5 0.0 - 1.2 mg/dL    Globulin 2.3 gm/dL    A/G Ratio 1.6 g/dL    BUN/Creatinine Ratio 13.9 7.0 - 25.0    Anion Gap 14.4 5.0 - 15.0 mmol/L    eGFR 56.7 (L) >60.0 mL/min/1.73   Troponin    Collection Time: 07/27/22  5:39 AM    Specimen: Arm, Left; Blood   Result Value Ref Range    Troponin T <0.010 0.000 - 0.030 ng/mL   BNP    Collection Time: 07/27/22  5:39 AM    Specimen: Arm, Left; Blood   Result Value Ref Range    proBNP 2,093.0 (H) 0.0 - 1,800.0 pg/mL   Magnesium    Collection Time: 07/27/22  5:39 AM    Specimen: Arm, Left; Blood   Result Value Ref Range    Magnesium 2.0 1.6 - 2.4 mg/dL   CBC Auto Differential    Collection Time: 07/27/22  5:39 AM    Specimen: Arm, Left; Blood   Result Value Ref Range    WBC 7.31 3.40 - 10.80 10*3/mm3    RBC 3.69 (L) 4.14 - 5.80 10*6/mm3    Hemoglobin 10.7 (L) 13.0 - 17.7 g/dL    Hematocrit 32.4 (L) 37.5 - 51.0 %    MCV 87.8 79.0 - 97.0 fL    MCH 29.0 26.6 - 33.0 pg    MCHC 33.0 31.5 - 35.7 g/dL    RDW 13.1 12.3 - 15.4 %    RDW-SD 42.4 37.0 - 54.0 fl    MPV 9.9 6.0 - 12.0 fL    Platelets 180 140 - 450 10*3/mm3    Neutrophil % 49.0 42.7 - 76.0 %    Lymphocyte % 40.4 19.6 -  45.3 %    Monocyte % 7.1 5.0 - 12.0 %    Eosinophil % 2.7 0.3 - 6.2 %    Basophil % 0.5 0.0 - 1.5 %    Immature Grans % 0.3 0.0 - 0.5 %    Neutrophils, Absolute 3.58 1.70 - 7.00 10*3/mm3    Lymphocytes, Absolute 2.95 0.70 - 3.10 10*3/mm3    Monocytes, Absolute 0.52 0.10 - 0.90 10*3/mm3    Eosinophils, Absolute 0.20 0.00 - 0.40 10*3/mm3    Basophils, Absolute 0.04 0.00 - 0.20 10*3/mm3    Immature Grans, Absolute 0.02 0.00 - 0.05 10*3/mm3    nRBC 0.0 0.0 - 0.2 /100 WBC   ECG 12 Lead    Collection Time: 07/27/22  5:58 AM   Result Value Ref Range    QT Interval 458 ms   Troponin    Collection Time: 07/27/22  7:40 AM    Specimen: Blood   Result Value Ref Range    Troponin T <0.010 0.000 - 0.030 ng/mL   Urinalysis With Microscopic If Indicated (No Culture) - Urine, Clean Catch    Collection Time: 07/27/22  7:42 AM    Specimen: Urine, Clean Catch   Result Value Ref Range    Color, UA Yellow Yellow, Straw    Appearance, UA Clear Clear    pH, UA 6.5 5.0 - 8.0    Specific Gravity, UA 1.009 1.005 - 1.030    Glucose, UA Negative Negative    Ketones, UA Negative Negative    Bilirubin, UA Negative Negative    Blood, UA Negative Negative    Protein, UA Negative Negative    Leuk Esterase, UA Moderate (2+) (A) Negative    Nitrite, UA Negative Negative    Urobilinogen, UA 1.0 E.U./dL 0.2 - 1.0 E.U./dL   Urinalysis, Microscopic Only - Urine, Clean Catch    Collection Time: 07/27/22  7:42 AM    Specimen: Urine, Clean Catch   Result Value Ref Range    RBC, UA 0-2 None Seen, 0-2 /HPF    WBC, UA 6-12 (A) None Seen, 0-2 /HPF    Bacteria, UA None Seen None Seen /HPF    Squamous Epithelial Cells, UA 0-2 None Seen, 0-2 /HPF    Hyaline Casts, UA 0-2 None Seen /LPF    Methodology Automated Microscopy    COVID-19,APTIMA PANTHER(VLADISLAV),[facility]FT[facility]/ STEVE, NP/OP SWAB IN UTM/VTM/SALINE TRANSPORT MEDIA,24 HR TAT - Swab, Nasopharynx    Collection Time: 07/27/22  9:46 AM    Specimen: Nasopharynx; Swab   Result Value Ref Range    COVID19 Not Detected Not  Detected - Ref. Range   Basic Metabolic Panel    Collection Time: 07/28/22  4:45 AM    Specimen: Blood   Result Value Ref Range    Glucose 91 65 - 99 mg/dL    BUN 15 8 - 23 mg/dL    Creatinine 1.20 0.76 - 1.27 mg/dL    Sodium 132 (L) 136 - 145 mmol/L    Potassium 3.8 3.5 - 5.2 mmol/L    Chloride 95 (L) 98 - 107 mmol/L    CO2 27.0 22.0 - 29.0 mmol/L    Calcium 8.7 8.6 - 10.5 mg/dL    BUN/Creatinine Ratio 12.5 7.0 - 25.0    Anion Gap 10.0 5.0 - 15.0 mmol/L    eGFR 57.8 (L) >60.0 mL/min/1.73   CBC Auto Differential    Collection Time: 07/28/22  4:45 AM    Specimen: Blood   Result Value Ref Range    WBC 5.64 3.40 - 10.80 10*3/mm3    RBC 3.78 (L) 4.14 - 5.80 10*6/mm3    Hemoglobin 10.8 (L) 13.0 - 17.7 g/dL    Hematocrit 33.0 (L) 37.5 - 51.0 %    MCV 87.3 79.0 - 97.0 fL    MCH 28.6 26.6 - 33.0 pg    MCHC 32.7 31.5 - 35.7 g/dL    RDW 13.0 12.3 - 15.4 %    RDW-SD 40.6 37.0 - 54.0 fl    MPV 10.3 6.0 - 12.0 fL    Platelets 185 140 - 450 10*3/mm3    Neutrophil % 49.3 42.7 - 76.0 %    Lymphocyte % 37.6 19.6 - 45.3 %    Monocyte % 9.0 5.0 - 12.0 %    Eosinophil % 3.0 0.3 - 6.2 %    Basophil % 0.7 0.0 - 1.5 %    Immature Grans % 0.4 0.0 - 0.5 %    Neutrophils, Absolute 2.78 1.70 - 7.00 10*3/mm3    Lymphocytes, Absolute 2.12 0.70 - 3.10 10*3/mm3    Monocytes, Absolute 0.51 0.10 - 0.90 10*3/mm3    Eosinophils, Absolute 0.17 0.00 - 0.40 10*3/mm3    Basophils, Absolute 0.04 0.00 - 0.20 10*3/mm3    Immature Grans, Absolute 0.02 0.00 - 0.05 10*3/mm3    nRBC 0.0 0.0 - 0.2 /100 WBC   Duplex Carotid Ultrasound CAR    Collection Time: 07/28/22 11:00 AM   Result Value Ref Range    Target HR (85%) 111 bpm    Max. Pred. HR (100%) 131 bpm    Prox CCA PSV 36.4 cm/sec    Prox CCA EDV 7.9 cm/sec    Dist CCA PSV 38.6 cm/sec    Dist CCA EDV 8.8 cm/sec    Prox ECA PSV 81.2 cm/sec    Prox ICA PSV -357.8 cm/sec    Prox ICA EDV -74.6 cm/sec    Mid ICA PSV -92.2 cm/sec    Mid ICA EDV -9.9 cm/sec    Dist ICA PSV -74.2 cm/sec    Dist ICA EDV -13.1 cm/sec     Vertebral A PSV -53.8 cm/sec    Vertebral A EDV -9.3 cm/sec    ICA/CCA ratio 9.27     Prox SCLA PSV 83.4 cm/sec    Prox CCA PSV -70.2 cm/sec    Prox CCA EDV -11.5 cm/sec    Dist CCA PSV -73.0 cm/sec    Dist CCA EDV -12.6 cm/sec    Prox ECA PSV -46.1 cm/sec    Prox ICA PSV -239.9 cm/sec    Prox ICA EDV -36.4 cm/sec    Mid ICA PSV -89.2 cm/sec    Mid ICA EDV -19.1 cm/sec    Dist ICA PSV -87.8 cm/sec    Dist ICA EDV -22.7 cm/sec    Vertebral A PSV 60.4 cm/sec    Vertebral A EDV 12.1 cm/sec    ICA/CCA ratio 3.29     Prox SCLA PSV 71.1 cm/sec   Adult Transthoracic Echo Complete W/ Cont if Necessary Per Protocol    Collection Time: 07/28/22 11:30 AM   Result Value Ref Range    Target HR (85%) 111 bpm    Max. Pred. HR (100%) 131 bpm    RV S' 8.0 cm/sec    RV Base 2.9 cm    Sinus 3.3 cm    Dimensionless Index 0.80 (DI)    LA ESV Index (BP) 21.3 ml/m2    Avg E/e' ratio 14.48     Ao pk frank 77.2 cm/sec    Ao V2 VTI 20.8 cm    DAVID(I,D) 2.5 cm2    EDV(cubed) 71.1 ml    EDV(MOD-sp2) 101.0 ml    EDV(MOD-sp4) 122.0 ml    EF(MOD-bp) 47.7 %    EF(MOD-sp2) 59.4 %    EF(MOD-sp4) 39.3 %    ESV(cubed) 27.2 ml    ESV(MOD-sp2) 41.0 ml    ESV(MOD-sp4) 74.0 ml    IVS/LVPW 0.90 cm    Lat Peak E' Frank 5.6 cm/sec    LV mass(C)d 147.7 grams    LV V1 max PG 1.88 mmHg    LV V1 mean PG 1.13 mmHg    LV V1 max 68.6 cm/sec    LVPWd 1.13 cm    Med Peak E' Frank 2.8 cm/sec    MV dec slope 221.2 cm/sec2    MV dec time 0.29 msec    MV P1/2t 108.7 msec    MV V2 VTI 43.6 cm    MVA(VTI) 1.20 cm2    RAP systole 3 mmHg    SI(MOD-sp2) 30.4 ml/m2    SI(MOD-sp4) 24.3 ml/m2    SV(LVOT) 52.4 ml    SV(MOD-sp2) 60.0 ml    SV(MOD-sp4) 48.0 ml    Ao max PG 2.38 mmHg    Ao mean PG 1.39 mmHg    FS 27.4 %    IVSd 1.01 cm    LV V1 VTI 17.5 cm    LVIDd 4.1 cm    LVIDs 3.0 cm    LVOT area 3.0 cm2    LVOT diam 1.95 cm    MV E/A 0.49     MV max PG 8.7 mmHg    MV mean PG 2.42 mmHg    MVA(P1/2t) 2.02 cm2    MV A max frank 124.7 cm/sec    MV E max frank 60.8 cm/sec    LV [place] Vol  "(BSA corrected) 61.8 cm2    LV Sys Vol (BSA corrected) 37.5 cm2    TAPSE (>1.6) 1.66 cm    Echo EF Estimated 50 %         RADIOLOGY RESULTS  Chest xray clear of infection, effusion or overt fracture, see radiologist report      PROGRESS, DATA ANALYSIS, CONSULTS AND MEDICAL DECISION MAKING  All labs have been independently reviewed by me.  All radiology studies have been reviewed by me and discussed with radiologist dictating the report.  EKG's independently viewed and interpreted by me unless stated otherwise. Discussion below represents my analysis of pertinent findings related to patient's condition, differential diagnosis, treatment plan and final disposition.     ED Course as of 08/10/22 1319   Wed Jul 27, 2022   0622 EKG          EKG time: 0558  Rhythm/Rate: Sinus rhythm rate 58  P waves and ME: Prolonged ME  QRS, axis: Narrow regular  ST and T waves: Nonspecific    Interpreted Contemporaneously by me, independently viewed  No significant changed compared to prior EKG from 3/27/2021   [TJ]      ED Course User Index  [TJ] Nicolas Keenan MD     I have spoken with dr. Bean on call for [facility] service and reviewed test results, MD has graciously accepted to admit to his service, see order     Reviewed pt's history and workup with Dr. Keenan.  After a bedside evaluation, Dr. Keenan agrees with the plan of care.     Based on the patient's lab findings and presenting symptoms, the doctor and I feel it is appropriate to admit the patient for further management, evaluation, and treatment.  I have discussed this with the admitting team.  I have also discussed this with the patient/family.  They are in agreement with admission.          Disposition vitals:  /59 (BP Location: Left arm, Patient Position: Lying)   Pulse 59   Temp 98.1 °F (36.7 °C) (Oral)   Resp 16   Ht 175.3 cm (69\")   Wt 81.6 kg (180 lb)   SpO2 97%   BMI 26.58 kg/m²       DIAGNOSIS  Final diagnoses:   Syncope, unspecified syncope type "       FOLLOW UP        Delfina Trujillo, BOOGIE  08/10/22 0548

## 2022-08-11 ENCOUNTER — TELEPHONE (OUTPATIENT)
Dept: CARDIOLOGY | Facility: CLINIC | Age: 87
End: 2022-08-11

## 2022-08-11 NOTE — TELEPHONE ENCOUNTER
[phone]  10:10 am    Danielle, pt's wife, called and lm that she would like to speak w SG directly re: pts recent hospital stay.      [facility]

## 2022-08-12 NOTE — TELEPHONE ENCOUNTER
Mrs. Tipton called stating she was returning your call.  She can be reached at [phone].    Vanesa

## 2022-08-16 ENCOUNTER — READMISSION MANAGEMENT (OUTPATIENT)
Dept: CALL CENTER | Facility: HOSPITAL | Age: 87
End: 2022-08-16

## 2022-08-16 NOTE — OUTREACH NOTE
Medical Week 3 Survey    Flowsheet Row Responses   [facility] patient discharged from? [place]   Does the patient have one of the following disease processes/diagnoses(primary or secondary)? Other   Week 3 attempt successful? Yes   Call start time 1127   Call end time 1130   General alerts for this patient [place]   Discharge diagnosis Episode of syncope    Person spoke with today (if not patient) and relationship Mrs. Ha Mccormack reviewed with patient/caregiver? Yes   Is the patient having any side effects they believe may be caused by any medication additions or changes? No   Does the patient have all medications ordered at discharge? N/A   Is the patient taking all medications as directed (includes completed medication regime)? Yes   Comments regarding appointments Appt with cards on 9/1/22   Does the patient have a primary care provider?  Yes   Comments regarding PCP Pt has followed up with PCP since discharge   Has the patient kept scheduled appointments due by today? Yes   Psychosocial issues? No   Did the patient receive a copy of their discharge instructions? Yes   Nursing interventions Reviewed instructions with patient   What is the patient's perception of their health status since discharge? Improving  [Pt is still weak]   Is the patient/caregiver able to teach back signs and symptoms related to disease process for when to call PCP? Yes   Is the patient/caregiver able to teach back signs and symptoms related to disease process for when to call 911? Yes   Is the patient/caregiver able to teach back the hierarchy of who to call/visit for symptoms/problems? PCP, Specialist, Home health nurse, Urgent Care, ED, 911 Yes   If the patient is a current smoker, are they able to teach back resources for cessation? Not a smoker   Week 3 Call Completed? Yes   Is the patient interested in additional calls from an ambulatory ?  NOTE:  applies to high risk patients requiring additional follow-up.  No   Revoked No further contact(revokes)-requires comment   Graduated/Revoked comments Pt is improving          SABRINA ESTRADA - Registered Nurse

## 2022-09-01 ENCOUNTER — OFFICE VISIT (OUTPATIENT)
Dept: CARDIOLOGY | Facility: CLINIC | Age: 87
End: 2022-09-01

## 2022-09-01 VITALS
WEIGHT: 167.4 LBS | HEIGHT: 69 IN | BODY MASS INDEX: 24.79 KG/M2 | OXYGEN SATURATION: 98 % | DIASTOLIC BLOOD PRESSURE: 74 MMHG | HEART RATE: 80 BPM | SYSTOLIC BLOOD PRESSURE: 124 MMHG

## 2022-09-01 DIAGNOSIS — I10 PRIMARY HYPERTENSION: ICD-10-CM

## 2022-09-01 DIAGNOSIS — R55 NEAR SYNCOPE: ICD-10-CM

## 2022-09-01 DIAGNOSIS — I35.0 NONRHEUMATIC AORTIC VALVE STENOSIS: ICD-10-CM

## 2022-09-01 DIAGNOSIS — I25.10 CORONARY ARTERY DISEASE INVOLVING NATIVE CORONARY ARTERY OF NATIVE HEART WITHOUT ANGINA PECTORIS: Primary | ICD-10-CM

## 2022-09-01 DIAGNOSIS — J44.9 CHRONIC OBSTRUCTIVE PULMONARY DISEASE, UNSPECIFIED COPD TYPE: ICD-10-CM

## 2022-09-01 DIAGNOSIS — I95.1 ORTHOSTATIC HYPOTENSION: ICD-10-CM

## 2022-09-01 DIAGNOSIS — R07.2 PRECORDIAL PAIN: ICD-10-CM

## 2022-09-01 DIAGNOSIS — E11.59 TYPE 2 DIABETES MELLITUS WITH OTHER CIRCULATORY COMPLICATION, WITHOUT LONG-TERM CURRENT USE OF INSULIN: ICD-10-CM

## 2022-09-01 DIAGNOSIS — E78.5 HYPERLIPIDEMIA, UNSPECIFIED HYPERLIPIDEMIA TYPE: ICD-10-CM

## 2022-09-01 DIAGNOSIS — N18.30 STAGE 3 CHRONIC KIDNEY DISEASE, UNSPECIFIED WHETHER STAGE 3A OR 3B CKD: ICD-10-CM

## 2022-09-01 PROCEDURE — 99214 OFFICE O/P EST MOD 30 MIN: CPT | Performed by: INTERNAL MEDICINE

## 2022-09-01 PROCEDURE — 93000 ELECTROCARDIOGRAM COMPLETE: CPT | Performed by: INTERNAL MEDICINE

## 2022-09-01 NOTE — PROGRESS NOTES
Subjective:     Encounter Date:09/01/2022      Patient ID: Yoni Bonner Jr. is a 89 y.o. male.    Chief Complaint:  History of Present Illness    This is an 89-year-old man with a history of hypertension, chronic bronchitis, dyslipidemia, tobacco use, coronary artery disease status post inferior myocardial infarction and drug eluting stent placement of his proximal RCA, who presents for follow up.      He presented to the emergency room on 3/27/2021 with complaints of aching chest pain.  Symptoms resolved after 30 minutes.  He was pain-free by the time he came to the emergency room.  His work-up in the emergency room was unremarkable.  He did admit to increased stress because of his wife's illness.  In office follow-up I recommended increasing his isosorbide mononitrate to 30 mg twice a day to result in improvement in his symptoms.     In 1/2022 he reported that shortly after his last follow up he began having more issues with falls which occur with position changes.  This was associated with low blood pressures.    He was orthostatic at that office visit with blood pressure sitting were 100/68 and with standing they dropped to 88/58.  This was associated with symptoms of dizziness.  At that office visit I recommended that he stop the isosorbide and carvedilol and I started him on midodrine 5 mg twice a day.  I also ordered a repeat echocardiogram.     His echocardiogram was performed on 1/21/2022 and showed normal left ventricular systolic function with an EF of 56%, moderate left ventricular hypertrophy, normal diastolic function, and moderate aortic stenosis with a peak velocity of 2.86, mean gradient of 18, valve area 1.1, and a dimensionless index of 0.4.  When I called the patient's wife to go over the results she indicated that he was doing better and was having less lightheadedness and falling episodes.      However by the time of his office follow-up with me in 2/2022 they reported that he was having  creased frequency of chest pain requiring sublingual nitroglycerin.  At that office visit I recommended isosorbide mononitrate 30 mg daily and metoprolol succinate 25 mg daily.  He was seen back in follow-up by BOOGIE Mehta at which time he reported increased issues with lightheadedness and falls.  As a result his isosorbide was stopped and he was started on ranolazine 500 mg twice a day.  He was continued on metoprolol succinate but this was changed to 12.5 mg twice a day.    The patient was then admitted to the hospital in 7/2022 following a syncopal episode resulting in a fall.  This ultimately was felt to be due to orthostatic hypotension.  Repeat echocardiogram was performed on 7/28/2022 and showed normal left ventricular systolic function wall motion with an EF of 50%, grade 1A diastolic dysfunction, mildly reduced right ventricular function with normal size, and no reports regarding the aortic valve function.  Carotid ultrasound was performed showing bilateral moderate carotid artery stenosis.  His medications were adjusted with reduction of his furosemide to 40 mg daily from twice daily and decreasing his metoprolol succinate to 12.5 mg twice daily to daily.    He returns today for follow-up.  His wife reports that over the last week or so he has been having these episodes where he will suddenly started shaking and complaining of weakness and some lightheadedness.  This does not result in any syncopal episodes.  The episodes occur both while sitting and standing.  He continues to have chronic dyspnea on exertion that they feel it may be a little worse recently but the patient's wife feels like he may have a recurrent episode of bronchitis.  The patient's chest pain per his report is better overall and only occurs occasionally.  He denies any palpitations, orthopnea, or lower extremity edema.  He has had no syncopal episodes or falls recently.        Prior History:  I saw the patient back on 02/04/2015  when he presented to establish care.  At that time the patient reported a history of recurrent syncopal episodes.  I felt that his symptoms were due to orthostatic syncope at that time.  He had a history of rheumatic fever and a thickened aortic valve along with carotid artery disease so I sat him up for both an echocardiogram and a carotid ultrasound around that time.  His echocardiogram was performed on 03/11/2015 and revealed normal left ventricular systolic function and wall motion, an ejection fraction of 54%, grade IA diastolic dysfunction and no significant valvular disease.  His carotid ultrasound revealed moderate bilateral stenosis.  He was last seen in follow up in 3/2017 for pre-operative evaluation.  At that time he reported some worsening of his baseline dyspnea on exertion though we proceeded with a dobutamine stress test that was negative for ischemia and the patient proceeded with his shoulder surgery.     On 4/8/2018 he was admitted with an inferior myocardial infarction.  The patient was accompanying his wife to the [facility] emergency room where he suffered a near syncopal episode associated with hypotension and bradycardia.  An EKG was performed at that time showing acute inferior ST elevations.  He was transferred to [facility] and was found to have a thrombotic subtotal occlusion of his proximal RCA for which she underwent thrombectomy and drug-eluting stent placement.  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  Following his catheterization he underwent an echocardiogram that showed mildly depressed left ventricular systolic function with an ejection fraction of 42%, inferior wall motion abnormalities, mild to moderate mitral regurgitation, mild aortic stenosis and regurgitation, and grade 1A diastolic dysfunction.       In 7/2018 he was readmitted for chest pain.  He described it as old sharp left-sided  chest pain lasting anywhere from a few minutes to 30 minutes.  Symptoms persisted with dyspnea and nausea.  Into being in admitted to the hospital at that time.  He underwent a stress test during that admission showed an inferior lateral infarct but no evidence of ischemia.  I started him on isosorbide mononitrate and discharged him home.  In 8/2018 he presented back for hospital follow at which time he was feeling well.  He had a repeat echocardiogram today that showed normalization of his left ventricular function with an EF of 60%, mild aortic stenosis, mild mitral regurgitation, and grade 1 diastolic dysfunction.        He was then admitted to the hospital in 7/2019 with shortness of breath.  He was noted to have elevated troponin in the indeterminate level during that admission and was evaluated by Dr. Freeman.  He was not having any symptoms concerning for angina and no further work up was recommended at that time.  Following that admission he saw BOOGIE Mejia in follow-up on 7/30/2019.  At that time he appeared to be doing well.  A repeat echocardiogram was ordered in order to reevaluate his aortic stenosis.  This showed normal left ventricular systolic function and wall motion with an EF 30 to 60%, grade 1A diastolic dysfunction, and moderate mitral regurgitation.     He was admitted in 2/2020 with chest pain.  Presented with pain that started the night prior to his admission.  He took 2 sublingual nitroglycerin with resolution of his symptoms.  He ruled out for myocardial infarction his EKG was unchanged.  We proceeded with a stress test that showed inferior lateral infarct with angela-infarct lateral ischemia which was new compared to the prior.  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  I recommended continue  with medical management at that time.  He was having some issues with low blood pressures and I discontinued his lisinopril at that time and continued him on carvedilol and isosorbide mononitrate only.    Review of Systems   Constitutional: Positive for malaise/fatigue.   HENT: Negative for hearing loss, hoarse voice, nosebleeds and sore throat.    Eyes: Negative for pain.   Cardiovascular: Positive for chest pain, dyspnea on exertion, leg swelling and near-syncope. Negative for claudication, cyanosis, irregular heartbeat, orthopnea, palpitations, paroxysmal nocturnal dyspnea and syncope.   Respiratory: Negative for shortness of breath and snoring.    Endocrine: Negative for cold intolerance, heat intolerance, polydipsia, polyphagia and polyuria.   Skin: Negative for itching and rash.   Musculoskeletal: Negative for arthritis, falls, joint pain, joint swelling, muscle cramps, muscle weakness and myalgias.   Gastrointestinal: Negative for constipation, diarrhea, dysphagia, heartburn, hematemesis, hematochezia, melena, nausea and vomiting.   Genitourinary: Negative for frequency, hematuria and hesitancy.   Neurological: Positive for light-headedness. Negative for excessive daytime sleepiness, dizziness, headaches, numbness and weakness.   Psychiatric/Behavioral: Negative for depression. The patient is not nervous/anxious.          Current Outpatient Medications:   •  albuterol (PROVENTIL) (2.5 MG/3ML) 0.083% nebulizer solution, Take 2.5 mg by nebulization Every 4 (Four) Hours As Needed for Wheezing or Shortness of Air., Disp: , Rfl: 12  •  albuterol sulfate  (90 Base) MCG/ACT inhaler, Inhale 2 puffs Every 4 (Four) Hours As Needed for Wheezing., Disp: , Rfl:   •  aspirin 81 MG EC tablet, Take 1 tablet by mouth Daily., Disp: 30 tablet, Rfl: 5  •  atorvastatin (LIPITOR) 10 MG tablet, TAKE ONE TABLET BY MOUTH ONCE NIGHTLY (Patient taking differently: Take 10 mg by mouth Every Night.), Disp: 90 tablet, Rfl: 2  •   clopidogrel (PLAVIX) 75 MG tablet, TAKE ONE TABLET BY MOUTH DAILY (Patient taking differently: Take 75 mg by mouth Daily.), Disp: 90 tablet, Rfl: 1  •  furosemide (LASIX) 40 MG tablet, Take 1 tablet by mouth Daily for 30 days., Disp: 30 tablet, Rfl: 0  •  metoprolol succinate XL (TOPROL-XL) 25 MG 24 hr tablet, Take 0.5 tablets by mouth Daily for 30 days., Disp: 15 tablet, Rfl: 0  •  midodrine (PROAMATINE) 5 MG tablet, Take 1 tablet by mouth 3 (Three) Times a Day Before Meals., Disp: 90 tablet, Rfl: 2  •  nitroglycerin (NITROSTAT) 0.4 MG SL tablet, DISSOLVE 1 TAB UNDER TONGUE FOR CHEST PAIN - IF PAIN REMAINS AFTER 5 MIN, CALL 911 AND REPEAT DOSE. MAX 3 TABS IN 15 MINUTES (Patient taking differently: Place 0.4 mg under the tongue Every 5 (Five) Minutes As Needed.), Disp: 25 tablet, Rfl: 4  •  ondansetron ODT (ZOFRAN-ODT) 4 MG disintegrating tablet, Place 4 mg on the tongue Every 6 (Six) Hours As Needed for Nausea or Vomiting., Disp: , Rfl:   •  ranolazine (Ranexa) 500 MG 12 hr tablet, Take 1 tablet by mouth 2 (Two) Times a Day., Disp: 60 tablet, Rfl: 11    Past Medical History:   Diagnosis Date   • Bronchitis    • CAD (coronary artery disease)    • Carotid arterial disease (HCC)    • Chronic bronchitis (HCC)    • COPD (chronic obstructive pulmonary disease) (HCC)    • Gout    • Hyperlipidemia    • Hypertension    • Ischemic cardiomyopathy     resolved, EF was 60% in 2018   • Mild aortic stenosis    • Mitral regurgitation    • Precordial chest pain    • Rheumatic fever    • Syncopal episodes    • Tobacco use    • Type 2 diabetes mellitus (HCC)        Past Surgical History:   Procedure Laterality Date   • CARDIAC CATHETERIZATION     • CARDIAC CATHETERIZATION N/A 4/8/2018    Procedure: Left Heart Cath;  Surgeon: Nicolas Jerez MD;  Location: [facility] CATH INVASIVE LOCATION;  Service: Cardiovascular   • CARDIAC CATHETERIZATION  4/8/2018    Procedure: Percutaneous Manual Thrombectomy;  Surgeon: Nicolas Jerez MD;  Location: [facility]  CATH INVASIVE LOCATION;  Service: Cardiovascular   • CARDIAC CATHETERIZATION N/A 4/8/2018    Procedure: Stent CARLENE coronary;  Surgeon: Nicolas Jerez MD;  Location:  [facility] CATH INVASIVE LOCATION;  Service: Cardiovascular   • CARDIAC CATHETERIZATION N/A 4/8/2018    Procedure: Right Heart Cath;  Surgeon: Nicolas Jerez MD;  Location:  [facility] CATH INVASIVE LOCATION;  Service: Cardiovascular   • CARDIAC CATHETERIZATION Left 2/21/2020    Procedure: Cardiac Catheterization/Vascular Study;  Surgeon: Alejandro Jenkins MD;  Location:  [facility] CATH INVASIVE LOCATION;  Service: Cardiovascular;  Laterality: Left;   • CARDIAC CATHETERIZATION N/A 2/21/2020    Procedure: Coronary angiography;  Surgeon: Alejandro Jenkins MD;  Location:  [facility] CATH INVASIVE LOCATION;  Service: Cardiovascular;  Laterality: N/A;   • EYE SURGERY      cataract surg   • HAND SURGERY     • HERNIA REPAIR     • KNEE SURGERY     • TESTICLE SURGERY         Family History   Problem Relation Age of Onset   • Heart disease Father    • Hypertension Father    • Stroke Father    • Diabetes Sister    • Cancer Brother    • Heart disease Brother    • Hypertension Brother    • No Known Problems Maternal Grandmother    • No Known Problems Maternal Grandfather    • No Known Problems Paternal Grandmother    • No Known Problems Paternal Grandfather        Social History     Tobacco Use   • Smoking status: Never Smoker   • Smokeless tobacco: Current User     Types: Chew   • Tobacco comment: chewing tobacco since 4 years old   Substance Use Topics   • Alcohol use: No     Comment: caffeine use   • Drug use: No         ECG 12 Lead    Date/Time: 9/1/2022 4:19 PM  Performed by: Melissa Templeton MD  Authorized by: Melissa Templeton MD   Comparison: compared with previous ECG   Similar to previous ECG  Rhythm: sinus rhythm  Conduction: 1st degree AV block               Objective:     Visit Vitals  /74 (BP Location: Right arm, Patient Position: Sitting, Cuff Size: Adult)   Pulse 80   Ht  "175.3 cm (69\")   Wt 75.9 kg (167 lb 6.4 oz)   SpO2 98%   BMI 24.72 kg/m²         Constitutional:       Appearance: Normal appearance. Well-developed.   HENT:      Head: Normocephalic and atraumatic.   Neck:      Vascular: No carotid bruit or JVD.   Pulmonary:      Effort: Pulmonary effort is normal.      Breath sounds: Normal breath sounds.   Cardiovascular:      Normal rate. Regular rhythm.      No gallop.   Pulses:     Radial: 2+ bilaterally.  Edema:     Peripheral edema absent.   Abdominal:      Palpations: Abdomen is soft.   Skin:     General: Skin is warm and dry.   Neurological:      Mental Status: Alert and oriented to person, place, and time.             Assessment:          Diagnosis Plan   1. Coronary artery disease involving native coronary artery of native heart without angina pectoris     2. Nonrheumatic aortic valve stenosis     3. Precordial pain     4. Hyperlipidemia, unspecified hyperlipidemia type     5. Orthostatic hypotension     6. Primary hypertension     7. Type 2 diabetes mellitus with other circulatory complication, without long-term current use of insulin ([facility])     8. Stage 3 chronic kidney disease, unspecified whether stage 3a or 3b CKD ([facility])     9. Chronic obstructive pulmonary disease, unspecified COPD type ([facility])            Plan:         1.  Episodes of weakness/tremors/lightheadedness.  Does not appear that this is due to his orthostatic hypotension since they also occur at rest while he is seated.  An echocardiogram did not show any significant new issues.  My only other cardiac concern would be whether an arrhythmia is playing a role.  I will get him set up with a ZIO monitor.  2.  Orthostatic hypotension.  No recent falls or episodes of syncope.  However in case this is contributing to his above episodes we will have the patient stop his furosemide.  I asked the patient's wife to notify me if he develops worsening shortness of breath or lower extremity edema off the furosemide.  3.  " Aortic valve stenosis.  Noted to be moderate earlier this year.  Recent echocardiogram in 7/2022 was a technically difficult study unable to visualize the aortic valve very well.  4.  Chronic diastolic congestive heart failure.  Plan to hold furosemide as above.  5.  Coronary artery disease.  His anginal symptoms have improved with the addition of ranolazine.  He remains on a very low-dose of metoprolol succinate.  We will continue his current regimen since his symptoms have improved overall.  His EKG appears unchanged.  6.  Chronic kidney disease stage III  7.  COPD  8.  Hyperlipidemia.  On atorvastatin for goal LDL of 70 or below.  9.  Chronic kidney disease    I will call and discuss results of his ZIO monitor.  Otherwise I will plan on seeing patient back again in 2 months.

## 2022-09-20 ENCOUNTER — APPOINTMENT (OUTPATIENT)
Dept: GENERAL RADIOLOGY | Facility: HOSPITAL | Age: 87
End: 2022-09-20

## 2022-09-20 ENCOUNTER — HOSPITAL ENCOUNTER (OUTPATIENT)
Facility: HOSPITAL | Age: 87
Setting detail: OBSERVATION
Discharge: HOME | End: 2022-09-20
Attending: EMERGENCY MEDICINE | Admitting: INTERNAL MEDICINE

## 2022-09-20 ENCOUNTER — READMISSION MANAGEMENT (OUTPATIENT)
Dept: CALL CENTER | Facility: HOSPITAL | Age: 87
End: 2022-09-20

## 2022-09-20 VITALS
TEMPERATURE: 96.8 F | RESPIRATION RATE: 20 BRPM | SYSTOLIC BLOOD PRESSURE: 103 MMHG | HEART RATE: 75 BPM | OXYGEN SATURATION: 96 % | DIASTOLIC BLOOD PRESSURE: 52 MMHG

## 2022-09-20 DIAGNOSIS — R07.9 CHEST PAIN, UNSPECIFIED TYPE: Primary | ICD-10-CM

## 2022-09-20 PROBLEM — D63.8 ANEMIA, CHRONIC DISEASE: Status: ACTIVE | Noted: 2017-03-29

## 2022-09-20 LAB
ALBUMIN SERPL-MCNC: 3.7 G/DL (ref 3.5–5.2)
ALBUMIN/GLOB SERPL: 1.6 G/DL
ALP SERPL-CCNC: 67 U/L (ref 39–117)
ALT SERPL W P-5'-P-CCNC: 20 U/L (ref 1–41)
ANION GAP SERPL CALCULATED.3IONS-SCNC: 10.3 MMOL/L (ref 5–15)
ANION GAP SERPL CALCULATED.3IONS-SCNC: 9.3 MMOL/L (ref 5–15)
APTT PPP: 30.8 SECONDS (ref 22.7–35.4)
AST SERPL-CCNC: 24 U/L (ref 1–40)
BASOPHILS # BLD AUTO: 0.05 10*3/MM3 (ref 0–0.2)
BASOPHILS # BLD AUTO: 0.06 10*3/MM3 (ref 0–0.2)
BASOPHILS NFR BLD AUTO: 0.7 % (ref 0–1.5)
BASOPHILS NFR BLD AUTO: 0.7 % (ref 0–1.5)
BILIRUB SERPL-MCNC: 0.2 MG/DL (ref 0–1.2)
BUN SERPL-MCNC: 28 MG/DL (ref 8–23)
BUN SERPL-MCNC: 29 MG/DL (ref 8–23)
BUN/CREAT SERPL: 22 (ref 7–25)
BUN/CREAT SERPL: 25.5 (ref 7–25)
CALCIUM SPEC-SCNC: 8.8 MG/DL (ref 8.6–10.5)
CALCIUM SPEC-SCNC: 9.3 MG/DL (ref 8.6–10.5)
CHLORIDE SERPL-SCNC: 100 MMOL/L (ref 98–107)
CHLORIDE SERPL-SCNC: 98 MMOL/L (ref 98–107)
CO2 SERPL-SCNC: 23.7 MMOL/L (ref 22–29)
CO2 SERPL-SCNC: 23.7 MMOL/L (ref 22–29)
CREAT SERPL-MCNC: 1.1 MG/DL (ref 0.76–1.27)
CREAT SERPL-MCNC: 1.32 MG/DL (ref 0.76–1.27)
DEPRECATED RDW RBC AUTO: 43 FL (ref 37–54)
DEPRECATED RDW RBC AUTO: 46.2 FL (ref 37–54)
EGFRCR SERPLBLD CKD-EPI 2021: 51.6 ML/MIN/1.73
EGFRCR SERPLBLD CKD-EPI 2021: 64.2 ML/MIN/1.73
EOSINOPHIL # BLD AUTO: 0.11 10*3/MM3 (ref 0–0.4)
EOSINOPHIL # BLD AUTO: 0.12 10*3/MM3 (ref 0–0.4)
EOSINOPHIL NFR BLD AUTO: 1.2 % (ref 0.3–6.2)
EOSINOPHIL NFR BLD AUTO: 1.7 % (ref 0.3–6.2)
ERYTHROCYTE [DISTWIDTH] IN BLOOD BY AUTOMATED COUNT: 13.5 % (ref 12.3–15.4)
ERYTHROCYTE [DISTWIDTH] IN BLOOD BY AUTOMATED COUNT: 13.8 % (ref 12.3–15.4)
GLOBULIN UR ELPH-MCNC: 2.3 GM/DL
GLUCOSE SERPL-MCNC: 112 MG/DL (ref 65–99)
GLUCOSE SERPL-MCNC: 118 MG/DL (ref 65–99)
HCT VFR BLD AUTO: 30.4 % (ref 37.5–51)
HCT VFR BLD AUTO: 32.1 % (ref 37.5–51)
HGB BLD-MCNC: 10.6 G/DL (ref 13–17.7)
HGB BLD-MCNC: 9.6 G/DL (ref 13–17.7)
IMM GRANULOCYTES # BLD AUTO: 0.04 10*3/MM3 (ref 0–0.05)
IMM GRANULOCYTES # BLD AUTO: 0.05 10*3/MM3 (ref 0–0.05)
IMM GRANULOCYTES NFR BLD AUTO: 0.6 % (ref 0–0.5)
IMM GRANULOCYTES NFR BLD AUTO: 0.6 % (ref 0–0.5)
INR PPP: 1.04 (ref 0.9–1.1)
LYMPHOCYTES # BLD AUTO: 2.68 10*3/MM3 (ref 0.7–3.1)
LYMPHOCYTES # BLD AUTO: 3.78 10*3/MM3 (ref 0.7–3.1)
LYMPHOCYTES NFR BLD AUTO: 37.1 % (ref 19.6–45.3)
LYMPHOCYTES NFR BLD AUTO: 41.8 % (ref 19.6–45.3)
MAGNESIUM SERPL-MCNC: 2 MG/DL (ref 1.6–2.4)
MAGNESIUM SERPL-MCNC: 2 MG/DL (ref 1.6–2.4)
MCH RBC QN AUTO: 28.6 PG (ref 26.6–33)
MCH RBC QN AUTO: 29 PG (ref 26.6–33)
MCHC RBC AUTO-ENTMCNC: 31.6 G/DL (ref 31.5–35.7)
MCHC RBC AUTO-ENTMCNC: 33 G/DL (ref 31.5–35.7)
MCV RBC AUTO: 87.9 FL (ref 79–97)
MCV RBC AUTO: 90.5 FL (ref 79–97)
MONOCYTES # BLD AUTO: 0.57 10*3/MM3 (ref 0.1–0.9)
MONOCYTES # BLD AUTO: 0.74 10*3/MM3 (ref 0.1–0.9)
MONOCYTES NFR BLD AUTO: 7.9 % (ref 5–12)
MONOCYTES NFR BLD AUTO: 8.2 % (ref 5–12)
NEUTROPHILS NFR BLD AUTO: 3.76 10*3/MM3 (ref 1.7–7)
NEUTROPHILS NFR BLD AUTO: 4.3 10*3/MM3 (ref 1.7–7)
NEUTROPHILS NFR BLD AUTO: 47.5 % (ref 42.7–76)
NEUTROPHILS NFR BLD AUTO: 52 % (ref 42.7–76)
NRBC BLD AUTO-RTO: 0 /100 WBC (ref 0–0.2)
NRBC BLD AUTO-RTO: 0 /100 WBC (ref 0–0.2)
NT-PROBNP SERPL-MCNC: 1308 PG/ML (ref 0–1800)
PLATELET # BLD AUTO: 167 10*3/MM3 (ref 140–450)
PLATELET # BLD AUTO: 186 10*3/MM3 (ref 140–450)
PMV BLD AUTO: 10.6 FL (ref 6–12)
PMV BLD AUTO: 10.8 FL (ref 6–12)
POTASSIUM SERPL-SCNC: 5 MMOL/L (ref 3.5–5.2)
POTASSIUM SERPL-SCNC: 5.1 MMOL/L (ref 3.5–5.2)
PROT SERPL-MCNC: 6 G/DL (ref 6–8.5)
PROTHROMBIN TIME: 13.5 SECONDS (ref 11.7–14.2)
QT INTERVAL: 352 MS
RBC # BLD AUTO: 3.36 10*6/MM3 (ref 4.14–5.8)
RBC # BLD AUTO: 3.65 10*6/MM3 (ref 4.14–5.8)
SODIUM SERPL-SCNC: 132 MMOL/L (ref 136–145)
SODIUM SERPL-SCNC: 133 MMOL/L (ref 136–145)
TROPONIN T SERPL-MCNC: <0.01 NG/ML (ref 0–0.03)
TROPONIN T SERPL-MCNC: <0.01 NG/ML (ref 0–0.03)
WBC NRBC COR # BLD: 7.22 10*3/MM3 (ref 3.4–10.8)
WBC NRBC COR # BLD: 9.04 10*3/MM3 (ref 3.4–10.8)

## 2022-09-20 PROCEDURE — 99285 EMERGENCY DEPT VISIT HI MDM: CPT

## 2022-09-20 PROCEDURE — 85025 COMPLETE CBC W/AUTO DIFF WBC: CPT | Performed by: EMERGENCY MEDICINE

## 2022-09-20 PROCEDURE — G0378 HOSPITAL OBSERVATION PER HR: HCPCS

## 2022-09-20 PROCEDURE — 85025 COMPLETE CBC W/AUTO DIFF WBC: CPT | Performed by: NURSE PRACTITIONER

## 2022-09-20 PROCEDURE — 96374 THER/PROPH/DIAG INJ IV PUSH: CPT

## 2022-09-20 PROCEDURE — 25010000002 ONDANSETRON PER 1 MG: Performed by: EMERGENCY MEDICINE

## 2022-09-20 PROCEDURE — 85610 PROTHROMBIN TIME: CPT | Performed by: EMERGENCY MEDICINE

## 2022-09-20 PROCEDURE — 99214 OFFICE O/P EST MOD 30 MIN: CPT | Performed by: INTERNAL MEDICINE

## 2022-09-20 PROCEDURE — 36415 COLL VENOUS BLD VENIPUNCTURE: CPT

## 2022-09-20 PROCEDURE — 83880 ASSAY OF NATRIURETIC PEPTIDE: CPT | Performed by: EMERGENCY MEDICINE

## 2022-09-20 PROCEDURE — 93005 ELECTROCARDIOGRAM TRACING: CPT | Performed by: EMERGENCY MEDICINE

## 2022-09-20 PROCEDURE — 80053 COMPREHEN METABOLIC PANEL: CPT | Performed by: EMERGENCY MEDICINE

## 2022-09-20 PROCEDURE — 85730 THROMBOPLASTIN TIME PARTIAL: CPT | Performed by: EMERGENCY MEDICINE

## 2022-09-20 PROCEDURE — 71045 X-RAY EXAM CHEST 1 VIEW: CPT

## 2022-09-20 PROCEDURE — 96375 TX/PRO/DX INJ NEW DRUG ADDON: CPT

## 2022-09-20 PROCEDURE — 93010 ELECTROCARDIOGRAM REPORT: CPT | Performed by: INTERNAL MEDICINE

## 2022-09-20 PROCEDURE — 93005 ELECTROCARDIOGRAM TRACING: CPT

## 2022-09-20 PROCEDURE — 83735 ASSAY OF MAGNESIUM: CPT | Performed by: EMERGENCY MEDICINE

## 2022-09-20 PROCEDURE — 25010000002 MORPHINE PER 10 MG: Performed by: EMERGENCY MEDICINE

## 2022-09-20 PROCEDURE — 84484 ASSAY OF TROPONIN QUANT: CPT | Performed by: EMERGENCY MEDICINE

## 2022-09-20 PROCEDURE — 83735 ASSAY OF MAGNESIUM: CPT | Performed by: NURSE PRACTITIONER

## 2022-09-20 RX ORDER — NICOTINE POLACRILEX 4 MG
15 LOZENGE BUCCAL
Status: DISCONTINUED | OUTPATIENT
Start: 2022-09-20 | End: 2022-09-20 | Stop reason: HOSPADM

## 2022-09-20 RX ORDER — NITROGLYCERIN 0.4 MG/1
0.4 TABLET SUBLINGUAL
Status: DISCONTINUED | OUTPATIENT
Start: 2022-09-20 | End: 2022-09-20 | Stop reason: HOSPADM

## 2022-09-20 RX ORDER — RANOLAZINE 500 MG/1
1000 TABLET, EXTENDED RELEASE ORAL EVERY 12 HOURS SCHEDULED
Status: DISCONTINUED | OUTPATIENT
Start: 2022-09-20 | End: 2022-09-20 | Stop reason: HOSPADM

## 2022-09-20 RX ORDER — MIDODRINE HYDROCHLORIDE 5 MG/1
5 TABLET ORAL
Status: CANCELLED | OUTPATIENT
Start: 2022-09-20

## 2022-09-20 RX ORDER — METOPROLOL SUCCINATE 25 MG/1
12.5 TABLET, EXTENDED RELEASE ORAL
Status: DISCONTINUED | OUTPATIENT
Start: 2022-09-20 | End: 2022-09-20 | Stop reason: HOSPADM

## 2022-09-20 RX ORDER — NITROGLYCERIN 0.4 MG/1
0.4 TABLET SUBLINGUAL
Status: DISCONTINUED | OUTPATIENT
Start: 2022-09-20 | End: 2022-09-20 | Stop reason: SDUPTHER

## 2022-09-20 RX ORDER — ONDANSETRON 2 MG/ML
4 INJECTION INTRAMUSCULAR; INTRAVENOUS EVERY 6 HOURS PRN
Status: DISCONTINUED | OUTPATIENT
Start: 2022-09-20 | End: 2022-09-20 | Stop reason: HOSPADM

## 2022-09-20 RX ORDER — SODIUM CHLORIDE 0.9 % (FLUSH) 0.9 %
10 SYRINGE (ML) INJECTION EVERY 12 HOURS SCHEDULED
Status: DISCONTINUED | OUTPATIENT
Start: 2022-09-20 | End: 2022-09-20 | Stop reason: HOSPADM

## 2022-09-20 RX ORDER — RANOLAZINE 500 MG/1
1000 TABLET, EXTENDED RELEASE ORAL 2 TIMES DAILY
Status: CANCELLED | OUTPATIENT
Start: 2022-09-20

## 2022-09-20 RX ORDER — ASPIRIN 81 MG/1
324 TABLET, CHEWABLE ORAL ONCE
Status: DISCONTINUED | OUTPATIENT
Start: 2022-09-20 | End: 2022-09-20 | Stop reason: HOSPADM

## 2022-09-20 RX ORDER — ACETAMINOPHEN 650 MG/1
650 SUPPOSITORY RECTAL EVERY 4 HOURS PRN
Status: DISCONTINUED | OUTPATIENT
Start: 2022-09-20 | End: 2022-09-20 | Stop reason: HOSPADM

## 2022-09-20 RX ORDER — SODIUM CHLORIDE 0.9 % (FLUSH) 0.9 %
10 SYRINGE (ML) INJECTION AS NEEDED
Status: DISCONTINUED | OUTPATIENT
Start: 2022-09-20 | End: 2022-09-20 | Stop reason: HOSPADM

## 2022-09-20 RX ORDER — MORPHINE SULFATE 2 MG/ML
2 INJECTION, SOLUTION INTRAMUSCULAR; INTRAVENOUS ONCE
Status: COMPLETED | OUTPATIENT
Start: 2022-09-20 | End: 2022-09-20

## 2022-09-20 RX ORDER — INSULIN LISPRO 100 [IU]/ML
0-7 INJECTION, SOLUTION INTRAVENOUS; SUBCUTANEOUS
Status: DISCONTINUED | OUTPATIENT
Start: 2022-09-20 | End: 2022-09-20 | Stop reason: HOSPADM

## 2022-09-20 RX ORDER — ATORVASTATIN CALCIUM 10 MG/1
10 TABLET, FILM COATED ORAL DAILY
Qty: 90 TABLET | Refills: 0 | Status: SHIPPED | OUTPATIENT
Start: 2022-09-21

## 2022-09-20 RX ORDER — ACETAMINOPHEN 325 MG/1
650 TABLET ORAL EVERY 4 HOURS PRN
Status: DISCONTINUED | OUTPATIENT
Start: 2022-09-20 | End: 2022-09-20 | Stop reason: HOSPADM

## 2022-09-20 RX ORDER — ONDANSETRON 2 MG/ML
4 INJECTION INTRAMUSCULAR; INTRAVENOUS ONCE
Status: COMPLETED | OUTPATIENT
Start: 2022-09-20 | End: 2022-09-20

## 2022-09-20 RX ORDER — ALBUTEROL SULFATE 2.5 MG/3ML
2.5 SOLUTION RESPIRATORY (INHALATION) EVERY 4 HOURS PRN
Status: CANCELLED | OUTPATIENT
Start: 2022-09-20

## 2022-09-20 RX ORDER — DEXTROSE MONOHYDRATE 25 G/50ML
25 INJECTION, SOLUTION INTRAVENOUS
Status: DISCONTINUED | OUTPATIENT
Start: 2022-09-20 | End: 2022-09-20 | Stop reason: HOSPADM

## 2022-09-20 RX ORDER — ATORVASTATIN CALCIUM 20 MG/1
10 TABLET, FILM COATED ORAL DAILY
Status: DISCONTINUED | OUTPATIENT
Start: 2022-09-20 | End: 2022-09-20 | Stop reason: HOSPADM

## 2022-09-20 RX ORDER — RANOLAZINE 1000 MG/1
1000 TABLET, EXTENDED RELEASE ORAL EVERY 12 HOURS SCHEDULED
Qty: 60 TABLET | Refills: 0 | Status: SHIPPED | OUTPATIENT
Start: 2022-09-20 | End: 2022-09-28 | Stop reason: HOSPADM

## 2022-09-20 RX ORDER — ASPIRIN 81 MG/1
81 TABLET ORAL DAILY
Status: CANCELLED | OUTPATIENT
Start: 2022-09-21

## 2022-09-20 RX ORDER — ACETAMINOPHEN 160 MG/5ML
650 SOLUTION ORAL EVERY 4 HOURS PRN
Status: DISCONTINUED | OUTPATIENT
Start: 2022-09-20 | End: 2022-09-20 | Stop reason: HOSPADM

## 2022-09-20 RX ORDER — METOPROLOL SUCCINATE 25 MG/1
12.5 TABLET, EXTENDED RELEASE ORAL
Status: CANCELLED | OUTPATIENT
Start: 2022-09-20

## 2022-09-20 RX ADMIN — MORPHINE SULFATE 2 MG: 2 INJECTION, SOLUTION INTRAMUSCULAR; INTRAVENOUS at 02:30

## 2022-09-20 RX ADMIN — Medication 10 ML: at 10:36

## 2022-09-20 RX ADMIN — ATORVASTATIN CALCIUM 10 MG: 20 TABLET, FILM COATED ORAL at 10:35

## 2022-09-20 RX ADMIN — RANOLAZINE 1000 MG: 500 TABLET, FILM COATED, EXTENDED RELEASE ORAL at 10:34

## 2022-09-20 RX ADMIN — METOPROLOL SUCCINATE 12.5 MG: 25 TABLET, EXTENDED RELEASE ORAL at 10:34

## 2022-09-20 RX ADMIN — SODIUM CHLORIDE 500 ML: 9 INJECTION, SOLUTION INTRAVENOUS at 02:19

## 2022-09-20 RX ADMIN — NITROGLYCERIN 0.4 MG: 0.4 TABLET SUBLINGUAL at 01:55

## 2022-09-20 RX ADMIN — ONDANSETRON 4 MG: 2 INJECTION INTRAMUSCULAR; INTRAVENOUS at 02:29

## 2022-09-20 NOTE — CONSULTS
[facility] Consult Note    Patient Name: Yoni Bonner Jr.  :1933  89 y.o.    Date of Admission: 2022  Date of Consultation:  22  Encounter Provider: Melissa Templeton MD  Place of Service: [facility] CARDIOLOGY  Referring Provider: Raj Bey MD  Patient Care Team:  Patric Cameron DO as PCP - General (Family Medicine)      Chief complaint: Chest pain    History of Present Illness:    This is a 89 year old man with chronic bronchitis, carotid artery disease, diabetes mellitus type 2, COPD, syncope due to orthostatic hypotension, hyperlipidemia, hypertension, coronary artery disease (with prior inferior myocardial infarction and stent placement of RCA,  of left circumflex artery and chronic severe LAD disease not easily amenable to PCI), who is admitted with chest pain.      The patient's history was mainly taken from the chart and in discussion with the patient's family including his wife who was at bedside.  Obtaining history directly from the patient is extremely limited by his severe deafness and issues with memory.    The patient is well-known to me.  He has a history of coronary artery disease including an inferior myocardial infarction in 2018 for an inferior myocardial infarction.  He subsequently underwent drug-eluting stent placement of his proximal right coronary artery.  He was noted to have severe mid to distal LAD stenosis that was not easily amenable to PCI so medical management was recommended.  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  Over the years he has had issues with angina that we have been medically managing.  He did undergo a cardiac catheterization in  that which showed stable disease.      Most recently he has developed issues with orthostatic hypotension resulting in syncope.  As a result we have had to back  off of his antianginal therapy including discontinuation of isosorbide and reduction of his metoprolol dosage and started him on midodrine.  He was started on ranolazine 500 mg twice a day earlier this year with improvement in his anginal symptoms.    During his most recent office visit earlier this month he was having episodes of shaking with poor responsiveness and diaphoresis.  His symptoms sounded atypical for angina or ischemic heart disease.  He was not having any associated lightheadedness or near syncope.  My concern was the symptoms were due to an arrhythmia so a ZIO monitor was placed.  According to the patient's wife a ZIO monitor was just removed last week and as of yet I have not received the monitor results.    Overall the patient's wife reports that his episodes of shaking and poor responsiveness have improved since his last office visit.  He really has not had any episodes of chest pain recently.  He has been dealing a lot with neck pain which is also another chronic issue.  His dyspnea has been rather stable.    Last night the patient and his wife are sitting in the recliner's.  He had a sudden onset of chest discomfort for which he took 3 sublingual nitroglycerin with improvement but no resolution of the symptoms.  At this point he was brought to the emergency room.  He eventually required morphine for resolution of his symptoms.  He has had no recurrent chest pain since then.  He appears to be comfortable this morning.    His work-up since his admission includes 2 normal troponins.  His EKG shows no acute changes.  On admission he was noted to have a mildly elevated creatinine which has since normalized.      Prior testing:    Echocardiogram 7/28/22:  · Left ventricular wall thickness is consistent with borderline concentric hypertrophy.  · There is calcification of the aortic valve.  · Left ventricular diastolic function is consistent with (grade Ia w/high LAP) impaired relaxation.  · Estimated  left ventricular EF = 50% Left ventricular systolic function is low normal.  · Mildly reduced right ventricular systolic function noted.    Echocardiogram 1/21/22:  · Calculated left ventricular EF = 56.4% Estimated left ventricular EF was in agreement with the calculated left ventricular EF. Left ventricular systolic function is normal.Left ventricular wall thickness is consistent with moderate concentric hypertrophy. Sigmoid-shaped ventricular septum is present.  · Left ventricular diastolic function was normal.  · Moderate aortic valve stenosis is present. Aortic valve area is 1.1 cm2.Peak velocity of the flow distal to the aortic valve is 286.4 cm/s. Aortic valve maximum pressure gradient is 32.8 mmHg. Aortic valve mean pressure gradient is 17.9 mmHg. Aortic valve dimensionless index is 0.4 .     Cardiac catheterization 2/21/20:  1. Severe two-vessel coronary artery disease with 70% mid LAD stenoses, 100% occlusion of proximal circumflex.  There is a patent stent within the right coronary artery with essentially luminal irregularities throughout the rest of the vessel supplies right to left collaterals to the distal circumflex.  2. Normal left ventricular filling pressures of 8 mmHg.        Nuclear Stress test 2/21/20:  · Left ventricular ejection fraction is normal (Calculated EF = 54%).  · Myocardial perfusion imaging indicates a large-sized infarct located in the lateral wall with mild angela-infarct ischemia.  · Compared to stress nuc in 2018, the lateral wall defect is more severe.      Past Medical History:   Diagnosis Date   • Bronchitis    • CAD (coronary artery disease)    • Carotid arterial disease (HCC)    • Chronic bronchitis ([facility])    • COPD (chronic obstructive pulmonary disease) ([facility])    • Gout    • Hyperlipidemia    • Hypertension    • Ischemic cardiomyopathy     resolved, EF was 60% in 2018   • Mild aortic stenosis    • Mitral regurgitation    • Precordial chest pain    • Rheumatic fever    •  Syncopal episodes    • Tobacco use    • Type 2 diabetes mellitus (HCC)        Past Surgical History:   Procedure Laterality Date   • CARDIAC CATHETERIZATION     • CARDIAC CATHETERIZATION N/A 4/8/2018    Procedure: Left Heart Cath;  Surgeon: Nicolas Jerez MD;  Location:  [facility];  Service: Cardiovascular   • CARDIAC CATHETERIZATION  4/8/2018    Procedure: Percutaneous Manual Thrombectomy;  Surgeon: Nicolas Jerez MD;  Location:  [facility];  Service: Cardiovascular   • CARDIAC CATHETERIZATION N/A 4/8/2018    Procedure: Stent CARLENE coronary;  Surgeon: Nicolas Jerez MD;  Location:  [facility];  Service: Cardiovascular   • CARDIAC CATHETERIZATION N/A 4/8/2018    Procedure: Right Heart Cath;  Surgeon: Nicolas Jerez MD;  Location:  [facility];  Service: Cardiovascular   • CARDIAC CATHETERIZATION Left 2/21/2020    Procedure: Cardiac Catheterization/Vascular Study;  Surgeon: Alejnadro Jenkins MD;  Location:  [facility];  Service: Cardiovascular;  Laterality: Left;   • CARDIAC CATHETERIZATION N/A 2/21/2020    Procedure: Coronary angiography;  Surgeon: Alejandro Jenkins MD;  Location:  [facility];  Service: Cardiovascular;  Laterality: N/A;   • EYE SURGERY      cataract surg   • HAND SURGERY     • HERNIA REPAIR     • KNEE SURGERY     • TESTICLE SURGERY           Prior to Admission medications    Medication Sig Start Date End Date Taking? Authorizing Provider   aspirin 81 MG EC tablet Take 1 tablet by mouth Daily. 4/11/18  Yes Melissa Templeton MD   midodrine (PROAMATINE) 5 MG tablet Take 1 tablet by mouth 3 (Three) Times a Day Before Meals. 4/15/22  Yes Jessica Rice APRN   nitroglycerin (NITROSTAT) 0.4 MG SL tablet DISSOLVE 1 TAB UNDER TONGUE FOR CHEST PAIN - IF PAIN REMAINS AFTER 5 MIN, CALL 911 AND REPEAT DOSE. MAX 3 TABS IN 15 MINUTES  Patient taking differently: Place 0.4 mg under the tongue Every 5 (Five) Minutes As  Needed. 1/28/21  Yes Melissa Templeton MD   ranolazine (Ranexa) 500 MG 12 hr tablet Take 1 tablet by mouth 2 (Two) Times a Day. 4/15/22  Yes Jessica Rice APRN   albuterol (PROVENTIL) (2.5 MG/3ML) 0.083% nebulizer solution Take 2.5 mg by nebulization Every 4 (Four) Hours As Needed for Wheezing or Shortness of Air. 1/9/19   Raj Bey MD   albuterol sulfate  (90 Base) MCG/ACT inhaler Inhale 2 puffs Every 4 (Four) Hours As Needed for Wheezing.    Provider, MD Miguelina   clopidogrel (PLAVIX) 75 MG tablet TAKE ONE TABLET BY MOUTH DAILY  Patient taking differently: Take 75 mg by mouth Daily. 4/4/22   Melissa Templeton MD   furosemide (LASIX) 40 MG tablet Take 1 tablet by mouth Daily for 30 days. 7/30/22 8/29/22  Damion Bean MD   metoprolol succinate XL (TOPROL-XL) 25 MG 24 hr tablet Take 0.5 tablets by mouth Daily for 30 days. 7/30/22 8/29/22  Damion Bean MD   ondansetron ODT (ZOFRAN-ODT) 4 MG disintegrating tablet Place 4 mg on the tongue Every 6 (Six) Hours As Needed for Nausea or Vomiting.    Provider, MD Miguelina   atorvastatin (LIPITOR) 10 MG tablet TAKE ONE TABLET BY MOUTH ONCE NIGHTLY  Patient taking differently: Take 10 mg by mouth Every Night. 12/14/21 9/20/22  Melissa Templeton MD       Allergies   Allergen Reactions   • No Known Drug Allergy Unknown (See Comments)     NKA       Social History     Socioeconomic History   • Marital status:    Tobacco Use   • Smoking status: Never Smoker   • Smokeless tobacco: Current User     Types: Chew   • Tobacco comment: chewing tobacco since 4 years old   Substance and Sexual Activity   • Alcohol use: No     Comment: caffeine use   • Drug use: No   • Sexual activity: Defer       Family History   Problem Relation Age of Onset   • Heart disease Father    • Hypertension Father    • Stroke Father    • Diabetes Sister    • Cancer Brother    • Heart disease Brother    • Hypertension Brother    • No Known Problems Maternal Grandmother    • No  Known Problems Maternal Grandfather    • No Known Problems Paternal Grandmother    • No Known Problems Paternal Grandfather        REVIEW OF SYSTEMS:   All systems reviewed.  Pertinent positives identified in HPI.  All other systems are negative.      Objective:     Vitals:    09/20/22 0606 09/20/22 0608 09/20/22 0631 09/20/22 0805   BP: 164/90 164/90 140/73 116/68   Patient Position:  Lying     Pulse: 104  86 75   Resp:    20   Temp:       SpO2: 96%  96%      There is no height or weight on file to calculate BMI.    General Appearance:    Alert, cooperative, in no acute distress   Head:    Normocephalic, without obvious abnormality, atraumatic   Eyes:            Lids and lashes normal, conjunctivae and sclerae normal, no icterus, no pallor, corneas clear, PERRLA   Ears:    Ears appear intact with no abnormalities noted   Neck:   No adenopathy, supple, trachea midline, no thyromegaly, no carotid bruit, no JVD   Lungs:     Clear to auscultation, respirations regular, even and unlabored    Heart:    Regular rhythm and normal rate, normal S1 and S2, no murmur, no gallop, no rub, no click   Chest Wall:    No abnormalities observed   Abdomen:     Normal bowel sounds, no masses, no organomegaly, soft, nontender, nondistended, no guarding, no rebound  tenderness   Extremities:   Moves all extremities well, no edema, no cyanosis, no redness   Pulses:   Pulses palpable and equal bilaterally. Normal radial, carotid, femoral, dorsalis pedis and posterior tibial pulses bilaterally. Normal abdominal aorta   Skin:  Psychiatric:   No bleeding, bruising or rash    Alert and oriented x 3, normal mood and affect   Lab Review:     Results from last 7 days   Lab Units 09/20/22  0358 09/20/22  0152   SODIUM mmol/L 133* 132*   POTASSIUM mmol/L 5.1 5.0   CHLORIDE mmol/L 100 98   CO2 mmol/L 23.7 23.7   BUN mg/dL 28* 29*   CREATININE mg/dL 1.10 1.32*   CALCIUM mg/dL 8.8 9.3   BILIRUBIN mg/dL  --  0.2   ALK PHOS U/L  --  67   ALT (SGPT) U/L   --  20   AST (SGOT) U/L  --  24   GLUCOSE mg/dL 112* 118*     Results from last 7 days   Lab Units 09/20/22  0358 09/20/22  0152   TROPONIN T ng/mL <0.010 <0.010     Results from last 7 days   Lab Units 09/20/22  0543   WBC 10*3/mm3 7.22   HEMOGLOBIN g/dL 9.6*   HEMATOCRIT % 30.4*   PLATELETS 10*3/mm3 167     Results from last 7 days   Lab Units 09/20/22  0152   INR  1.04   APTT seconds 30.8     Results from last 7 days   Lab Units 09/20/22  0358   MAGNESIUM mg/dL 2.0                 EKG on 9/20/22    I personally viewed and interpreted the patient's EKG/Telemetry data.        Assessment and Plan:       1.  Chest pain.  He has a history of angina which has been medically managed.    Fortunately his troponins are normal and his EKG is unchanged.  2.  Coronary artery disease.  He has known coronary artery disease that is not amenable to PCI and has been medically managed.  Antianginal therapy has been limited by issues with orthostatic hypotension.  3.  Orthostatic hypotension.  On midodrine.  No recent syncopal episodes.  4.  COPD  5.  Supine hypertension.  Fairly well controlled.  6.  Hyperlipidemia.  For some reason he has been off of the atorvastatin.  7.  Diabetes mellitus type 2    - Discussed options with the patient's family.  I do not think a stress test would be very useful visit will likely be abnormal because of his known underlying disease.  I discussed options of repeating cardiac catheterization to ensure there is been no significant change and to see if he has any disease new disease amenable to PCI versus continuing medical management.  The patient's wife does indicate that the patient is not interested in pursuing invasive procedures.  With this in mind we will stick with medical management for now.  -Increase ranolazine to 1000 mg twice a day.  - Continue low-dose metoprolol succinate.  - Resume atorvastatin at a low dose.  - Continue aspirin and midodrine.  -If he remains pain-free he is okay for  discharge from my standpoint either later today or tomorrow.  He will need to keep his follow-up office appointment as scheduled.    Melissa Templeton MD  09/20/22  09:09 EDT

## 2022-09-20 NOTE — ED PROVIDER NOTES
EMERGENCY DEPARTMENT ENCOUNTER    Room Number:  34/34  Date of encounter:  9/20/2022  PCP: Patric Cameron DO  Historian: Patient      HPI:  Chief Complaint: Chest pain  A complete HPI/ROS/PMH/PSH/SH/FH are unobtainable due to: None    Context: Yoni Bonner Jr. is a 89 y.o. male who presents to the ED c/o chest pain that started around midnight tonight.  Describes a tight left anterior chest pain that does not radiate but gave him a cold sweat.  No nausea vomiting no shortness of breath.  Patient has prior history of CAD.  Does have a currently has 8 out of 10 pain.  Had a similar less severe episode yesterday.      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) 04/27/2015   • Coronary artery disease involving native coronary artery of native heart without angina pectoris 04/16/2018   • Chest pain 12/06/2018   • CKD (chronic kidney disease) stage 3, GFR 30-59 ml/min ([facility]) 01/06/2019   • Nonrheumatic aortic valve stenosis 03/20/2019   • COPD (chronic obstructive pulmonary disease) ([facility]) 07/24/2019   • HLD (hyperlipidemia) 07/25/2019   • Contusion of right leg 05/19/2021   • Knee pain 02/11/2020   • Primary osteoarthritis of left knee 02/11/2020   • Orthostatic hypotension 01/13/2022     Resolved Ambulatory Problems     Diagnosis Date Noted   • Acute ST elevation myocardial infarction (STEMI) of inferior wall ([facility]) 04/08/2018   • Contusion of left knee 01/09/2016   • Left shoulder pain 04/13/2016   • Postoperative anemia 03/29/2017   • Tobacco abuse  04/16/2018   • History of coronary artery stent placement 04/16/2018   • Chronic systolic congestive heart failure (HCC) 04/16/2018   • Ischemic cardiomyopathy 05/08/2018   • Exertional chest pain 07/18/2018   • Acute bronchitis due to Rhinovirus 01/09/2019   • Elevated troponin 07/25/2019     Past Medical History:   Diagnosis Date   • Bronchitis    • CAD (coronary artery disease)    • Carotid arterial disease (HCC)    • Chronic bronchitis (HCC)    • Gout    • Hyperlipidemia    • Hypertension    • Mild aortic stenosis    • Mitral regurgitation    • Precordial chest pain    • Rheumatic fever    • Syncopal episodes    • Tobacco use    • Type 2 diabetes mellitus (HCC)          PAST SURGICAL HISTORY  Past Surgical History:   Procedure Laterality Date   • CARDIAC CATHETERIZATION     • CARDIAC CATHETERIZATION N/A 4/8/2018    Procedure: Left Heart Cath;  Surgeon: Nicolas Jerez MD;  [facility];  Service: Cardiovascular   • CARDIAC CATHETERIZATION  4/8/2018    Procedure: Percutaneous Manual Thrombectomy;  Surgeon: Nicolas Jerez MD;  [facility];  Service: Cardiovascular   • CARDIAC CATHETERIZATION N/A 4/8/2018    Procedure: Stent CARLENE coronary;  Surgeon: Nicolas Jerez MD;  [facility];  Service: Cardiovascular   • CARDIAC CATHETERIZATION N/A 4/8/2018    Procedure: Right Heart Cath;  Surgeon: Nicolas Jerez MD;  [facility];  Service: Cardiovascular   • CARDIAC CATHETERIZATION Left 2/21/2020    Procedure: Cardiac Catheterization/Vascular Study;  Surgeon: Alejandro Jenkins MD;  [facility];  Service: Cardiovascular;  Laterality: Left;   • CARDIAC CATHETERIZATION N/A 2/21/2020    Procedure: Coronary angiography;  Surgeon: Alejandro Jenkins MD;  Location: [facility] CATH INVASIVE LOCATION;  Service: Cardiovascular;  Laterality: N/A;   • EYE SURGERY      cataract surg   • HAND SURGERY     • HERNIA REPAIR      • KNEE SURGERY     • TESTICLE SURGERY           FAMILY HISTORY  Family History   Problem Relation Age of Onset   • Heart disease Father    • Hypertension Father    • Stroke Father    • Diabetes Sister    • Cancer Brother    • Heart disease Brother    • Hypertension Brother    • No Known Problems Maternal Grandmother    • No Known Problems Maternal Grandfather    • No Known Problems Paternal Grandmother    • No Known Problems Paternal Grandfather          SOCIAL HISTORY  Social History     Socioeconomic History   • Marital status:    Tobacco Use   • Smoking status: Never Smoker   • Smokeless tobacco: Current User     Types: Chew   • Tobacco comment: chewing tobacco since 4 years old   Substance and Sexual Activity   • Alcohol use: No     Comment: caffeine use   • Drug use: No   • Sexual activity: Defer         ALLERGIES  No known drug allergy        REVIEW OF SYSTEMS  Review of Systems     All systems reviewed and negative except for those discussed in HPI.       PHYSICAL EXAM    I have reviewed the triage vital signs and nursing notes.    ED Triage Vitals [09/20/22 0122]   Temp Heart Rate Resp BP SpO2   96.8 °F (36 °C) 100 16 125/67 98 %      Temp src Heart Rate Source Patient Position BP Location FiO2 (%)   -- -- -- -- --       Physical Exam  GENERAL: not distressed, very hard of hearing  HENT: nares patent  EYES: no scleral icterus  CV: regular rhythm, regular rate  RESPIRATORY: normal effort  ABDOMEN: soft  MUSCULOSKELETAL: no deformity  NEURO: alert, moves all extremities, follows commands  SKIN: warm, dry        LAB RESULTS  Recent Results (from the past 24 hour(s))   ECG 12 Lead    Collection Time: 09/20/22  1:30 AM   Result Value Ref Range    QT Interval 352 ms   Comprehensive Metabolic Panel    Collection Time: 09/20/22  1:52 AM    Specimen: Blood   Result Value Ref Range    Glucose 118 (H) 65 - 99 mg/dL    BUN 29 (H) 8 - 23 mg/dL    Creatinine 1.32 (H) 0.76 - 1.27 mg/dL    Sodium 132 (L) 136 - 145  mmol/L    Potassium 5.0 3.5 - 5.2 mmol/L    Chloride 98 98 - 107 mmol/L    CO2 23.7 22.0 - 29.0 mmol/L    Calcium 9.3 8.6 - 10.5 mg/dL    Total Protein 6.0 6.0 - 8.5 g/dL    Albumin 3.70 3.50 - 5.20 g/dL    ALT (SGPT) 20 1 - 41 U/L    AST (SGOT) 24 1 - 40 U/L    Alkaline Phosphatase 67 39 - 117 U/L    Total Bilirubin 0.2 0.0 - 1.2 mg/dL    Globulin 2.3 gm/dL    A/G Ratio 1.6 g/dL    BUN/Creatinine Ratio 22.0 7.0 - 25.0    Anion Gap 10.3 5.0 - 15.0 mmol/L    eGFR 51.6 (L) >60.0 mL/min/1.73   aPTT    Collection Time: 09/20/22  1:52 AM    Specimen: Blood   Result Value Ref Range    PTT 30.8 22.7 - 35.4 seconds   Protime-INR    Collection Time: 09/20/22  1:52 AM    Specimen: Blood   Result Value Ref Range    Protime 13.5 11.7 - 14.2 Seconds    INR 1.04 0.90 - 1.10   BNP    Collection Time: 09/20/22  1:52 AM    Specimen: Blood   Result Value Ref Range    proBNP 1,308.0 0.0 - 1,800.0 pg/mL   Troponin    Collection Time: 09/20/22  1:52 AM    Specimen: Blood   Result Value Ref Range    Troponin T <0.010 0.000 - 0.030 ng/mL   Magnesium    Collection Time: 09/20/22  1:52 AM    Specimen: Blood   Result Value Ref Range    Magnesium 2.0 1.6 - 2.4 mg/dL   CBC Auto Differential    Collection Time: 09/20/22  1:52 AM    Specimen: Blood   Result Value Ref Range    WBC 9.04 3.40 - 10.80 10*3/mm3    RBC 3.65 (L) 4.14 - 5.80 10*6/mm3    Hemoglobin 10.6 (L) 13.0 - 17.7 g/dL    Hematocrit 32.1 (L) 37.5 - 51.0 %    MCV 87.9 79.0 - 97.0 fL    MCH 29.0 26.6 - 33.0 pg    MCHC 33.0 31.5 - 35.7 g/dL    RDW 13.5 12.3 - 15.4 %    RDW-SD 43.0 37.0 - 54.0 fl    MPV 10.8 6.0 - 12.0 fL    Platelets 186 140 - 450 10*3/mm3    Neutrophil % 47.5 42.7 - 76.0 %    Lymphocyte % 41.8 19.6 - 45.3 %    Monocyte % 8.2 5.0 - 12.0 %    Eosinophil % 1.2 0.3 - 6.2 %    Basophil % 0.7 0.0 - 1.5 %    Immature Grans % 0.6 (H) 0.0 - 0.5 %    Neutrophils, Absolute 4.30 1.70 - 7.00 10*3/mm3    Lymphocytes, Absolute 3.78 (H) 0.70 - 3.10 10*3/mm3    Monocytes, Absolute  0.74 0.10 - 0.90 10*3/mm3    Eosinophils, Absolute 0.11 0.00 - 0.40 10*3/mm3    Basophils, Absolute 0.06 0.00 - 0.20 10*3/mm3    Immature Grans, Absolute 0.05 0.00 - 0.05 10*3/mm3    nRBC 0.0 0.0 - 0.2 /100 WBC   Troponin    Collection Time: 09/20/22  3:58 AM    Specimen: Arm, Right; Blood   Result Value Ref Range    Troponin T <0.010 0.000 - 0.030 ng/mL   Basic Metabolic Panel    Collection Time: 09/20/22  3:58 AM    Specimen: Arm, Right; Blood   Result Value Ref Range    Glucose 112 (H) 65 - 99 mg/dL    BUN 28 (H) 8 - 23 mg/dL    Creatinine 1.10 0.76 - 1.27 mg/dL    Sodium 133 (L) 136 - 145 mmol/L    Potassium 5.1 3.5 - 5.2 mmol/L    Chloride 100 98 - 107 mmol/L    CO2 23.7 22.0 - 29.0 mmol/L    Calcium 8.8 8.6 - 10.5 mg/dL    BUN/Creatinine Ratio 25.5 (H) 7.0 - 25.0    Anion Gap 9.3 5.0 - 15.0 mmol/L    eGFR 64.2 >60.0 mL/min/1.73   Magnesium    Collection Time: 09/20/22  3:58 AM    Specimen: Arm, Right; Blood   Result Value Ref Range    Magnesium 2.0 1.6 - 2.4 mg/dL   CBC Auto Differential    Collection Time: 09/20/22  5:43 AM    Specimen: Blood   Result Value Ref Range    WBC 7.22 3.40 - 10.80 10*3/mm3    RBC 3.36 (L) 4.14 - 5.80 10*6/mm3    Hemoglobin 9.6 (L) 13.0 - 17.7 g/dL    Hematocrit 30.4 (L) 37.5 - 51.0 %    MCV 90.5 79.0 - 97.0 fL    MCH 28.6 26.6 - 33.0 pg    MCHC 31.6 31.5 - 35.7 g/dL    RDW 13.8 12.3 - 15.4 %    RDW-SD 46.2 37.0 - 54.0 fl    MPV 10.6 6.0 - 12.0 fL    Platelets 167 140 - 450 10*3/mm3    Neutrophil % 52.0 42.7 - 76.0 %    Lymphocyte % 37.1 19.6 - 45.3 %    Monocyte % 7.9 5.0 - 12.0 %    Eosinophil % 1.7 0.3 - 6.2 %    Basophil % 0.7 0.0 - 1.5 %    Immature Grans % 0.6 (H) 0.0 - 0.5 %    Neutrophils, Absolute 3.76 1.70 - 7.00 10*3/mm3    Lymphocytes, Absolute 2.68 0.70 - 3.10 10*3/mm3    Monocytes, Absolute 0.57 0.10 - 0.90 10*3/mm3    Eosinophils, Absolute 0.12 0.00 - 0.40 10*3/mm3    Basophils, Absolute 0.05 0.00 - 0.20 10*3/mm3    Immature Grans, Absolute 0.04 0.00 - 0.05 10*3/mm3     nRBC 0.0 0.0 - 0.2 /100 WBC       Ordered the above labs and independently reviewed the results.        RADIOLOGY  XR Chest 1 View    Result Date: 9/20/2022  Patient: JALIL JOSHI  Time Out: 03:46 Exam(s): FILM CXR 1 VIEW EXAM:   XR Chest, 1 View CLINICAL HISTORY:    Reason for exam: cp. TECHNIQUE:   Frontal view of the chest. COMPARISON:   July 27, 2022 FINDINGS:   Lungs:  Unremarkable.  No consolidation.   Pleural space:  Unremarkable.  No pneumothorax.   Heart:  The cardiac silhouette is mildly enlarged.   Mediastinum:  Unremarkable.   Bones joints:  Previous left shoulder arthroplasty.  Mild to moderate osteophytosis throughout the mid to lower thoracic spine.   Vasculature:  The aortic arch is mildly calcified but nondilated.   Upper abdomen:  There is no pneumoperitoneum under the diaphragm. IMPRESSION:     Cardiomegaly without signs of CHF or acute focal infiltrate.    Electronically signed by Fernie San MD on 09-20-22 at 0346      I ordered the above noted radiological studies. Reviewed by me and discussed with radiologist.  See dictation for official radiology interpretation.      PROCEDURES    Procedures      MEDICATIONS GIVEN IN ER    Medications   sodium chloride 0.9 % flush 10 mL (has no administration in time range)   aspirin chewable tablet 324 mg (324 mg Oral Not Given 9/20/22 0147)   sodium chloride 0.9 % flush 10 mL (has no administration in time range)   sodium chloride 0.9 % flush 10 mL (has no administration in time range)   dextrose (GLUTOSE) oral gel 15 g (has no administration in time range)   dextrose (D50W) (25 g/50 mL) IV injection 25 g (has no administration in time range)   glucagon (human recombinant) (GLUCAGEN DIAGNOSTIC) injection 1 mg (has no administration in time range)   nitroglycerin (NITROSTAT) SL tablet 0.4 mg (has no administration in time range)   insulin lispro (ADMELOG) injection 0-7 Units (has no administration in time range)   acetaminophen (TYLENOL) tablet 650 mg  (has no administration in time range)     Or   acetaminophen (TYLENOL) 160 MG/5ML solution 650 mg (has no administration in time range)     Or   acetaminophen (TYLENOL) suppository 650 mg (has no administration in time range)   ondansetron (ZOFRAN) injection 4 mg (has no administration in time range)   sodium chloride 0.9 % bolus 500 mL (0 mL Intravenous Stopped 9/20/22 0357)   morphine injection 2 mg (2 mg Intravenous Given 9/20/22 0230)   ondansetron (ZOFRAN) injection 4 mg (4 mg Intravenous Given 9/20/22 0229)         PROGRESS, DATA ANALYSIS, CONSULTS, AND MEDICAL DECISION MAKING    All labs have been independently reviewed by me.  All radiology studies have been reviewed by me and discussed with radiologist dictating the report.   EKG's independently viewed and interpreted by me.  Discussion below represents my analysis of pertinent findings related to patient's condition, differential diagnosis, treatment plan and final disposition.        ED Course as of 09/20/22 0614   Tue Sep 20, 2022   0142 EKG          EKG time: 0130  Rhythm/Rate: Sinus rhythm rate 95  P waves and IA: Prolonged IA interval  QRS, axis: Narrow regular  ST and T waves: Nonspecific    Interpreted Contemporaneously by me, independently viewed  No significant changed compared to prior EKG   [TJ]      ED Course User Index  [TJ] Nicolas Keenan MD           PPE: The patient wore a surgical mask throughout the entire patient encounter. I wore an N95.    AS OF 06:14 EDT VITALS:    BP - 164/90  HR - 94  TEMP - 96.8 °F (36 °C)  O2 SATS - 96%        DIAGNOSIS  Final diagnoses:   Chest pain, unspecified type         DISPOSITION  Admit           Nicolas Keenan MD  09/20/22 0614

## 2022-09-20 NOTE — H&P
Patient Name:  Yoni Bonner Jr.  YOB: 1933  MRN:  [id number]  Admit Date:  9/20/2022  Patient Care Team:  Patric Cameron DO as PCP - General (Family Medicine)      Subjective   History Present Illness     Chief Complaint   Patient presents with   • Chest Pain       Mr. Bonner is a 89 y.o. never smoker with a history of CAD, COPD, HLD, CKD, HTN, DM II who presents to [facility] ER with chief complaint of chest pan & admitted for chest pain.    Family at bedside & patient [place].  Chest pain starting at 2300 last night while at rest; therefore, went to [facility] ER for further evaluation.     Unremarkable serial troponin.  Patient reports nitroglycerin did not help pain; however, morphine alleviated chest pain.    Recommendation pending hospital course.  Details below.    History of Present Illness  Review of Systems   Constitutional: Negative for chills and fever.   HENT: Negative for congestion and rhinorrhea.    Respiratory: Negative for cough and shortness of breath.    Cardiovascular: Positive for chest pain (resolved). Negative for leg swelling.   Gastrointestinal: Negative for abdominal pain, constipation, diarrhea, nausea and vomiting.   Endocrine: Negative for polydipsia, polyphagia and polyuria.   Genitourinary: Negative for difficulty urinating and dysuria.   Musculoskeletal: Positive for gait problem (due to generalized weakness). Negative for myalgias.   Skin: Negative for rash and wound.   Neurological: Positive for dizziness (intermittent). Negative for light-headedness.   Psychiatric/Behavioral: Negative for confusion and hallucinations.        Personal History     Past Medical History:   Diagnosis Date   • Bronchitis    • CAD (coronary artery disease)    • Carotid arterial disease (HCC)    • Chronic bronchitis (HCC)    • COPD (chronic obstructive pulmonary disease) (HCC)    • Gout    • Hyperlipidemia    • Hypertension    • Ischemic cardiomyopathy     resolved, EF was 60% in 2018    • Mild aortic stenosis    • Mitral regurgitation    • Precordial chest pain    • Rheumatic fever    • Syncopal episodes    • Tobacco use    • Type 2 diabetes mellitus (HCC)      Past Surgical History:   Procedure Laterality Date   • CARDIAC CATHETERIZATION     • CARDIAC CATHETERIZATION N/A 4/8/2018    Procedure: Left Heart Cath;  Surgeon: Nicolas Jerez MD;  Location:  [facility];  Service: Cardiovascular   • CARDIAC CATHETERIZATION  4/8/2018    Procedure: Percutaneous Manual Thrombectomy;  Surgeon: Nicolas Jerez MD;  Location:  [facility];  Service: Cardiovascular   • CARDIAC CATHETERIZATION N/A 4/8/2018    Procedure: Stent CARLENE coronary;  Surgeon: Nicolas Jerez MD;  Location:  [facility];  Service: Cardiovascular   • CARDIAC CATHETERIZATION N/A 4/8/2018    Procedure: Right Heart Cath;  Surgeon: Nicolas Jerez MD;  Location:  [facility];  Service: Cardiovascular   • CARDIAC CATHETERIZATION Left 2/21/2020    Procedure: Cardiac Catheterization/Vascular Study;  Surgeon: Alejandro Jenkins MD;  Location:  [facility];  Service: Cardiovascular;  Laterality: Left;   • CARDIAC CATHETERIZATION N/A 2/21/2020    Procedure: Coronary angiography;  Surgeon: Alejandro Jenkins MD;  Location:  [facility];  Service: Cardiovascular;  Laterality: N/A;   • EYE SURGERY      cataract surg   • HAND SURGERY     • HERNIA REPAIR     • KNEE SURGERY     • TESTICLE SURGERY       Family History   Problem Relation Age of Onset   • Heart disease Father    • Hypertension Father    • Stroke Father    • Diabetes Sister    • Cancer Brother    • Heart disease Brother    • Hypertension Brother    • No Known Problems Maternal Grandmother    • No Known Problems Maternal Grandfather    • No Known Problems Paternal Grandmother    • No Known Problems Paternal Grandfather      Social History     Tobacco Use   • Smoking status: Never Smoker   • Smokeless tobacco:  Current User     Types: Chew   • Tobacco comment: chewing tobacco since 4 years old   Substance Use Topics   • Alcohol use: No     Comment: caffeine use   • Drug use: No     No current facility-administered medications on file prior to encounter.     Current Outpatient Medications on File Prior to Encounter   Medication Sig Dispense Refill   • albuterol (PROVENTIL) (2.5 MG/3ML) 0.083% nebulizer solution Take 2.5 mg by nebulization Every 4 (Four) Hours As Needed for Wheezing or Shortness of Air.  12   • albuterol sulfate  (90 Base) MCG/ACT inhaler Inhale 2 puffs Every 4 (Four) Hours As Needed for Wheezing.     • aspirin 81 MG EC tablet Take 1 tablet by mouth Daily. 30 tablet 5   • midodrine (PROAMATINE) 5 MG tablet Take 1 tablet by mouth 3 (Three) Times a Day Before Meals. 90 tablet 2   • nitroglycerin (NITROSTAT) 0.4 MG SL tablet DISSOLVE 1 TAB UNDER TONGUE FOR CHEST PAIN - IF PAIN REMAINS AFTER 5 MIN, CALL 911 AND REPEAT DOSE. MAX 3 TABS IN 15 MINUTES (Patient taking differently: Place 0.4 mg under the tongue Every 5 (Five) Minutes As Needed.) 25 tablet 4   • ranolazine (Ranexa) 500 MG 12 hr tablet Take 1 tablet by mouth 2 (Two) Times a Day. 60 tablet 11   • [DISCONTINUED] clopidogrel (PLAVIX) 75 MG tablet TAKE ONE TABLET BY MOUTH DAILY (Patient taking differently: Take 75 mg by mouth Daily.) 90 tablet 1   • metoprolol succinate XL (TOPROL-XL) 25 MG 24 hr tablet Take 0.5 tablets by mouth Daily for 30 days. 15 tablet 0   • ondansetron ODT (ZOFRAN-ODT) 4 MG disintegrating tablet Place 4 mg on the tongue Every 6 (Six) Hours As Needed for Nausea or Vomiting.     • [DISCONTINUED] atorvastatin (LIPITOR) 10 MG tablet TAKE ONE TABLET BY MOUTH ONCE NIGHTLY (Patient taking differently: Take 10 mg by mouth Every Night.) 90 tablet 2   • [DISCONTINUED] furosemide (LASIX) 40 MG tablet Take 1 tablet by mouth Daily for 30 days. 30 tablet 0     Allergies   Allergen Reactions   • No Known Drug Allergy Unknown (See Comments)      NKA       Objective    Objective     Vital Signs  Temp:  [96.8 °F (36 °C)] 96.8 °F (36 °C)  Heart Rate:  [] 75  Resp:  [16-20] 20  BP: ()/(52-90) 103/52  SpO2:  [94 %-100 %] 96 %  on   ;   Device (Oxygen Therapy): room air  There is no height or weight on file to calculate BMI.    Physical Exam  Constitutional:       General: He is not in acute distress.     Appearance: He is not toxic-appearing.      Comments: Elderly, frail, generally weak   HENT:      Head: Normocephalic and atraumatic.   Eyes:      Extraocular Movements: Extraocular movements intact.      Conjunctiva/sclera: Conjunctivae normal.   Cardiovascular:      Rate and Rhythm: Normal rate and regular rhythm.   Pulmonary:      Effort: Pulmonary effort is normal.      Comments: Diminished on expiration  Abdominal:      General: Bowel sounds are normal.      Palpations: Abdomen is soft.   Musculoskeletal:         General: No tenderness.      Cervical back: Normal range of motion and neck supple.      Right lower leg: No edema.      Left lower leg: No edema.   Skin:     General: Skin is warm and dry.   Neurological:      Mental Status: He is alert. Mental status is at baseline.      Cranial Nerves: No cranial nerve deficit.   Psychiatric:         Behavior: Behavior normal.         Thought Content: Thought content normal.         Results Review:  I reviewed the patient's new clinical results.  I reviewed the patient's new imaging results and agree with the interpretation.  I reviewed the patient's other test results and agree with the interpretation  I personally viewed and interpreted the patient's EKG/Telemetry data  Discussed with ED provider.    Lab Results (last 24 hours)     Procedure Component Value Units Date/Time    CBC & Differential [[id number]]  (Abnormal) Collected: 09/20/22 0152    Specimen: Blood Updated: 09/20/22 0215    Narrative:      The following orders were created for panel order CBC & Differential.  Procedure                                Abnormality         Status                     ---------                               -----------         ------                     CBC Auto Differential[[id number]]        Abnormal            Final result                 Please view results for these tests on the individual orders.    Comprehensive Metabolic Panel [[id number]]  (Abnormal) Collected: 09/20/22 0152    Specimen: Blood Updated: 09/20/22 0315     Glucose 118 mg/dL      BUN 29 mg/dL      Creatinine 1.32 mg/dL      Sodium 132 mmol/L      Potassium 5.0 mmol/L      Chloride 98 mmol/L      CO2 23.7 mmol/L      Calcium 9.3 mg/dL      Total Protein 6.0 g/dL      Albumin 3.70 g/dL      ALT (SGPT) 20 U/L      AST (SGOT) 24 U/L      Alkaline Phosphatase 67 U/L      Total Bilirubin 0.2 mg/dL      Globulin 2.3 gm/dL      A/G Ratio 1.6 g/dL      BUN/Creatinine Ratio 22.0     Anion Gap 10.3 mmol/L      eGFR 51.6 mL/min/1.73      Comment: National Kidney Foundation and American Society of Nephrology (ASN) Task Force recommended calculation based on the Chronic Kidney Disease Epidemiology Collaboration (CKD-EPI) equation refit without adjustment for race.       Narrative:      GFR Normal >60  Chronic Kidney Disease <60  Kidney Failure <15      aPTT [[id number]]  (Normal) Collected: 09/20/22 0152    Specimen: Blood Updated: 09/20/22 0225     PTT 30.8 seconds     Protime-INR [[id number]]  (Normal) Collected: 09/20/22 0152    Specimen: Blood Updated: 09/20/22 0225     Protime 13.5 Seconds      INR 1.04    BNP [[id number]]  (Normal) Collected: 09/20/22 0152    Specimen: Blood Updated: 09/20/22 0253     proBNP 1,308.0 pg/mL     Narrative:      Among patients with dyspnea, NT-proBNP is highly sensitive for the detection of acute congestive heart failure. In addition NT-proBNP of <300 pg/ml effectively rules out acute congestive heart failure with 99% negative predictive value.    Results may be falsely decreased if patient taking Biotin.      Troponin  [[id number]]  (Normal) Collected: 09/20/22 0152    Specimen: Blood Updated: 09/20/22 0315     Troponin T <0.010 ng/mL     Narrative:      Troponin T Reference Range:  <= 0.03 ng/mL-   Negative for AMI  >0.03 ng/mL-     Abnormal for myocardial necrosis.  Clinicians would have to utilize clinical acumen, EKG, Troponin and serial changes to determine if it is an Acute Myocardial Infarction or myocardial injury due to an underlying chronic condition.       Results may be falsely decreased if patient taking Biotin.      Magnesium [[id number]]  (Normal) Collected: 09/20/22 0152    Specimen: Blood Updated: 09/20/22 0315     Magnesium 2.0 mg/dL     CBC Auto Differential [[id number]]  (Abnormal) Collected: 09/20/22 0152    Specimen: Blood Updated: 09/20/22 0215     WBC 9.04 10*3/mm3      RBC 3.65 10*6/mm3      Hemoglobin 10.6 g/dL      Hematocrit 32.1 %      MCV 87.9 fL      MCH 29.0 pg      MCHC 33.0 g/dL      RDW 13.5 %      RDW-SD 43.0 fl      MPV 10.8 fL      Platelets 186 10*3/mm3      Neutrophil % 47.5 %      Lymphocyte % 41.8 %      Monocyte % 8.2 %      Eosinophil % 1.2 %      Basophil % 0.7 %      Immature Grans % 0.6 %      Neutrophils, Absolute 4.30 10*3/mm3      Lymphocytes, Absolute 3.78 10*3/mm3      Monocytes, Absolute 0.74 10*3/mm3      Eosinophils, Absolute 0.11 10*3/mm3      Basophils, Absolute 0.06 10*3/mm3      Immature Grans, Absolute 0.05 10*3/mm3      nRBC 0.0 /100 WBC     Troponin [[id number]]  (Normal) Collected: 09/20/22 0358    Specimen: Blood from Arm, Right Updated: 09/20/22 0441     Troponin T <0.010 ng/mL     Narrative:      Troponin T Reference Range:  <= 0.03 ng/mL-   Negative for AMI  >0.03 ng/mL-     Abnormal for myocardial necrosis.  Clinicians would have to utilize clinical acumen, EKG, Troponin and serial changes to determine if it is an Acute Myocardial Infarction or myocardial injury due to an underlying chronic condition.       Results may be falsely decreased if patient taking Biotin.       Basic Metabolic Panel [[id number]]  (Abnormal) Collected: 09/20/22 0358    Specimen: Blood from Arm, Right Updated: 09/20/22 0441     Glucose 112 mg/dL      BUN 28 mg/dL      Creatinine 1.10 mg/dL      Sodium 133 mmol/L      Potassium 5.1 mmol/L      Comment: Slight hemolysis detected by analyzer. Results may be affected.        Chloride 100 mmol/L      CO2 23.7 mmol/L      Calcium 8.8 mg/dL      BUN/Creatinine Ratio 25.5     Anion Gap 9.3 mmol/L      eGFR 64.2 mL/min/1.73      Comment: National Kidney Foundation and American Society of Nephrology (ASN) Task Force recommended calculation based on the Chronic Kidney Disease Epidemiology Collaboration (CKD-EPI) equation refit without adjustment for race.       Narrative:      GFR Normal >60  Chronic Kidney Disease <60  Kidney Failure <15      Magnesium [[id number]]  (Normal) Collected: 09/20/22 0358    Specimen: Blood from Arm, Right Updated: 09/20/22 0440     Magnesium 2.0 mg/dL     CBC Auto Differential [[id number]]  (Abnormal) Collected: 09/20/22 0543    Specimen: Blood Updated: 09/20/22 0557     WBC 7.22 10*3/mm3      RBC 3.36 10*6/mm3      Hemoglobin 9.6 g/dL      Hematocrit 30.4 %      MCV 90.5 fL      MCH 28.6 pg      MCHC 31.6 g/dL      RDW 13.8 %      RDW-SD 46.2 fl      MPV 10.6 fL      Platelets 167 10*3/mm3      Neutrophil % 52.0 %      Lymphocyte % 37.1 %      Monocyte % 7.9 %      Eosinophil % 1.7 %      Basophil % 0.7 %      Immature Grans % 0.6 %      Neutrophils, Absolute 3.76 10*3/mm3      Lymphocytes, Absolute 2.68 10*3/mm3      Monocytes, Absolute 0.57 10*3/mm3      Eosinophils, Absolute 0.12 10*3/mm3      Basophils, Absolute 0.05 10*3/mm3      Immature Grans, Absolute 0.04 10*3/mm3      nRBC 0.0 /100 WBC           Imaging Results (Last 24 Hours)     Procedure Component Value Units Date/Time    XR Chest 1 View [[id number]] Collected: 09/20/22 0347     Updated: 09/20/22 0347    Narrative:        Patient: JALIL JOSHI  Time Out: 03:46  Exam(s): FILM  CXR 1 VIEW     EXAM:    XR Chest, 1 View    CLINICAL HISTORY:     Reason for exam: cp.    TECHNIQUE:    Frontal view of the chest.    COMPARISON:    July 27, 2022    FINDINGS:    Lungs:  Unremarkable.  No consolidation.    Pleural space:  Unremarkable.  No pneumothorax.    Heart:  The cardiac silhouette is mildly enlarged.    Mediastinum:  Unremarkable.    Bones joints:  Previous left shoulder arthroplasty.  Mild to moderate   osteophytosis throughout the mid to lower thoracic spine.    Vasculature:  The aortic arch is mildly calcified but nondilated.    Upper abdomen:  There is no pneumoperitoneum under the diaphragm.    IMPRESSION:       Cardiomegaly without signs of CHF or acute focal infiltrate.    Impression:          Electronically signed by Fernie San MD on 09-20-22 at 0346          Results for orders placed during the hospital encounter of 07/27/22    Adult Transthoracic Echo Complete W/ Cont if Necessary Per Protocol    Interpretation Summary  · Left ventricular wall thickness is consistent with borderline concentric hypertrophy.  · There is calcification of the aortic valve.  · Left ventricular diastolic function is consistent with (grade Ia w/high LAP) impaired relaxation.  · Estimated left ventricular EF = 50% Left ventricular systolic function is low normal.  · Mildly reduced right ventricular systolic function noted.      ECG 12 Lead   Final Result   HEART RATE= 95  bpm   RR Interval= 632  ms   MI Interval= 213  ms   P Horizontal Axis= 59  deg   P Front Axis= 45  deg   QRSD Interval= 95  ms   QT Interval= 352  ms   QRS Axis= -24  deg   T Wave Axis= 117  deg   - ABNORMAL ECG -   Sinus rhythm   Borderline prolonged MI interval   Abnormal R-wave progression, early transition   Inferior infarct, old   Lateral leads are also involved   Poor quality tracing, repeat.   Electronically Signed By: [facility]) ([facility] 20-Sep-2022 08:17:34   Date and Time of Study: 2022-09-20 01:30:01            Assessment/Plan     Active Hospital Problems    Diagnosis  POA   • **Chest pain [R07.9]  Yes   • HLD (hyperlipidemia) [E78.5]  Yes   • COPD (chronic obstructive pulmonary disease) ([facility]) [J44.9]  Yes   • CKD (chronic kidney disease) stage 3, GFR 30-59 ml/min ([facility]) [N18.30]  Yes   • Coronary artery disease involving native coronary artery of native heart without angina pectoris [I25.10]  Yes   • Anemia, chronic disease [D63.8]  Yes   • HTN (hypertension) [I10]  Yes   • Type 2 diabetes mellitus with circulatory disorder, without long-term current use of insulin ([facility]) [E11.59]  Yes      Resolved Hospital Problems   No resolved problems to display.       Mr. Bonner is a 89 y.o. never smoker with a history of CAD, COPD, HLD, CKD, HTN, DM II who presents to [facility] ER with chief complaint of chest pan & admitted for chest pain.        Chest pain: Cardiology following patient approves discharge later today if chest pain remains absent.  Increased Ranexa 1000 mg p.o. twice daily.  Follow-up with cardiology as previously scheduled.      HTN (hypertension): BP acceptable, hemodynamically stable.  Continue Toprol-XL 25 mg p.o. daily & midodrine 5 mg PO TID per home dose regimen.       Anemia of chronic disease:   Most likely due to CKD & part hemodilution following IV fluid bolus.  No overt signs of active bleeding.      Type 2 diabetes mellitus with circulatory disorder, without long-term current use of insulin ([facility]): Glucose acceptable.  Continue NCS /cardiac diet.  Low-dose correctional sliding scale.      Coronary artery disease involving native coronary artery of native heart without angina pectoris: See plan above.  ASA, statin, & Ranexa (increased) continued.       CKD (chronic kidney disease) stage 3, GFR 30-59 ml/min ([facility]): Serum creatinine appears at baseline.  Avoid nephrotoxins.  Monitor labs.      COPD (chronic obstructive pulmonary disease) ([facility]): Appears stable on exam.  100% O2 saturation on room air.      HLD  (hyperlipidemia): See plan above.      I discussed the patient's findings and my recommendations with patient, RN, & Dr. Buenrostro.     VTE Prophylaxis - SCD  Code Status - CPR       BOOGIE Malone  [facility]  09/20/22  10:58 EDT

## 2022-09-20 NOTE — ED TRIAGE NOTES
Patient from home via EMS reporting non radiating anterior left sided chest pain that started around midnight. Patient has had intermittent CP all day.

## 2022-09-20 NOTE — DISCHARGE SUMMARY
Patient Name: Yoni Bonner Jr.  : 1933  MRN: [id number]    Date of Admission: 2022  Date of Discharge:  2022  Primary Care Physician: Patric Cameron DO      Chief Complaint:   Chest Pain      Discharge Diagnoses     Active Hospital Problems    Diagnosis  POA   • **Chest pain [R07.9]  Yes   • HLD (hyperlipidemia) [E78.5]  Yes   • COPD (chronic obstructive pulmonary disease) (HCC) [J44.9]  Yes   • CKD (chronic kidney disease) stage 3, GFR 30-59 ml/min (HCC) [N18.30]  Yes   • Coronary artery disease involving native coronary artery of native heart without angina pectoris [I25.10]  Yes   • Anemia, chronic disease [D63.8]  Yes   • HTN (hypertension) [I10]  Yes   • Type 2 diabetes mellitus with circulatory disorder, without long-term current use of insulin (HCC) [E11.59]  Yes      Resolved Hospital Problems   No resolved problems to display.        Hospital Course     Mr. Bonner is a 89 y.o. male with a history of CAD status post PCI, COPD, HLD, CKD, hypertension, type 2 diabetes who presented to [facility] initially complaining of chest pain.  Please see the admitting history and physical for further details.  He was found to have chest pain and was admitted to the hospital for further evaluation and treatment.      Cardiology evaluated patient with chest pain & discussed options with family to include repeating cardiac catheterization to ensure no significant change; however, patient not interested in pursuing invasive procedures per wife statement and prefers medical management; therefore, ranolazine 1000 mg twice daily (increased from prior 500 mg twice daily) initiated during hospital admission with continued low-dose metoprolol succinate, atorvastatin, aspirin, midodrine.  Patient remained chest pain-free during hospital stay cardiology approved discharge on 2022 with follow-up outpatient cardiology as previously discussed.    Serum hemoglobin 9.6 decreased from  10.6 and within approximately 5 hours; however, suspect hemodilution following IV fluid bolus given no evidence of overt bleeding & and patient currently denies hematemesis, hemoptysis, melena, or hematochezia.      Patient appears medically stable for discharge home with family on 9/20/2022 and family agrees with plan for discharge and follow-up as previously discussed.    Day of Discharge       Physical Exam:  Temp:  [96.8 °F (36 °C)] 96.8 °F (36 °C)  Heart Rate:  [] 75  Resp:  [16-20] 20  BP: ()/(52-90) 103/52  There is no height or weight on file to calculate BMI.     Physical Exam   Constitutional:       General: He is not in acute distress.     Appearance: He is not toxic-appearing.      Comments: Elderly, frail, generally weak   HENT:      Head: Normocephalic and atraumatic.   Eyes:      Extraocular Movements: Extraocular movements intact.      Conjunctiva/sclera: Conjunctivae normal.   Cardiovascular:      Rate and Rhythm: Normal rate and regular rhythm.   Pulmonary:      Effort: Pulmonary effort is normal.      Comments: Diminished on expiration  Abdominal:      General: Bowel sounds are normal.      Palpations: Abdomen is soft.   Musculoskeletal:         General: No tenderness.      Cervical back: Normal range of motion and neck supple.      Right lower leg: No edema.      Left lower leg: No edema.   Skin:     General: Skin is warm and dry.   Neurological:      Mental Status: He is alert. Mental status is at baseline.      Cranial Nerves: No cranial nerve deficit.   Psychiatric:         Behavior: Behavior normal.         Thought Content: Thought content normal.        Consultants     Consult Orders (all) (From admission, onward)     Start     Ordered    09/20/22 0702  Inpatient Cardiology Consult  IN AM        Specialty:  Cardiology  Provider:  Melissa Templeton MD    09/20/22 0348    09/20/22 0329  [facility] (on-call MD unless specified) Details  Once        Specialty:  Hospitalist  Provider:   Amita Jacobs, APRN    09/20/22 0328              Procedures     * Surgery not found *      Imaging Results (All)     Procedure Component Value Units Date/Time    XR Chest 1 View [[id number]] Collected: 09/20/22 0347     Updated: 09/20/22 0347    Narrative:        Patient: JALIL JOSHI  Time Out: 03:46  Exam(s): FILM CXR 1 VIEW     EXAM:    XR Chest, 1 View    CLINICAL HISTORY:     Reason for exam: cp.    TECHNIQUE:    Frontal view of the chest.    COMPARISON:    July 27, 2022    FINDINGS:    Lungs:  Unremarkable.  No consolidation.    Pleural space:  Unremarkable.  No pneumothorax.    Heart:  The cardiac silhouette is mildly enlarged.    Mediastinum:  Unremarkable.    Bones joints:  Previous left shoulder arthroplasty.  Mild to moderate   osteophytosis throughout the mid to lower thoracic spine.    Vasculature:  The aortic arch is mildly calcified but nondilated.    Upper abdomen:  There is no pneumoperitoneum under the diaphragm.    IMPRESSION:       Cardiomegaly without signs of CHF or acute focal infiltrate.    Impression:          Electronically signed by Fernie San MD on 09-20-22 at 0346        Results for orders placed during the hospital encounter of 07/27/22    Duplex Carotid Ultrasound CAR    Interpretation Summary  · Proximal right internal carotid artery moderate stenosis.  · Proximal left internal carotid artery moderate stenosis.    Results for orders placed during the hospital encounter of 07/27/22    Adult Transthoracic Echo Complete W/ Cont if Necessary Per Protocol    Interpretation Summary  · Left ventricular wall thickness is consistent with borderline concentric hypertrophy.  · There is calcification of the aortic valve.  · Left ventricular diastolic function is consistent with (grade Ia w/high LAP) impaired relaxation.  · Estimated left ventricular EF = 50% Left ventricular systolic function is low normal.  · Mildly reduced right ventricular systolic function noted.    Pertinent Labs      Results from last 7 days   Lab Units 09/20/22  0543 09/20/22  0152   WBC 10*3/mm3 7.22 9.04   HEMOGLOBIN g/dL 9.6* 10.6*   PLATELETS 10*3/mm3 167 186     Results from last 7 days   Lab Units 09/20/22  0358 09/20/22  0152   SODIUM mmol/L 133* 132*   POTASSIUM mmol/L 5.1 5.0   CHLORIDE mmol/L 100 98   CO2 mmol/L 23.7 23.7   BUN mg/dL 28* 29*   CREATININE mg/dL 1.10 1.32*   GLUCOSE mg/dL 112* 118*   EGFR mL/min/1.73 64.2 51.6*     Results from last 7 days   Lab Units 09/20/22  0152   ALBUMIN g/dL 3.70   BILIRUBIN mg/dL 0.2   ALK PHOS U/L 67   AST (SGOT) U/L 24   ALT (SGPT) U/L 20     Results from last 7 days   Lab Units 09/20/22  0358 09/20/22  0152   CALCIUM mg/dL 8.8 9.3   ALBUMIN g/dL  --  3.70   MAGNESIUM mg/dL 2.0 2.0       Results from last 7 days   Lab Units 09/20/22  0358 09/20/22  0152   TROPONIN T ng/mL <0.010 <0.010   PROBNP pg/mL  --  1,308.0           Invalid input(s): LDLCALC          Test Results Pending at Discharge       Discharge Details        Discharge Medications      New Medications      Instructions Start Date   atorvastatin 10 MG tablet  Commonly known as: LIPITOR   10 mg, Oral, Daily   Start Date: September 21, 2022        Changes to Medications      Instructions Start Date   nitroglycerin 0.4 MG SL tablet  Commonly known as: NITROSTAT  What changed:   · how much to take  · how to take this  · when to take this  · reasons to take this  · additional instructions   DISSOLVE 1 TAB UNDER TONGUE FOR CHEST PAIN - IF PAIN REMAINS AFTER 5 MIN, CALL 911 AND REPEAT DOSE. MAX 3 TABS IN 15 MINUTES      ranolazine 1000 MG 12 hr tablet  Commonly known as: RANEXA  What changed:   · medication strength  · how much to take  · when to take this   1,000 mg, Oral, Every 12 Hours Scheduled         Continue These Medications      Instructions Start Date   albuterol sulfate  (90 Base) MCG/ACT inhaler  Commonly known as: PROVENTIL HFA;VENTOLIN HFA;PROAIR HFA   2 puffs, Inhalation, Every 4 Hours PRN       albuterol (2.5 MG/3ML) 0.083% nebulizer solution  Commonly known as: PROVENTIL   2.5 mg, Nebulization, Every 4 Hours PRN      aspirin 81 MG EC tablet   81 mg, Oral, Daily      metoprolol succinate XL 25 MG 24 hr tablet  Commonly known as: TOPROL-XL   12.5 mg, Oral, Every 24 Hours Scheduled      midodrine 5 MG tablet  Commonly known as: PROAMATINE   5 mg, Oral, 3 Times Daily Before Meals      ondansetron ODT 4 MG disintegrating tablet  Commonly known as: ZOFRAN-ODT   4 mg, Translingual, Every 6 Hours PRN             Allergies   Allergen Reactions   • No Known Drug Allergy Unknown (See Comments)     NKA       Discharge Disposition:  Home or Self Care      Discharge Diet:  Diet Order   Procedures   • Diet Regular; Cardiac       Discharge Activity:   Activity Instructions     Activity as Tolerated            CODE STATUS:    Code Status and Medical Interventions:   Ordered at: 09/20/22 0341     Code Status (Patient has no pulse and is not breathing):    CPR (Attempt to Resuscitate)     Medical Interventions (Patient has pulse or is breathing):    Full Support       Future Appointments   Date Time Provider Department Center   11/2/2022  1:00 PM Nneka Honeycutt APRN [facility]     Additional Instructions for the Follow-ups that You Need to Schedule     Discharge Follow-up with PCP   As directed       Currently Documented PCP:    Patric Cameron DO    PCP Phone Number:    [phone]     Follow Up Details: Please call to schedule a one week or earliest available follow-up appointment PCP; statin new medication, CMP            Follow-up Information     Patric Cameron DO .    Specialty: Family Medicine  Why: Please call to schedule a one week or earliest available follow-up appointment PCP; statin new medication, CMP  Contact information:  [address] [place] [address]  [phone]                         Additional Instructions for the Follow-ups that You Need to Schedule     Discharge  Follow-up with PCP   As directed       Currently Documented PCP:    Patric Cameron DO    PCP Phone Number:    [phone]     Follow Up Details: Please call to schedule a one week or earliest available follow-up appointment PCP; statin new medication, CMP           Time Spent on Discharge:  Greater than 30 minutes      BOOGIE Malone  [facility]  09/20/22  13:16 EDT

## 2022-09-20 NOTE — ED NOTES
Nursing report ED to floor  Yoni Bonner Jr.  89 y.o.  male    HPI :   Chief Complaint   Patient presents with   • Chest Pain       Admitting doctor:   Raj Bey MD    Admitting diagnosis:   The encounter diagnosis was Chest pain, unspecified type.    Code status:   Current Code Status     Date Active Code Status Order ID Comments User Context       9/20/2022 0341 CPR (Attempt to Resuscitate) [id number]  Amita Jacobs APRN ED     Advance Care Planning Activity      Questions for Current Code Status     Question Answer    Code Status (Patient has no pulse and is not breathing) CPR (Attempt to Resuscitate)    Medical Interventions (Patient has pulse or is breathing) Full Support          Allergies:   No known drug allergy    Isolation:   No active isolations    Intake and Output    Intake/Output Summary (Last 24 hours) at 9/20/2022 0428  Last data filed at 9/20/2022 0357  Gross per 24 hour   Intake 500 ml   Output --   Net 500 ml       Weight:   There were no vitals filed for this visit.    Most recent vitals:   Vitals:    09/20/22 0206 09/20/22 0221 09/20/22 0222 09/20/22 0334   BP: 95/52 133/76 133/76 127/78   Pulse: 96 63 89 78   Resp:  16     Temp:       SpO2: 97% 100% 94% 99%       Active LDAs/IV Access:   Lines, Drains & Airways     Active LDAs     Name Placement date Placement time Site Days    Peripheral IV 09/20/22 0125 Right Antecubital 09/20/22  0125  Antecubital  less than 1                Labs (abnormal labs have a star):   Labs Reviewed   COMPREHENSIVE METABOLIC PANEL - Abnormal; Notable for the following components:       Result Value    Glucose 118 (*)     BUN 29 (*)     Creatinine 1.32 (*)     Sodium 132 (*)     eGFR 51.6 (*)     All other components within normal limits    Narrative:     GFR Normal >60  Chronic Kidney Disease <60  Kidney Failure <15     CBC WITH AUTO DIFFERENTIAL - Abnormal; Notable for the following components:    RBC 3.65 (*)     Hemoglobin 10.6 (*)     Hematocrit  32.1 (*)     Immature Grans % 0.6 (*)     Lymphocytes, Absolute 3.78 (*)     All other components within normal limits   APTT - Normal   PROTIME-INR - Normal   BNP (IN-HOUSE) - Normal    Narrative:     Among patients with dyspnea, NT-proBNP is highly sensitive for the detection of acute congestive heart failure. In addition NT-proBNP of <300 pg/ml effectively rules out acute congestive heart failure with 99% negative predictive value.    Results may be falsely decreased if patient taking Biotin.     TROPONIN (IN-HOUSE) - Normal    Narrative:     Troponin T Reference Range:  <= 0.03 ng/mL-   Negative for AMI  >0.03 ng/mL-     Abnormal for myocardial necrosis.  Clinicians would have to utilize clinical acumen, EKG, Troponin and serial changes to determine if it is an Acute Myocardial Infarction or myocardial injury due to an underlying chronic condition.       Results may be falsely decreased if patient taking Biotin.     MAGNESIUM - Normal   TROPONIN (IN-HOUSE)   BASIC METABOLIC PANEL   CBC WITH AUTO DIFFERENTIAL   MAGNESIUM   POCT GLUCOSE FINGERSTICK   POCT GLUCOSE FINGERSTICK   POCT GLUCOSE FINGERSTICK   CBC AND DIFFERENTIAL    Narrative:     The following orders were created for panel order CBC & Differential.  Procedure                               Abnormality         Status                     ---------                               -----------         ------                     CBC Auto Differential[[id number]]        Abnormal            Final result                 Please view results for these tests on the individual orders.       EKG:   ECG 12 Lead   Preliminary Result   HEART RATE= 95  bpm   RR Interval= 632  ms   VT Interval= 213  ms   P Horizontal Axis= 59  deg   P Front Axis= 45  deg   QRSD Interval= 95  ms   QT Interval= 352  ms   QRS Axis= -24  deg   T Wave Axis= 117  deg   - ABNORMAL ECG -   Sinus rhythm   Borderline prolonged VT interval   Abnormal R-wave progression, early transition   Inferior  infarct, old   Lateral leads are also involved   Electronically Signed By:    Date and Time of Study: 2022-09-20 01:30:01          Meds given in ED:   Medications   sodium chloride 0.9 % flush 10 mL (has no administration in time range)   aspirin chewable tablet 324 mg (324 mg Oral Not Given 9/20/22 0147)   sodium chloride 0.9 % flush 10 mL (has no administration in time range)   sodium chloride 0.9 % flush 10 mL (has no administration in time range)   dextrose (GLUTOSE) oral gel 15 g (has no administration in time range)   dextrose (D50W) (25 g/50 mL) IV injection 25 g (has no administration in time range)   glucagon (human recombinant) (GLUCAGEN DIAGNOSTIC) injection 1 mg (has no administration in time range)   nitroglycerin (NITROSTAT) SL tablet 0.4 mg (has no administration in time range)   insulin lispro (ADMELOG) injection 0-7 Units (has no administration in time range)   acetaminophen (TYLENOL) tablet 650 mg (has no administration in time range)     Or   acetaminophen (TYLENOL) 160 MG/5ML solution 650 mg (has no administration in time range)     Or   acetaminophen (TYLENOL) suppository 650 mg (has no administration in time range)   ondansetron (ZOFRAN) injection 4 mg (has no administration in time range)   sodium chloride 0.9 % bolus 500 mL (0 mL Intravenous Stopped 9/20/22 0357)   morphine injection 2 mg (2 mg Intravenous Given 9/20/22 0230)   ondansetron (ZOFRAN) injection 4 mg (4 mg Intravenous Given 9/20/22 0229)       Imaging results:  XR Chest 1 View    Result Date: 9/20/2022  Electronically signed by Fernie San MD on 09-20-22 at 0346      Ambulatory status:   - asst 1      Social issues:   Social History     Socioeconomic History   • Marital status:    Tobacco Use   • Smoking status: Never Smoker   • Smokeless tobacco: Current User     Types: Chew   • Tobacco comment: chewing tobacco since 4 years old   Substance and Sexual Activity   • Alcohol use: No     Comment: caffeine use   • Drug use:  No   • Sexual activity: Defer       NIH Stroke Scale:         Kamini Brock RN  09/20/22 04:28 EDT

## 2022-09-21 NOTE — OUTREACH NOTE
Prep Survey    Flowsheet Row Responses   [facility] facility patient discharged from? [place]   Is LACE score < 7 ? No   Emergency Room discharge w/ pulse ox? No   Eligibility Readm Mgmt   Discharge diagnosis Chest pain  HTN (hypertension   Does the patient have one of the following disease processes/diagnoses(primary or secondary)? Other   Does the patient have Home health ordered? No   Is there a DME ordered? No   Prep survey completed? Yes          EDWARD LINDER - Registered Nurse

## 2022-09-23 ENCOUNTER — APPOINTMENT (OUTPATIENT)
Dept: GENERAL RADIOLOGY | Facility: HOSPITAL | Age: 87
End: 2022-09-23

## 2022-09-23 ENCOUNTER — READMISSION MANAGEMENT (OUTPATIENT)
Dept: CALL CENTER | Facility: HOSPITAL | Age: 87
End: 2022-09-23

## 2022-09-23 ENCOUNTER — HOSPITAL ENCOUNTER (INPATIENT)
Facility: HOSPITAL | Age: 87
LOS: 5 days | Discharge: NURSING, REHAB OR LONG-TERM CARE | End: 2022-09-28
Attending: EMERGENCY MEDICINE | Admitting: HOSPITALIST

## 2022-09-23 ENCOUNTER — APPOINTMENT (OUTPATIENT)
Dept: CT IMAGING | Facility: HOSPITAL | Age: 87
End: 2022-09-23

## 2022-09-23 DIAGNOSIS — S72.002A CLOSED FRACTURE OF NECK OF LEFT FEMUR, INITIAL ENCOUNTER: ICD-10-CM

## 2022-09-23 DIAGNOSIS — S09.90XA CLOSED HEAD INJURY, INITIAL ENCOUNTER: ICD-10-CM

## 2022-09-23 DIAGNOSIS — E87.1 HYPONATREMIA: ICD-10-CM

## 2022-09-23 DIAGNOSIS — S72.002A LEFT DISPLACED FEMORAL NECK FRACTURE: Primary | ICD-10-CM

## 2022-09-23 DIAGNOSIS — R47.81 SLURRED SPEECH: ICD-10-CM

## 2022-09-23 PROBLEM — I48.91 ATRIAL FIBRILLATION: Status: ACTIVE | Noted: 2022-09-23

## 2022-09-23 LAB
ABO GROUP BLD: NORMAL
ALBUMIN SERPL-MCNC: 3.9 G/DL (ref 3.5–5.2)
ALBUMIN/GLOB SERPL: 1.7 G/DL
ALP SERPL-CCNC: 62 U/L (ref 39–117)
ALT SERPL W P-5'-P-CCNC: 19 U/L (ref 1–41)
ANION GAP SERPL CALCULATED.3IONS-SCNC: 11.2 MMOL/L (ref 5–15)
APTT PPP: 36.2 SECONDS (ref 22.7–35.4)
AST SERPL-CCNC: 22 U/L (ref 1–40)
BASOPHILS # BLD AUTO: 0.05 10*3/MM3 (ref 0–0.2)
BASOPHILS NFR BLD AUTO: 0.5 % (ref 0–1.5)
BILIRUB SERPL-MCNC: 0.8 MG/DL (ref 0–1.2)
BLD GP AB SCN SERPL QL: NEGATIVE
BUN SERPL-MCNC: 36 MG/DL (ref 8–23)
BUN/CREAT SERPL: 29 (ref 7–25)
CALCIUM SPEC-SCNC: 9.3 MG/DL (ref 8.6–10.5)
CHLORIDE SERPL-SCNC: 92 MMOL/L (ref 98–107)
CHOLEST SERPL-MCNC: 126 MG/DL (ref 0–200)
CO2 SERPL-SCNC: 22.8 MMOL/L (ref 22–29)
CREAT SERPL-MCNC: 1.24 MG/DL (ref 0.76–1.27)
DEPRECATED RDW RBC AUTO: 45.1 FL (ref 37–54)
EGFRCR SERPLBLD CKD-EPI 2021: 55.6 ML/MIN/1.73
EOSINOPHIL # BLD AUTO: 0.2 10*3/MM3 (ref 0–0.4)
EOSINOPHIL NFR BLD AUTO: 1.9 % (ref 0.3–6.2)
ERYTHROCYTE [DISTWIDTH] IN BLOOD BY AUTOMATED COUNT: 13.8 % (ref 12.3–15.4)
GLOBULIN UR ELPH-MCNC: 2.3 GM/DL
GLUCOSE BLDC GLUCOMTR-MCNC: 108 MG/DL (ref 70–130)
GLUCOSE BLDC GLUCOMTR-MCNC: 110 MG/DL (ref 70–130)
GLUCOSE SERPL-MCNC: 118 MG/DL (ref 65–99)
HBA1C MFR BLD: 5.9 % (ref 4.8–5.6)
HCT VFR BLD AUTO: 32.7 % (ref 37.5–51)
HDLC SERPL-MCNC: 70 MG/DL (ref 40–60)
HGB BLD-MCNC: 10.7 G/DL (ref 13–17.7)
IMM GRANULOCYTES # BLD AUTO: 0.06 10*3/MM3 (ref 0–0.05)
IMM GRANULOCYTES NFR BLD AUTO: 0.6 % (ref 0–0.5)
INR PPP: 1.19 (ref 0.9–1.1)
LDLC SERPL CALC-MCNC: 42 MG/DL (ref 0–100)
LDLC/HDLC SERPL: 0.6 {RATIO}
LYMPHOCYTES # BLD AUTO: 2.03 10*3/MM3 (ref 0.7–3.1)
LYMPHOCYTES NFR BLD AUTO: 19.7 % (ref 19.6–45.3)
MCH RBC QN AUTO: 29.1 PG (ref 26.6–33)
MCHC RBC AUTO-ENTMCNC: 32.7 G/DL (ref 31.5–35.7)
MCV RBC AUTO: 88.9 FL (ref 79–97)
MONOCYTES # BLD AUTO: 0.69 10*3/MM3 (ref 0.1–0.9)
MONOCYTES NFR BLD AUTO: 6.7 % (ref 5–12)
NEUTROPHILS NFR BLD AUTO: 7.3 10*3/MM3 (ref 1.7–7)
NEUTROPHILS NFR BLD AUTO: 70.6 % (ref 42.7–76)
NRBC BLD AUTO-RTO: 0 /100 WBC (ref 0–0.2)
PLATELET # BLD AUTO: 182 10*3/MM3 (ref 140–450)
PMV BLD AUTO: 10.8 FL (ref 6–12)
POTASSIUM SERPL-SCNC: 5 MMOL/L (ref 3.5–5.2)
PROT SERPL-MCNC: 6.2 G/DL (ref 6–8.5)
PROTHROMBIN TIME: 14.9 SECONDS (ref 11.7–14.2)
QT INTERVAL: 402 MS
RBC # BLD AUTO: 3.68 10*6/MM3 (ref 4.14–5.8)
RH BLD: POSITIVE
SODIUM SERPL-SCNC: 126 MMOL/L (ref 136–145)
T&S EXPIRATION DATE: NORMAL
TRIGL SERPL-MCNC: 71 MG/DL (ref 0–150)
TROPONIN T SERPL-MCNC: 0.02 NG/ML (ref 0–0.03)
TSH SERPL DL<=0.05 MIU/L-ACNC: 0.74 UIU/ML (ref 0.27–4.2)
URATE SERPL-MCNC: 5.7 MG/DL (ref 3.4–7)
VLDLC SERPL-MCNC: 14 MG/DL (ref 5–40)
WBC NRBC COR # BLD: 10.33 10*3/MM3 (ref 3.4–10.8)

## 2022-09-23 PROCEDURE — 86850 RBC ANTIBODY SCREEN: CPT | Performed by: EMERGENCY MEDICINE

## 2022-09-23 PROCEDURE — 84550 ASSAY OF BLOOD/URIC ACID: CPT | Performed by: NURSE PRACTITIONER

## 2022-09-23 PROCEDURE — 86900 BLOOD TYPING SEROLOGIC ABO: CPT | Performed by: EMERGENCY MEDICINE

## 2022-09-23 PROCEDURE — 85025 COMPLETE CBC W/AUTO DIFF WBC: CPT | Performed by: EMERGENCY MEDICINE

## 2022-09-23 PROCEDURE — 99222 1ST HOSP IP/OBS MODERATE 55: CPT | Performed by: NURSE PRACTITIONER

## 2022-09-23 PROCEDURE — 92610 EVALUATE SWALLOWING FUNCTION: CPT

## 2022-09-23 PROCEDURE — 80061 LIPID PANEL: CPT | Performed by: NURSE PRACTITIONER

## 2022-09-23 PROCEDURE — 94761 N-INVAS EAR/PLS OXIMETRY MLT: CPT

## 2022-09-23 PROCEDURE — 84484 ASSAY OF TROPONIN QUANT: CPT | Performed by: EMERGENCY MEDICINE

## 2022-09-23 PROCEDURE — 99284 EMERGENCY DEPT VISIT MOD MDM: CPT

## 2022-09-23 PROCEDURE — 82962 GLUCOSE BLOOD TEST: CPT

## 2022-09-23 PROCEDURE — 80053 COMPREHEN METABOLIC PANEL: CPT | Performed by: EMERGENCY MEDICINE

## 2022-09-23 PROCEDURE — 83036 HEMOGLOBIN GLYCOSYLATED A1C: CPT | Performed by: NURSE PRACTITIONER

## 2022-09-23 PROCEDURE — 84443 ASSAY THYROID STIM HORMONE: CPT | Performed by: NURSE PRACTITIONER

## 2022-09-23 PROCEDURE — 85730 THROMBOPLASTIN TIME PARTIAL: CPT | Performed by: EMERGENCY MEDICINE

## 2022-09-23 PROCEDURE — 86923 COMPATIBILITY TEST ELECTRIC: CPT

## 2022-09-23 PROCEDURE — 94640 AIRWAY INHALATION TREATMENT: CPT

## 2022-09-23 PROCEDURE — 94799 UNLISTED PULMONARY SVC/PX: CPT

## 2022-09-23 PROCEDURE — 73552 X-RAY EXAM OF FEMUR 2/>: CPT

## 2022-09-23 PROCEDURE — 93005 ELECTROCARDIOGRAM TRACING: CPT | Performed by: EMERGENCY MEDICINE

## 2022-09-23 PROCEDURE — 86901 BLOOD TYPING SEROLOGIC RH(D): CPT | Performed by: EMERGENCY MEDICINE

## 2022-09-23 PROCEDURE — 72125 CT NECK SPINE W/O DYE: CPT

## 2022-09-23 PROCEDURE — 99222 1ST HOSP IP/OBS MODERATE 55: CPT | Performed by: STUDENT IN AN ORGANIZED HEALTH CARE EDUCATION/TRAINING PROGRAM

## 2022-09-23 PROCEDURE — 85610 PROTHROMBIN TIME: CPT | Performed by: EMERGENCY MEDICINE

## 2022-09-23 PROCEDURE — 70450 CT HEAD/BRAIN W/O DYE: CPT

## 2022-09-23 PROCEDURE — 94664 DEMO&/EVAL PT USE INHALER: CPT

## 2022-09-23 PROCEDURE — 93010 ELECTROCARDIOGRAM REPORT: CPT | Performed by: INTERNAL MEDICINE

## 2022-09-23 PROCEDURE — 73502 X-RAY EXAM HIP UNI 2-3 VIEWS: CPT

## 2022-09-23 PROCEDURE — 36415 COLL VENOUS BLD VENIPUNCTURE: CPT

## 2022-09-23 RX ORDER — ASPIRIN 300 MG/1
300 SUPPOSITORY RECTAL DAILY
Status: DISCONTINUED | OUTPATIENT
Start: 2022-09-23 | End: 2022-09-23

## 2022-09-23 RX ORDER — ATORVASTATIN CALCIUM 80 MG/1
80 TABLET, FILM COATED ORAL NIGHTLY
Status: DISCONTINUED | OUTPATIENT
Start: 2022-09-23 | End: 2022-09-28 | Stop reason: HOSPADM

## 2022-09-23 RX ORDER — ASPIRIN 81 MG/1
81 TABLET ORAL DAILY
Status: DISCONTINUED | OUTPATIENT
Start: 2022-09-23 | End: 2022-09-28 | Stop reason: HOSPADM

## 2022-09-23 RX ORDER — ACETAMINOPHEN 650 MG/1
650 SUPPOSITORY RECTAL EVERY 4 HOURS PRN
Status: DISCONTINUED | OUTPATIENT
Start: 2022-09-23 | End: 2022-09-28 | Stop reason: HOSPADM

## 2022-09-23 RX ORDER — ACETAMINOPHEN 160 MG/5ML
650 SOLUTION ORAL EVERY 4 HOURS PRN
Status: DISCONTINUED | OUTPATIENT
Start: 2022-09-23 | End: 2022-09-28 | Stop reason: HOSPADM

## 2022-09-23 RX ORDER — MIDODRINE HYDROCHLORIDE 5 MG/1
5 TABLET ORAL
Status: DISCONTINUED | OUTPATIENT
Start: 2022-09-23 | End: 2022-09-28 | Stop reason: HOSPADM

## 2022-09-23 RX ORDER — ACETAMINOPHEN 500 MG
1000 TABLET ORAL ONCE
Status: CANCELLED | OUTPATIENT
Start: 2022-09-23 | End: 2022-09-23

## 2022-09-23 RX ORDER — SODIUM CHLORIDE 9 MG/ML
75 INJECTION, SOLUTION INTRAVENOUS CONTINUOUS
Status: DISCONTINUED | OUTPATIENT
Start: 2022-09-23 | End: 2022-09-23

## 2022-09-23 RX ORDER — INSULIN LISPRO 100 [IU]/ML
0-7 INJECTION, SOLUTION INTRAVENOUS; SUBCUTANEOUS
Status: DISCONTINUED | OUTPATIENT
Start: 2022-09-23 | End: 2022-09-23

## 2022-09-23 RX ORDER — BISACODYL 10 MG
10 SUPPOSITORY, RECTAL RECTAL DAILY PRN
Status: DISCONTINUED | OUTPATIENT
Start: 2022-09-23 | End: 2022-09-28 | Stop reason: HOSPADM

## 2022-09-23 RX ORDER — ONDANSETRON 2 MG/ML
4 INJECTION INTRAMUSCULAR; INTRAVENOUS EVERY 4 HOURS PRN
Status: DISCONTINUED | OUTPATIENT
Start: 2022-09-23 | End: 2022-09-28 | Stop reason: HOSPADM

## 2022-09-23 RX ORDER — SODIUM CHLORIDE 0.9 % (FLUSH) 0.9 %
10 SYRINGE (ML) INJECTION AS NEEDED
Status: DISCONTINUED | OUTPATIENT
Start: 2022-09-23 | End: 2022-09-28 | Stop reason: HOSPADM

## 2022-09-23 RX ORDER — ONDANSETRON 2 MG/ML
4 INJECTION INTRAMUSCULAR; INTRAVENOUS EVERY 6 HOURS PRN
Status: DISCONTINUED | OUTPATIENT
Start: 2022-09-23 | End: 2022-09-23 | Stop reason: DRUGHIGH

## 2022-09-23 RX ORDER — IPRATROPIUM BROMIDE AND ALBUTEROL SULFATE 2.5; .5 MG/3ML; MG/3ML
3 SOLUTION RESPIRATORY (INHALATION) EVERY 4 HOURS PRN
Status: DISCONTINUED | OUTPATIENT
Start: 2022-09-23 | End: 2022-09-28 | Stop reason: HOSPADM

## 2022-09-23 RX ORDER — METOPROLOL SUCCINATE 25 MG/1
12.5 TABLET, EXTENDED RELEASE ORAL DAILY
Status: DISCONTINUED | OUTPATIENT
Start: 2022-09-23 | End: 2022-09-24

## 2022-09-23 RX ORDER — ALBUTEROL SULFATE 2.5 MG/3ML
2.5 SOLUTION RESPIRATORY (INHALATION) EVERY 4 HOURS PRN
Status: DISCONTINUED | OUTPATIENT
Start: 2022-09-23 | End: 2022-09-28 | Stop reason: HOSPADM

## 2022-09-23 RX ORDER — ACETAMINOPHEN 325 MG/1
650 TABLET ORAL EVERY 4 HOURS PRN
COMMUNITY

## 2022-09-23 RX ORDER — ATORVASTATIN CALCIUM 20 MG/1
10 TABLET, FILM COATED ORAL NIGHTLY
Status: DISCONTINUED | OUTPATIENT
Start: 2022-09-23 | End: 2022-09-23

## 2022-09-23 RX ORDER — SODIUM CHLORIDE 9 MG/ML
100 INJECTION, SOLUTION INTRAVENOUS CONTINUOUS
Status: DISCONTINUED | OUTPATIENT
Start: 2022-09-23 | End: 2022-09-24

## 2022-09-23 RX ORDER — ASPIRIN 325 MG
325 TABLET ORAL DAILY
Status: DISCONTINUED | OUTPATIENT
Start: 2022-09-23 | End: 2022-09-23

## 2022-09-23 RX ORDER — SODIUM CHLORIDE 0.9 % (FLUSH) 0.9 %
10 SYRINGE (ML) INJECTION EVERY 12 HOURS SCHEDULED
Status: DISCONTINUED | OUTPATIENT
Start: 2022-09-23 | End: 2022-09-28 | Stop reason: HOSPADM

## 2022-09-23 RX ORDER — ACETAMINOPHEN 325 MG/1
650 TABLET ORAL EVERY 4 HOURS PRN
Status: DISCONTINUED | OUTPATIENT
Start: 2022-09-23 | End: 2022-09-28 | Stop reason: HOSPADM

## 2022-09-23 RX ORDER — TRANEXAMIC ACID 10 MG/ML
1000 INJECTION, SOLUTION INTRAVENOUS ONCE
Status: CANCELLED | OUTPATIENT
Start: 2022-09-23 | End: 2022-09-23

## 2022-09-23 RX ORDER — NITROGLYCERIN 0.4 MG/1
TABLET SUBLINGUAL
Qty: 25 TABLET | Refills: 4 | Status: SHIPPED | OUTPATIENT
Start: 2022-09-23 | End: 2022-10-28 | Stop reason: HOSPADM

## 2022-09-23 RX ORDER — RANOLAZINE 500 MG/1
1000 TABLET, EXTENDED RELEASE ORAL EVERY 12 HOURS SCHEDULED
Status: DISCONTINUED | OUTPATIENT
Start: 2022-09-23 | End: 2022-09-24

## 2022-09-23 RX ORDER — DEXTROSE MONOHYDRATE 25 G/50ML
25 INJECTION, SOLUTION INTRAVENOUS
Status: DISCONTINUED | OUTPATIENT
Start: 2022-09-23 | End: 2022-09-28 | Stop reason: HOSPADM

## 2022-09-23 RX ORDER — CEFAZOLIN SODIUM 2 G/100ML
2 INJECTION, SOLUTION INTRAVENOUS ONCE
Status: CANCELLED | OUTPATIENT
Start: 2022-09-23 | End: 2022-09-23

## 2022-09-23 RX ORDER — NICOTINE POLACRILEX 4 MG
15 LOZENGE BUCCAL
Status: DISCONTINUED | OUTPATIENT
Start: 2022-09-23 | End: 2022-09-28 | Stop reason: HOSPADM

## 2022-09-23 RX ADMIN — ASPIRIN 81 MG: 81 TABLET, FILM COATED ORAL at 12:08

## 2022-09-23 RX ADMIN — ATORVASTATIN CALCIUM 80 MG: 80 TABLET, FILM COATED ORAL at 20:46

## 2022-09-23 RX ADMIN — RANOLAZINE 1000 MG: 500 TABLET, FILM COATED, EXTENDED RELEASE ORAL at 20:46

## 2022-09-23 RX ADMIN — Medication 10 ML: at 20:47

## 2022-09-23 RX ADMIN — SODIUM CHLORIDE 100 ML/HR: 9 INJECTION, SOLUTION INTRAVENOUS at 20:46

## 2022-09-23 RX ADMIN — RANOLAZINE 1000 MG: 500 TABLET, FILM COATED, EXTENDED RELEASE ORAL at 12:08

## 2022-09-23 RX ADMIN — ACETAMINOPHEN 650 MG: 325 TABLET, FILM COATED ORAL at 12:08

## 2022-09-23 RX ADMIN — METOPROLOL SUCCINATE 12.5 MG: 25 TABLET, EXTENDED RELEASE ORAL at 12:09

## 2022-09-23 RX ADMIN — MIDODRINE HYDROCHLORIDE 5 MG: 5 TABLET ORAL at 17:05

## 2022-09-23 RX ADMIN — SODIUM CHLORIDE 100 ML/HR: 9 INJECTION, SOLUTION INTRAVENOUS at 10:44

## 2022-09-23 RX ADMIN — MIDODRINE HYDROCHLORIDE 5 MG: 5 TABLET ORAL at 12:08

## 2022-09-23 RX ADMIN — IPRATROPIUM BROMIDE AND ALBUTEROL SULFATE 3 ML: .5; 3 SOLUTION RESPIRATORY (INHALATION) at 11:09

## 2022-09-23 RX ADMIN — SODIUM CHLORIDE 500 ML: 9 INJECTION, SOLUTION INTRAVENOUS at 07:47

## 2022-09-23 RX ADMIN — Medication 10 ML: at 12:10

## 2022-09-23 NOTE — OUTREACH NOTE
Medical Week 1 Survey    Flowsheet Row Responses   [facility] patient discharged from? [place]   Does the patient have one of the following disease processes/diagnoses(primary or secondary)? Other   Week 1 attempt successful? No   Unsuccessful attempts Attempt 1   Revoke Readmitted          BRIGIDO FITZPATRICK - Registered Nurse

## 2022-09-24 ENCOUNTER — APPOINTMENT (OUTPATIENT)
Dept: CT IMAGING | Facility: HOSPITAL | Age: 87
End: 2022-09-24

## 2022-09-24 ENCOUNTER — APPOINTMENT (OUTPATIENT)
Dept: MRI IMAGING | Facility: HOSPITAL | Age: 87
End: 2022-09-24

## 2022-09-24 ENCOUNTER — APPOINTMENT (OUTPATIENT)
Dept: CARDIOLOGY | Facility: HOSPITAL | Age: 87
End: 2022-09-24

## 2022-09-24 LAB
ALBUMIN SERPL-MCNC: 2.8 G/DL (ref 3.5–5.2)
ALBUMIN/GLOB SERPL: 1.5 G/DL
ALP SERPL-CCNC: 46 U/L (ref 39–117)
ALT SERPL W P-5'-P-CCNC: 13 U/L (ref 1–41)
ANION GAP SERPL CALCULATED.3IONS-SCNC: 10 MMOL/L (ref 5–15)
AORTIC DIMENSIONLESS INDEX: 0.3 (DI)
AST SERPL-CCNC: 19 U/L (ref 1–40)
BH CV ECHO MEAS - AO MAX PG: 36.2 MMHG
BH CV ECHO MEAS - AO MEAN PG: 20.8 MMHG
BH CV ECHO MEAS - AO V2 MAX: 300.7 CM/SEC
BH CV ECHO MEAS - AO V2 VTI: 79.7 CM
BH CV ECHO MEAS - AVA(I,D): 0.75 CM2
BH CV ECHO MEAS - EDV(CUBED): 93 ML
BH CV ECHO MEAS - EDV(MOD-SP2): 114 ML
BH CV ECHO MEAS - EDV(MOD-SP4): 110 ML
BH CV ECHO MEAS - EF(MOD-BP): 55.1 %
BH CV ECHO MEAS - EF(MOD-SP2): 52.6 %
BH CV ECHO MEAS - EF(MOD-SP4): 56.4 %
BH CV ECHO MEAS - ESV(CUBED): 47 ML
BH CV ECHO MEAS - ESV(MOD-SP2): 54 ML
BH CV ECHO MEAS - ESV(MOD-SP4): 48 ML
BH CV ECHO MEAS - FS: 20.3 %
BH CV ECHO MEAS - IVS/LVPW: 1.1 CM
BH CV ECHO MEAS - IVSD: 1.06 CM
BH CV ECHO MEAS - LV MASS(C)D: 157.6 GRAMS
BH CV ECHO MEAS - LV MAX PG: 2.6 MMHG
BH CV ECHO MEAS - LV MEAN PG: 1.41 MMHG
BH CV ECHO MEAS - LV V1 MAX: 80 CM/SEC
BH CV ECHO MEAS - LV V1 VTI: 19.4 CM
BH CV ECHO MEAS - LVIDD: 4.5 CM
BH CV ECHO MEAS - LVIDS: 3.6 CM
BH CV ECHO MEAS - LVOT AREA: 3.1 CM2
BH CV ECHO MEAS - LVOT DIAM: 1.98 CM
BH CV ECHO MEAS - LVPWD: 0.96 CM
BH CV ECHO MEAS - MV A DUR: 0.16 SEC
BH CV ECHO MEAS - MV A MAX VEL: 142.6 CM/SEC
BH CV ECHO MEAS - MV DEC SLOPE: 537.3 CM/SEC2
BH CV ECHO MEAS - MV DEC TIME: 177 MSEC
BH CV ECHO MEAS - MV E MAX VEL: 103 CM/SEC
BH CV ECHO MEAS - MV E/A: 0.72
BH CV ECHO MEAS - MV MAX PG: 9.6 MMHG
BH CV ECHO MEAS - MV MEAN PG: 4.1 MMHG
BH CV ECHO MEAS - MV P1/2T: 66 MSEC
BH CV ECHO MEAS - MV V2 VTI: 26.7 CM
BH CV ECHO MEAS - MVA(P1/2T): 3.3 CM2
BH CV ECHO MEAS - MVA(VTI): 2.23 CM2
BH CV ECHO MEAS - RAP SYSTOLE: 8 MMHG
BH CV ECHO MEAS - SV(LVOT): 59.6 ML
BH CV ECHO MEAS - SV(MOD-SP2): 60 ML
BH CV ECHO MEAS - SV(MOD-SP4): 62 ML
BH CV VAS BP RIGHT ARM: NORMAL MMHG
BILIRUB SERPL-MCNC: 0.7 MG/DL (ref 0–1.2)
BUN SERPL-MCNC: 36 MG/DL (ref 8–23)
BUN/CREAT SERPL: 28.3 (ref 7–25)
CALCIUM SPEC-SCNC: 7.8 MG/DL (ref 8.6–10.5)
CHLORIDE SERPL-SCNC: 99 MMOL/L (ref 98–107)
CO2 SERPL-SCNC: 17 MMOL/L (ref 22–29)
CREAT SERPL-MCNC: 1.27 MG/DL (ref 0.76–1.27)
DEPRECATED RDW RBC AUTO: 42.3 FL (ref 37–54)
EGFRCR SERPLBLD CKD-EPI 2021: 54 ML/MIN/1.73
ERYTHROCYTE [DISTWIDTH] IN BLOOD BY AUTOMATED COUNT: 13.8 % (ref 12.3–15.4)
GLOBULIN UR ELPH-MCNC: 1.9 GM/DL
GLUCOSE BLDC GLUCOMTR-MCNC: 100 MG/DL (ref 70–130)
GLUCOSE SERPL-MCNC: 95 MG/DL (ref 65–99)
HCT VFR BLD AUTO: 23.8 % (ref 37.5–51)
HGB BLD-MCNC: 8.2 G/DL (ref 13–17.7)
MAXIMAL PREDICTED HEART RATE: 131 BPM
MCH RBC QN AUTO: 28.8 PG (ref 26.6–33)
MCHC RBC AUTO-ENTMCNC: 34.5 G/DL (ref 31.5–35.7)
MCV RBC AUTO: 83.5 FL (ref 79–97)
PLATELET # BLD AUTO: 143 10*3/MM3 (ref 140–450)
PMV BLD AUTO: 10.5 FL (ref 6–12)
POTASSIUM SERPL-SCNC: 4.5 MMOL/L (ref 3.5–5.2)
PROT SERPL-MCNC: 4.7 G/DL (ref 6–8.5)
RBC # BLD AUTO: 2.85 10*6/MM3 (ref 4.14–5.8)
SODIUM SERPL-SCNC: 126 MMOL/L (ref 136–145)
STRESS TARGET HR: 111 BPM
WBC NRBC COR # BLD: 7.24 10*3/MM3 (ref 3.4–10.8)

## 2022-09-24 PROCEDURE — 86900 BLOOD TYPING SEROLOGIC ABO: CPT

## 2022-09-24 PROCEDURE — 82962 GLUCOSE BLOOD TEST: CPT

## 2022-09-24 PROCEDURE — 86901 BLOOD TYPING SEROLOGIC RH(D): CPT

## 2022-09-24 PROCEDURE — 94799 UNLISTED PULMONARY SVC/PX: CPT

## 2022-09-24 PROCEDURE — A9577 INJ MULTIHANCE: HCPCS | Performed by: HOSPITALIST

## 2022-09-24 PROCEDURE — 93308 TTE F-UP OR LMTD: CPT

## 2022-09-24 PROCEDURE — 36430 TRANSFUSION BLD/BLD COMPNT: CPT

## 2022-09-24 PROCEDURE — 93308 TTE F-UP OR LMTD: CPT | Performed by: INTERNAL MEDICINE

## 2022-09-24 PROCEDURE — 99233 SBSQ HOSP IP/OBS HIGH 50: CPT | Performed by: STUDENT IN AN ORGANIZED HEALTH CARE EDUCATION/TRAINING PROGRAM

## 2022-09-24 PROCEDURE — 85027 COMPLETE CBC AUTOMATED: CPT | Performed by: NURSE PRACTITIONER

## 2022-09-24 PROCEDURE — P9016 RBC LEUKOCYTES REDUCED: HCPCS

## 2022-09-24 PROCEDURE — 70450 CT HEAD/BRAIN W/O DYE: CPT

## 2022-09-24 PROCEDURE — 93321 DOPPLER ECHO F-UP/LMTD STD: CPT

## 2022-09-24 PROCEDURE — 93325 DOPPLER ECHO COLOR FLOW MAPG: CPT

## 2022-09-24 PROCEDURE — 93321 DOPPLER ECHO F-UP/LMTD STD: CPT | Performed by: INTERNAL MEDICINE

## 2022-09-24 PROCEDURE — 93325 DOPPLER ECHO COLOR FLOW MAPG: CPT | Performed by: INTERNAL MEDICINE

## 2022-09-24 PROCEDURE — 80053 COMPREHEN METABOLIC PANEL: CPT | Performed by: NURSE PRACTITIONER

## 2022-09-24 PROCEDURE — 0 GADOBENATE DIMEGLUMINE 529 MG/ML SOLUTION: Performed by: HOSPITALIST

## 2022-09-24 PROCEDURE — 70553 MRI BRAIN STEM W/O & W/DYE: CPT

## 2022-09-24 PROCEDURE — 25010000002 PERFLUTREN (DEFINITY) 8.476 MG IN SODIUM CHLORIDE (PF) 0.9 % 10 ML INJECTION: Performed by: NURSE PRACTITIONER

## 2022-09-24 RX ORDER — SODIUM CHLORIDE, SODIUM LACTATE, POTASSIUM CHLORIDE, CALCIUM CHLORIDE 600; 310; 30; 20 MG/100ML; MG/100ML; MG/100ML; MG/100ML
100 INJECTION, SOLUTION INTRAVENOUS CONTINUOUS
Status: DISCONTINUED | OUTPATIENT
Start: 2022-09-24 | End: 2022-09-24

## 2022-09-24 RX ORDER — SODIUM CHLORIDE, SODIUM LACTATE, POTASSIUM CHLORIDE, CALCIUM CHLORIDE 600; 310; 30; 20 MG/100ML; MG/100ML; MG/100ML; MG/100ML
1000 INJECTION, SOLUTION INTRAVENOUS ONCE
Status: COMPLETED | OUTPATIENT
Start: 2022-09-24 | End: 2022-09-24

## 2022-09-24 RX ORDER — CEFAZOLIN SODIUM 2 G/100ML
2 INJECTION, SOLUTION INTRAVENOUS
Status: COMPLETED | OUTPATIENT
Start: 2022-09-25 | End: 2022-09-25

## 2022-09-24 RX ORDER — SODIUM CHLORIDE, SODIUM LACTATE, POTASSIUM CHLORIDE, CALCIUM CHLORIDE 600; 310; 30; 20 MG/100ML; MG/100ML; MG/100ML; MG/100ML
50 INJECTION, SOLUTION INTRAVENOUS CONTINUOUS
Status: DISCONTINUED | OUTPATIENT
Start: 2022-09-24 | End: 2022-09-27

## 2022-09-24 RX ORDER — LORAZEPAM 2 MG/ML
1 INJECTION INTRAMUSCULAR EVERY 4 HOURS PRN
Status: DISCONTINUED | OUTPATIENT
Start: 2022-09-24 | End: 2022-09-28 | Stop reason: HOSPADM

## 2022-09-24 RX ADMIN — SODIUM CHLORIDE 500 ML: 9 INJECTION, SOLUTION INTRAVENOUS at 04:17

## 2022-09-24 RX ADMIN — ASPIRIN 81 MG: 81 TABLET, FILM COATED ORAL at 08:04

## 2022-09-24 RX ADMIN — ATORVASTATIN CALCIUM 80 MG: 80 TABLET, FILM COATED ORAL at 20:12

## 2022-09-24 RX ADMIN — PERFLUTREN 1 ML: 6.52 INJECTION, SUSPENSION INTRAVENOUS at 12:50

## 2022-09-24 RX ADMIN — ACETAMINOPHEN 650 MG: 325 TABLET, FILM COATED ORAL at 18:33

## 2022-09-24 RX ADMIN — MIDODRINE HYDROCHLORIDE 5 MG: 5 TABLET ORAL at 08:04

## 2022-09-24 RX ADMIN — SODIUM CHLORIDE, POTASSIUM CHLORIDE, SODIUM LACTATE AND CALCIUM CHLORIDE 1000 ML: 600; 310; 30; 20 INJECTION, SOLUTION INTRAVENOUS at 07:05

## 2022-09-24 RX ADMIN — Medication 10 ML: at 20:12

## 2022-09-24 RX ADMIN — GADOBENATE DIMEGLUMINE 16 ML: 529 INJECTION, SOLUTION INTRAVENOUS at 12:05

## 2022-09-24 RX ADMIN — MIDODRINE HYDROCHLORIDE 5 MG: 5 TABLET ORAL at 18:33

## 2022-09-24 RX ADMIN — Medication 10 ML: at 08:04

## 2022-09-24 RX ADMIN — ALBUTEROL SULFATE 2.5 MG: 2.5 SOLUTION RESPIRATORY (INHALATION) at 18:45

## 2022-09-24 RX ADMIN — SODIUM CHLORIDE, POTASSIUM CHLORIDE, SODIUM LACTATE AND CALCIUM CHLORIDE 100 ML/HR: 600; 310; 30; 20 INJECTION, SOLUTION INTRAVENOUS at 07:58

## 2022-09-25 ENCOUNTER — APPOINTMENT (OUTPATIENT)
Dept: GENERAL RADIOLOGY | Facility: HOSPITAL | Age: 87
End: 2022-09-25

## 2022-09-25 ENCOUNTER — ANESTHESIA EVENT (OUTPATIENT)
Dept: PERIOP | Facility: HOSPITAL | Age: 87
End: 2022-09-25

## 2022-09-25 ENCOUNTER — ANESTHESIA (OUTPATIENT)
Dept: PERIOP | Facility: HOSPITAL | Age: 87
End: 2022-09-25

## 2022-09-25 LAB
ANION GAP SERPL CALCULATED.3IONS-SCNC: 12 MMOL/L (ref 5–15)
BH BB BLOOD EXPIRATION DATE: NORMAL
BH BB BLOOD TYPE BARCODE: 6200
BH BB DISPENSE STATUS: NORMAL
BH BB PRODUCT CODE: NORMAL
BH BB UNIT NUMBER: NORMAL
BUN SERPL-MCNC: 30 MG/DL (ref 8–23)
BUN/CREAT SERPL: 25.4 (ref 7–25)
CALCIUM SPEC-SCNC: 7.8 MG/DL (ref 8.6–10.5)
CHLORIDE SERPL-SCNC: 103 MMOL/L (ref 98–107)
CO2 SERPL-SCNC: 16 MMOL/L (ref 22–29)
CREAT SERPL-MCNC: 1.18 MG/DL (ref 0.76–1.27)
CROSSMATCH INTERPRETATION: NORMAL
DEPRECATED RDW RBC AUTO: 46.5 FL (ref 37–54)
EGFRCR SERPLBLD CKD-EPI 2021: 59 ML/MIN/1.73
ERYTHROCYTE [DISTWIDTH] IN BLOOD BY AUTOMATED COUNT: 14.1 % (ref 12.3–15.4)
GLUCOSE SERPL-MCNC: 89 MG/DL (ref 65–99)
HCT VFR BLD AUTO: 30.9 % (ref 37.5–51)
HGB BLD-MCNC: 10.1 G/DL (ref 13–17.7)
MCH RBC QN AUTO: 29.3 PG (ref 26.6–33)
MCHC RBC AUTO-ENTMCNC: 32.7 G/DL (ref 31.5–35.7)
MCV RBC AUTO: 89.6 FL (ref 79–97)
PLATELET # BLD AUTO: 176 10*3/MM3 (ref 140–450)
PMV BLD AUTO: 10.5 FL (ref 6–12)
POTASSIUM SERPL-SCNC: 4 MMOL/L (ref 3.5–5.2)
RBC # BLD AUTO: 3.45 10*6/MM3 (ref 4.14–5.8)
SODIUM SERPL-SCNC: 131 MMOL/L (ref 136–145)
UNIT  ABO: NORMAL
UNIT  RH: NORMAL
WBC NRBC COR # BLD: 7.13 10*3/MM3 (ref 3.4–10.8)

## 2022-09-25 PROCEDURE — 25010000002 CLONIDINE PER 1 MG: Performed by: ORTHOPAEDIC SURGERY

## 2022-09-25 PROCEDURE — 25010000002 FENTANYL CITRATE (PF) 100 MCG/2ML SOLUTION: Performed by: NURSE ANESTHETIST, CERTIFIED REGISTERED

## 2022-09-25 PROCEDURE — 76000 FLUOROSCOPY <1 HR PHYS/QHP: CPT

## 2022-09-25 PROCEDURE — 73502 X-RAY EXAM HIP UNI 2-3 VIEWS: CPT

## 2022-09-25 PROCEDURE — 25010000002 ONDANSETRON PER 1 MG: Performed by: NURSE ANESTHETIST, CERTIFIED REGISTERED

## 2022-09-25 PROCEDURE — C1776 JOINT DEVICE (IMPLANTABLE): HCPCS | Performed by: ORTHOPAEDIC SURGERY

## 2022-09-25 PROCEDURE — 25010000002 CEFAZOLIN IN DEXTROSE 2-4 GM/100ML-% SOLUTION: Performed by: ORTHOPAEDIC SURGERY

## 2022-09-25 PROCEDURE — 25010000002 PROPOFOL 200 MG/20ML EMULSION: Performed by: NURSE ANESTHETIST, CERTIFIED REGISTERED

## 2022-09-25 PROCEDURE — 85027 COMPLETE CBC AUTOMATED: CPT | Performed by: HOSPITALIST

## 2022-09-25 PROCEDURE — 25010000002 ROPIVACAINE PER 1 MG: Performed by: ORTHOPAEDIC SURGERY

## 2022-09-25 PROCEDURE — 80048 BASIC METABOLIC PNL TOTAL CA: CPT | Performed by: HOSPITALIST

## 2022-09-25 PROCEDURE — 73501 X-RAY EXAM HIP UNI 1 VIEW: CPT

## 2022-09-25 PROCEDURE — 25010000002 KETOROLAC TROMETHAMINE PER 15 MG: Performed by: ORTHOPAEDIC SURGERY

## 2022-09-25 PROCEDURE — 25010000002 EPINEPHRINE 1 MG/ML SOLUTION 30 ML VIAL: Performed by: ORTHOPAEDIC SURGERY

## 2022-09-25 PROCEDURE — 0SRB0JZ REPLACEMENT OF LEFT HIP JOINT WITH SYNTHETIC SUBSTITUTE, OPEN APPROACH: ICD-10-PCS | Performed by: ORTHOPAEDIC SURGERY

## 2022-09-25 PROCEDURE — 99233 SBSQ HOSP IP/OBS HIGH 50: CPT | Performed by: STUDENT IN AN ORGANIZED HEALTH CARE EDUCATION/TRAINING PROGRAM

## 2022-09-25 DEVICE — VIOLET ANTIBACTERIAL POLYDIOXANONE, KNOTLESS TISSUE CONTROL DEVICE
Type: IMPLANTABLE DEVICE | Site: HIP | Status: FUNCTIONAL
Brand: STRATAFIX

## 2022-09-25 DEVICE — DEV CONTRL TISS STRATAFIX SPIRAL MNCRYL UD 3/0 PLS 30CM: Type: IMPLANTABLE DEVICE | Site: HIP | Status: FUNCTIONAL

## 2022-09-25 DEVICE — IMPLANTABLE DEVICE: Type: IMPLANTABLE DEVICE | Status: FUNCTIONAL

## 2022-09-25 DEVICE — OR3O DUAL MOBILITY XLPE INSERT 28/44
Type: IMPLANTABLE DEVICE | Site: HIP | Status: FUNCTIONAL
Brand: OR3O DUAL MOBILITY

## 2022-09-25 DEVICE — OXINIUM FEMORAL HEAD 12/14 TAPER                                    28 MM +4
Type: IMPLANTABLE DEVICE | Site: HIP | Status: FUNCTIONAL
Brand: OXINIUM

## 2022-09-25 DEVICE — R3 3 HOLE ACETABULAR SHELL 56MM
Type: IMPLANTABLE DEVICE | Site: HIP | Status: FUNCTIONAL
Brand: R3 ACETABULAR

## 2022-09-25 DEVICE — OR3O DUAL MOBILITY LINER 44/56
Type: IMPLANTABLE DEVICE | Site: HIP | Status: FUNCTIONAL
Brand: OR3O DUAL MOBILITY

## 2022-09-25 DEVICE — POLARSTEM STANDARD NON-CEMENTED                                    WITH TI/HA 4
Type: IMPLANTABLE DEVICE | Site: HIP | Status: FUNCTIONAL
Brand: POLARSTEM

## 2022-09-25 RX ORDER — FENTANYL CITRATE 50 UG/ML
50 INJECTION, SOLUTION INTRAMUSCULAR; INTRAVENOUS
Status: DISCONTINUED | OUTPATIENT
Start: 2022-09-25 | End: 2022-09-25 | Stop reason: HOSPADM

## 2022-09-25 RX ORDER — PROMETHAZINE HYDROCHLORIDE 25 MG/1
25 TABLET ORAL ONCE AS NEEDED
Status: DISCONTINUED | OUTPATIENT
Start: 2022-09-25 | End: 2022-09-25 | Stop reason: HOSPADM

## 2022-09-25 RX ORDER — ONDANSETRON 2 MG/ML
4 INJECTION INTRAMUSCULAR; INTRAVENOUS ONCE AS NEEDED
Status: DISCONTINUED | OUTPATIENT
Start: 2022-09-25 | End: 2022-09-25 | Stop reason: HOSPADM

## 2022-09-25 RX ORDER — DIPHENHYDRAMINE HCL 25 MG
25 CAPSULE ORAL
Status: DISCONTINUED | OUTPATIENT
Start: 2022-09-25 | End: 2022-09-25 | Stop reason: HOSPADM

## 2022-09-25 RX ORDER — MAGNESIUM HYDROXIDE 1200 MG/15ML
LIQUID ORAL AS NEEDED
Status: DISCONTINUED | OUTPATIENT
Start: 2022-09-25 | End: 2022-09-25 | Stop reason: HOSPADM

## 2022-09-25 RX ORDER — FENTANYL CITRATE 50 UG/ML
25 INJECTION, SOLUTION INTRAMUSCULAR; INTRAVENOUS
Status: DISCONTINUED | OUTPATIENT
Start: 2022-09-25 | End: 2022-09-25 | Stop reason: HOSPADM

## 2022-09-25 RX ORDER — BUPIVACAINE HCL/0.9 % NACL/PF 0.125 %
PLASTIC BAG, INJECTION (ML) EPIDURAL AS NEEDED
Status: DISCONTINUED | OUTPATIENT
Start: 2022-09-25 | End: 2022-09-25 | Stop reason: SURG

## 2022-09-25 RX ORDER — HYDROCODONE BITARTRATE AND ACETAMINOPHEN 7.5; 325 MG/1; MG/1
1 TABLET ORAL ONCE AS NEEDED
Status: DISCONTINUED | OUTPATIENT
Start: 2022-09-25 | End: 2022-09-25 | Stop reason: HOSPADM

## 2022-09-25 RX ORDER — DIPHENHYDRAMINE HYDROCHLORIDE 50 MG/ML
12.5 INJECTION INTRAMUSCULAR; INTRAVENOUS
Status: DISCONTINUED | OUTPATIENT
Start: 2022-09-25 | End: 2022-09-25 | Stop reason: HOSPADM

## 2022-09-25 RX ORDER — FLUMAZENIL 0.1 MG/ML
0.2 INJECTION INTRAVENOUS AS NEEDED
Status: DISCONTINUED | OUTPATIENT
Start: 2022-09-25 | End: 2022-09-25 | Stop reason: HOSPADM

## 2022-09-25 RX ORDER — SODIUM CHLORIDE, SODIUM LACTATE, POTASSIUM CHLORIDE, CALCIUM CHLORIDE 600; 310; 30; 20 MG/100ML; MG/100ML; MG/100ML; MG/100ML
9 INJECTION, SOLUTION INTRAVENOUS CONTINUOUS
Status: DISCONTINUED | OUTPATIENT
Start: 2022-09-25 | End: 2022-09-27

## 2022-09-25 RX ORDER — CEFAZOLIN SODIUM 2 G/100ML
2 INJECTION, SOLUTION INTRAVENOUS EVERY 8 HOURS
Status: COMPLETED | OUTPATIENT
Start: 2022-09-25 | End: 2022-09-26

## 2022-09-25 RX ORDER — PROPOFOL 10 MG/ML
INJECTION, EMULSION INTRAVENOUS AS NEEDED
Status: DISCONTINUED | OUTPATIENT
Start: 2022-09-25 | End: 2022-09-25 | Stop reason: SURG

## 2022-09-25 RX ORDER — OXYCODONE AND ACETAMINOPHEN 7.5; 325 MG/1; MG/1
1 TABLET ORAL EVERY 4 HOURS PRN
Status: DISCONTINUED | OUTPATIENT
Start: 2022-09-25 | End: 2022-09-25 | Stop reason: HOSPADM

## 2022-09-25 RX ORDER — TRANEXAMIC ACID 100 MG/ML
INJECTION, SOLUTION INTRAVENOUS AS NEEDED
Status: DISCONTINUED | OUTPATIENT
Start: 2022-09-25 | End: 2022-09-25 | Stop reason: SURG

## 2022-09-25 RX ORDER — FENTANYL CITRATE 50 UG/ML
INJECTION, SOLUTION INTRAMUSCULAR; INTRAVENOUS AS NEEDED
Status: DISCONTINUED | OUTPATIENT
Start: 2022-09-25 | End: 2022-09-25 | Stop reason: SURG

## 2022-09-25 RX ORDER — ACETAMINOPHEN 650 MG/1
650 SUPPOSITORY RECTAL ONCE AS NEEDED
Status: DISCONTINUED | OUTPATIENT
Start: 2022-09-25 | End: 2022-09-25 | Stop reason: HOSPADM

## 2022-09-25 RX ORDER — EPHEDRINE SULFATE 50 MG/ML
5 INJECTION, SOLUTION INTRAVENOUS ONCE AS NEEDED
Status: DISCONTINUED | OUTPATIENT
Start: 2022-09-25 | End: 2022-09-25 | Stop reason: HOSPADM

## 2022-09-25 RX ORDER — LABETALOL HYDROCHLORIDE 5 MG/ML
5 INJECTION, SOLUTION INTRAVENOUS
Status: DISCONTINUED | OUTPATIENT
Start: 2022-09-25 | End: 2022-09-25 | Stop reason: HOSPADM

## 2022-09-25 RX ORDER — LIDOCAINE HYDROCHLORIDE 20 MG/ML
INJECTION, SOLUTION EPIDURAL; INFILTRATION; INTRACAUDAL; PERINEURAL AS NEEDED
Status: DISCONTINUED | OUTPATIENT
Start: 2022-09-25 | End: 2022-09-25 | Stop reason: SURG

## 2022-09-25 RX ORDER — SODIUM CHLORIDE 0.9 % (FLUSH) 0.9 %
3 SYRINGE (ML) INJECTION EVERY 12 HOURS SCHEDULED
Status: DISCONTINUED | OUTPATIENT
Start: 2022-09-25 | End: 2022-09-25 | Stop reason: HOSPADM

## 2022-09-25 RX ORDER — SODIUM CHLORIDE 0.9 % (FLUSH) 0.9 %
3-10 SYRINGE (ML) INJECTION AS NEEDED
Status: DISCONTINUED | OUTPATIENT
Start: 2022-09-25 | End: 2022-09-25 | Stop reason: HOSPADM

## 2022-09-25 RX ORDER — LIDOCAINE HYDROCHLORIDE 10 MG/ML
0.5 INJECTION, SOLUTION EPIDURAL; INFILTRATION; INTRACAUDAL; PERINEURAL ONCE AS NEEDED
Status: DISCONTINUED | OUTPATIENT
Start: 2022-09-25 | End: 2022-09-25 | Stop reason: HOSPADM

## 2022-09-25 RX ORDER — PROMETHAZINE HYDROCHLORIDE 25 MG/1
25 SUPPOSITORY RECTAL ONCE AS NEEDED
Status: DISCONTINUED | OUTPATIENT
Start: 2022-09-25 | End: 2022-09-25 | Stop reason: HOSPADM

## 2022-09-25 RX ORDER — NALOXONE HCL 0.4 MG/ML
0.2 VIAL (ML) INJECTION AS NEEDED
Status: DISCONTINUED | OUTPATIENT
Start: 2022-09-25 | End: 2022-09-25 | Stop reason: HOSPADM

## 2022-09-25 RX ORDER — FAMOTIDINE 10 MG/ML
20 INJECTION, SOLUTION INTRAVENOUS ONCE
Status: COMPLETED | OUTPATIENT
Start: 2022-09-25 | End: 2022-09-25

## 2022-09-25 RX ORDER — HYDROMORPHONE HYDROCHLORIDE 1 MG/ML
0.25 INJECTION, SOLUTION INTRAMUSCULAR; INTRAVENOUS; SUBCUTANEOUS
Status: DISCONTINUED | OUTPATIENT
Start: 2022-09-25 | End: 2022-09-25 | Stop reason: HOSPADM

## 2022-09-25 RX ORDER — ROCURONIUM BROMIDE 10 MG/ML
INJECTION, SOLUTION INTRAVENOUS AS NEEDED
Status: DISCONTINUED | OUTPATIENT
Start: 2022-09-25 | End: 2022-09-25 | Stop reason: SURG

## 2022-09-25 RX ORDER — ONDANSETRON 2 MG/ML
INJECTION INTRAMUSCULAR; INTRAVENOUS AS NEEDED
Status: DISCONTINUED | OUTPATIENT
Start: 2022-09-25 | End: 2022-09-25 | Stop reason: SURG

## 2022-09-25 RX ORDER — HYDRALAZINE HYDROCHLORIDE 20 MG/ML
5 INJECTION INTRAMUSCULAR; INTRAVENOUS
Status: DISCONTINUED | OUTPATIENT
Start: 2022-09-25 | End: 2022-09-25 | Stop reason: HOSPADM

## 2022-09-25 RX ORDER — ACETAMINOPHEN 325 MG/1
650 TABLET ORAL ONCE AS NEEDED
Status: DISCONTINUED | OUTPATIENT
Start: 2022-09-25 | End: 2022-09-25 | Stop reason: HOSPADM

## 2022-09-25 RX ORDER — MIDAZOLAM HYDROCHLORIDE 1 MG/ML
0.5 INJECTION INTRAMUSCULAR; INTRAVENOUS
Status: DISCONTINUED | OUTPATIENT
Start: 2022-09-25 | End: 2022-09-25 | Stop reason: HOSPADM

## 2022-09-25 RX ADMIN — SUGAMMADEX 100 MG: 100 INJECTION, SOLUTION INTRAVENOUS at 13:07

## 2022-09-25 RX ADMIN — FAMOTIDINE 20 MG: 10 INJECTION INTRAVENOUS at 11:45

## 2022-09-25 RX ADMIN — TRANEXAMIC ACID 1000 MG: 100 INJECTION INTRAVENOUS at 12:36

## 2022-09-25 RX ADMIN — FENTANYL CITRATE 50 MCG: 50 INJECTION, SOLUTION INTRAMUSCULAR; INTRAVENOUS at 12:32

## 2022-09-25 RX ADMIN — SODIUM CHLORIDE, POTASSIUM CHLORIDE, SODIUM LACTATE AND CALCIUM CHLORIDE 9 ML/HR: 600; 310; 30; 20 INJECTION, SOLUTION INTRAVENOUS at 11:46

## 2022-09-25 RX ADMIN — PROPOFOL 60 MG: 10 INJECTION, EMULSION INTRAVENOUS at 12:21

## 2022-09-25 RX ADMIN — Medication 100 MCG: at 12:20

## 2022-09-25 RX ADMIN — Medication 100 MCG: at 13:00

## 2022-09-25 RX ADMIN — ONDANSETRON 4 MG: 2 INJECTION INTRAMUSCULAR; INTRAVENOUS at 13:03

## 2022-09-25 RX ADMIN — FENTANYL CITRATE 25 MCG: 50 INJECTION, SOLUTION INTRAMUSCULAR; INTRAVENOUS at 12:20

## 2022-09-25 RX ADMIN — CEFAZOLIN SODIUM 2 G: 2 INJECTION, SOLUTION INTRAVENOUS at 12:07

## 2022-09-25 RX ADMIN — MIDODRINE HYDROCHLORIDE 5 MG: 5 TABLET ORAL at 16:50

## 2022-09-25 RX ADMIN — Medication 10 ML: at 21:07

## 2022-09-25 RX ADMIN — ROCURONIUM BROMIDE 35 MG: 10 INJECTION, SOLUTION INTRAVENOUS at 12:21

## 2022-09-25 RX ADMIN — ATORVASTATIN CALCIUM 80 MG: 80 TABLET, FILM COATED ORAL at 21:07

## 2022-09-25 RX ADMIN — MIDODRINE HYDROCHLORIDE 5 MG: 5 TABLET ORAL at 07:53

## 2022-09-25 RX ADMIN — CEFAZOLIN SODIUM 2 G: 2 INJECTION, SOLUTION INTRAVENOUS at 21:07

## 2022-09-25 RX ADMIN — LIDOCAINE HYDROCHLORIDE 60 MG: 20 INJECTION, SOLUTION EPIDURAL; INFILTRATION; INTRACAUDAL; PERINEURAL at 12:21

## 2022-09-25 RX ADMIN — FENTANYL CITRATE 25 MCG: 50 INJECTION, SOLUTION INTRAMUSCULAR; INTRAVENOUS at 12:50

## 2022-09-25 NOTE — ANESTHESIA POSTPROCEDURE EVALUATION
"Patient: Yoni Bonner Jr.    Procedure Summary     Date: 09/25/22 Room / Location: [facility] OR [address] [place] [address] MAIN OR    Anesthesia Start: 1213 Anesthesia Stop: 1329    Procedure: TOTAL HIP ARTHROPLASTY ANTERIOR WITH HANA TABLE (Left Hip) Diagnosis:     Surgeons: Jeff Rodriguez II, MD Provider: Immanuel Alfonso MD    Anesthesia Type: general ASA Status: 3          Anesthesia Type: general    Vitals  Vitals Value Taken Time   /59 09/25/22 1446   Temp 36.6 °C (97.9 °F) 09/25/22 1445   Pulse 70 09/25/22 1448   Resp 20 09/25/22 1445   SpO2 97 % 09/25/22 1448   Vitals shown include unvalidated device data.        Post Anesthesia Care and Evaluation    Patient location during evaluation: PACU  Patient participation: complete - patient participated  Level of consciousness: awake  Pain management: adequate    Airway patency: patent  Anesthetic complications: No anesthetic complications    Cardiovascular status: acceptable  Respiratory status: acceptable  Hydration status: acceptable    Comments: /59   Pulse 71   Temp 36.6 °C (97.9 °F) (Oral)   Resp 20   Ht 172.7 cm (68\")   Wt 80.5 kg (177 lb 6.4 oz)   SpO2 96%   BMI 26.97 kg/m²         "

## 2022-09-25 NOTE — ANESTHESIA PROCEDURE NOTES
Arterial Line      Patient reassessed immediately prior to procedure    Patient location during procedure: holding area  Start time: 9/25/2022 11:29 AM  Stop Time:9/25/2022 11:40 AM       Line placed for ABGs/Labs/ISTAT and hemodynamic monitoring.  Performed By   Anesthesiologist: Immanuel Alfonso MD  Preanesthetic Checklist  Completed: patient identified, IV checked, site marked, risks and benefits discussed, surgical consent, monitors and equipment checked, pre-op evaluation and timeout performed  Arterial Line Prep   Sterile Tech: cap and gloves  Prep: ChloraPrep  Patient monitoring: blood pressure monitoring, continuous pulse oximetry and EKG  Arterial Line Procedure   Laterality:right  Location:  radial artery  Catheter size: 20 G   Guidance: ultrasound guided  PROCEDURE NOTE/ULTRASOUND INTERPRETATION.  Using ultrasound guidance the potential vascular sites for insertion of the catheter were visualized to determine the patency of the vessel to be used for vascular access.  After selecting the appropriate site for insertion, the needle was visualized under ultrasound being inserted into the radial artery, followed by ultrasound confirmation of wire and catheter placement. There were no abnormalities seen on ultrasound; an image was taken; and the patient tolerated the procedure with no complications.   Number of attempts: 1  Successful placement: yes  Post Assessment   Dressing Type: occlusive dressing applied, secured with tape and wrist guard applied.   Complications no  Circ/Move/Sens Assessment: normal.   Patient Tolerance: patient tolerated the procedure well with no apparent complications  Additional Notes  Ultrasound interpretation:  With ultrasound guidance, the available vessels were examined and the artery was found to be patent.  The needle and wire were seen entering the artery and the catheter was confirmed in the artery.  No abnormalities noted.

## 2022-09-25 NOTE — ANESTHESIA PROCEDURE NOTES
Airway  Urgency: elective    Date/Time: 9/25/2022 12:24 PM  Airway not difficult    General Information and Staff    Patient location during procedure: OR  Anesthesiologist: Immanuel Alfonso MD  CRNA/CAA: Liberty San CRNA    Indications and Patient Condition  Indications for airway management: airway protection    Preoxygenated: yes  Mask difficulty assessment: 2 - vent by mask + OA or adjuvant +/- NMBA    Final Airway Details  Final airway type: endotracheal airway      Successful airway: ETT  Cuffed: yes   Successful intubation technique: direct laryngoscopy  Facilitating devices/methods: intubating stylet  Endotracheal tube insertion site: oral  Blade: Jaime  Blade size: 4  ETT size (mm): 8.0  Cormack-Lehane Classification: grade I - full view of glottis  Placement verified by: chest auscultation and capnometry   Measured from: lips  ETT/EBT  to lips (cm): 23  Number of attempts at approach: 1  Assessment: lips, teeth, and gum same as pre-op and atraumatic intubation

## 2022-09-25 NOTE — ANESTHESIA PREPROCEDURE EVALUATION
Anesthesia Evaluation     Patient summary reviewed and Nursing notes reviewed   NPO Solid Status: > 8 hours  NPO Liquid Status: > 2 hours           Airway   Mallampati: II  TM distance: >3 FB  Neck ROM: full  Comment: Blade: Kuo 2; Grade View: 1  Dental - normal exam     Pulmonary - normal exam    breath sounds clear to auscultation  (+) COPD,   Cardiovascular - normal exam    Patient on routine beta blocker  Rhythm: regular  Rate: normal    (+) hypertension less than 2 medications, valvular problems/murmurs (Mild) AS and MR, CAD, dysrhythmias Atrial Fib, CHF (Ischemic cardiomyopathy) , hyperlipidemia,  carotid artery disease  (-) angina, orthopnea, PND, APODACA    ROS comment: Sinus rhythm  Borderline IVCD with LAD  Borderline low voltage, extremity leads  When compared with ECG of 20-Sep-2022 1:30:01,  No significant change      · The study is technically difficult for diagnosis.  · Left ventricular ejection fraction appears to be 56 - 60%. Left ventricular systolic function is normal. Normal left ventricular cavity size and wall thickness noted. All left ventricular wall segments contract normally. Left ventricular diastolic function is consistent with (grade I) impaired relaxation.  · Moderate aortic valve stenosis is present.        Neuro/Psych  (+) syncope,    GI/Hepatic/Renal/Endo    (+)   renal disease CRI,     Musculoskeletal     Abdominal    Substance History - negative use     OB/GYN negative ob/gyn ROS         Other   arthritis (Gout),                      Anesthesia Plan    ASA 3     general     (A-line)  intravenous induction     Anesthetic plan, risks, benefits, and alternatives have been provided, discussed and informed consent has been obtained with: patient.        CODE STATUS:    Level Of Support Discussed With: Patient  Code Status (Patient has no pulse and is not breathing): CPR (Attempt to Resuscitate)  Medical Interventions (Patient has pulse or is breathing): Full Support

## 2022-09-26 ENCOUNTER — APPOINTMENT (OUTPATIENT)
Dept: NEUROLOGY | Facility: HOSPITAL | Age: 87
End: 2022-09-26

## 2022-09-26 LAB
ANION GAP SERPL CALCULATED.3IONS-SCNC: 12.2 MMOL/L (ref 5–15)
BUN SERPL-MCNC: 33 MG/DL (ref 8–23)
BUN/CREAT SERPL: 24.3 (ref 7–25)
CALCIUM SPEC-SCNC: 8.2 MG/DL (ref 8.6–10.5)
CHLORIDE SERPL-SCNC: 99 MMOL/L (ref 98–107)
CO2 SERPL-SCNC: 17.8 MMOL/L (ref 22–29)
CREAT SERPL-MCNC: 1.36 MG/DL (ref 0.76–1.27)
DEPRECATED RDW RBC AUTO: 45.1 FL (ref 37–54)
EGFRCR SERPLBLD CKD-EPI 2021: 49.7 ML/MIN/1.73
ERYTHROCYTE [DISTWIDTH] IN BLOOD BY AUTOMATED COUNT: 13.9 % (ref 12.3–15.4)
GLUCOSE SERPL-MCNC: 104 MG/DL (ref 65–99)
HCT VFR BLD AUTO: 29.7 % (ref 37.5–51)
HGB BLD-MCNC: 9.8 G/DL (ref 13–17.7)
MCH RBC QN AUTO: 29.5 PG (ref 26.6–33)
MCHC RBC AUTO-ENTMCNC: 33 G/DL (ref 31.5–35.7)
MCV RBC AUTO: 89.5 FL (ref 79–97)
PLATELET # BLD AUTO: 196 10*3/MM3 (ref 140–450)
PMV BLD AUTO: 10.3 FL (ref 6–12)
POTASSIUM SERPL-SCNC: 4.5 MMOL/L (ref 3.5–5.2)
RBC # BLD AUTO: 3.32 10*6/MM3 (ref 4.14–5.8)
SODIUM SERPL-SCNC: 129 MMOL/L (ref 136–145)
WBC NRBC COR # BLD: 9.16 10*3/MM3 (ref 3.4–10.8)

## 2022-09-26 PROCEDURE — 95816 EEG AWAKE AND DROWSY: CPT

## 2022-09-26 PROCEDURE — 85027 COMPLETE CBC AUTOMATED: CPT | Performed by: HOSPITALIST

## 2022-09-26 PROCEDURE — 97162 PT EVAL MOD COMPLEX 30 MIN: CPT

## 2022-09-26 PROCEDURE — 99232 SBSQ HOSP IP/OBS MODERATE 35: CPT | Performed by: INTERNAL MEDICINE

## 2022-09-26 PROCEDURE — 95816 EEG AWAKE AND DROWSY: CPT | Performed by: PSYCHIATRY & NEUROLOGY

## 2022-09-26 PROCEDURE — 97530 THERAPEUTIC ACTIVITIES: CPT

## 2022-09-26 PROCEDURE — 25010000002 CEFAZOLIN IN DEXTROSE 2-4 GM/100ML-% SOLUTION: Performed by: ORTHOPAEDIC SURGERY

## 2022-09-26 PROCEDURE — 80048 BASIC METABOLIC PNL TOTAL CA: CPT | Performed by: HOSPITALIST

## 2022-09-26 PROCEDURE — 99233 SBSQ HOSP IP/OBS HIGH 50: CPT | Performed by: NURSE PRACTITIONER

## 2022-09-26 RX ADMIN — ASPIRIN 81 MG: 81 TABLET, FILM COATED ORAL at 08:02

## 2022-09-26 RX ADMIN — MIDODRINE HYDROCHLORIDE 5 MG: 5 TABLET ORAL at 08:02

## 2022-09-26 RX ADMIN — CEFAZOLIN SODIUM 2 G: 2 INJECTION, SOLUTION INTRAVENOUS at 11:42

## 2022-09-26 RX ADMIN — CEFAZOLIN SODIUM 2 G: 2 INJECTION, SOLUTION INTRAVENOUS at 05:48

## 2022-09-26 RX ADMIN — MIDODRINE HYDROCHLORIDE 5 MG: 5 TABLET ORAL at 17:47

## 2022-09-26 RX ADMIN — MIDODRINE HYDROCHLORIDE 5 MG: 5 TABLET ORAL at 11:42

## 2022-09-26 RX ADMIN — ATORVASTATIN CALCIUM 80 MG: 80 TABLET, FILM COATED ORAL at 20:02

## 2022-09-26 RX ADMIN — Medication 10 ML: at 20:04

## 2022-09-27 LAB
ANION GAP SERPL CALCULATED.3IONS-SCNC: 8.8 MMOL/L (ref 5–15)
BUN SERPL-MCNC: 29 MG/DL (ref 8–23)
BUN/CREAT SERPL: 29.3 (ref 7–25)
CALCIUM SPEC-SCNC: 7.9 MG/DL (ref 8.6–10.5)
CHLORIDE SERPL-SCNC: 101 MMOL/L (ref 98–107)
CO2 SERPL-SCNC: 19.2 MMOL/L (ref 22–29)
CREAT SERPL-MCNC: 0.99 MG/DL (ref 0.76–1.27)
DEPRECATED RDW RBC AUTO: 44.1 FL (ref 37–54)
EGFRCR SERPLBLD CKD-EPI 2021: 72.8 ML/MIN/1.73
ERYTHROCYTE [DISTWIDTH] IN BLOOD BY AUTOMATED COUNT: 14.1 % (ref 12.3–15.4)
GLUCOSE SERPL-MCNC: 100 MG/DL (ref 65–99)
HCT VFR BLD AUTO: 27 % (ref 37.5–51)
HGB BLD-MCNC: 9.1 G/DL (ref 13–17.7)
MCH RBC QN AUTO: 29 PG (ref 26.6–33)
MCHC RBC AUTO-ENTMCNC: 33.7 G/DL (ref 31.5–35.7)
MCV RBC AUTO: 86 FL (ref 79–97)
PLATELET # BLD AUTO: 174 10*3/MM3 (ref 140–450)
PMV BLD AUTO: 10.5 FL (ref 6–12)
POTASSIUM SERPL-SCNC: 4 MMOL/L (ref 3.5–5.2)
RBC # BLD AUTO: 3.14 10*6/MM3 (ref 4.14–5.8)
SODIUM SERPL-SCNC: 129 MMOL/L (ref 136–145)
WBC NRBC COR # BLD: 7.55 10*3/MM3 (ref 3.4–10.8)

## 2022-09-27 PROCEDURE — 85027 COMPLETE CBC AUTOMATED: CPT | Performed by: HOSPITALIST

## 2022-09-27 PROCEDURE — 80048 BASIC METABOLIC PNL TOTAL CA: CPT | Performed by: HOSPITALIST

## 2022-09-27 PROCEDURE — 92526 ORAL FUNCTION THERAPY: CPT

## 2022-09-27 PROCEDURE — 99232 SBSQ HOSP IP/OBS MODERATE 35: CPT | Performed by: INTERNAL MEDICINE

## 2022-09-27 PROCEDURE — 99232 SBSQ HOSP IP/OBS MODERATE 35: CPT | Performed by: NURSE PRACTITIONER

## 2022-09-27 RX ORDER — RANOLAZINE 500 MG/1
1000 TABLET, EXTENDED RELEASE ORAL EVERY 12 HOURS SCHEDULED
Status: DISCONTINUED | OUTPATIENT
Start: 2022-09-27 | End: 2022-09-27

## 2022-09-27 RX ADMIN — Medication 10 ML: at 08:28

## 2022-09-27 RX ADMIN — MIDODRINE HYDROCHLORIDE 5 MG: 5 TABLET ORAL at 12:26

## 2022-09-27 RX ADMIN — MIDODRINE HYDROCHLORIDE 5 MG: 5 TABLET ORAL at 06:47

## 2022-09-27 RX ADMIN — MIDODRINE HYDROCHLORIDE 5 MG: 5 TABLET ORAL at 18:11

## 2022-09-27 RX ADMIN — ACETAMINOPHEN 650 MG: 325 TABLET, FILM COATED ORAL at 21:00

## 2022-09-27 RX ADMIN — Medication 10 ML: at 21:00

## 2022-09-27 RX ADMIN — ASPIRIN 81 MG: 81 TABLET, FILM COATED ORAL at 08:27

## 2022-09-27 RX ADMIN — ATORVASTATIN CALCIUM 80 MG: 80 TABLET, FILM COATED ORAL at 20:33

## 2022-09-28 ENCOUNTER — APPOINTMENT (OUTPATIENT)
Dept: GENERAL RADIOLOGY | Facility: HOSPITAL | Age: 87
End: 2022-09-28

## 2022-09-28 VITALS
RESPIRATION RATE: 16 BRPM | BODY MASS INDEX: 27.32 KG/M2 | TEMPERATURE: 98 F | HEIGHT: 68 IN | SYSTOLIC BLOOD PRESSURE: 98 MMHG | OXYGEN SATURATION: 98 % | HEART RATE: 96 BPM | WEIGHT: 180.3 LBS | DIASTOLIC BLOOD PRESSURE: 74 MMHG

## 2022-09-28 PROBLEM — S72.002A LEFT DISPLACED FEMORAL NECK FRACTURE (HCC): Status: RESOLVED | Noted: 2022-09-23 | Resolved: 2022-09-28

## 2022-09-28 PROBLEM — R47.81 SLURRED SPEECH: Status: RESOLVED | Noted: 2022-09-23 | Resolved: 2022-09-28

## 2022-09-28 PROCEDURE — 99232 SBSQ HOSP IP/OBS MODERATE 35: CPT | Performed by: NURSE PRACTITIONER

## 2022-09-28 PROCEDURE — 74230 X-RAY XM SWLNG FUNCJ C+: CPT

## 2022-09-28 PROCEDURE — 92611 MOTION FLUOROSCOPY/SWALLOW: CPT

## 2022-09-28 RX ADMIN — BARIUM SULFATE 50 ML: 400 SUSPENSION ORAL at 09:39

## 2022-09-28 RX ADMIN — MIDODRINE HYDROCHLORIDE 5 MG: 5 TABLET ORAL at 06:43

## 2022-09-28 RX ADMIN — ASPIRIN 81 MG: 81 TABLET, FILM COATED ORAL at 08:53

## 2022-09-28 RX ADMIN — BARIUM SULFATE 135 ML: 980 POWDER, FOR SUSPENSION ORAL at 09:39

## 2022-09-28 RX ADMIN — BARIUM SULFATE 55 ML: 0.81 POWDER, FOR SUSPENSION ORAL at 09:39

## 2022-09-28 RX ADMIN — MIDODRINE HYDROCHLORIDE 5 MG: 5 TABLET ORAL at 14:31

## 2022-09-28 RX ADMIN — BARIUM SULFATE 1 TEASPOON(S): 0.6 CREAM ORAL at 09:39

## 2022-09-30 ENCOUNTER — HOSPITAL ENCOUNTER (EMERGENCY)
Facility: HOSPITAL | Age: 87
Discharge: NURSING, REHAB OR LONG-TERM CARE | End: 2022-10-01
Attending: EMERGENCY MEDICINE | Admitting: EMERGENCY MEDICINE

## 2022-09-30 DIAGNOSIS — Z71.1 WORRIED WELL: Primary | ICD-10-CM

## 2022-09-30 PROCEDURE — 99285 EMERGENCY DEPT VISIT HI MDM: CPT

## 2022-09-30 PROCEDURE — 36415 COLL VENOUS BLD VENIPUNCTURE: CPT

## 2022-10-01 ENCOUNTER — APPOINTMENT (OUTPATIENT)
Dept: GENERAL RADIOLOGY | Facility: HOSPITAL | Age: 87
End: 2022-10-01

## 2022-10-01 VITALS
OXYGEN SATURATION: 94 % | DIASTOLIC BLOOD PRESSURE: 67 MMHG | HEART RATE: 81 BPM | RESPIRATION RATE: 16 BRPM | SYSTOLIC BLOOD PRESSURE: 101 MMHG | TEMPERATURE: 98.1 F

## 2022-10-01 LAB
ALBUMIN SERPL-MCNC: 2.7 G/DL (ref 3.5–5.2)
ALBUMIN/GLOB SERPL: 1.1 G/DL
ALP SERPL-CCNC: 88 U/L (ref 39–117)
ALT SERPL W P-5'-P-CCNC: 26 U/L (ref 1–41)
ANION GAP SERPL CALCULATED.3IONS-SCNC: 9.6 MMOL/L (ref 5–15)
AST SERPL-CCNC: 49 U/L (ref 1–40)
BACTERIA UR QL AUTO: NORMAL /HPF
BASOPHILS # BLD AUTO: 0.04 10*3/MM3 (ref 0–0.2)
BASOPHILS NFR BLD AUTO: 0.6 % (ref 0–1.5)
BILIRUB SERPL-MCNC: 0.7 MG/DL (ref 0–1.2)
BILIRUB UR QL STRIP: NEGATIVE
BUN SERPL-MCNC: 26 MG/DL (ref 8–23)
BUN/CREAT SERPL: 26 (ref 7–25)
CALCIUM SPEC-SCNC: 8.3 MG/DL (ref 8.6–10.5)
CHLORIDE SERPL-SCNC: 103 MMOL/L (ref 98–107)
CLARITY UR: ABNORMAL
CO2 SERPL-SCNC: 21.4 MMOL/L (ref 22–29)
COLOR UR: ABNORMAL
CREAT SERPL-MCNC: 1 MG/DL (ref 0.76–1.27)
D-LACTATE SERPL-SCNC: 1.1 MMOL/L (ref 0.5–2)
DEPRECATED RDW RBC AUTO: 43.7 FL (ref 37–54)
EGFRCR SERPLBLD CKD-EPI 2021: 71.9 ML/MIN/1.73
EOSINOPHIL # BLD AUTO: 0.16 10*3/MM3 (ref 0–0.4)
EOSINOPHIL NFR BLD AUTO: 2.4 % (ref 0.3–6.2)
ERYTHROCYTE [DISTWIDTH] IN BLOOD BY AUTOMATED COUNT: 13.5 % (ref 12.3–15.4)
GLOBULIN UR ELPH-MCNC: 2.4 GM/DL
GLUCOSE SERPL-MCNC: 119 MG/DL (ref 65–99)
GLUCOSE UR STRIP-MCNC: NEGATIVE MG/DL
HCT VFR BLD AUTO: 26.9 % (ref 37.5–51)
HGB BLD-MCNC: 8.8 G/DL (ref 13–17.7)
HGB UR QL STRIP.AUTO: NEGATIVE
HYALINE CASTS UR QL AUTO: NORMAL /LPF
IMM GRANULOCYTES # BLD AUTO: 0.04 10*3/MM3 (ref 0–0.05)
IMM GRANULOCYTES NFR BLD AUTO: 0.6 % (ref 0–0.5)
KETONES UR QL STRIP: NEGATIVE
LEUKOCYTE ESTERASE UR QL STRIP.AUTO: ABNORMAL
LYMPHOCYTES # BLD AUTO: 2.18 10*3/MM3 (ref 0.7–3.1)
LYMPHOCYTES NFR BLD AUTO: 32.8 % (ref 19.6–45.3)
MCH RBC QN AUTO: 29 PG (ref 26.6–33)
MCHC RBC AUTO-ENTMCNC: 32.7 G/DL (ref 31.5–35.7)
MCV RBC AUTO: 88.8 FL (ref 79–97)
MONOCYTES # BLD AUTO: 0.8 10*3/MM3 (ref 0.1–0.9)
MONOCYTES NFR BLD AUTO: 12 % (ref 5–12)
NEUTROPHILS NFR BLD AUTO: 3.42 10*3/MM3 (ref 1.7–7)
NEUTROPHILS NFR BLD AUTO: 51.6 % (ref 42.7–76)
NITRITE UR QL STRIP: NEGATIVE
NRBC BLD AUTO-RTO: 0 /100 WBC (ref 0–0.2)
PH UR STRIP.AUTO: 5.5 [PH] (ref 5–8)
PLATELET # BLD AUTO: 270 10*3/MM3 (ref 140–450)
PMV BLD AUTO: 10.4 FL (ref 6–12)
POTASSIUM SERPL-SCNC: 4.1 MMOL/L (ref 3.5–5.2)
PROCALCITONIN SERPL-MCNC: 0.18 NG/ML (ref 0–0.25)
PROT SERPL-MCNC: 5.1 G/DL (ref 6–8.5)
PROT UR QL STRIP: NEGATIVE
QT INTERVAL: 404 MS
RBC # BLD AUTO: 3.03 10*6/MM3 (ref 4.14–5.8)
RBC # UR STRIP: NORMAL /HPF
REF LAB TEST METHOD: NORMAL
SODIUM SERPL-SCNC: 134 MMOL/L (ref 136–145)
SP GR UR STRIP: 1.01 (ref 1–1.03)
SQUAMOUS #/AREA URNS HPF: NORMAL /HPF
TROPONIN T SERPL-MCNC: 0.02 NG/ML (ref 0–0.03)
TROPONIN T SERPL-MCNC: 0.03 NG/ML (ref 0–0.03)
TROPONIN T SERPL-MCNC: 0.03 NG/ML (ref 0–0.03)
UROBILINOGEN UR QL STRIP: ABNORMAL
WBC # UR STRIP: NORMAL /HPF
WBC NRBC COR # BLD: 6.64 10*3/MM3 (ref 3.4–10.8)

## 2022-10-01 PROCEDURE — 81001 URINALYSIS AUTO W/SCOPE: CPT | Performed by: EMERGENCY MEDICINE

## 2022-10-01 PROCEDURE — 83605 ASSAY OF LACTIC ACID: CPT | Performed by: EMERGENCY MEDICINE

## 2022-10-01 PROCEDURE — 36415 COLL VENOUS BLD VENIPUNCTURE: CPT

## 2022-10-01 PROCEDURE — 84484 ASSAY OF TROPONIN QUANT: CPT | Performed by: EMERGENCY MEDICINE

## 2022-10-01 PROCEDURE — 84145 PROCALCITONIN (PCT): CPT | Performed by: EMERGENCY MEDICINE

## 2022-10-01 PROCEDURE — 71045 X-RAY EXAM CHEST 1 VIEW: CPT

## 2022-10-01 PROCEDURE — 80053 COMPREHEN METABOLIC PANEL: CPT | Performed by: EMERGENCY MEDICINE

## 2022-10-01 PROCEDURE — 93005 ELECTROCARDIOGRAM TRACING: CPT | Performed by: EMERGENCY MEDICINE

## 2022-10-01 PROCEDURE — 93010 ELECTROCARDIOGRAM REPORT: CPT | Performed by: INTERNAL MEDICINE

## 2022-10-01 PROCEDURE — 85025 COMPLETE CBC W/AUTO DIFF WBC: CPT | Performed by: EMERGENCY MEDICINE

## 2022-10-01 RX ORDER — HYDROCODONE BITARTRATE AND ACETAMINOPHEN 5; 325 MG/1; MG/1
1 TABLET ORAL EVERY 8 HOURS PRN
Status: ON HOLD | COMMUNITY
End: 2022-10-19 | Stop reason: SDUPTHER

## 2022-10-01 RX ORDER — MIDODRINE HYDROCHLORIDE 5 MG/1
5 TABLET ORAL
Status: DISCONTINUED | OUTPATIENT
Start: 2022-10-01 | End: 2022-10-01 | Stop reason: HOSPADM

## 2022-10-01 RX ORDER — HYDROCODONE BITARTRATE AND ACETAMINOPHEN 5; 325 MG/1; MG/1
1 TABLET ORAL EVERY 8 HOURS PRN
Status: DISCONTINUED | OUTPATIENT
Start: 2022-10-01 | End: 2022-10-01 | Stop reason: HOSPADM

## 2022-10-01 RX ORDER — BISACODYL 10 MG
10 SUPPOSITORY, RECTAL RECTAL DAILY PRN
COMMUNITY
End: 2022-10-28 | Stop reason: HOSPADM

## 2022-10-01 RX ORDER — ANORECTAL OINTMENT 15.7; .44; 24; 20.6 G/100G; G/100G; G/100G; G/100G
1 OINTMENT TOPICAL 2 TIMES DAILY
COMMUNITY
End: 2022-10-28 | Stop reason: HOSPADM

## 2022-10-01 RX ORDER — SODIUM PHOSPHATE, DIBASIC AND SODIUM PHOSPHATE, MONOBASIC 7; 19 G/133ML; G/133ML
1 ENEMA RECTAL AS NEEDED
COMMUNITY
End: 2022-10-28 | Stop reason: HOSPADM

## 2022-10-01 RX ORDER — SODIUM CHLORIDE 0.9 % (FLUSH) 0.9 %
10 SYRINGE (ML) INJECTION AS NEEDED
Status: DISCONTINUED | OUTPATIENT
Start: 2022-10-01 | End: 2022-10-01 | Stop reason: HOSPADM

## 2022-10-01 RX ORDER — ONDANSETRON 4 MG/1
4 TABLET, ORALLY DISINTEGRATING ORAL EVERY 6 HOURS PRN
Status: DISCONTINUED | OUTPATIENT
Start: 2022-10-01 | End: 2022-10-01 | Stop reason: HOSPADM

## 2022-10-01 RX ORDER — ACETAMINOPHEN 325 MG/1
650 TABLET ORAL EVERY 4 HOURS PRN
Status: DISCONTINUED | OUTPATIENT
Start: 2022-10-01 | End: 2022-10-01 | Stop reason: HOSPADM

## 2022-10-01 RX ORDER — ASPIRIN 81 MG/1
81 TABLET ORAL DAILY
Status: DISCONTINUED | OUTPATIENT
Start: 2022-10-01 | End: 2022-10-01 | Stop reason: HOSPADM

## 2022-10-01 RX ADMIN — MIDODRINE HYDROCHLORIDE 5 MG: 5 TABLET ORAL at 12:55

## 2022-10-01 RX ADMIN — SODIUM CHLORIDE 500 ML: 9 INJECTION, SOLUTION INTRAVENOUS at 00:28

## 2022-10-01 RX ADMIN — ASPIRIN 81 MG: 81 TABLET, COATED ORAL at 12:56

## 2022-10-01 RX ADMIN — SODIUM CHLORIDE 500 ML: 9 INJECTION, SOLUTION INTRAVENOUS at 02:37

## 2022-10-01 NOTE — ED TRIAGE NOTES
To ER via EMS from [facility].  Sent for hypotension with syst 80's per NH.  Pt was seen here 2 days ago for hip fx with total hip replacement.  Per EMS pressure has been 90's-low 100's  Systolic.  Pt has no complaints other than being tired.   Normal A&Ox3    Pt in mask at time of triage.  Triage staff in appropriate PPE.

## 2022-10-01 NOTE — ED PROVIDER NOTES
EMERGENCY DEPARTMENT ENCOUNTER    Room Number:  10/10  Date of encounter:  10/1/2022  PCP: Patric Cameron DO  Historian: Patient, EMS      HPI:  Chief Complaint: Hypotension  A complete HPI/ROS/PMH/PSH/SH/FH are unobtainable due to: None    Context: Yoni Bonner Jr. is a 89 y.o. male who presents to the ED from nursing home.  Family reports patient had blood pressure in the 80s systolic this evening.  EMS reports pressure in the 90s to 100.  Patient denies any complaints no chest pain or shortness of breath no pain in his limbs no pain in his abdomen no nausea vomiting.      PAST MEDICAL HISTORY  Active Ambulatory Problems     Diagnosis Date Noted   • HTN (hypertension) 03/08/2017   • Complete tear of right rotator cuff 04/13/2016   • Onychomycosis due to dermatophyte 04/27/2015   • Left rotator cuff tear arthropathy 01/13/2016   • Obesity 04/27/2015   • Anemia, chronic disease 03/29/2017   • Right shoulder pain 04/13/2016   • S/p reverse total shoulder arthroplasty 04/11/2017   • Episode of syncope 04/16/2018   • Coronary artery disease involving native coronary artery of native heart without angina pectoris 04/16/2018   • Chest pain 12/06/2018   • CKD (chronic kidney disease) stage 3, GFR 30-59 ml/min ([facility]) 01/06/2019   • Nonrheumatic aortic valve stenosis 03/20/2019   • COPD (chronic obstructive pulmonary disease) ([facility]) 07/24/2019   • HLD (hyperlipidemia) 07/25/2019   • Contusion of right leg 05/19/2021   • Knee pain 02/11/2020   • Primary osteoarthritis of left knee 02/11/2020   • Orthostatic hypotension 01/13/2022   • Hyponatremia 09/23/2022   • Atrial fibrillation ([facility]) 09/23/2022     Resolved Ambulatory Problems     Diagnosis Date Noted   • Acute ST elevation myocardial infarction (STEMI) of inferior wall ([facility]) 04/08/2018   • Contusion of left knee 01/09/2016   • Left shoulder pain 04/13/2016   • Tobacco abuse 04/16/2018   • History of coronary artery stent placement 04/16/2018   • Chronic systolic  congestive heart failure (HCC) 04/16/2018   • Ischemic cardiomyopathy 05/08/2018   • Exertional chest pain 07/18/2018   • Acute bronchitis due to Rhinovirus 01/09/2019   • Elevated troponin 07/25/2019   • Slurred speech 09/23/2022   • Left displaced femoral neck fracture (HCC) 09/23/2022     Past Medical History:   Diagnosis Date   • Bronchitis    • CAD (coronary artery disease)    • Carotid arterial disease (HCC)    • Chronic bronchitis (HCC)    • Gout    • Hyperlipidemia    • Hypertension    • Mild aortic stenosis    • Mitral regurgitation    • Precordial chest pain    • Rheumatic fever    • Syncopal episodes    • Tobacco use          PAST SURGICAL HISTORY  Past Surgical History:   Procedure Laterality Date   • CARDIAC CATHETERIZATION     • CARDIAC CATHETERIZATION N/A 4/8/2018    Procedure: Left Heart Cath;  Surgeon: Nicolas Jerez MD;  [facility];  Service: Cardiovascular   • CARDIAC CATHETERIZATION  4/8/2018    Procedure: Percutaneous Manual Thrombectomy;  Surgeon: Nicolas Jerez MD;  [facility];  Service: Cardiovascular   • CARDIAC CATHETERIZATION N/A 4/8/2018    Procedure: Stent CARLENE coronary;  Surgeon: Nicolas Jerez MD;  [facility];  Service: Cardiovascular   • CARDIAC CATHETERIZATION N/A 4/8/2018    Procedure: Right Heart Cath;  Surgeon: Nicolas Jerez MD;  [facility];  Service: Cardiovascular   • CARDIAC CATHETERIZATION Left 2/21/2020    Procedure: Cardiac Catheterization/Vascular Study;  Surgeon: Alejandro Jenkins MD;  [facility];  Service: Cardiovascular;  Laterality: Left;   • CARDIAC CATHETERIZATION N/A 2/21/2020    Procedure: Coronary angiography;  Surgeon: Alejandro Jenkins MD;  [facility];  Service: Cardiovascular;  Laterality: N/A;   • EYE SURGERY      cataract surg   • HAND SURGERY     • HERNIA REPAIR     • KNEE SURGERY     • TESTICLE  SURGERY     • TOTAL HIP ARTHROPLASTY Left 9/25/2022    Procedure: TOTAL HIP ARTHROPLASTY ANTERIOR WITH HANA TABLE;  Surgeon: Jeff Rodriguez II, MD;  Location: [facility];  Service: Orthopedics;  Laterality: Left;         FAMILY HISTORY  Family History   Problem Relation Age of Onset   • Heart disease Father    • Hypertension Father    • Stroke Father    • Diabetes Sister    • Cancer Brother    • Heart disease Brother    • Hypertension Brother    • No Known Problems Maternal Grandmother    • No Known Problems Maternal Grandfather    • No Known Problems Paternal Grandmother    • No Known Problems Paternal Grandfather          SOCIAL HISTORY  Social History     Socioeconomic History   • Marital status:    Tobacco Use   • Smoking status: Never Smoker   • Smokeless tobacco: Current User     Types: Chew   • Tobacco comment: chewing tobacco since 4 years old   Substance and Sexual Activity   • Alcohol use: No     Comment: caffeine use   • Drug use: Never   • Sexual activity: Defer         ALLERGIES  No known drug allergy        REVIEW OF SYSTEMS  Review of Systems     All systems reviewed and negative except for those discussed in HPI.       PHYSICAL EXAM    I have reviewed the triage vital signs and nursing notes.    ED Triage Vitals [09/30/22 2351]   Temp Heart Rate Resp BP SpO2   98.1 °F (36.7 °C) 67 18 96/60 98 %      Temp src Heart Rate Source Patient Position BP Location FiO2 (%)   -- -- -- -- --       Physical Exam  GENERAL: Chronically ill-appearing, not distressed  HENT: nares patent  EYES: no scleral icterus  CV: regular rhythm, regular rate  RESPIRATORY: normal effort  ABDOMEN: soft, nontender nondistended  MUSCULOSKELETAL: no deformity  NEURO: alert, moves all extremities, follows commands  SKIN: warm, dry        LAB RESULTS  Recent Results (from the past 24 hour(s))   ECG 12 Lead    Collection Time: 10/01/22 12:28 AM   Result Value Ref Range    QT Interval 404 ms   Comprehensive Metabolic  Panel    Collection Time: 10/01/22 12:39 AM    Specimen: Blood   Result Value Ref Range    Glucose 119 (H) 65 - 99 mg/dL    BUN 26 (H) 8 - 23 mg/dL    Creatinine 1.00 0.76 - 1.27 mg/dL    Sodium 134 (L) 136 - 145 mmol/L    Potassium 4.1 3.5 - 5.2 mmol/L    Chloride 103 98 - 107 mmol/L    CO2 21.4 (L) 22.0 - 29.0 mmol/L    Calcium 8.3 (L) 8.6 - 10.5 mg/dL    Total Protein 5.1 (L) 6.0 - 8.5 g/dL    Albumin 2.70 (L) 3.50 - 5.20 g/dL    ALT (SGPT) 26 1 - 41 U/L    AST (SGOT) 49 (H) 1 - 40 U/L    Alkaline Phosphatase 88 39 - 117 U/L    Total Bilirubin 0.7 0.0 - 1.2 mg/dL    Globulin 2.4 gm/dL    A/G Ratio 1.1 g/dL    BUN/Creatinine Ratio 26.0 (H) 7.0 - 25.0    Anion Gap 9.6 5.0 - 15.0 mmol/L    eGFR 71.9 >60.0 mL/min/1.73   Lactic Acid, Plasma    Collection Time: 10/01/22 12:39 AM    Specimen: Blood   Result Value Ref Range    Lactate 1.1 0.5 - 2.0 mmol/L   Procalcitonin    Collection Time: 10/01/22 12:39 AM    Specimen: Blood   Result Value Ref Range    Procalcitonin 0.18 0.00 - 0.25 ng/mL   Troponin    Collection Time: 10/01/22 12:39 AM    Specimen: Blood   Result Value Ref Range    Troponin T 0.025 0.000 - 0.030 ng/mL   CBC Auto Differential    Collection Time: 10/01/22 12:39 AM    Specimen: Blood   Result Value Ref Range    WBC 6.64 3.40 - 10.80 10*3/mm3    RBC 3.03 (L) 4.14 - 5.80 10*6/mm3    Hemoglobin 8.8 (L) 13.0 - 17.7 g/dL    Hematocrit 26.9 (L) 37.5 - 51.0 %    MCV 88.8 79.0 - 97.0 fL    MCH 29.0 26.6 - 33.0 pg    MCHC 32.7 31.5 - 35.7 g/dL    RDW 13.5 12.3 - 15.4 %    RDW-SD 43.7 37.0 - 54.0 fl    MPV 10.4 6.0 - 12.0 fL    Platelets 270 140 - 450 10*3/mm3    Neutrophil % 51.6 42.7 - 76.0 %    Lymphocyte % 32.8 19.6 - 45.3 %    Monocyte % 12.0 5.0 - 12.0 %    Eosinophil % 2.4 0.3 - 6.2 %    Basophil % 0.6 0.0 - 1.5 %    Immature Grans % 0.6 (H) 0.0 - 0.5 %    Neutrophils, Absolute 3.42 1.70 - 7.00 10*3/mm3    Lymphocytes, Absolute 2.18 0.70 - 3.10 10*3/mm3    Monocytes, Absolute 0.80 0.10 - 0.90 10*3/mm3     Eosinophils, Absolute 0.16 0.00 - 0.40 10*3/mm3    Basophils, Absolute 0.04 0.00 - 0.20 10*3/mm3    Immature Grans, Absolute 0.04 0.00 - 0.05 10*3/mm3    nRBC 0.0 0.0 - 0.2 /100 WBC   Urinalysis With Microscopic If Indicated (No Culture) - Urine, Clean Catch    Collection Time: 10/01/22  1:40 AM    Specimen: Urine, Clean Catch   Result Value Ref Range    Color, UA Dark Yellow (A) Yellow, Straw    Appearance, UA Cloudy (A) Clear    pH, UA 5.5 5.0 - 8.0    Specific Gravity, UA 1.015 1.005 - 1.030    Glucose, UA Negative Negative    Ketones, UA Negative Negative    Bilirubin, UA Negative Negative    Blood, UA Negative Negative    Protein, UA Negative Negative    Leuk Esterase, UA Small (1+) (A) Negative    Nitrite, UA Negative Negative    Urobilinogen, UA 0.2 E.U./dL 0.2 - 1.0 E.U./dL   Urinalysis, Microscopic Only - Urine, Clean Catch    Collection Time: 10/01/22  1:40 AM    Specimen: Urine, Clean Catch   Result Value Ref Range    RBC, UA None Seen None Seen, 0-2 /HPF    WBC, UA 0-2 None Seen, 0-2 /HPF    Bacteria, UA None Seen None Seen /HPF    Squamous Epithelial Cells, UA 0-2 None Seen, 0-2 /HPF    Hyaline Casts, UA 0-2 None Seen /LPF    Methodology Manual Light Microscopy    Troponin    Collection Time: 10/01/22  2:37 AM    Specimen: Blood   Result Value Ref Range    Troponin T 0.030 0.000 - 0.030 ng/mL   Troponin    Collection Time: 10/01/22  5:17 AM    Specimen: Blood   Result Value Ref Range    Troponin T 0.024 0.000 - 0.030 ng/mL       Ordered the above labs and independently reviewed the results.        RADIOLOGY  XR Chest 1 View    Result Date: 10/1/2022  SINGLE VIEW OF THE CHEST  HISTORY: Hypotension  COMPARISON: 09/20/2022  FINDINGS: Heart size is within normal limits. No pneumothorax or pleural effusion is seen. No acute infiltrates are identified. Patient is status post left shoulder arthroplasty. Lung volumes are overall diminished, unchanged when compared to prior study. There is some tortuosity of the  thoracic aorta.      No acute findings.  This report was finalized on 10/1/2022 12:38 AM by Dr. Eliz Medina M.D.        I ordered the above noted radiological studies. Reviewed by me and discussed with radiologist.  See dictation for official radiology interpretation.      PROCEDURES    Procedures      MEDICATIONS GIVEN IN ER    Medications   sodium chloride 0.9 % flush 10 mL (has no administration in time range)   sodium chloride 0.9 % bolus 500 mL (0 mL Intravenous Stopped 10/1/22 0130)   sodium chloride 0.9 % bolus 500 mL (0 mL Intravenous Stopped 10/1/22 0503)         PROGRESS, DATA ANALYSIS, CONSULTS, AND MEDICAL DECISION MAKING    All labs have been independently reviewed by me.  All radiology studies have been reviewed by me and discussed with radiologist dictating the report.   EKG's independently viewed and interpreted by me.  Discussion below represents my analysis of pertinent findings related to patient's condition, differential diagnosis, treatment plan and final disposition.        ED Course as of 10/01/22 0558   Sat Oct 01, 2022   0031 EKG          EKG time: 0028  Rhythm/Rate: Sinus rhythm rate 61  P waves and NE: Within normal limits  QRS, axis: Narrow regular  ST and T waves: Within normal limits    Interpreted Contemporaneously by me, independently viewed [TJ]   0133 Hemoglobin(!): 8.8 [TJ]   0133 WBC: 6.64 [TJ]   0133 Procalcitonin: 0.18 [TJ]   0133 Lactate: 1.1 [TJ]   0208 Creatinine: 1.00 [TJ]   0208 Sodium(!): 134 [TJ]   0220 WBC, UA: 0-2 [TJ]   0220 Bacteria, UA: None Seen [TJ]   0558 Patient has been stable and normotensive throughout stay in ED.  Work-up has been negative.  Will discharge back to nursing home. [TJ]      ED Course User Index  [TJ] Nicolas Keenan MD           PPE: The patient wore a surgical mask throughout the entire patient encounter. I wore an N95.    AS OF 05:58 EDT VITALS:    BP - 124/66  HR - 79  TEMP - 98.1 °F (36.7 °C)  O2 SATS -  96%        DIAGNOSIS  Final diagnoses:   Worried well         DISPOSITION  discharge           Nicolas Keenan MD  10/01/22 0558       Nicolas Keenan MD  10/01/22 0558

## 2022-10-04 ENCOUNTER — TELEPHONE (OUTPATIENT)
Dept: NEUROLOGY | Facility: CLINIC | Age: 87
End: 2022-10-04

## 2022-10-04 NOTE — TELEPHONE ENCOUNTER
Called and spoke to PT's wife. She stated pt is currently in [facility] for rehab stay after hospital. I spoke with a Lady from facility. They stated Radhika would give me a juanita back to get him scheduled. They handle pts scheduling and transportation in facility. Pt may be staying there long term possibly.  When they call back I can take the call to get them scheduled.

## 2022-10-04 NOTE — TELEPHONE ENCOUNTER
----- Message from BOOGIE Gordon sent at 9/26/2022  4:10 PM EDT -----  Regarding: follow up  Melody, this patient needs follow-up with one of the epileptologist in the next 3 months for seizure follow-up.    Bety

## 2022-10-09 ENCOUNTER — HOSPITAL ENCOUNTER (INPATIENT)
Facility: HOSPITAL | Age: 87
LOS: 10 days | Discharge: NURSING, REHAB OR LONG-TERM CARE | End: 2022-10-20
Attending: EMERGENCY MEDICINE | Admitting: INTERNAL MEDICINE

## 2022-10-09 ENCOUNTER — APPOINTMENT (OUTPATIENT)
Dept: GENERAL RADIOLOGY | Facility: HOSPITAL | Age: 87
End: 2022-10-09

## 2022-10-09 DIAGNOSIS — R77.8 ELEVATED TROPONIN: ICD-10-CM

## 2022-10-09 DIAGNOSIS — A41.9 SEPSIS, DUE TO UNSPECIFIED ORGANISM, UNSPECIFIED WHETHER ACUTE ORGAN DYSFUNCTION PRESENT: Primary | ICD-10-CM

## 2022-10-09 DIAGNOSIS — R53.1 GENERALIZED WEAKNESS: ICD-10-CM

## 2022-10-09 DIAGNOSIS — J44.9 CHRONIC OBSTRUCTIVE PULMONARY DISEASE, UNSPECIFIED COPD TYPE: ICD-10-CM

## 2022-10-09 DIAGNOSIS — I26.99 ACUTE PULMONARY EMBOLISM WITHOUT ACUTE COR PULMONALE, UNSPECIFIED PULMONARY EMBOLISM TYPE: ICD-10-CM

## 2022-10-09 DIAGNOSIS — I26.99 ACUTE PULMONARY EMBOLISM, UNSPECIFIED PULMONARY EMBOLISM TYPE, UNSPECIFIED WHETHER ACUTE COR PULMONALE PRESENT: ICD-10-CM

## 2022-10-09 DIAGNOSIS — N28.9 ACUTE RENAL INSUFFICIENCY: ICD-10-CM

## 2022-10-09 LAB
ALBUMIN SERPL-MCNC: 2.7 G/DL (ref 3.5–5.2)
ALBUMIN/GLOB SERPL: 1 G/DL
ALP SERPL-CCNC: 95 U/L (ref 39–117)
ALT SERPL W P-5'-P-CCNC: 16 U/L (ref 1–41)
ANION GAP SERPL CALCULATED.3IONS-SCNC: 11.9 MMOL/L (ref 5–15)
AST SERPL-CCNC: 26 U/L (ref 1–40)
B PARAPERT DNA SPEC QL NAA+PROBE: NOT DETECTED
B PERT DNA SPEC QL NAA+PROBE: NOT DETECTED
BASOPHILS # BLD AUTO: 0.04 10*3/MM3 (ref 0–0.2)
BASOPHILS NFR BLD AUTO: 0.4 % (ref 0–1.5)
BILIRUB SERPL-MCNC: 0.6 MG/DL (ref 0–1.2)
BUN SERPL-MCNC: 29 MG/DL (ref 8–23)
BUN/CREAT SERPL: 21.2 (ref 7–25)
C PNEUM DNA NPH QL NAA+NON-PROBE: NOT DETECTED
CALCIUM SPEC-SCNC: 8.2 MG/DL (ref 8.6–10.5)
CHLORIDE SERPL-SCNC: 98 MMOL/L (ref 98–107)
CO2 SERPL-SCNC: 22.1 MMOL/L (ref 22–29)
CREAT SERPL-MCNC: 1.37 MG/DL (ref 0.76–1.27)
D-LACTATE SERPL-SCNC: 2.3 MMOL/L (ref 0.5–2)
DEPRECATED RDW RBC AUTO: 42.3 FL (ref 37–54)
EGFRCR SERPLBLD CKD-EPI 2021: 49.3 ML/MIN/1.73
EOSINOPHIL # BLD AUTO: 0.04 10*3/MM3 (ref 0–0.4)
EOSINOPHIL NFR BLD AUTO: 0.4 % (ref 0.3–6.2)
ERYTHROCYTE [DISTWIDTH] IN BLOOD BY AUTOMATED COUNT: 13.2 % (ref 12.3–15.4)
FLUAV SUBTYP SPEC NAA+PROBE: NOT DETECTED
FLUBV RNA ISLT QL NAA+PROBE: NOT DETECTED
GLOBULIN UR ELPH-MCNC: 2.6 GM/DL
GLUCOSE SERPL-MCNC: 119 MG/DL (ref 65–99)
HADV DNA SPEC NAA+PROBE: NOT DETECTED
HCOV 229E RNA SPEC QL NAA+PROBE: NOT DETECTED
HCOV HKU1 RNA SPEC QL NAA+PROBE: NOT DETECTED
HCOV NL63 RNA SPEC QL NAA+PROBE: NOT DETECTED
HCOV OC43 RNA SPEC QL NAA+PROBE: NOT DETECTED
HCT VFR BLD AUTO: 25.7 % (ref 37.5–51)
HGB BLD-MCNC: 8.4 G/DL (ref 13–17.7)
HMPV RNA NPH QL NAA+NON-PROBE: NOT DETECTED
HOLD SPECIMEN: NORMAL
HOLD SPECIMEN: NORMAL
HPIV1 RNA ISLT QL NAA+PROBE: NOT DETECTED
HPIV2 RNA SPEC QL NAA+PROBE: NOT DETECTED
HPIV3 RNA NPH QL NAA+PROBE: NOT DETECTED
HPIV4 P GENE NPH QL NAA+PROBE: NOT DETECTED
IMM GRANULOCYTES # BLD AUTO: 0.06 10*3/MM3 (ref 0–0.05)
IMM GRANULOCYTES NFR BLD AUTO: 0.5 % (ref 0–0.5)
LYMPHOCYTES # BLD AUTO: 1.08 10*3/MM3 (ref 0.7–3.1)
LYMPHOCYTES NFR BLD AUTO: 9.5 % (ref 19.6–45.3)
M PNEUMO IGG SER IA-ACNC: NOT DETECTED
MCH RBC QN AUTO: 29 PG (ref 26.6–33)
MCHC RBC AUTO-ENTMCNC: 32.7 G/DL (ref 31.5–35.7)
MCV RBC AUTO: 88.6 FL (ref 79–97)
MONOCYTES # BLD AUTO: 0.99 10*3/MM3 (ref 0.1–0.9)
MONOCYTES NFR BLD AUTO: 8.7 % (ref 5–12)
NEUTROPHILS NFR BLD AUTO: 80.5 % (ref 42.7–76)
NEUTROPHILS NFR BLD AUTO: 9.21 10*3/MM3 (ref 1.7–7)
NRBC BLD AUTO-RTO: 0 /100 WBC (ref 0–0.2)
NT-PROBNP SERPL-MCNC: ABNORMAL PG/ML (ref 0–1800)
PLATELET # BLD AUTO: 327 10*3/MM3 (ref 140–450)
PMV BLD AUTO: 11.1 FL (ref 6–12)
POTASSIUM SERPL-SCNC: 4.3 MMOL/L (ref 3.5–5.2)
PROCALCITONIN SERPL-MCNC: 0.98 NG/ML (ref 0–0.25)
PROT SERPL-MCNC: 5.3 G/DL (ref 6–8.5)
QT INTERVAL: 338 MS
RBC # BLD AUTO: 2.9 10*6/MM3 (ref 4.14–5.8)
RHINOVIRUS RNA SPEC NAA+PROBE: NOT DETECTED
RSV RNA NPH QL NAA+NON-PROBE: NOT DETECTED
SARS-COV-2 RNA NPH QL NAA+NON-PROBE: NOT DETECTED
SODIUM SERPL-SCNC: 132 MMOL/L (ref 136–145)
TROPONIN T SERPL-MCNC: 0.15 NG/ML (ref 0–0.03)
WBC NRBC COR # BLD: 11.42 10*3/MM3 (ref 3.4–10.8)
WHOLE BLOOD HOLD COAG: NORMAL
WHOLE BLOOD HOLD SPECIMEN: NORMAL

## 2022-10-09 PROCEDURE — 71045 X-RAY EXAM CHEST 1 VIEW: CPT

## 2022-10-09 PROCEDURE — 83605 ASSAY OF LACTIC ACID: CPT | Performed by: PHYSICIAN ASSISTANT

## 2022-10-09 PROCEDURE — 85025 COMPLETE CBC W/AUTO DIFF WBC: CPT | Performed by: PHYSICIAN ASSISTANT

## 2022-10-09 PROCEDURE — 83880 ASSAY OF NATRIURETIC PEPTIDE: CPT | Performed by: PHYSICIAN ASSISTANT

## 2022-10-09 PROCEDURE — 87150 DNA/RNA AMPLIFIED PROBE: CPT | Performed by: PHYSICIAN ASSISTANT

## 2022-10-09 PROCEDURE — 84484 ASSAY OF TROPONIN QUANT: CPT | Performed by: PHYSICIAN ASSISTANT

## 2022-10-09 PROCEDURE — 99285 EMERGENCY DEPT VISIT HI MDM: CPT

## 2022-10-09 PROCEDURE — 93005 ELECTROCARDIOGRAM TRACING: CPT

## 2022-10-09 PROCEDURE — 87186 SC STD MICRODIL/AGAR DIL: CPT | Performed by: PHYSICIAN ASSISTANT

## 2022-10-09 PROCEDURE — 0202U NFCT DS 22 TRGT SARS-COV-2: CPT | Performed by: PHYSICIAN ASSISTANT

## 2022-10-09 PROCEDURE — 84145 PROCALCITONIN (PCT): CPT | Performed by: PHYSICIAN ASSISTANT

## 2022-10-09 PROCEDURE — 36415 COLL VENOUS BLD VENIPUNCTURE: CPT

## 2022-10-09 PROCEDURE — 87040 BLOOD CULTURE FOR BACTERIA: CPT | Performed by: PHYSICIAN ASSISTANT

## 2022-10-09 PROCEDURE — 25010000002 CEFEPIME PER 500 MG: Performed by: PHYSICIAN ASSISTANT

## 2022-10-09 PROCEDURE — 80053 COMPREHEN METABOLIC PANEL: CPT | Performed by: PHYSICIAN ASSISTANT

## 2022-10-09 RX ORDER — PANTOPRAZOLE SODIUM 40 MG/10ML
80 INJECTION, POWDER, LYOPHILIZED, FOR SOLUTION INTRAVENOUS ONCE
Status: COMPLETED | OUTPATIENT
Start: 2022-10-09 | End: 2022-10-09

## 2022-10-09 RX ORDER — ACETAMINOPHEN 500 MG
1000 TABLET ORAL ONCE
Status: COMPLETED | OUTPATIENT
Start: 2022-10-09 | End: 2022-10-09

## 2022-10-09 RX ORDER — SODIUM CHLORIDE 0.9 % (FLUSH) 0.9 %
10 SYRINGE (ML) INJECTION AS NEEDED
Status: DISCONTINUED | OUTPATIENT
Start: 2022-10-09 | End: 2022-10-20 | Stop reason: HOSPADM

## 2022-10-09 RX ORDER — ASPIRIN 325 MG
325 TABLET ORAL ONCE
Status: COMPLETED | OUTPATIENT
Start: 2022-10-09 | End: 2022-10-10

## 2022-10-09 RX ADMIN — SODIUM CHLORIDE 500 ML: 9 INJECTION, SOLUTION INTRAVENOUS at 22:22

## 2022-10-09 RX ADMIN — PANTOPRAZOLE SODIUM 40 MG: 40 INJECTION, POWDER, FOR SOLUTION INTRAVENOUS at 23:40

## 2022-10-09 RX ADMIN — SODIUM CHLORIDE 1000 ML: 9 INJECTION, SOLUTION INTRAVENOUS at 23:15

## 2022-10-09 RX ADMIN — ACETAMINOPHEN 1000 MG: 500 TABLET ORAL at 22:21

## 2022-10-09 RX ADMIN — CEFEPIME 2 G: 2 INJECTION, POWDER, FOR SOLUTION INTRAVENOUS at 23:40

## 2022-10-10 ENCOUNTER — APPOINTMENT (OUTPATIENT)
Dept: CT IMAGING | Facility: HOSPITAL | Age: 87
End: 2022-10-10

## 2022-10-10 ENCOUNTER — APPOINTMENT (OUTPATIENT)
Dept: CARDIOLOGY | Facility: HOSPITAL | Age: 87
End: 2022-10-10

## 2022-10-10 PROBLEM — A41.9 SEPSIS: Status: ACTIVE | Noted: 2022-10-10

## 2022-10-10 PROBLEM — I26.99 ACUTE PULMONARY EMBOLISM (HCC): Status: ACTIVE | Noted: 2022-10-10

## 2022-10-10 PROBLEM — Z96.649 S/P HIP REPLACEMENT: Status: ACTIVE | Noted: 2022-10-10

## 2022-10-10 PROBLEM — A41.9 SEPSIS, DUE TO UNSPECIFIED ORGANISM, UNSPECIFIED WHETHER ACUTE ORGAN DYSFUNCTION PRESENT (HCC): Status: ACTIVE | Noted: 2022-10-10

## 2022-10-10 PROBLEM — R06.02 SHORTNESS OF BREATH: Status: ACTIVE | Noted: 2022-10-10

## 2022-10-10 LAB
ABO GROUP BLD: NORMAL
APTT PPP: 75.5 SECONDS (ref 22.7–35.4)
APTT PPP: >200 SECONDS (ref 22.7–35.4)
BACTERIA BLD CULT: ABNORMAL
BACTERIA ID TEST ISLT QL CULT: ABNORMAL
BACTERIA UR QL AUTO: ABNORMAL /HPF
BH CV ECHO MEAS - EDV(MOD-SP2): 77 ML
BH CV ECHO MEAS - EDV(MOD-SP4): 142 ML
BH CV ECHO MEAS - EF(MOD-BP): 57.4 %
BH CV ECHO MEAS - EF(MOD-SP2): 57.1 %
BH CV ECHO MEAS - EF(MOD-SP4): 57 %
BH CV ECHO MEAS - ESV(MOD-SP2): 33 ML
BH CV ECHO MEAS - ESV(MOD-SP4): 61 ML
BH CV ECHO MEAS - LAT PEAK E' VEL: 6.9 CM/SEC
BH CV ECHO MEAS - LV DIASTOLIC VOL/BSA (35-75): 72.7 CM2
BH CV ECHO MEAS - LV SYSTOLIC VOL/BSA (12-30): 31.2 CM2
BH CV ECHO MEAS - MED PEAK E' VEL: 4.6 CM/SEC
BH CV ECHO MEAS - SI(MOD-SP2): 22.5 ML/M2
BH CV ECHO MEAS - SI(MOD-SP4): 41.5 ML/M2
BH CV ECHO MEAS - SV(MOD-SP2): 44 ML
BH CV ECHO MEAS - SV(MOD-SP4): 81 ML
BH CV XLRA - RV BASE: 2.8 CM
BH CV XLRA - RV LENGTH: 7.9 CM
BH CV XLRA - RV MID: 2.9 CM
BH CV XLRA - TDI S': 11 CM/SEC
BILIRUB UR QL STRIP: NEGATIVE
BLD GP AB SCN SERPL QL: NEGATIVE
BOTTLE TYPE: ABNORMAL
CLARITY UR: ABNORMAL
COLOR UR: YELLOW
D-LACTATE SERPL-SCNC: 1.1 MMOL/L (ref 0.5–2)
EXPIRATION DATE: NORMAL
FECAL OCCULT BLOOD SCREEN, POC: NEGATIVE
FERRITIN SERPL-MCNC: 788 NG/ML (ref 30–400)
GLUCOSE UR STRIP-MCNC: NEGATIVE MG/DL
HGB UR QL STRIP.AUTO: ABNORMAL
HYALINE CASTS UR QL AUTO: ABNORMAL /LPF
INR PPP: 1.51 (ref 0.9–1.1)
IRON 24H UR-MRATE: 12 MCG/DL (ref 59–158)
IRON SATN MFR SERPL: 9 % (ref 20–50)
KETONES UR QL STRIP: ABNORMAL
LEFT ATRIUM VOLUME INDEX: 18.5 ML/M2
LEUKOCYTE ESTERASE UR QL STRIP.AUTO: ABNORMAL
Lab: 194
MAXIMAL PREDICTED HEART RATE: 131 BPM
NEGATIVE CONTROL: NEGATIVE
NITRITE UR QL STRIP: POSITIVE
PH UR STRIP.AUTO: 5.5 [PH] (ref 5–8)
POSITIVE CONTROL: POSITIVE
PROT UR QL STRIP: ABNORMAL
PROTHROMBIN TIME: 18.4 SECONDS (ref 11.7–14.2)
PSA SERPL-MCNC: 0.3 NG/ML (ref 0–4)
QT INTERVAL: 389 MS
RBC # UR STRIP: ABNORMAL /HPF
REF LAB TEST METHOD: ABNORMAL
RH BLD: POSITIVE
SP GR UR STRIP: >=1.03 (ref 1–1.03)
SQUAMOUS #/AREA URNS HPF: ABNORMAL /HPF
STRESS TARGET HR: 111 BPM
T&S EXPIRATION DATE: NORMAL
TIBC SERPL-MCNC: 140 MCG/DL (ref 298–536)
TRANSFERRIN SERPL-MCNC: 94 MG/DL (ref 200–360)
TROPONIN T SERPL-MCNC: 0.12 NG/ML (ref 0–0.03)
UROBILINOGEN UR QL STRIP: ABNORMAL
WBC # UR STRIP: ABNORMAL /HPF

## 2022-10-10 PROCEDURE — 99222 1ST HOSP IP/OBS MODERATE 55: CPT | Performed by: INTERNAL MEDICINE

## 2022-10-10 PROCEDURE — 84153 ASSAY OF PSA TOTAL: CPT | Performed by: INTERNAL MEDICINE

## 2022-10-10 PROCEDURE — 93308 TTE F-UP OR LMTD: CPT

## 2022-10-10 PROCEDURE — 71275 CT ANGIOGRAPHY CHEST: CPT

## 2022-10-10 PROCEDURE — 86901 BLOOD TYPING SEROLOGIC RH(D): CPT | Performed by: PHYSICIAN ASSISTANT

## 2022-10-10 PROCEDURE — 93010 ELECTROCARDIOGRAM REPORT: CPT | Performed by: INTERNAL MEDICINE

## 2022-10-10 PROCEDURE — 36415 COLL VENOUS BLD VENIPUNCTURE: CPT | Performed by: PHYSICIAN ASSISTANT

## 2022-10-10 PROCEDURE — 84466 ASSAY OF TRANSFERRIN: CPT | Performed by: NURSE PRACTITIONER

## 2022-10-10 PROCEDURE — 25010000002 IOPAMIDOL 61 % SOLUTION: Performed by: EMERGENCY MEDICINE

## 2022-10-10 PROCEDURE — 87077 CULTURE AEROBIC IDENTIFY: CPT | Performed by: NURSE PRACTITIONER

## 2022-10-10 PROCEDURE — 84484 ASSAY OF TROPONIN QUANT: CPT | Performed by: NURSE PRACTITIONER

## 2022-10-10 PROCEDURE — 93325 DOPPLER ECHO COLOR FLOW MAPG: CPT

## 2022-10-10 PROCEDURE — 83540 ASSAY OF IRON: CPT | Performed by: NURSE PRACTITIONER

## 2022-10-10 PROCEDURE — 87086 URINE CULTURE/COLONY COUNT: CPT | Performed by: NURSE PRACTITIONER

## 2022-10-10 PROCEDURE — 82728 ASSAY OF FERRITIN: CPT | Performed by: NURSE PRACTITIONER

## 2022-10-10 PROCEDURE — 85730 THROMBOPLASTIN TIME PARTIAL: CPT | Performed by: PHYSICIAN ASSISTANT

## 2022-10-10 PROCEDURE — 25010000002 VANCOMYCIN 10 G RECONSTITUTED SOLUTION: Performed by: PHYSICIAN ASSISTANT

## 2022-10-10 PROCEDURE — 93308 TTE F-UP OR LMTD: CPT | Performed by: INTERNAL MEDICINE

## 2022-10-10 PROCEDURE — 25010000002 HEPARIN (PORCINE) PER 1000 UNITS: Performed by: PHYSICIAN ASSISTANT

## 2022-10-10 PROCEDURE — 85730 THROMBOPLASTIN TIME PARTIAL: CPT | Performed by: HOSPITALIST

## 2022-10-10 PROCEDURE — 82270 OCCULT BLOOD FECES: CPT | Performed by: PHYSICIAN ASSISTANT

## 2022-10-10 PROCEDURE — 25010000002 MEROPENEM PER 100 MG: Performed by: HOSPITALIST

## 2022-10-10 PROCEDURE — 94799 UNLISTED PULMONARY SVC/PX: CPT

## 2022-10-10 PROCEDURE — 94640 AIRWAY INHALATION TREATMENT: CPT

## 2022-10-10 PROCEDURE — 25010000002 CEFEPIME PER 500 MG: Performed by: NURSE PRACTITIONER

## 2022-10-10 PROCEDURE — 93325 DOPPLER ECHO COLOR FLOW MAPG: CPT | Performed by: INTERNAL MEDICINE

## 2022-10-10 PROCEDURE — 25010000002 HEPARIN (PORCINE) 25000-0.45 UT/250ML-% SOLUTION: Performed by: PHYSICIAN ASSISTANT

## 2022-10-10 PROCEDURE — 86850 RBC ANTIBODY SCREEN: CPT | Performed by: PHYSICIAN ASSISTANT

## 2022-10-10 PROCEDURE — 87186 SC STD MICRODIL/AGAR DIL: CPT | Performed by: NURSE PRACTITIONER

## 2022-10-10 PROCEDURE — 87102 FUNGUS ISOLATION CULTURE: CPT | Performed by: HOSPITALIST

## 2022-10-10 PROCEDURE — 85610 PROTHROMBIN TIME: CPT | Performed by: PHYSICIAN ASSISTANT

## 2022-10-10 PROCEDURE — 86900 BLOOD TYPING SEROLOGIC ABO: CPT | Performed by: PHYSICIAN ASSISTANT

## 2022-10-10 PROCEDURE — 25010000002 PERFLUTREN (DEFINITY) 8.476 MG IN SODIUM CHLORIDE (PF) 0.9 % 10 ML INJECTION: Performed by: INTERNAL MEDICINE

## 2022-10-10 PROCEDURE — 83605 ASSAY OF LACTIC ACID: CPT | Performed by: PHYSICIAN ASSISTANT

## 2022-10-10 PROCEDURE — 94761 N-INVAS EAR/PLS OXIMETRY MLT: CPT

## 2022-10-10 PROCEDURE — 81001 URINALYSIS AUTO W/SCOPE: CPT | Performed by: NURSE PRACTITIONER

## 2022-10-10 PROCEDURE — 93005 ELECTROCARDIOGRAM TRACING: CPT | Performed by: NURSE PRACTITIONER

## 2022-10-10 RX ORDER — HEPARIN SODIUM 10000 [USP'U]/100ML
18 INJECTION, SOLUTION INTRAVENOUS
Status: DISCONTINUED | OUTPATIENT
Start: 2022-10-10 | End: 2022-10-13

## 2022-10-10 RX ORDER — ACETAMINOPHEN 160 MG/5ML
650 SOLUTION ORAL EVERY 4 HOURS PRN
Status: DISCONTINUED | OUTPATIENT
Start: 2022-10-10 | End: 2022-10-20 | Stop reason: HOSPADM

## 2022-10-10 RX ORDER — IPRATROPIUM BROMIDE AND ALBUTEROL SULFATE 2.5; .5 MG/3ML; MG/3ML
3 SOLUTION RESPIRATORY (INHALATION) ONCE
Status: COMPLETED | OUTPATIENT
Start: 2022-10-10 | End: 2022-10-10

## 2022-10-10 RX ORDER — SODIUM CHLORIDE 0.9 % (FLUSH) 0.9 %
10 SYRINGE (ML) INJECTION EVERY 12 HOURS SCHEDULED
Status: DISCONTINUED | OUTPATIENT
Start: 2022-10-10 | End: 2022-10-20 | Stop reason: HOSPADM

## 2022-10-10 RX ORDER — HEPARIN SODIUM 5000 [USP'U]/ML
80 INJECTION, SOLUTION INTRAVENOUS; SUBCUTANEOUS ONCE
Status: COMPLETED | OUTPATIENT
Start: 2022-10-10 | End: 2022-10-10

## 2022-10-10 RX ORDER — SODIUM CHLORIDE 0.9 % (FLUSH) 0.9 %
10 SYRINGE (ML) INJECTION AS NEEDED
Status: DISCONTINUED | OUTPATIENT
Start: 2022-10-10 | End: 2022-10-20 | Stop reason: HOSPADM

## 2022-10-10 RX ORDER — ACETAMINOPHEN 650 MG/1
650 SUPPOSITORY RECTAL EVERY 4 HOURS PRN
Status: DISCONTINUED | OUTPATIENT
Start: 2022-10-10 | End: 2022-10-20 | Stop reason: HOSPADM

## 2022-10-10 RX ORDER — NITROGLYCERIN 0.4 MG/1
0.4 TABLET SUBLINGUAL
Status: DISCONTINUED | OUTPATIENT
Start: 2022-10-10 | End: 2022-10-20 | Stop reason: HOSPADM

## 2022-10-10 RX ORDER — ONDANSETRON 2 MG/ML
4 INJECTION INTRAMUSCULAR; INTRAVENOUS EVERY 6 HOURS PRN
Status: DISCONTINUED | OUTPATIENT
Start: 2022-10-10 | End: 2022-10-20 | Stop reason: HOSPADM

## 2022-10-10 RX ORDER — BUSPIRONE HYDROCHLORIDE 5 MG/1
5 TABLET ORAL DAILY
COMMUNITY
Start: 2022-10-01

## 2022-10-10 RX ORDER — SODIUM CHLORIDE 9 MG/ML
100 INJECTION, SOLUTION INTRAVENOUS CONTINUOUS
Status: DISCONTINUED | OUTPATIENT
Start: 2022-10-10 | End: 2022-10-10

## 2022-10-10 RX ORDER — ACETAMINOPHEN 325 MG/1
650 TABLET ORAL EVERY 4 HOURS PRN
Status: DISCONTINUED | OUTPATIENT
Start: 2022-10-10 | End: 2022-10-20 | Stop reason: HOSPADM

## 2022-10-10 RX ORDER — HEPARIN SODIUM 5000 [USP'U]/ML
40-80 INJECTION, SOLUTION INTRAVENOUS; SUBCUTANEOUS EVERY 6 HOURS PRN
Status: DISCONTINUED | OUTPATIENT
Start: 2022-10-10 | End: 2022-10-13

## 2022-10-10 RX ADMIN — CEFEPIME 2 G: 2 INJECTION, POWDER, FOR SOLUTION INTRAVENOUS at 12:45

## 2022-10-10 RX ADMIN — ASPIRIN 325 MG: 325 TABLET ORAL at 01:12

## 2022-10-10 RX ADMIN — HEPARIN SODIUM 18 UNITS/KG/HR: 10000 INJECTION, SOLUTION INTRAVENOUS at 04:34

## 2022-10-10 RX ADMIN — MEROPENEM 500 MG: 500 INJECTION, POWDER, FOR SOLUTION INTRAVENOUS at 23:36

## 2022-10-10 RX ADMIN — IPRATROPIUM BROMIDE AND ALBUTEROL SULFATE 3 ML: .5; 3 SOLUTION RESPIRATORY (INHALATION) at 04:20

## 2022-10-10 RX ADMIN — VANCOMYCIN HYDROCHLORIDE 1750 MG: 10 INJECTION, POWDER, LYOPHILIZED, FOR SOLUTION INTRAVENOUS at 02:36

## 2022-10-10 RX ADMIN — SODIUM CHLORIDE 100 ML/HR: 9 INJECTION, SOLUTION INTRAVENOUS at 02:41

## 2022-10-10 RX ADMIN — IOPAMIDOL 95 ML: 612 INJECTION, SOLUTION INTRAVENOUS at 02:27

## 2022-10-10 RX ADMIN — PERFLUTREN 2 ML: 6.52 INJECTION, SUSPENSION INTRAVENOUS at 10:39

## 2022-10-10 RX ADMIN — MEROPENEM 500 MG: 500 INJECTION, POWDER, FOR SOLUTION INTRAVENOUS at 20:39

## 2022-10-10 RX ADMIN — Medication 10 ML: at 08:19

## 2022-10-10 RX ADMIN — Medication 10 ML: at 20:39

## 2022-10-10 RX ADMIN — HEPARIN SODIUM 15 UNITS/KG/HR: 10000 INJECTION, SOLUTION INTRAVENOUS at 23:38

## 2022-10-10 RX ADMIN — HEPARIN SODIUM 6500 UNITS: 5000 INJECTION INTRAVENOUS; SUBCUTANEOUS at 04:33

## 2022-10-11 ENCOUNTER — APPOINTMENT (OUTPATIENT)
Dept: GENERAL RADIOLOGY | Facility: HOSPITAL | Age: 87
End: 2022-10-11

## 2022-10-11 ENCOUNTER — APPOINTMENT (OUTPATIENT)
Dept: CT IMAGING | Facility: HOSPITAL | Age: 87
End: 2022-10-11

## 2022-10-11 LAB
ALBUMIN SERPL-MCNC: 2.1 G/DL (ref 3.5–5.2)
ALBUMIN/GLOB SERPL: 0.9 G/DL
ALP SERPL-CCNC: 72 U/L (ref 39–117)
ALT SERPL W P-5'-P-CCNC: 15 U/L (ref 1–41)
ANION GAP SERPL CALCULATED.3IONS-SCNC: 13.2 MMOL/L (ref 5–15)
APTT PPP: 76.8 SECONDS (ref 22.7–35.4)
AST SERPL-CCNC: 24 U/L (ref 1–40)
BASOPHILS # BLD AUTO: 0.04 10*3/MM3 (ref 0–0.2)
BASOPHILS NFR BLD AUTO: 0.4 % (ref 0–1.5)
BILIRUB SERPL-MCNC: 0.4 MG/DL (ref 0–1.2)
BUN SERPL-MCNC: 30 MG/DL (ref 8–23)
BUN/CREAT SERPL: 28 (ref 7–25)
CALCIUM SPEC-SCNC: 7.5 MG/DL (ref 8.6–10.5)
CHLORIDE SERPL-SCNC: 104 MMOL/L (ref 98–107)
CO2 SERPL-SCNC: 16.8 MMOL/L (ref 22–29)
CREAT SERPL-MCNC: 1.07 MG/DL (ref 0.76–1.27)
DEPRECATED RDW RBC AUTO: 40.8 FL (ref 37–54)
EGFRCR SERPLBLD CKD-EPI 2021: 66.3 ML/MIN/1.73
EOSINOPHIL # BLD AUTO: 0.04 10*3/MM3 (ref 0–0.4)
EOSINOPHIL NFR BLD AUTO: 0.4 % (ref 0.3–6.2)
ERYTHROCYTE [DISTWIDTH] IN BLOOD BY AUTOMATED COUNT: 13.1 % (ref 12.3–15.4)
GLOBULIN UR ELPH-MCNC: 2.3 GM/DL
GLUCOSE SERPL-MCNC: 90 MG/DL (ref 65–99)
HCT VFR BLD AUTO: 24.5 % (ref 37.5–51)
HGB BLD-MCNC: 8.1 G/DL (ref 13–17.7)
IMM GRANULOCYTES # BLD AUTO: 0.08 10*3/MM3 (ref 0–0.05)
IMM GRANULOCYTES NFR BLD AUTO: 0.8 % (ref 0–0.5)
LIPASE SERPL-CCNC: 11 U/L (ref 13–60)
LYMPHOCYTES # BLD AUTO: 1.29 10*3/MM3 (ref 0.7–3.1)
LYMPHOCYTES NFR BLD AUTO: 12.9 % (ref 19.6–45.3)
MCH RBC QN AUTO: 28.6 PG (ref 26.6–33)
MCHC RBC AUTO-ENTMCNC: 33.1 G/DL (ref 31.5–35.7)
MCV RBC AUTO: 86.6 FL (ref 79–97)
MONOCYTES # BLD AUTO: 0.95 10*3/MM3 (ref 0.1–0.9)
MONOCYTES NFR BLD AUTO: 9.5 % (ref 5–12)
NEUTROPHILS NFR BLD AUTO: 7.58 10*3/MM3 (ref 1.7–7)
NEUTROPHILS NFR BLD AUTO: 76 % (ref 42.7–76)
NRBC BLD AUTO-RTO: 0 /100 WBC (ref 0–0.2)
PLATELET # BLD AUTO: 295 10*3/MM3 (ref 140–450)
PMV BLD AUTO: 10.8 FL (ref 6–12)
POTASSIUM SERPL-SCNC: 3.9 MMOL/L (ref 3.5–5.2)
PROT SERPL-MCNC: 4.4 G/DL (ref 6–8.5)
QT INTERVAL: 321 MS
RBC # BLD AUTO: 2.83 10*6/MM3 (ref 4.14–5.8)
SODIUM SERPL-SCNC: 134 MMOL/L (ref 136–145)
WBC NRBC COR # BLD: 9.98 10*3/MM3 (ref 3.4–10.8)

## 2022-10-11 PROCEDURE — 36415 COLL VENOUS BLD VENIPUNCTURE: CPT | Performed by: PHYSICIAN ASSISTANT

## 2022-10-11 PROCEDURE — 99232 SBSQ HOSP IP/OBS MODERATE 35: CPT | Performed by: NURSE PRACTITIONER

## 2022-10-11 PROCEDURE — 25010000002 MEROPENEM PER 100 MG: Performed by: HOSPITALIST

## 2022-10-11 PROCEDURE — 92610 EVALUATE SWALLOWING FUNCTION: CPT

## 2022-10-11 PROCEDURE — 25010000002 HEPARIN (PORCINE) 25000-0.45 UT/250ML-% SOLUTION: Performed by: PHYSICIAN ASSISTANT

## 2022-10-11 PROCEDURE — 85025 COMPLETE CBC W/AUTO DIFF WBC: CPT | Performed by: PHYSICIAN ASSISTANT

## 2022-10-11 PROCEDURE — 93005 ELECTROCARDIOGRAM TRACING: CPT | Performed by: NURSE PRACTITIONER

## 2022-10-11 PROCEDURE — 87070 CULTURE OTHR SPECIMN AEROBIC: CPT | Performed by: NURSE PRACTITIONER

## 2022-10-11 PROCEDURE — 80053 COMPREHEN METABOLIC PANEL: CPT | Performed by: HOSPITALIST

## 2022-10-11 PROCEDURE — 97530 THERAPEUTIC ACTIVITIES: CPT

## 2022-10-11 PROCEDURE — 97166 OT EVAL MOD COMPLEX 45 MIN: CPT

## 2022-10-11 PROCEDURE — 74176 CT ABD & PELVIS W/O CONTRAST: CPT

## 2022-10-11 PROCEDURE — 87205 SMEAR GRAM STAIN: CPT | Performed by: NURSE PRACTITIONER

## 2022-10-11 PROCEDURE — 83690 ASSAY OF LIPASE: CPT | Performed by: HOSPITALIST

## 2022-10-11 PROCEDURE — 73502 X-RAY EXAM HIP UNI 2-3 VIEWS: CPT

## 2022-10-11 PROCEDURE — 97162 PT EVAL MOD COMPLEX 30 MIN: CPT

## 2022-10-11 PROCEDURE — 93010 ELECTROCARDIOGRAM REPORT: CPT | Performed by: INTERNAL MEDICINE

## 2022-10-11 PROCEDURE — 85730 THROMBOPLASTIN TIME PARTIAL: CPT | Performed by: PHYSICIAN ASSISTANT

## 2022-10-11 RX ORDER — CASTOR OIL AND BALSAM, PERU 788; 87 MG/G; MG/G
1 OINTMENT TOPICAL EVERY 12 HOURS SCHEDULED
Status: DISCONTINUED | OUTPATIENT
Start: 2022-10-11 | End: 2022-10-20 | Stop reason: HOSPADM

## 2022-10-11 RX ORDER — IPRATROPIUM BROMIDE AND ALBUTEROL SULFATE 2.5; .5 MG/3ML; MG/3ML
3 SOLUTION RESPIRATORY (INHALATION) EVERY 4 HOURS PRN
Status: DISCONTINUED | OUTPATIENT
Start: 2022-10-11 | End: 2022-10-20 | Stop reason: HOSPADM

## 2022-10-11 RX ORDER — ASPIRIN 81 MG/1
81 TABLET ORAL DAILY
Status: DISCONTINUED | OUTPATIENT
Start: 2022-10-12 | End: 2022-10-11

## 2022-10-11 RX ORDER — BUSPIRONE HYDROCHLORIDE 5 MG/1
5 TABLET ORAL DAILY
Status: DISCONTINUED | OUTPATIENT
Start: 2022-10-12 | End: 2022-10-20 | Stop reason: HOSPADM

## 2022-10-11 RX ORDER — ASPIRIN 81 MG/1
81 TABLET ORAL DAILY
Status: DISCONTINUED | OUTPATIENT
Start: 2022-10-12 | End: 2022-10-13

## 2022-10-11 RX ORDER — ATORVASTATIN CALCIUM 20 MG/1
10 TABLET, FILM COATED ORAL DAILY
Status: DISCONTINUED | OUTPATIENT
Start: 2022-10-12 | End: 2022-10-20 | Stop reason: HOSPADM

## 2022-10-11 RX ORDER — OLANZAPINE 10 MG/1
2.5 INJECTION, POWDER, LYOPHILIZED, FOR SOLUTION INTRAMUSCULAR ONCE
Status: COMPLETED | OUTPATIENT
Start: 2022-10-11 | End: 2022-10-11

## 2022-10-11 RX ORDER — MIDODRINE HYDROCHLORIDE 5 MG/1
5 TABLET ORAL
Status: DISCONTINUED | OUTPATIENT
Start: 2022-10-12 | End: 2022-10-12

## 2022-10-11 RX ADMIN — CASTOR OIL AND BALSAM, PERU 1 APPLICATION: 788; 87 OINTMENT TOPICAL at 22:35

## 2022-10-11 RX ADMIN — MEROPENEM 1 G: 1 INJECTION, POWDER, FOR SOLUTION INTRAVENOUS at 21:04

## 2022-10-11 RX ADMIN — Medication 10 ML: at 21:01

## 2022-10-11 RX ADMIN — HEPARIN SODIUM 15 UNITS/KG/HR: 10000 INJECTION, SOLUTION INTRAVENOUS at 19:21

## 2022-10-11 RX ADMIN — OLANZAPINE 2.5 MG: 10 INJECTION, POWDER, LYOPHILIZED, FOR SOLUTION INTRAMUSCULAR at 05:12

## 2022-10-11 RX ADMIN — MEROPENEM 500 MG: 500 INJECTION, POWDER, FOR SOLUTION INTRAVENOUS at 07:25

## 2022-10-11 RX ADMIN — SODIUM CHLORIDE 500 ML: 9 INJECTION, SOLUTION INTRAVENOUS at 03:58

## 2022-10-11 RX ADMIN — Medication 10 ML: at 07:29

## 2022-10-12 LAB
ALBUMIN SERPL-MCNC: 2.5 G/DL (ref 3.5–5.2)
ALBUMIN/GLOB SERPL: 1 G/DL
ALP SERPL-CCNC: 78 U/L (ref 39–117)
ALT SERPL W P-5'-P-CCNC: 15 U/L (ref 1–41)
ANION GAP SERPL CALCULATED.3IONS-SCNC: 14 MMOL/L (ref 5–15)
APTT PPP: 81.3 SECONDS (ref 22.7–35.4)
AST SERPL-CCNC: 32 U/L (ref 1–40)
BACTERIA SPEC AEROBE CULT: ABNORMAL
BACTERIA SPEC AEROBE CULT: ABNORMAL
BASOPHILS # BLD AUTO: 0.04 10*3/MM3 (ref 0–0.2)
BASOPHILS NFR BLD AUTO: 0.5 % (ref 0–1.5)
BILIRUB SERPL-MCNC: 0.5 MG/DL (ref 0–1.2)
BUN SERPL-MCNC: 26 MG/DL (ref 8–23)
BUN/CREAT SERPL: 24.3 (ref 7–25)
CALCIUM SPEC-SCNC: 8 MG/DL (ref 8.6–10.5)
CHLORIDE SERPL-SCNC: 104 MMOL/L (ref 98–107)
CO2 SERPL-SCNC: 18 MMOL/L (ref 22–29)
CREAT SERPL-MCNC: 1.07 MG/DL (ref 0.76–1.27)
DEPRECATED RDW RBC AUTO: 44.1 FL (ref 37–54)
EGFRCR SERPLBLD CKD-EPI 2021: 66.3 ML/MIN/1.73
EOSINOPHIL # BLD AUTO: 0.16 10*3/MM3 (ref 0–0.4)
EOSINOPHIL NFR BLD AUTO: 1.9 % (ref 0.3–6.2)
ERYTHROCYTE [DISTWIDTH] IN BLOOD BY AUTOMATED COUNT: 13.4 % (ref 12.3–15.4)
GLOBULIN UR ELPH-MCNC: 2.5 GM/DL
GLUCOSE SERPL-MCNC: 83 MG/DL (ref 65–99)
GRAM STN SPEC: ABNORMAL
HCT VFR BLD AUTO: 22.9 % (ref 37.5–51)
HGB BLD-MCNC: 7.5 G/DL (ref 13–17.7)
IMM GRANULOCYTES # BLD AUTO: 0.06 10*3/MM3 (ref 0–0.05)
IMM GRANULOCYTES NFR BLD AUTO: 0.7 % (ref 0–0.5)
ISOLATED FROM: ABNORMAL
LYMPHOCYTES # BLD AUTO: 1.51 10*3/MM3 (ref 0.7–3.1)
LYMPHOCYTES NFR BLD AUTO: 17.5 % (ref 19.6–45.3)
MCH RBC QN AUTO: 29.1 PG (ref 26.6–33)
MCHC RBC AUTO-ENTMCNC: 32.8 G/DL (ref 31.5–35.7)
MCV RBC AUTO: 88.8 FL (ref 79–97)
MONOCYTES # BLD AUTO: 0.87 10*3/MM3 (ref 0.1–0.9)
MONOCYTES NFR BLD AUTO: 10.1 % (ref 5–12)
NEUTROPHILS NFR BLD AUTO: 5.99 10*3/MM3 (ref 1.7–7)
NEUTROPHILS NFR BLD AUTO: 69.3 % (ref 42.7–76)
NRBC BLD AUTO-RTO: 0 /100 WBC (ref 0–0.2)
PLATELET # BLD AUTO: 293 10*3/MM3 (ref 140–450)
PMV BLD AUTO: 11.6 FL (ref 6–12)
POTASSIUM SERPL-SCNC: 4 MMOL/L (ref 3.5–5.2)
PROT SERPL-MCNC: 5 G/DL (ref 6–8.5)
RBC # BLD AUTO: 2.58 10*6/MM3 (ref 4.14–5.8)
SODIUM SERPL-SCNC: 136 MMOL/L (ref 136–145)
WBC NRBC COR # BLD: 8.63 10*3/MM3 (ref 3.4–10.8)

## 2022-10-12 PROCEDURE — 87040 BLOOD CULTURE FOR BACTERIA: CPT | Performed by: INTERNAL MEDICINE

## 2022-10-12 PROCEDURE — 85730 THROMBOPLASTIN TIME PARTIAL: CPT | Performed by: PHYSICIAN ASSISTANT

## 2022-10-12 PROCEDURE — 94664 DEMO&/EVAL PT USE INHALER: CPT

## 2022-10-12 PROCEDURE — 94799 UNLISTED PULMONARY SVC/PX: CPT

## 2022-10-12 PROCEDURE — 25010000002 MEROPENEM PER 100 MG: Performed by: HOSPITALIST

## 2022-10-12 PROCEDURE — 99232 SBSQ HOSP IP/OBS MODERATE 35: CPT | Performed by: INTERNAL MEDICINE

## 2022-10-12 PROCEDURE — 25010000002 ERTAPENEM PER 500 MG: Performed by: INTERNAL MEDICINE

## 2022-10-12 PROCEDURE — 25010000002 FUROSEMIDE PER 20 MG: Performed by: NURSE PRACTITIONER

## 2022-10-12 PROCEDURE — 85025 COMPLETE CBC W/AUTO DIFF WBC: CPT | Performed by: PHYSICIAN ASSISTANT

## 2022-10-12 PROCEDURE — 99232 SBSQ HOSP IP/OBS MODERATE 35: CPT | Performed by: NURSE PRACTITIONER

## 2022-10-12 PROCEDURE — 80053 COMPREHEN METABOLIC PANEL: CPT | Performed by: HOSPITALIST

## 2022-10-12 PROCEDURE — 36415 COLL VENOUS BLD VENIPUNCTURE: CPT | Performed by: PHYSICIAN ASSISTANT

## 2022-10-12 RX ORDER — FUROSEMIDE 10 MG/ML
40 INJECTION INTRAMUSCULAR; INTRAVENOUS ONCE
Status: COMPLETED | OUTPATIENT
Start: 2022-10-12 | End: 2022-10-12

## 2022-10-12 RX ADMIN — Medication 10 ML: at 09:43

## 2022-10-12 RX ADMIN — ATORVASTATIN CALCIUM 10 MG: 20 TABLET, FILM COATED ORAL at 09:43

## 2022-10-12 RX ADMIN — BUSPIRONE HYDROCHLORIDE 5 MG: 5 TABLET ORAL at 09:43

## 2022-10-12 RX ADMIN — MIDODRINE HYDROCHLORIDE 5 MG: 5 TABLET ORAL at 06:30

## 2022-10-12 RX ADMIN — MIDODRINE HYDROCHLORIDE 5 MG: 5 TABLET ORAL at 12:24

## 2022-10-12 RX ADMIN — CASTOR OIL AND BALSAM, PERU 1 APPLICATION: 788; 87 OINTMENT TOPICAL at 09:54

## 2022-10-12 RX ADMIN — MEROPENEM 1 G: 1 INJECTION, POWDER, FOR SOLUTION INTRAVENOUS at 07:35

## 2022-10-12 RX ADMIN — Medication 10 ML: at 20:29

## 2022-10-12 RX ADMIN — ASPIRIN 81 MG: 81 TABLET, FILM COATED ORAL at 09:43

## 2022-10-12 RX ADMIN — IPRATROPIUM BROMIDE AND ALBUTEROL SULFATE 3 ML: 2.5; .5 SOLUTION RESPIRATORY (INHALATION) at 10:14

## 2022-10-12 RX ADMIN — CASTOR OIL AND BALSAM, PERU 1 APPLICATION: 788; 87 OINTMENT TOPICAL at 20:28

## 2022-10-12 RX ADMIN — IPRATROPIUM BROMIDE AND ALBUTEROL SULFATE 3 ML: 2.5; .5 SOLUTION RESPIRATORY (INHALATION) at 00:02

## 2022-10-12 RX ADMIN — FUROSEMIDE 40 MG: 10 INJECTION, SOLUTION INTRAMUSCULAR; INTRAVENOUS at 09:43

## 2022-10-12 RX ADMIN — ERTAPENEM 1 G: 1 INJECTION INTRAMUSCULAR; INTRAVENOUS at 18:17

## 2022-10-13 PROBLEM — B96.29 UTI DUE TO EXTENDED-SPECTRUM BETA LACTAMASE (ESBL) PRODUCING ESCHERICHIA COLI: Status: ACTIVE | Noted: 2022-10-13

## 2022-10-13 PROBLEM — I50.33 DIASTOLIC CHF, ACUTE ON CHRONIC (HCC): Status: ACTIVE | Noted: 2022-10-13

## 2022-10-13 PROBLEM — R06.02 SHORTNESS OF BREATH: Status: RESOLVED | Noted: 2022-10-10 | Resolved: 2022-10-13

## 2022-10-13 PROBLEM — Z16.12 UTI DUE TO EXTENDED-SPECTRUM BETA LACTAMASE (ESBL) PRODUCING ESCHERICHIA COLI: Status: ACTIVE | Noted: 2022-10-13

## 2022-10-13 PROBLEM — N39.0 UTI DUE TO EXTENDED-SPECTRUM BETA LACTAMASE (ESBL) PRODUCING ESCHERICHIA COLI: Status: ACTIVE | Noted: 2022-10-13

## 2022-10-13 PROBLEM — A41.51 SEPSIS DUE TO ESCHERICHIA COLI (HCC): Status: ACTIVE | Noted: 2022-10-10

## 2022-10-13 LAB
ALBUMIN SERPL-MCNC: 2.7 G/DL (ref 3.5–5.2)
ALBUMIN/GLOB SERPL: 1.1 G/DL
ALP SERPL-CCNC: 86 U/L (ref 39–117)
ALT SERPL W P-5'-P-CCNC: 26 U/L (ref 1–41)
ANION GAP SERPL CALCULATED.3IONS-SCNC: 16.3 MMOL/L (ref 5–15)
APTT PPP: 74.6 SECONDS (ref 22.7–35.4)
AST SERPL-CCNC: 48 U/L (ref 1–40)
BACTERIA SPEC RESP CULT: NORMAL
BASOPHILS # BLD AUTO: 0.04 10*3/MM3 (ref 0–0.2)
BASOPHILS NFR BLD AUTO: 0.5 % (ref 0–1.5)
BILIRUB SERPL-MCNC: 0.4 MG/DL (ref 0–1.2)
BUN SERPL-MCNC: 26 MG/DL (ref 8–23)
BUN/CREAT SERPL: 26.3 (ref 7–25)
CALCIUM SPEC-SCNC: 8.6 MG/DL (ref 8.6–10.5)
CHLORIDE SERPL-SCNC: 103 MMOL/L (ref 98–107)
CO2 SERPL-SCNC: 18.7 MMOL/L (ref 22–29)
CREAT SERPL-MCNC: 0.99 MG/DL (ref 0.76–1.27)
DEPRECATED RDW RBC AUTO: 41.6 FL (ref 37–54)
EGFRCR SERPLBLD CKD-EPI 2021: 72.8 ML/MIN/1.73
EOSINOPHIL # BLD AUTO: 0.12 10*3/MM3 (ref 0–0.4)
EOSINOPHIL NFR BLD AUTO: 1.5 % (ref 0.3–6.2)
ERYTHROCYTE [DISTWIDTH] IN BLOOD BY AUTOMATED COUNT: 13.2 % (ref 12.3–15.4)
GLOBULIN UR ELPH-MCNC: 2.5 GM/DL
GLUCOSE SERPL-MCNC: 117 MG/DL (ref 65–99)
GRAM STN SPEC: NORMAL
HCT VFR BLD AUTO: 26.4 % (ref 37.5–51)
HGB BLD-MCNC: 8.4 G/DL (ref 13–17.7)
IMM GRANULOCYTES # BLD AUTO: 0.06 10*3/MM3 (ref 0–0.05)
IMM GRANULOCYTES NFR BLD AUTO: 0.7 % (ref 0–0.5)
LYMPHOCYTES # BLD AUTO: 2.46 10*3/MM3 (ref 0.7–3.1)
LYMPHOCYTES NFR BLD AUTO: 30.3 % (ref 19.6–45.3)
MCH RBC QN AUTO: 27.9 PG (ref 26.6–33)
MCHC RBC AUTO-ENTMCNC: 31.8 G/DL (ref 31.5–35.7)
MCV RBC AUTO: 87.7 FL (ref 79–97)
MONOCYTES # BLD AUTO: 0.84 10*3/MM3 (ref 0.1–0.9)
MONOCYTES NFR BLD AUTO: 10.4 % (ref 5–12)
NEUTROPHILS NFR BLD AUTO: 4.59 10*3/MM3 (ref 1.7–7)
NEUTROPHILS NFR BLD AUTO: 56.6 % (ref 42.7–76)
NRBC BLD AUTO-RTO: 0 /100 WBC (ref 0–0.2)
PLATELET # BLD AUTO: 367 10*3/MM3 (ref 140–450)
PMV BLD AUTO: 11.5 FL (ref 6–12)
POTASSIUM SERPL-SCNC: 3.4 MMOL/L (ref 3.5–5.2)
PROT SERPL-MCNC: 5.2 G/DL (ref 6–8.5)
RBC # BLD AUTO: 3.01 10*6/MM3 (ref 4.14–5.8)
SODIUM SERPL-SCNC: 138 MMOL/L (ref 136–145)
WBC NRBC COR # BLD: 8.11 10*3/MM3 (ref 3.4–10.8)

## 2022-10-13 PROCEDURE — 94799 UNLISTED PULMONARY SVC/PX: CPT

## 2022-10-13 PROCEDURE — 94761 N-INVAS EAR/PLS OXIMETRY MLT: CPT

## 2022-10-13 PROCEDURE — 85730 THROMBOPLASTIN TIME PARTIAL: CPT | Performed by: PHYSICIAN ASSISTANT

## 2022-10-13 PROCEDURE — 85025 COMPLETE CBC W/AUTO DIFF WBC: CPT | Performed by: PHYSICIAN ASSISTANT

## 2022-10-13 PROCEDURE — 97110 THERAPEUTIC EXERCISES: CPT

## 2022-10-13 PROCEDURE — 93005 ELECTROCARDIOGRAM TRACING: CPT | Performed by: INTERNAL MEDICINE

## 2022-10-13 PROCEDURE — 92526 ORAL FUNCTION THERAPY: CPT

## 2022-10-13 PROCEDURE — 99232 SBSQ HOSP IP/OBS MODERATE 35: CPT | Performed by: INTERNAL MEDICINE

## 2022-10-13 PROCEDURE — 25010000002 ERTAPENEM PER 500 MG: Performed by: INTERNAL MEDICINE

## 2022-10-13 PROCEDURE — 94664 DEMO&/EVAL PT USE INHALER: CPT

## 2022-10-13 PROCEDURE — 94760 N-INVAS EAR/PLS OXIMETRY 1: CPT

## 2022-10-13 PROCEDURE — 97530 THERAPEUTIC ACTIVITIES: CPT

## 2022-10-13 PROCEDURE — 80053 COMPREHEN METABOLIC PANEL: CPT | Performed by: HOSPITALIST

## 2022-10-13 RX ORDER — POTASSIUM CHLORIDE 7.45 MG/ML
10 INJECTION INTRAVENOUS
Status: DISCONTINUED | OUTPATIENT
Start: 2022-10-13 | End: 2022-10-20 | Stop reason: HOSPADM

## 2022-10-13 RX ORDER — POTASSIUM CHLORIDE 750 MG/1
40 TABLET, FILM COATED, EXTENDED RELEASE ORAL AS NEEDED
Status: DISCONTINUED | OUTPATIENT
Start: 2022-10-13 | End: 2022-10-20 | Stop reason: HOSPADM

## 2022-10-13 RX ORDER — FUROSEMIDE 20 MG/1
20 TABLET ORAL DAILY
Status: DISCONTINUED | OUTPATIENT
Start: 2022-10-13 | End: 2022-10-20 | Stop reason: HOSPADM

## 2022-10-13 RX ORDER — POTASSIUM CHLORIDE 1.5 G/1.77G
40 POWDER, FOR SOLUTION ORAL AS NEEDED
Status: DISCONTINUED | OUTPATIENT
Start: 2022-10-13 | End: 2022-10-20 | Stop reason: HOSPADM

## 2022-10-13 RX ADMIN — METOPROLOL TARTRATE 12.5 MG: 25 TABLET, FILM COATED ORAL at 20:55

## 2022-10-13 RX ADMIN — IPRATROPIUM BROMIDE AND ALBUTEROL SULFATE 3 ML: 2.5; .5 SOLUTION RESPIRATORY (INHALATION) at 08:34

## 2022-10-13 RX ADMIN — POTASSIUM CHLORIDE 40 MEQ: 1.5 POWDER, FOR SOLUTION ORAL at 10:42

## 2022-10-13 RX ADMIN — ATORVASTATIN CALCIUM 10 MG: 20 TABLET, FILM COATED ORAL at 08:45

## 2022-10-13 RX ADMIN — FUROSEMIDE 20 MG: 20 TABLET ORAL at 10:42

## 2022-10-13 RX ADMIN — ERTAPENEM 1 G: 1 INJECTION INTRAMUSCULAR; INTRAVENOUS at 17:24

## 2022-10-13 RX ADMIN — ASPIRIN 81 MG: 81 TABLET, FILM COATED ORAL at 08:45

## 2022-10-13 RX ADMIN — BUSPIRONE HYDROCHLORIDE 5 MG: 5 TABLET ORAL at 08:45

## 2022-10-13 RX ADMIN — APIXABAN 5 MG: 5 TABLET, FILM COATED ORAL at 17:23

## 2022-10-13 RX ADMIN — Medication 10 ML: at 20:55

## 2022-10-13 RX ADMIN — CASTOR OIL AND BALSAM, PERU 1 APPLICATION: 788; 87 OINTMENT TOPICAL at 08:46

## 2022-10-13 RX ADMIN — CASTOR OIL AND BALSAM, PERU 1 APPLICATION: 788; 87 OINTMENT TOPICAL at 20:55

## 2022-10-13 RX ADMIN — METOPROLOL TARTRATE 12.5 MG: 25 TABLET, FILM COATED ORAL at 10:41

## 2022-10-14 LAB
ALBUMIN SERPL-MCNC: 2.6 G/DL (ref 3.5–5.2)
ALBUMIN/GLOB SERPL: 1 G/DL
ALP SERPL-CCNC: 80 U/L (ref 39–117)
ALT SERPL W P-5'-P-CCNC: 21 U/L (ref 1–41)
ANION GAP SERPL CALCULATED.3IONS-SCNC: 12.2 MMOL/L (ref 5–15)
AST SERPL-CCNC: 34 U/L (ref 1–40)
BILIRUB SERPL-MCNC: 0.4 MG/DL (ref 0–1.2)
BUN SERPL-MCNC: 20 MG/DL (ref 8–23)
BUN/CREAT SERPL: 25.6 (ref 7–25)
CALCIUM SPEC-SCNC: 8.2 MG/DL (ref 8.6–10.5)
CHLORIDE SERPL-SCNC: 106 MMOL/L (ref 98–107)
CO2 SERPL-SCNC: 19.8 MMOL/L (ref 22–29)
CREAT SERPL-MCNC: 0.78 MG/DL (ref 0.76–1.27)
DEPRECATED RDW RBC AUTO: 41.3 FL (ref 37–54)
EGFRCR SERPLBLD CKD-EPI 2021: 85.2 ML/MIN/1.73
ERYTHROCYTE [DISTWIDTH] IN BLOOD BY AUTOMATED COUNT: 13.7 % (ref 12.3–15.4)
GLOBULIN UR ELPH-MCNC: 2.7 GM/DL
GLUCOSE SERPL-MCNC: 114 MG/DL (ref 65–99)
HCT VFR BLD AUTO: 24 % (ref 37.5–51)
HGB BLD-MCNC: 8.1 G/DL (ref 13–17.7)
MCH RBC QN AUTO: 28.2 PG (ref 26.6–33)
MCHC RBC AUTO-ENTMCNC: 33.8 G/DL (ref 31.5–35.7)
MCV RBC AUTO: 83.6 FL (ref 79–97)
PLATELET # BLD AUTO: 377 10*3/MM3 (ref 140–450)
PMV BLD AUTO: 11.4 FL (ref 6–12)
POTASSIUM SERPL-SCNC: 3.4 MMOL/L (ref 3.5–5.2)
PROT SERPL-MCNC: 5.3 G/DL (ref 6–8.5)
QT INTERVAL: 340 MS
RBC # BLD AUTO: 2.87 10*6/MM3 (ref 4.14–5.8)
SODIUM SERPL-SCNC: 138 MMOL/L (ref 136–145)
WBC NRBC COR # BLD: 9.2 10*3/MM3 (ref 3.4–10.8)

## 2022-10-14 PROCEDURE — 94761 N-INVAS EAR/PLS OXIMETRY MLT: CPT

## 2022-10-14 PROCEDURE — 80053 COMPREHEN METABOLIC PANEL: CPT | Performed by: HOSPITALIST

## 2022-10-14 PROCEDURE — 94664 DEMO&/EVAL PT USE INHALER: CPT

## 2022-10-14 PROCEDURE — 99232 SBSQ HOSP IP/OBS MODERATE 35: CPT | Performed by: NURSE PRACTITIONER

## 2022-10-14 PROCEDURE — 85027 COMPLETE CBC AUTOMATED: CPT | Performed by: HOSPITALIST

## 2022-10-14 PROCEDURE — 25010000002 ERTAPENEM PER 500 MG: Performed by: INTERNAL MEDICINE

## 2022-10-14 PROCEDURE — 94799 UNLISTED PULMONARY SVC/PX: CPT

## 2022-10-14 PROCEDURE — 94760 N-INVAS EAR/PLS OXIMETRY 1: CPT

## 2022-10-14 RX ORDER — SODIUM CHLORIDE 0.9 % (FLUSH) 0.9 %
10 SYRINGE (ML) INJECTION EVERY 12 HOURS SCHEDULED
Status: CANCELLED | OUTPATIENT
Start: 2022-10-14

## 2022-10-14 RX ORDER — SODIUM CHLORIDE 0.9 % (FLUSH) 0.9 %
10 SYRINGE (ML) INJECTION AS NEEDED
Status: CANCELLED | OUTPATIENT
Start: 2022-10-14

## 2022-10-14 RX ORDER — SODIUM CHLORIDE 0.9 % (FLUSH) 0.9 %
20 SYRINGE (ML) INJECTION AS NEEDED
Status: CANCELLED | OUTPATIENT
Start: 2022-10-14

## 2022-10-14 RX ADMIN — APIXABAN 5 MG: 5 TABLET, FILM COATED ORAL at 20:28

## 2022-10-14 RX ADMIN — ATORVASTATIN CALCIUM 10 MG: 20 TABLET, FILM COATED ORAL at 08:03

## 2022-10-14 RX ADMIN — IPRATROPIUM BROMIDE AND ALBUTEROL SULFATE 3 ML: 2.5; .5 SOLUTION RESPIRATORY (INHALATION) at 14:49

## 2022-10-14 RX ADMIN — APIXABAN 5 MG: 5 TABLET, FILM COATED ORAL at 08:03

## 2022-10-14 RX ADMIN — FUROSEMIDE 20 MG: 20 TABLET ORAL at 08:03

## 2022-10-14 RX ADMIN — BUSPIRONE HYDROCHLORIDE 5 MG: 5 TABLET ORAL at 08:03

## 2022-10-14 RX ADMIN — METOPROLOL TARTRATE 12.5 MG: 25 TABLET, FILM COATED ORAL at 08:05

## 2022-10-14 RX ADMIN — CASTOR OIL AND BALSAM, PERU 1 APPLICATION: 788; 87 OINTMENT TOPICAL at 08:03

## 2022-10-14 RX ADMIN — ERTAPENEM 1 G: 1 INJECTION INTRAMUSCULAR; INTRAVENOUS at 18:44

## 2022-10-14 RX ADMIN — CASTOR OIL AND BALSAM, PERU 1 APPLICATION: 788; 87 OINTMENT TOPICAL at 20:28

## 2022-10-14 RX ADMIN — METOPROLOL TARTRATE 12.5 MG: 25 TABLET, FILM COATED ORAL at 20:29

## 2022-10-15 ENCOUNTER — APPOINTMENT (OUTPATIENT)
Dept: GENERAL RADIOLOGY | Facility: HOSPITAL | Age: 87
End: 2022-10-15

## 2022-10-15 PROBLEM — I21.A1 TYPE 2 MI (MYOCARDIAL INFARCTION) (HCC): Status: ACTIVE | Noted: 2022-10-15

## 2022-10-15 PROCEDURE — 99232 SBSQ HOSP IP/OBS MODERATE 35: CPT | Performed by: INTERNAL MEDICINE

## 2022-10-15 PROCEDURE — 71045 X-RAY EXAM CHEST 1 VIEW: CPT

## 2022-10-15 PROCEDURE — 25010000002 ERTAPENEM PER 500 MG: Performed by: INTERNAL MEDICINE

## 2022-10-15 PROCEDURE — 25010000002 FUROSEMIDE PER 20 MG: Performed by: HOSPITALIST

## 2022-10-15 PROCEDURE — 97110 THERAPEUTIC EXERCISES: CPT

## 2022-10-15 RX ORDER — OLANZAPINE 10 MG/1
2.5 INJECTION, POWDER, LYOPHILIZED, FOR SOLUTION INTRAMUSCULAR EVERY 8 HOURS PRN
Status: DISCONTINUED | OUTPATIENT
Start: 2022-10-15 | End: 2022-10-20 | Stop reason: HOSPADM

## 2022-10-15 RX ORDER — FUROSEMIDE 10 MG/ML
40 INJECTION INTRAMUSCULAR; INTRAVENOUS ONCE
Status: COMPLETED | OUTPATIENT
Start: 2022-10-15 | End: 2022-10-15

## 2022-10-15 RX ORDER — LORAZEPAM 2 MG/ML
0.5 INJECTION INTRAMUSCULAR EVERY 4 HOURS PRN
Status: DISCONTINUED | OUTPATIENT
Start: 2022-10-15 | End: 2022-10-20 | Stop reason: HOSPADM

## 2022-10-15 RX ADMIN — FUROSEMIDE 40 MG: 10 INJECTION, SOLUTION INTRAMUSCULAR; INTRAVENOUS at 12:05

## 2022-10-15 RX ADMIN — APIXABAN 5 MG: 5 TABLET, FILM COATED ORAL at 20:20

## 2022-10-15 RX ADMIN — ERTAPENEM 1 G: 1 INJECTION INTRAMUSCULAR; INTRAVENOUS at 18:49

## 2022-10-15 RX ADMIN — Medication 10 ML: at 20:20

## 2022-10-15 RX ADMIN — CASTOR OIL AND BALSAM, PERU 1 APPLICATION: 788; 87 OINTMENT TOPICAL at 10:14

## 2022-10-15 RX ADMIN — CASTOR OIL AND BALSAM, PERU 1 APPLICATION: 788; 87 OINTMENT TOPICAL at 20:20

## 2022-10-15 RX ADMIN — METOPROLOL TARTRATE 25 MG: 25 TABLET ORAL at 20:20

## 2022-10-15 RX ADMIN — Medication 10 ML: at 10:15

## 2022-10-16 LAB — BACTERIA ISLT: NORMAL

## 2022-10-16 PROCEDURE — 99232 SBSQ HOSP IP/OBS MODERATE 35: CPT | Performed by: INTERNAL MEDICINE

## 2022-10-16 PROCEDURE — 25010000002 ERTAPENEM PER 500 MG: Performed by: INTERNAL MEDICINE

## 2022-10-16 RX ADMIN — APIXABAN 5 MG: 5 TABLET, FILM COATED ORAL at 20:16

## 2022-10-16 RX ADMIN — Medication 10 ML: at 20:22

## 2022-10-16 RX ADMIN — CASTOR OIL AND BALSAM, PERU 1 APPLICATION: 788; 87 OINTMENT TOPICAL at 08:44

## 2022-10-16 RX ADMIN — CASTOR OIL AND BALSAM, PERU 1 APPLICATION: 788; 87 OINTMENT TOPICAL at 20:16

## 2022-10-16 RX ADMIN — Medication 10 ML: at 08:44

## 2022-10-16 RX ADMIN — METOPROLOL TARTRATE 25 MG: 25 TABLET ORAL at 20:16

## 2022-10-16 RX ADMIN — APIXABAN 5 MG: 5 TABLET, FILM COATED ORAL at 16:00

## 2022-10-16 RX ADMIN — METOPROLOL TARTRATE 25 MG: 25 TABLET ORAL at 16:00

## 2022-10-16 RX ADMIN — ERTAPENEM 1 G: 1 INJECTION INTRAMUSCULAR; INTRAVENOUS at 17:20

## 2022-10-16 RX ADMIN — BUSPIRONE HYDROCHLORIDE 5 MG: 5 TABLET ORAL at 16:00

## 2022-10-16 RX ADMIN — POTASSIUM CHLORIDE 40 MEQ: 750 TABLET, EXTENDED RELEASE ORAL at 12:03

## 2022-10-16 RX ADMIN — ATORVASTATIN CALCIUM 10 MG: 20 TABLET, FILM COATED ORAL at 16:00

## 2022-10-17 LAB
BACTERIA SPEC AEROBE CULT: NORMAL
BACTERIA SPEC AEROBE CULT: NORMAL

## 2022-10-17 PROCEDURE — 99232 SBSQ HOSP IP/OBS MODERATE 35: CPT | Performed by: NURSE PRACTITIONER

## 2022-10-17 PROCEDURE — 97530 THERAPEUTIC ACTIVITIES: CPT

## 2022-10-17 PROCEDURE — 25010000002 ERTAPENEM PER 500 MG: Performed by: INTERNAL MEDICINE

## 2022-10-17 RX ADMIN — ACETAMINOPHEN 650 MG: 325 TABLET, FILM COATED ORAL at 00:25

## 2022-10-17 RX ADMIN — Medication 10 ML: at 08:45

## 2022-10-17 RX ADMIN — ERTAPENEM 1 G: 1 INJECTION INTRAMUSCULAR; INTRAVENOUS at 18:30

## 2022-10-17 RX ADMIN — CASTOR OIL AND BALSAM, PERU 1 APPLICATION: 788; 87 OINTMENT TOPICAL at 20:16

## 2022-10-17 RX ADMIN — ATORVASTATIN CALCIUM 10 MG: 20 TABLET, FILM COATED ORAL at 08:45

## 2022-10-17 RX ADMIN — CASTOR OIL AND BALSAM, PERU 1 APPLICATION: 788; 87 OINTMENT TOPICAL at 08:45

## 2022-10-17 RX ADMIN — METOPROLOL TARTRATE 25 MG: 25 TABLET ORAL at 08:45

## 2022-10-17 RX ADMIN — BUSPIRONE HYDROCHLORIDE 5 MG: 5 TABLET ORAL at 08:45

## 2022-10-17 RX ADMIN — APIXABAN 5 MG: 5 TABLET, FILM COATED ORAL at 08:45

## 2022-10-17 RX ADMIN — Medication 10 ML: at 20:16

## 2022-10-17 RX ADMIN — FUROSEMIDE 20 MG: 20 TABLET ORAL at 08:45

## 2022-10-17 RX ADMIN — METOPROLOL TARTRATE 25 MG: 25 TABLET ORAL at 20:16

## 2022-10-17 RX ADMIN — APIXABAN 5 MG: 5 TABLET, FILM COATED ORAL at 20:16

## 2022-10-18 LAB
ANION GAP SERPL CALCULATED.3IONS-SCNC: 13.9 MMOL/L (ref 5–15)
BUN SERPL-MCNC: 19 MG/DL (ref 8–23)
BUN/CREAT SERPL: 22.6 (ref 7–25)
CALCIUM SPEC-SCNC: 8.9 MG/DL (ref 8.6–10.5)
CHLORIDE SERPL-SCNC: 105 MMOL/L (ref 98–107)
CO2 SERPL-SCNC: 22.1 MMOL/L (ref 22–29)
CREAT SERPL-MCNC: 0.84 MG/DL (ref 0.76–1.27)
DEPRECATED RDW RBC AUTO: 45.5 FL (ref 37–54)
EGFRCR SERPLBLD CKD-EPI 2021: 83.4 ML/MIN/1.73
ERYTHROCYTE [DISTWIDTH] IN BLOOD BY AUTOMATED COUNT: 14.7 % (ref 12.3–15.4)
GLUCOSE SERPL-MCNC: 99 MG/DL (ref 65–99)
HCT VFR BLD AUTO: 28.3 % (ref 37.5–51)
HGB BLD-MCNC: 9.3 G/DL (ref 13–17.7)
MCH RBC QN AUTO: 28.5 PG (ref 26.6–33)
MCHC RBC AUTO-ENTMCNC: 32.9 G/DL (ref 31.5–35.7)
MCV RBC AUTO: 86.8 FL (ref 79–97)
PLATELET # BLD AUTO: 415 10*3/MM3 (ref 140–450)
PMV BLD AUTO: 10.8 FL (ref 6–12)
POTASSIUM SERPL-SCNC: 3.6 MMOL/L (ref 3.5–5.2)
RBC # BLD AUTO: 3.26 10*6/MM3 (ref 4.14–5.8)
SODIUM SERPL-SCNC: 141 MMOL/L (ref 136–145)
WBC NRBC COR # BLD: 8.54 10*3/MM3 (ref 3.4–10.8)

## 2022-10-18 PROCEDURE — 94760 N-INVAS EAR/PLS OXIMETRY 1: CPT

## 2022-10-18 PROCEDURE — 25010000002 ERTAPENEM PER 500 MG: Performed by: INTERNAL MEDICINE

## 2022-10-18 PROCEDURE — 94664 DEMO&/EVAL PT USE INHALER: CPT

## 2022-10-18 PROCEDURE — 80048 BASIC METABOLIC PNL TOTAL CA: CPT | Performed by: HOSPITALIST

## 2022-10-18 PROCEDURE — 25010000002 FUROSEMIDE PER 20 MG: Performed by: NURSE PRACTITIONER

## 2022-10-18 PROCEDURE — 97530 THERAPEUTIC ACTIVITIES: CPT

## 2022-10-18 PROCEDURE — 85027 COMPLETE CBC AUTOMATED: CPT | Performed by: HOSPITALIST

## 2022-10-18 PROCEDURE — 94799 UNLISTED PULMONARY SVC/PX: CPT

## 2022-10-18 PROCEDURE — 99222 1ST HOSP IP/OBS MODERATE 55: CPT | Performed by: NURSE PRACTITIONER

## 2022-10-18 PROCEDURE — 99232 SBSQ HOSP IP/OBS MODERATE 35: CPT | Performed by: NURSE PRACTITIONER

## 2022-10-18 PROCEDURE — 94761 N-INVAS EAR/PLS OXIMETRY MLT: CPT

## 2022-10-18 RX ORDER — POTASSIUM CHLORIDE 1.5 G/1.77G
20 POWDER, FOR SOLUTION ORAL ONCE
Status: COMPLETED | OUTPATIENT
Start: 2022-10-18 | End: 2022-10-18

## 2022-10-18 RX ORDER — FUROSEMIDE 10 MG/ML
40 INJECTION INTRAMUSCULAR; INTRAVENOUS ONCE
Status: COMPLETED | OUTPATIENT
Start: 2022-10-18 | End: 2022-10-18

## 2022-10-18 RX ADMIN — FUROSEMIDE 40 MG: 10 INJECTION, SOLUTION INTRAMUSCULAR; INTRAVENOUS at 10:05

## 2022-10-18 RX ADMIN — CASTOR OIL AND BALSAM, PERU 1 APPLICATION: 788; 87 OINTMENT TOPICAL at 08:27

## 2022-10-18 RX ADMIN — ATORVASTATIN CALCIUM 10 MG: 20 TABLET, FILM COATED ORAL at 08:27

## 2022-10-18 RX ADMIN — ERTAPENEM 1 G: 1 INJECTION INTRAMUSCULAR; INTRAVENOUS at 17:01

## 2022-10-18 RX ADMIN — BUSPIRONE HYDROCHLORIDE 5 MG: 5 TABLET ORAL at 08:27

## 2022-10-18 RX ADMIN — METOPROLOL TARTRATE 25 MG: 25 TABLET ORAL at 08:27

## 2022-10-18 RX ADMIN — Medication 10 ML: at 20:50

## 2022-10-18 RX ADMIN — APIXABAN 5 MG: 5 TABLET, FILM COATED ORAL at 08:27

## 2022-10-18 RX ADMIN — FUROSEMIDE 20 MG: 20 TABLET ORAL at 08:27

## 2022-10-18 RX ADMIN — POTASSIUM CHLORIDE 20 MEQ: 1.5 POWDER, FOR SOLUTION ORAL at 10:05

## 2022-10-18 RX ADMIN — CASTOR OIL AND BALSAM, PERU 1 APPLICATION: 788; 87 OINTMENT TOPICAL at 20:50

## 2022-10-18 RX ADMIN — IPRATROPIUM BROMIDE AND ALBUTEROL SULFATE 3 ML: 2.5; .5 SOLUTION RESPIRATORY (INHALATION) at 09:17

## 2022-10-18 RX ADMIN — APIXABAN 5 MG: 5 TABLET, FILM COATED ORAL at 20:45

## 2022-10-18 RX ADMIN — Medication 10 ML: at 08:27

## 2022-10-18 RX ADMIN — METOPROLOL TARTRATE 25 MG: 25 TABLET ORAL at 20:45

## 2022-10-19 PROCEDURE — 25010000002 ERTAPENEM PER 500 MG: Performed by: INTERNAL MEDICINE

## 2022-10-19 PROCEDURE — 97530 THERAPEUTIC ACTIVITIES: CPT

## 2022-10-19 PROCEDURE — 05HY33Z INSERTION OF INFUSION DEVICE INTO UPPER VEIN, PERCUTANEOUS APPROACH: ICD-10-PCS | Performed by: INTERNAL MEDICINE

## 2022-10-19 PROCEDURE — C1751 CATH, INF, PER/CENT/MIDLINE: HCPCS

## 2022-10-19 PROCEDURE — 99232 SBSQ HOSP IP/OBS MODERATE 35: CPT | Performed by: NURSE PRACTITIONER

## 2022-10-19 RX ORDER — SODIUM CHLORIDE 0.9 % (FLUSH) 0.9 %
10 SYRINGE (ML) INJECTION AS NEEDED
Status: DISCONTINUED | OUTPATIENT
Start: 2022-10-19 | End: 2022-10-20 | Stop reason: HOSPADM

## 2022-10-19 RX ORDER — FUROSEMIDE 20 MG/1
20 TABLET ORAL DAILY
Start: 2022-10-20 | End: 2022-10-28 | Stop reason: HOSPADM

## 2022-10-19 RX ORDER — SODIUM CHLORIDE 0.9 % (FLUSH) 0.9 %
10 SYRINGE (ML) INJECTION EVERY 12 HOURS SCHEDULED
Status: DISCONTINUED | OUTPATIENT
Start: 2022-10-19 | End: 2022-10-20 | Stop reason: HOSPADM

## 2022-10-19 RX ORDER — SODIUM CHLORIDE 0.9 % (FLUSH) 0.9 %
20 SYRINGE (ML) INJECTION AS NEEDED
Status: DISCONTINUED | OUTPATIENT
Start: 2022-10-19 | End: 2022-10-20 | Stop reason: HOSPADM

## 2022-10-19 RX ORDER — HYDROCODONE BITARTRATE AND ACETAMINOPHEN 5; 325 MG/1; MG/1
1 TABLET ORAL EVERY 8 HOURS PRN
Qty: 9 TABLET | Refills: 0 | Status: SHIPPED | OUTPATIENT
Start: 2022-10-19 | End: 2022-10-28 | Stop reason: HOSPADM

## 2022-10-19 RX ADMIN — FUROSEMIDE 20 MG: 20 TABLET ORAL at 08:54

## 2022-10-19 RX ADMIN — BUSPIRONE HYDROCHLORIDE 5 MG: 5 TABLET ORAL at 08:54

## 2022-10-19 RX ADMIN — APIXABAN 5 MG: 5 TABLET, FILM COATED ORAL at 08:54

## 2022-10-19 RX ADMIN — APIXABAN 5 MG: 5 TABLET, FILM COATED ORAL at 21:30

## 2022-10-19 RX ADMIN — CASTOR OIL AND BALSAM, PERU 1 APPLICATION: 788; 87 OINTMENT TOPICAL at 21:31

## 2022-10-19 RX ADMIN — Medication 10 ML: at 08:55

## 2022-10-19 RX ADMIN — ERTAPENEM 1 G: 1 INJECTION INTRAMUSCULAR; INTRAVENOUS at 17:44

## 2022-10-19 RX ADMIN — CASTOR OIL AND BALSAM, PERU 1 APPLICATION: 788; 87 OINTMENT TOPICAL at 08:55

## 2022-10-19 RX ADMIN — METOPROLOL TARTRATE 25 MG: 25 TABLET ORAL at 08:54

## 2022-10-19 RX ADMIN — Medication 10 ML: at 21:34

## 2022-10-19 RX ADMIN — ATORVASTATIN CALCIUM 10 MG: 20 TABLET, FILM COATED ORAL at 08:54

## 2022-10-19 RX ADMIN — METOPROLOL TARTRATE 25 MG: 25 TABLET ORAL at 21:30

## 2022-10-20 VITALS
OXYGEN SATURATION: 95 % | BODY MASS INDEX: 25.69 KG/M2 | WEIGHT: 169.53 LBS | HEART RATE: 92 BPM | HEIGHT: 68 IN | TEMPERATURE: 97.2 F | DIASTOLIC BLOOD PRESSURE: 72 MMHG | SYSTOLIC BLOOD PRESSURE: 142 MMHG | RESPIRATION RATE: 16 BRPM

## 2022-10-22 ENCOUNTER — APPOINTMENT (OUTPATIENT)
Dept: CT IMAGING | Facility: HOSPITAL | Age: 87
End: 2022-10-22

## 2022-10-22 ENCOUNTER — HOSPITAL ENCOUNTER (INPATIENT)
Facility: HOSPITAL | Age: 87
LOS: 2 days | Discharge: HOSPICE | End: 2022-10-28
Attending: EMERGENCY MEDICINE | Admitting: INTERNAL MEDICINE

## 2022-10-22 ENCOUNTER — APPOINTMENT (OUTPATIENT)
Dept: GENERAL RADIOLOGY | Facility: HOSPITAL | Age: 87
End: 2022-10-22

## 2022-10-22 DIAGNOSIS — R94.31 ABNORMAL EKG: ICD-10-CM

## 2022-10-22 DIAGNOSIS — R41.89 EPISODE OF UNRESPONSIVENESS: Primary | ICD-10-CM

## 2022-10-22 DIAGNOSIS — R77.8 ELEVATED TROPONIN: ICD-10-CM

## 2022-10-22 PROBLEM — R40.4 EPISODE OF UNRESPONSIVENESS: Status: ACTIVE | Noted: 2022-10-22

## 2022-10-22 LAB
ALBUMIN SERPL-MCNC: 3.1 G/DL (ref 3.5–5.2)
ALBUMIN/GLOB SERPL: 1.1 G/DL
ALP SERPL-CCNC: 74 U/L (ref 39–117)
ALT SERPL W P-5'-P-CCNC: 10 U/L (ref 1–41)
ANION GAP SERPL CALCULATED.3IONS-SCNC: 15.6 MMOL/L (ref 5–15)
APTT PPP: 41.9 SECONDS (ref 22.7–35.4)
AST SERPL-CCNC: 22 U/L (ref 1–40)
BASOPHILS # BLD AUTO: 0.06 10*3/MM3 (ref 0–0.2)
BASOPHILS NFR BLD AUTO: 0.6 % (ref 0–1.5)
BILIRUB SERPL-MCNC: 0.8 MG/DL (ref 0–1.2)
BUN SERPL-MCNC: 20 MG/DL (ref 8–23)
BUN/CREAT SERPL: 14.9 (ref 7–25)
CALCIUM SPEC-SCNC: 9 MG/DL (ref 8.6–10.5)
CHLORIDE SERPL-SCNC: 102 MMOL/L (ref 98–107)
CO2 SERPL-SCNC: 28.4 MMOL/L (ref 22–29)
CREAT SERPL-MCNC: 1.34 MG/DL (ref 0.76–1.27)
DEPRECATED RDW RBC AUTO: 48.8 FL (ref 37–54)
EGFRCR SERPLBLD CKD-EPI 2021: 50.6 ML/MIN/1.73
EOSINOPHIL # BLD AUTO: 0.07 10*3/MM3 (ref 0–0.4)
EOSINOPHIL NFR BLD AUTO: 0.7 % (ref 0.3–6.2)
ERYTHROCYTE [DISTWIDTH] IN BLOOD BY AUTOMATED COUNT: 15 % (ref 12.3–15.4)
GLOBULIN UR ELPH-MCNC: 2.8 GM/DL
GLUCOSE SERPL-MCNC: 119 MG/DL (ref 65–99)
HCT VFR BLD AUTO: 31 % (ref 37.5–51)
HGB BLD-MCNC: 9.7 G/DL (ref 13–17.7)
IMM GRANULOCYTES # BLD AUTO: 0.08 10*3/MM3 (ref 0–0.05)
IMM GRANULOCYTES NFR BLD AUTO: 0.8 % (ref 0–0.5)
INR PPP: 1.92 (ref 0.9–1.1)
LYMPHOCYTES # BLD AUTO: 2.12 10*3/MM3 (ref 0.7–3.1)
LYMPHOCYTES NFR BLD AUTO: 21.3 % (ref 19.6–45.3)
MCH RBC QN AUTO: 28.5 PG (ref 26.6–33)
MCHC RBC AUTO-ENTMCNC: 31.3 G/DL (ref 31.5–35.7)
MCV RBC AUTO: 91.2 FL (ref 79–97)
MONOCYTES # BLD AUTO: 0.65 10*3/MM3 (ref 0.1–0.9)
MONOCYTES NFR BLD AUTO: 6.5 % (ref 5–12)
NEUTROPHILS NFR BLD AUTO: 6.97 10*3/MM3 (ref 1.7–7)
NEUTROPHILS NFR BLD AUTO: 70.1 % (ref 42.7–76)
NRBC BLD AUTO-RTO: 0 /100 WBC (ref 0–0.2)
PLATELET # BLD AUTO: 320 10*3/MM3 (ref 140–450)
PMV BLD AUTO: 11.5 FL (ref 6–12)
POTASSIUM SERPL-SCNC: 3.4 MMOL/L (ref 3.5–5.2)
PROT SERPL-MCNC: 5.9 G/DL (ref 6–8.5)
PROTHROMBIN TIME: 22.2 SECONDS (ref 11.7–14.2)
QT INTERVAL: 397 MS
RBC # BLD AUTO: 3.4 10*6/MM3 (ref 4.14–5.8)
SODIUM SERPL-SCNC: 146 MMOL/L (ref 136–145)
TROPONIN T SERPL-MCNC: 0.26 NG/ML (ref 0–0.03)
TROPONIN T SERPL-MCNC: 0.27 NG/ML (ref 0–0.03)
WBC NRBC COR # BLD: 9.95 10*3/MM3 (ref 3.4–10.8)

## 2022-10-22 PROCEDURE — 85025 COMPLETE CBC W/AUTO DIFF WBC: CPT | Performed by: PHYSICIAN ASSISTANT

## 2022-10-22 PROCEDURE — 99285 EMERGENCY DEPT VISIT HI MDM: CPT

## 2022-10-22 PROCEDURE — G0378 HOSPITAL OBSERVATION PER HR: HCPCS

## 2022-10-22 PROCEDURE — 71275 CT ANGIOGRAPHY CHEST: CPT

## 2022-10-22 PROCEDURE — 84484 ASSAY OF TROPONIN QUANT: CPT | Performed by: PHYSICIAN ASSISTANT

## 2022-10-22 PROCEDURE — 93005 ELECTROCARDIOGRAM TRACING: CPT | Performed by: INTERNAL MEDICINE

## 2022-10-22 PROCEDURE — 93010 ELECTROCARDIOGRAM REPORT: CPT | Performed by: STUDENT IN AN ORGANIZED HEALTH CARE EDUCATION/TRAINING PROGRAM

## 2022-10-22 PROCEDURE — 0 IOPAMIDOL PER 1 ML: Performed by: EMERGENCY MEDICINE

## 2022-10-22 PROCEDURE — 80053 COMPREHEN METABOLIC PANEL: CPT | Performed by: PHYSICIAN ASSISTANT

## 2022-10-22 PROCEDURE — 85610 PROTHROMBIN TIME: CPT | Performed by: INTERNAL MEDICINE

## 2022-10-22 PROCEDURE — 93005 ELECTROCARDIOGRAM TRACING: CPT | Performed by: PHYSICIAN ASSISTANT

## 2022-10-22 PROCEDURE — 70450 CT HEAD/BRAIN W/O DYE: CPT

## 2022-10-22 PROCEDURE — 71045 X-RAY EXAM CHEST 1 VIEW: CPT

## 2022-10-22 PROCEDURE — 25010000002 HEPARIN (PORCINE) 25000-0.45 UT/250ML-% SOLUTION: Performed by: INTERNAL MEDICINE

## 2022-10-22 PROCEDURE — 85730 THROMBOPLASTIN TIME PARTIAL: CPT | Performed by: INTERNAL MEDICINE

## 2022-10-22 RX ORDER — ONDANSETRON 2 MG/ML
4 INJECTION INTRAMUSCULAR; INTRAVENOUS EVERY 6 HOURS PRN
Status: DISCONTINUED | OUTPATIENT
Start: 2022-10-22 | End: 2022-10-28 | Stop reason: HOSPADM

## 2022-10-22 RX ORDER — ACETAMINOPHEN 325 MG/1
650 TABLET ORAL EVERY 4 HOURS PRN
Status: DISCONTINUED | OUTPATIENT
Start: 2022-10-22 | End: 2022-10-23

## 2022-10-22 RX ORDER — SODIUM CHLORIDE 0.9 % (FLUSH) 0.9 %
10 SYRINGE (ML) INJECTION AS NEEDED
Status: DISCONTINUED | OUTPATIENT
Start: 2022-10-22 | End: 2022-10-28 | Stop reason: HOSPADM

## 2022-10-22 RX ORDER — HEPARIN SODIUM 10000 [USP'U]/100ML
12 INJECTION, SOLUTION INTRAVENOUS
Status: DISCONTINUED | OUTPATIENT
Start: 2022-10-22 | End: 2022-10-25

## 2022-10-22 RX ORDER — HEPARIN SODIUM 5000 [USP'U]/ML
30-51.9 INJECTION, SOLUTION INTRAVENOUS; SUBCUTANEOUS EVERY 6 HOURS PRN
Status: DISCONTINUED | OUTPATIENT
Start: 2022-10-22 | End: 2022-10-25

## 2022-10-22 RX ORDER — ALUMINA, MAGNESIA, AND SIMETHICONE 2400; 2400; 240 MG/30ML; MG/30ML; MG/30ML
15 SUSPENSION ORAL EVERY 6 HOURS PRN
Status: DISCONTINUED | OUTPATIENT
Start: 2022-10-22 | End: 2022-10-28 | Stop reason: HOSPADM

## 2022-10-22 RX ORDER — ONDANSETRON 4 MG/1
4 TABLET, FILM COATED ORAL EVERY 6 HOURS PRN
Status: DISCONTINUED | OUTPATIENT
Start: 2022-10-22 | End: 2022-10-28 | Stop reason: HOSPADM

## 2022-10-22 RX ORDER — NITROGLYCERIN 0.4 MG/1
0.4 TABLET SUBLINGUAL
Status: DISCONTINUED | OUTPATIENT
Start: 2022-10-22 | End: 2022-10-28 | Stop reason: HOSPADM

## 2022-10-22 RX ADMIN — IOPAMIDOL 95 ML: 755 INJECTION, SOLUTION INTRAVENOUS at 19:49

## 2022-10-22 RX ADMIN — HEPARIN SODIUM 12 UNITS/KG/HR: 10000 INJECTION, SOLUTION INTRAVENOUS at 21:59

## 2022-10-22 RX ADMIN — Medication 10 ML: at 23:30

## 2022-10-23 LAB
ALBUMIN SERPL-MCNC: 2.6 G/DL (ref 3.5–5.2)
ALBUMIN/GLOB SERPL: 1.1 G/DL
ALP SERPL-CCNC: 63 U/L (ref 39–117)
ALT SERPL W P-5'-P-CCNC: 8 U/L (ref 1–41)
ANION GAP SERPL CALCULATED.3IONS-SCNC: 12.7 MMOL/L (ref 5–15)
APTT PPP: 168.5 SECONDS (ref 22.7–35.4)
APTT PPP: 80.9 SECONDS (ref 22.7–35.4)
APTT PPP: >200 SECONDS (ref 22.7–35.4)
AST SERPL-CCNC: 26 U/L (ref 1–40)
BASOPHILS # BLD AUTO: 0.05 10*3/MM3 (ref 0–0.2)
BASOPHILS NFR BLD AUTO: 0.7 % (ref 0–1.5)
BILIRUB SERPL-MCNC: 0.6 MG/DL (ref 0–1.2)
BUN SERPL-MCNC: 21 MG/DL (ref 8–23)
BUN/CREAT SERPL: 16.7 (ref 7–25)
CALCIUM SPEC-SCNC: 8.1 MG/DL (ref 8.6–10.5)
CHLORIDE SERPL-SCNC: 103 MMOL/L (ref 98–107)
CO2 SERPL-SCNC: 27.3 MMOL/L (ref 22–29)
CREAT SERPL-MCNC: 1.26 MG/DL (ref 0.76–1.27)
DEPRECATED RDW RBC AUTO: 49.3 FL (ref 37–54)
EGFRCR SERPLBLD CKD-EPI 2021: 54.5 ML/MIN/1.73
EOSINOPHIL # BLD AUTO: 0.19 10*3/MM3 (ref 0–0.4)
EOSINOPHIL NFR BLD AUTO: 2.5 % (ref 0.3–6.2)
ERYTHROCYTE [DISTWIDTH] IN BLOOD BY AUTOMATED COUNT: 15.3 % (ref 12.3–15.4)
GLOBULIN UR ELPH-MCNC: 2.3 GM/DL
GLUCOSE SERPL-MCNC: 85 MG/DL (ref 65–99)
HCT VFR BLD AUTO: 28 % (ref 37.5–51)
HGB BLD-MCNC: 8.9 G/DL (ref 13–17.7)
IMM GRANULOCYTES # BLD AUTO: 0.02 10*3/MM3 (ref 0–0.05)
IMM GRANULOCYTES NFR BLD AUTO: 0.3 % (ref 0–0.5)
LYMPHOCYTES # BLD AUTO: 2.63 10*3/MM3 (ref 0.7–3.1)
LYMPHOCYTES NFR BLD AUTO: 34.7 % (ref 19.6–45.3)
MCH RBC QN AUTO: 28.5 PG (ref 26.6–33)
MCHC RBC AUTO-ENTMCNC: 31.8 G/DL (ref 31.5–35.7)
MCV RBC AUTO: 89.7 FL (ref 79–97)
MONOCYTES # BLD AUTO: 0.73 10*3/MM3 (ref 0.1–0.9)
MONOCYTES NFR BLD AUTO: 9.6 % (ref 5–12)
NEUTROPHILS NFR BLD AUTO: 3.96 10*3/MM3 (ref 1.7–7)
NEUTROPHILS NFR BLD AUTO: 52.2 % (ref 42.7–76)
NRBC BLD AUTO-RTO: 0 /100 WBC (ref 0–0.2)
PLATELET # BLD AUTO: 239 10*3/MM3 (ref 140–450)
PMV BLD AUTO: 11.2 FL (ref 6–12)
POTASSIUM SERPL-SCNC: 3.2 MMOL/L (ref 3.5–5.2)
POTASSIUM SERPL-SCNC: 3.8 MMOL/L (ref 3.5–5.2)
PROT SERPL-MCNC: 4.9 G/DL (ref 6–8.5)
QT INTERVAL: 449 MS
RBC # BLD AUTO: 3.12 10*6/MM3 (ref 4.14–5.8)
SODIUM SERPL-SCNC: 143 MMOL/L (ref 136–145)
WBC NRBC COR # BLD: 7.58 10*3/MM3 (ref 3.4–10.8)

## 2022-10-23 PROCEDURE — 94799 UNLISTED PULMONARY SVC/PX: CPT

## 2022-10-23 PROCEDURE — 85730 THROMBOPLASTIN TIME PARTIAL: CPT | Performed by: INTERNAL MEDICINE

## 2022-10-23 PROCEDURE — 85025 COMPLETE CBC W/AUTO DIFF WBC: CPT | Performed by: INTERNAL MEDICINE

## 2022-10-23 PROCEDURE — G0378 HOSPITAL OBSERVATION PER HR: HCPCS

## 2022-10-23 PROCEDURE — 36415 COLL VENOUS BLD VENIPUNCTURE: CPT | Performed by: INTERNAL MEDICINE

## 2022-10-23 PROCEDURE — 99221 1ST HOSP IP/OBS SF/LOW 40: CPT | Performed by: STUDENT IN AN ORGANIZED HEALTH CARE EDUCATION/TRAINING PROGRAM

## 2022-10-23 PROCEDURE — 25010000002 ERTAPENEM PER 500 MG: Performed by: NURSE PRACTITIONER

## 2022-10-23 PROCEDURE — 80053 COMPREHEN METABOLIC PANEL: CPT | Performed by: NURSE PRACTITIONER

## 2022-10-23 PROCEDURE — 84132 ASSAY OF SERUM POTASSIUM: CPT | Performed by: INTERNAL MEDICINE

## 2022-10-23 RX ORDER — SODIUM CHLORIDE 0.9 % (FLUSH) 0.9 %
10 SYRINGE (ML) INJECTION EVERY 12 HOURS SCHEDULED
Status: DISCONTINUED | OUTPATIENT
Start: 2022-10-23 | End: 2022-10-28 | Stop reason: HOSPADM

## 2022-10-23 RX ORDER — BUSPIRONE HYDROCHLORIDE 5 MG/1
5 TABLET ORAL DAILY
Status: DISCONTINUED | OUTPATIENT
Start: 2022-10-23 | End: 2022-10-28 | Stop reason: HOSPADM

## 2022-10-23 RX ORDER — ALBUTEROL SULFATE 2.5 MG/3ML
2.5 SOLUTION RESPIRATORY (INHALATION) EVERY 4 HOURS PRN
Status: DISCONTINUED | OUTPATIENT
Start: 2022-10-23 | End: 2022-10-28 | Stop reason: HOSPADM

## 2022-10-23 RX ORDER — POTASSIUM CHLORIDE 750 MG/1
40 TABLET, FILM COATED, EXTENDED RELEASE ORAL AS NEEDED
Status: DISCONTINUED | OUTPATIENT
Start: 2022-10-23 | End: 2022-10-28 | Stop reason: HOSPADM

## 2022-10-23 RX ORDER — ASPIRIN 81 MG/1
81 TABLET ORAL DAILY
Status: DISCONTINUED | OUTPATIENT
Start: 2022-10-23 | End: 2022-10-28 | Stop reason: HOSPADM

## 2022-10-23 RX ORDER — ACETAMINOPHEN 650 MG/1
650 SUPPOSITORY RECTAL EVERY 4 HOURS PRN
Status: DISCONTINUED | OUTPATIENT
Start: 2022-10-23 | End: 2022-10-28 | Stop reason: HOSPADM

## 2022-10-23 RX ORDER — POTASSIUM CHLORIDE 29.8 MG/ML
20 INJECTION INTRAVENOUS
Status: DISCONTINUED | OUTPATIENT
Start: 2022-10-23 | End: 2022-10-28 | Stop reason: HOSPADM

## 2022-10-23 RX ORDER — SODIUM CHLORIDE 0.9 % (FLUSH) 0.9 %
10 SYRINGE (ML) INJECTION AS NEEDED
Status: DISCONTINUED | OUTPATIENT
Start: 2022-10-23 | End: 2022-10-28 | Stop reason: HOSPADM

## 2022-10-23 RX ORDER — ACETAMINOPHEN 325 MG/1
650 TABLET ORAL EVERY 4 HOURS PRN
Status: DISCONTINUED | OUTPATIENT
Start: 2022-10-23 | End: 2022-10-28 | Stop reason: HOSPADM

## 2022-10-23 RX ORDER — POTASSIUM CHLORIDE 1.5 G/1.77G
40 POWDER, FOR SOLUTION ORAL AS NEEDED
Status: DISCONTINUED | OUTPATIENT
Start: 2022-10-23 | End: 2022-10-28 | Stop reason: HOSPADM

## 2022-10-23 RX ORDER — ATORVASTATIN CALCIUM 20 MG/1
10 TABLET, FILM COATED ORAL DAILY
Status: DISCONTINUED | OUTPATIENT
Start: 2022-10-23 | End: 2022-10-23

## 2022-10-23 RX ORDER — BISACODYL 10 MG
10 SUPPOSITORY, RECTAL RECTAL DAILY PRN
Status: DISCONTINUED | OUTPATIENT
Start: 2022-10-23 | End: 2022-10-28 | Stop reason: HOSPADM

## 2022-10-23 RX ORDER — ONDANSETRON 2 MG/ML
4 INJECTION INTRAMUSCULAR; INTRAVENOUS EVERY 6 HOURS PRN
Status: DISCONTINUED | OUTPATIENT
Start: 2022-10-23 | End: 2022-10-23 | Stop reason: SDUPTHER

## 2022-10-23 RX ORDER — ATORVASTATIN CALCIUM 20 MG/1
40 TABLET, FILM COATED ORAL NIGHTLY
Status: DISCONTINUED | OUTPATIENT
Start: 2022-10-23 | End: 2022-10-28 | Stop reason: HOSPADM

## 2022-10-23 RX ORDER — ACETAMINOPHEN 160 MG/5ML
650 SOLUTION ORAL EVERY 4 HOURS PRN
Status: DISCONTINUED | OUTPATIENT
Start: 2022-10-23 | End: 2022-10-28 | Stop reason: HOSPADM

## 2022-10-23 RX ADMIN — Medication 10 ML: at 13:34

## 2022-10-23 RX ADMIN — METOPROLOL TARTRATE 12.5 MG: 25 TABLET, FILM COATED ORAL at 13:34

## 2022-10-23 RX ADMIN — ERTAPENEM SODIUM 1 G: 1 INJECTION, POWDER, LYOPHILIZED, FOR SOLUTION INTRAMUSCULAR; INTRAVENOUS at 13:35

## 2022-10-23 RX ADMIN — Medication 10 ML: at 21:13

## 2022-10-23 RX ADMIN — POTASSIUM CHLORIDE 40 MEQ: 750 TABLET, EXTENDED RELEASE ORAL at 18:00

## 2022-10-23 RX ADMIN — ASPIRIN 81 MG: 81 TABLET, COATED ORAL at 13:34

## 2022-10-23 RX ADMIN — POTASSIUM CHLORIDE 40 MEQ: 750 TABLET, EXTENDED RELEASE ORAL at 13:34

## 2022-10-23 RX ADMIN — BUSPIRONE HYDROCHLORIDE 5 MG: 5 TABLET ORAL at 13:34

## 2022-10-23 RX ADMIN — ATORVASTATIN CALCIUM 40 MG: 20 TABLET, FILM COATED ORAL at 21:11

## 2022-10-24 ENCOUNTER — APPOINTMENT (OUTPATIENT)
Dept: CARDIOLOGY | Facility: HOSPITAL | Age: 87
End: 2022-10-24

## 2022-10-24 LAB
ANION GAP SERPL CALCULATED.3IONS-SCNC: 11.6 MMOL/L (ref 5–15)
APTT PPP: 66.3 SECONDS (ref 22.7–35.4)
APTT PPP: 71.1 SECONDS (ref 22.7–35.4)
APTT PPP: 97.8 SECONDS (ref 22.7–35.4)
BASOPHILS # BLD AUTO: 0.04 10*3/MM3 (ref 0–0.2)
BASOPHILS NFR BLD AUTO: 0.6 % (ref 0–1.5)
BH CV ECHO MEAS - EDV(MOD-SP2): 155 ML
BH CV ECHO MEAS - EDV(MOD-SP4): 153 ML
BH CV ECHO MEAS - EF(MOD-BP): 50.5 %
BH CV ECHO MEAS - EF(MOD-SP2): 50.3 %
BH CV ECHO MEAS - EF(MOD-SP4): 54.2 %
BH CV ECHO MEAS - ESV(MOD-SP2): 77 ML
BH CV ECHO MEAS - ESV(MOD-SP4): 70 ML
BH CV ECHO MEAS - LV DIASTOLIC VOL/BSA (35-75): 77 CM2
BH CV ECHO MEAS - LV SYSTOLIC VOL/BSA (12-30): 35.2 CM2
BH CV ECHO MEAS - SI(MOD-SP2): 39.2 ML/M2
BH CV ECHO MEAS - SI(MOD-SP4): 41.7 ML/M2
BH CV ECHO MEAS - SV(MOD-SP2): 78 ML
BH CV ECHO MEAS - SV(MOD-SP4): 83 ML
BUN SERPL-MCNC: 18 MG/DL (ref 8–23)
BUN/CREAT SERPL: 18.4 (ref 7–25)
CALCIUM SPEC-SCNC: 7.8 MG/DL (ref 8.6–10.5)
CHLORIDE SERPL-SCNC: 103 MMOL/L (ref 98–107)
CO2 SERPL-SCNC: 24.4 MMOL/L (ref 22–29)
CREAT SERPL-MCNC: 0.98 MG/DL (ref 0.76–1.27)
DEPRECATED RDW RBC AUTO: 48.4 FL (ref 37–54)
EGFRCR SERPLBLD CKD-EPI 2021: 73.7 ML/MIN/1.73
EOSINOPHIL # BLD AUTO: 0.15 10*3/MM3 (ref 0–0.4)
EOSINOPHIL NFR BLD AUTO: 2.1 % (ref 0.3–6.2)
ERYTHROCYTE [DISTWIDTH] IN BLOOD BY AUTOMATED COUNT: 14.9 % (ref 12.3–15.4)
GLUCOSE SERPL-MCNC: 81 MG/DL (ref 65–99)
HCT VFR BLD AUTO: 25.2 % (ref 37.5–51)
HGB BLD-MCNC: 8.3 G/DL (ref 13–17.7)
IMM GRANULOCYTES # BLD AUTO: 0.02 10*3/MM3 (ref 0–0.05)
IMM GRANULOCYTES NFR BLD AUTO: 0.3 % (ref 0–0.5)
LYMPHOCYTES # BLD AUTO: 2.49 10*3/MM3 (ref 0.7–3.1)
LYMPHOCYTES NFR BLD AUTO: 34.9 % (ref 19.6–45.3)
MAXIMAL PREDICTED HEART RATE: 131 BPM
MCH RBC QN AUTO: 28.8 PG (ref 26.6–33)
MCHC RBC AUTO-ENTMCNC: 32.9 G/DL (ref 31.5–35.7)
MCV RBC AUTO: 87.5 FL (ref 79–97)
MONOCYTES # BLD AUTO: 0.63 10*3/MM3 (ref 0.1–0.9)
MONOCYTES NFR BLD AUTO: 8.8 % (ref 5–12)
NEUTROPHILS NFR BLD AUTO: 3.8 10*3/MM3 (ref 1.7–7)
NEUTROPHILS NFR BLD AUTO: 53.3 % (ref 42.7–76)
NRBC BLD AUTO-RTO: 0 /100 WBC (ref 0–0.2)
PLATELET # BLD AUTO: 206 10*3/MM3 (ref 140–450)
PMV BLD AUTO: 10.8 FL (ref 6–12)
POTASSIUM SERPL-SCNC: 4 MMOL/L (ref 3.5–5.2)
RBC # BLD AUTO: 2.88 10*6/MM3 (ref 4.14–5.8)
SODIUM SERPL-SCNC: 139 MMOL/L (ref 136–145)
STRESS TARGET HR: 111 BPM
WBC NRBC COR # BLD: 7.13 10*3/MM3 (ref 3.4–10.8)

## 2022-10-24 PROCEDURE — 99232 SBSQ HOSP IP/OBS MODERATE 35: CPT | Performed by: NURSE PRACTITIONER

## 2022-10-24 PROCEDURE — G0378 HOSPITAL OBSERVATION PER HR: HCPCS

## 2022-10-24 PROCEDURE — 85025 COMPLETE CBC W/AUTO DIFF WBC: CPT | Performed by: INTERNAL MEDICINE

## 2022-10-24 PROCEDURE — 25010000002 HEPARIN (PORCINE) 25000-0.45 UT/250ML-% SOLUTION: Performed by: INTERNAL MEDICINE

## 2022-10-24 PROCEDURE — 25010000002 PERFLUTREN (DEFINITY) 8.476 MG IN SODIUM CHLORIDE (PF) 0.9 % 10 ML INJECTION: Performed by: STUDENT IN AN ORGANIZED HEALTH CARE EDUCATION/TRAINING PROGRAM

## 2022-10-24 PROCEDURE — 92610 EVALUATE SWALLOWING FUNCTION: CPT

## 2022-10-24 PROCEDURE — 93308 TTE F-UP OR LMTD: CPT | Performed by: INTERNAL MEDICINE

## 2022-10-24 PROCEDURE — 93308 TTE F-UP OR LMTD: CPT

## 2022-10-24 PROCEDURE — 80048 BASIC METABOLIC PNL TOTAL CA: CPT | Performed by: NURSE PRACTITIONER

## 2022-10-24 PROCEDURE — 25010000002 ERTAPENEM PER 500 MG: Performed by: INTERNAL MEDICINE

## 2022-10-24 PROCEDURE — 85730 THROMBOPLASTIN TIME PARTIAL: CPT | Performed by: INTERNAL MEDICINE

## 2022-10-24 RX ADMIN — Medication 10 ML: at 09:13

## 2022-10-24 RX ADMIN — PERFLUTREN 3 ML: 6.52 INJECTION, SUSPENSION INTRAVENOUS at 07:49

## 2022-10-24 RX ADMIN — METOPROLOL TARTRATE 12.5 MG: 25 TABLET, FILM COATED ORAL at 09:13

## 2022-10-24 RX ADMIN — ASPIRIN 81 MG: 81 TABLET, COATED ORAL at 09:13

## 2022-10-24 RX ADMIN — METOPROLOL TARTRATE 12.5 MG: 25 TABLET, FILM COATED ORAL at 21:23

## 2022-10-24 RX ADMIN — ATORVASTATIN CALCIUM 40 MG: 20 TABLET, FILM COATED ORAL at 21:23

## 2022-10-24 RX ADMIN — HEPARIN SODIUM 7 UNITS/KG/HR: 10000 INJECTION, SOLUTION INTRAVENOUS at 04:45

## 2022-10-24 RX ADMIN — ERTAPENEM SODIUM 1 G: 1 INJECTION, POWDER, LYOPHILIZED, FOR SOLUTION INTRAMUSCULAR; INTRAVENOUS at 12:14

## 2022-10-24 RX ADMIN — Medication 10 ML: at 21:24

## 2022-10-24 RX ADMIN — BUSPIRONE HYDROCHLORIDE 5 MG: 5 TABLET ORAL at 09:13

## 2022-10-25 LAB
ANION GAP SERPL CALCULATED.3IONS-SCNC: 11.3 MMOL/L (ref 5–15)
APTT PPP: 53 SECONDS (ref 22.7–35.4)
BASOPHILS # BLD AUTO: 0.04 10*3/MM3 (ref 0–0.2)
BASOPHILS NFR BLD AUTO: 0.7 % (ref 0–1.5)
BUN SERPL-MCNC: 17 MG/DL (ref 8–23)
BUN/CREAT SERPL: 20.7 (ref 7–25)
CALCIUM SPEC-SCNC: 8.1 MG/DL (ref 8.6–10.5)
CHLORIDE SERPL-SCNC: 104 MMOL/L (ref 98–107)
CO2 SERPL-SCNC: 24.7 MMOL/L (ref 22–29)
CREAT SERPL-MCNC: 0.82 MG/DL (ref 0.76–1.27)
DEPRECATED RDW RBC AUTO: 47.1 FL (ref 37–54)
EGFRCR SERPLBLD CKD-EPI 2021: 84 ML/MIN/1.73
EOSINOPHIL # BLD AUTO: 0.14 10*3/MM3 (ref 0–0.4)
EOSINOPHIL NFR BLD AUTO: 2.3 % (ref 0.3–6.2)
ERYTHROCYTE [DISTWIDTH] IN BLOOD BY AUTOMATED COUNT: 15.1 % (ref 12.3–15.4)
GLUCOSE SERPL-MCNC: 92 MG/DL (ref 65–99)
HCT VFR BLD AUTO: 24.7 % (ref 37.5–51)
HGB BLD-MCNC: 8.5 G/DL (ref 13–17.7)
IMM GRANULOCYTES # BLD AUTO: 0.02 10*3/MM3 (ref 0–0.05)
IMM GRANULOCYTES NFR BLD AUTO: 0.3 % (ref 0–0.5)
LYMPHOCYTES # BLD AUTO: 2.32 10*3/MM3 (ref 0.7–3.1)
LYMPHOCYTES NFR BLD AUTO: 37.8 % (ref 19.6–45.3)
MCH RBC QN AUTO: 29.3 PG (ref 26.6–33)
MCHC RBC AUTO-ENTMCNC: 34.4 G/DL (ref 31.5–35.7)
MCV RBC AUTO: 85.2 FL (ref 79–97)
MONOCYTES # BLD AUTO: 0.51 10*3/MM3 (ref 0.1–0.9)
MONOCYTES NFR BLD AUTO: 8.3 % (ref 5–12)
NEUTROPHILS NFR BLD AUTO: 3.1 10*3/MM3 (ref 1.7–7)
NEUTROPHILS NFR BLD AUTO: 50.6 % (ref 42.7–76)
NRBC BLD AUTO-RTO: 0 /100 WBC (ref 0–0.2)
PLATELET # BLD AUTO: 205 10*3/MM3 (ref 140–450)
PMV BLD AUTO: 11.9 FL (ref 6–12)
POTASSIUM SERPL-SCNC: 3.6 MMOL/L (ref 3.5–5.2)
RBC # BLD AUTO: 2.9 10*6/MM3 (ref 4.14–5.8)
SODIUM SERPL-SCNC: 140 MMOL/L (ref 136–145)
WBC NRBC COR # BLD: 6.13 10*3/MM3 (ref 3.4–10.8)

## 2022-10-25 PROCEDURE — G0378 HOSPITAL OBSERVATION PER HR: HCPCS

## 2022-10-25 PROCEDURE — 85025 COMPLETE CBC W/AUTO DIFF WBC: CPT | Performed by: INTERNAL MEDICINE

## 2022-10-25 PROCEDURE — 85730 THROMBOPLASTIN TIME PARTIAL: CPT | Performed by: INTERNAL MEDICINE

## 2022-10-25 PROCEDURE — 25010000002 HEPARIN (PORCINE) PER 1000 UNITS: Performed by: INTERNAL MEDICINE

## 2022-10-25 PROCEDURE — 25010000002 ERTAPENEM PER 500 MG: Performed by: INTERNAL MEDICINE

## 2022-10-25 PROCEDURE — 36415 COLL VENOUS BLD VENIPUNCTURE: CPT | Performed by: INTERNAL MEDICINE

## 2022-10-25 PROCEDURE — 80048 BASIC METABOLIC PNL TOTAL CA: CPT | Performed by: INTERNAL MEDICINE

## 2022-10-25 PROCEDURE — 99232 SBSQ HOSP IP/OBS MODERATE 35: CPT | Performed by: NURSE PRACTITIONER

## 2022-10-25 RX ADMIN — ERTAPENEM SODIUM 1 G: 1 INJECTION, POWDER, LYOPHILIZED, FOR SOLUTION INTRAMUSCULAR; INTRAVENOUS at 10:56

## 2022-10-25 RX ADMIN — Medication 10 ML: at 21:31

## 2022-10-25 RX ADMIN — ATORVASTATIN CALCIUM 40 MG: 20 TABLET, FILM COATED ORAL at 21:31

## 2022-10-25 RX ADMIN — APIXABAN 5 MG: 5 TABLET, FILM COATED ORAL at 21:31

## 2022-10-25 RX ADMIN — APIXABAN 5 MG: 5 TABLET, FILM COATED ORAL at 10:56

## 2022-10-25 RX ADMIN — Medication 10 ML: at 08:39

## 2022-10-25 RX ADMIN — HEPARIN SODIUM 4000 UNITS: 5000 INJECTION INTRAVENOUS; SUBCUTANEOUS at 07:46

## 2022-10-25 RX ADMIN — METOPROLOL TARTRATE 12.5 MG: 25 TABLET, FILM COATED ORAL at 21:31

## 2022-10-25 RX ADMIN — BUSPIRONE HYDROCHLORIDE 5 MG: 5 TABLET ORAL at 08:34

## 2022-10-25 RX ADMIN — METOPROLOL TARTRATE 12.5 MG: 25 TABLET, FILM COATED ORAL at 08:34

## 2022-10-25 RX ADMIN — ASPIRIN 81 MG: 81 TABLET, COATED ORAL at 08:34

## 2022-10-26 PROCEDURE — 25010000002 ERTAPENEM PER 500 MG: Performed by: INTERNAL MEDICINE

## 2022-10-26 RX ADMIN — Medication 10 ML: at 21:34

## 2022-10-26 RX ADMIN — APIXABAN 5 MG: 5 TABLET, FILM COATED ORAL at 08:10

## 2022-10-26 RX ADMIN — ATORVASTATIN CALCIUM 40 MG: 20 TABLET, FILM COATED ORAL at 21:34

## 2022-10-26 RX ADMIN — METOPROLOL TARTRATE 12.5 MG: 25 TABLET, FILM COATED ORAL at 21:34

## 2022-10-26 RX ADMIN — ERTAPENEM SODIUM 1 G: 1 INJECTION, POWDER, LYOPHILIZED, FOR SOLUTION INTRAMUSCULAR; INTRAVENOUS at 12:09

## 2022-10-26 RX ADMIN — APIXABAN 5 MG: 5 TABLET, FILM COATED ORAL at 21:34

## 2022-10-26 RX ADMIN — BUSPIRONE HYDROCHLORIDE 5 MG: 5 TABLET ORAL at 08:10

## 2022-10-26 RX ADMIN — METOPROLOL TARTRATE 12.5 MG: 25 TABLET, FILM COATED ORAL at 08:10

## 2022-10-26 RX ADMIN — Medication 10 ML: at 08:10

## 2022-10-26 RX ADMIN — ASPIRIN 81 MG: 81 TABLET, COATED ORAL at 08:10

## 2022-10-27 RX ADMIN — Medication 10 ML: at 22:15

## 2022-10-27 RX ADMIN — Medication 10 ML: at 09:56

## 2022-10-27 RX ADMIN — APIXABAN 5 MG: 5 TABLET, FILM COATED ORAL at 09:51

## 2022-10-27 RX ADMIN — ASPIRIN 81 MG: 81 TABLET, COATED ORAL at 09:52

## 2022-10-27 RX ADMIN — APIXABAN 5 MG: 5 TABLET, FILM COATED ORAL at 22:14

## 2022-10-27 RX ADMIN — BUSPIRONE HYDROCHLORIDE 5 MG: 5 TABLET ORAL at 09:53

## 2022-10-27 RX ADMIN — METOPROLOL TARTRATE 12.5 MG: 25 TABLET, FILM COATED ORAL at 09:53

## 2022-10-27 RX ADMIN — ATORVASTATIN CALCIUM 40 MG: 20 TABLET, FILM COATED ORAL at 22:14

## 2022-10-27 RX ADMIN — METOPROLOL TARTRATE 12.5 MG: 25 TABLET, FILM COATED ORAL at 22:13

## 2022-10-28 VITALS
HEIGHT: 72 IN | RESPIRATION RATE: 17 BRPM | WEIGHT: 183.6 LBS | OXYGEN SATURATION: 99 % | TEMPERATURE: 98 F | DIASTOLIC BLOOD PRESSURE: 73 MMHG | BODY MASS INDEX: 24.87 KG/M2 | HEART RATE: 80 BPM | SYSTOLIC BLOOD PRESSURE: 132 MMHG

## 2022-10-28 RX ADMIN — BUSPIRONE HYDROCHLORIDE 5 MG: 5 TABLET ORAL at 09:19

## 2022-10-28 RX ADMIN — METOPROLOL TARTRATE 12.5 MG: 25 TABLET, FILM COATED ORAL at 09:19

## 2022-10-28 RX ADMIN — Medication 10 ML: at 09:19

## 2022-10-28 RX ADMIN — APIXABAN 5 MG: 5 TABLET, FILM COATED ORAL at 09:18

## 2022-10-28 RX ADMIN — ASPIRIN 81 MG: 81 TABLET, COATED ORAL at 09:18

## 2022-10-29 ENCOUNTER — READMISSION MANAGEMENT (OUTPATIENT)
Dept: CALL CENTER | Facility: HOSPITAL | Age: 87
End: 2022-10-29

## 2022-10-29 NOTE — OUTREACH NOTE
Prep Survey    Flowsheet Row Responses   [facility] facility patient discharged from? [facility]   Is LACE score < 7 ? No   Emergency Room discharge w/ pulse ox? No   Eligibility Not Eligible   What are the reasons patient is not eligible? Hospice/Pallative Care   Does the patient have one of the following disease processes/diagnoses(primary or secondary)? Other   Prep survey completed? Yes          DENZEL WANG - Registered Nurse

## 2023-01-09 ENCOUNTER — HOSPITAL ENCOUNTER (EMERGENCY)
Facility: HOSPITAL | Age: 88
Discharge: HOME | End: 2023-01-09
Attending: EMERGENCY MEDICINE | Admitting: EMERGENCY MEDICINE
Payer: COMMERCIAL

## 2023-01-09 ENCOUNTER — APPOINTMENT (OUTPATIENT)
Dept: GENERAL RADIOLOGY | Facility: HOSPITAL | Age: 88
End: 2023-01-09
Payer: COMMERCIAL

## 2023-01-09 VITALS
DIASTOLIC BLOOD PRESSURE: 60 MMHG | RESPIRATION RATE: 17 BRPM | SYSTOLIC BLOOD PRESSURE: 136 MMHG | TEMPERATURE: 97.5 F | OXYGEN SATURATION: 99 % | HEIGHT: 72 IN | HEART RATE: 84 BPM | BODY MASS INDEX: 25.06 KG/M2 | WEIGHT: 185 LBS

## 2023-01-09 DIAGNOSIS — Z79.01 ANTICOAGULATED: ICD-10-CM

## 2023-01-09 DIAGNOSIS — I48.11 LONGSTANDING PERSISTENT ATRIAL FIBRILLATION: ICD-10-CM

## 2023-01-09 DIAGNOSIS — R07.89 ATYPICAL CHEST PAIN: Primary | ICD-10-CM

## 2023-01-09 DIAGNOSIS — I10 PRIMARY HYPERTENSION: ICD-10-CM

## 2023-01-09 DIAGNOSIS — R05.2 SUBACUTE COUGH: ICD-10-CM

## 2023-01-09 LAB
ALBUMIN SERPL-MCNC: 3.5 G/DL (ref 3.5–5.2)
ALBUMIN/GLOB SERPL: 1.1 G/DL
ALP SERPL-CCNC: 78 U/L (ref 39–117)
ALT SERPL W P-5'-P-CCNC: 6 U/L (ref 1–41)
ANION GAP SERPL CALCULATED.3IONS-SCNC: 13.1 MMOL/L (ref 5–15)
AST SERPL-CCNC: 17 U/L (ref 1–40)
B PARAPERT DNA SPEC QL NAA+PROBE: NOT DETECTED
B PERT DNA SPEC QL NAA+PROBE: NOT DETECTED
BASOPHILS # BLD AUTO: 0.06 10*3/MM3 (ref 0–0.2)
BASOPHILS NFR BLD AUTO: 0.7 % (ref 0–1.5)
BILIRUB SERPL-MCNC: 0.2 MG/DL (ref 0–1.2)
BUN SERPL-MCNC: 20 MG/DL (ref 8–23)
BUN/CREAT SERPL: 14.3 (ref 7–25)
C PNEUM DNA NPH QL NAA+NON-PROBE: NOT DETECTED
CALCIUM SPEC-SCNC: 9.2 MG/DL (ref 8.6–10.5)
CHLORIDE SERPL-SCNC: 98 MMOL/L (ref 98–107)
CO2 SERPL-SCNC: 20.9 MMOL/L (ref 22–29)
CREAT SERPL-MCNC: 1.4 MG/DL (ref 0.76–1.27)
DEPRECATED RDW RBC AUTO: 38.6 FL (ref 37–54)
EGFRCR SERPLBLD CKD-EPI 2021: 48 ML/MIN/1.73
EOSINOPHIL # BLD AUTO: 0.19 10*3/MM3 (ref 0–0.4)
EOSINOPHIL NFR BLD AUTO: 2.2 % (ref 0.3–6.2)
ERYTHROCYTE [DISTWIDTH] IN BLOOD BY AUTOMATED COUNT: 12.8 % (ref 12.3–15.4)
FLUAV SUBTYP SPEC NAA+PROBE: NOT DETECTED
FLUBV RNA ISLT QL NAA+PROBE: NOT DETECTED
GLOBULIN UR ELPH-MCNC: 3.1 GM/DL
GLUCOSE SERPL-MCNC: 96 MG/DL (ref 65–99)
HADV DNA SPEC NAA+PROBE: NOT DETECTED
HCOV 229E RNA SPEC QL NAA+PROBE: NOT DETECTED
HCOV HKU1 RNA SPEC QL NAA+PROBE: NOT DETECTED
HCOV NL63 RNA SPEC QL NAA+PROBE: NOT DETECTED
HCOV OC43 RNA SPEC QL NAA+PROBE: NOT DETECTED
HCT VFR BLD AUTO: 32.6 % (ref 37.5–51)
HGB BLD-MCNC: 10.5 G/DL (ref 13–17.7)
HMPV RNA NPH QL NAA+NON-PROBE: NOT DETECTED
HPIV1 RNA ISLT QL NAA+PROBE: NOT DETECTED
HPIV2 RNA SPEC QL NAA+PROBE: NOT DETECTED
HPIV3 RNA NPH QL NAA+PROBE: NOT DETECTED
HPIV4 P GENE NPH QL NAA+PROBE: NOT DETECTED
IMM GRANULOCYTES # BLD AUTO: 0.01 10*3/MM3 (ref 0–0.05)
IMM GRANULOCYTES NFR BLD AUTO: 0.1 % (ref 0–0.5)
LYMPHOCYTES # BLD AUTO: 3.5 10*3/MM3 (ref 0.7–3.1)
LYMPHOCYTES NFR BLD AUTO: 41 % (ref 19.6–45.3)
M PNEUMO IGG SER IA-ACNC: NOT DETECTED
MCH RBC QN AUTO: 27.5 PG (ref 26.6–33)
MCHC RBC AUTO-ENTMCNC: 32.2 G/DL (ref 31.5–35.7)
MCV RBC AUTO: 85.3 FL (ref 79–97)
MONOCYTES # BLD AUTO: 0.56 10*3/MM3 (ref 0.1–0.9)
MONOCYTES NFR BLD AUTO: 6.6 % (ref 5–12)
NEUTROPHILS NFR BLD AUTO: 4.22 10*3/MM3 (ref 1.7–7)
NEUTROPHILS NFR BLD AUTO: 49.4 % (ref 42.7–76)
NRBC BLD AUTO-RTO: 0 /100 WBC (ref 0–0.2)
NT-PROBNP SERPL-MCNC: 3249 PG/ML (ref 0–1800)
PLATELET # BLD AUTO: 236 10*3/MM3 (ref 140–450)
PMV BLD AUTO: 11.8 FL (ref 6–12)
POTASSIUM SERPL-SCNC: 5.5 MMOL/L (ref 3.5–5.2)
PROCALCITONIN SERPL-MCNC: 0.14 NG/ML (ref 0–0.25)
PROT SERPL-MCNC: 6.6 G/DL (ref 6–8.5)
RBC # BLD AUTO: 3.82 10*6/MM3 (ref 4.14–5.8)
RHINOVIRUS RNA SPEC NAA+PROBE: NOT DETECTED
RSV RNA NPH QL NAA+NON-PROBE: NOT DETECTED
SARS-COV-2 RNA NPH QL NAA+NON-PROBE: NOT DETECTED
SODIUM SERPL-SCNC: 132 MMOL/L (ref 136–145)
TROPONIN T SERPL-MCNC: 0.02 NG/ML (ref 0–0.03)
TROPONIN T SERPL-MCNC: 0.04 NG/ML (ref 0–0.03)
WBC NRBC COR # BLD: 8.54 10*3/MM3 (ref 3.4–10.8)

## 2023-01-09 PROCEDURE — 93010 ELECTROCARDIOGRAM REPORT: CPT | Performed by: INTERNAL MEDICINE

## 2023-01-09 PROCEDURE — 71045 X-RAY EXAM CHEST 1 VIEW: CPT

## 2023-01-09 PROCEDURE — 83880 ASSAY OF NATRIURETIC PEPTIDE: CPT | Performed by: EMERGENCY MEDICINE

## 2023-01-09 PROCEDURE — 84145 PROCALCITONIN (PCT): CPT | Performed by: EMERGENCY MEDICINE

## 2023-01-09 PROCEDURE — 36415 COLL VENOUS BLD VENIPUNCTURE: CPT

## 2023-01-09 PROCEDURE — 85025 COMPLETE CBC W/AUTO DIFF WBC: CPT | Performed by: EMERGENCY MEDICINE

## 2023-01-09 PROCEDURE — 99284 EMERGENCY DEPT VISIT MOD MDM: CPT

## 2023-01-09 PROCEDURE — 0202U NFCT DS 22 TRGT SARS-COV-2: CPT | Performed by: EMERGENCY MEDICINE

## 2023-01-09 PROCEDURE — 93005 ELECTROCARDIOGRAM TRACING: CPT | Performed by: EMERGENCY MEDICINE

## 2023-01-09 PROCEDURE — 84484 ASSAY OF TROPONIN QUANT: CPT | Performed by: EMERGENCY MEDICINE

## 2023-01-09 PROCEDURE — 93005 ELECTROCARDIOGRAM TRACING: CPT

## 2023-01-09 PROCEDURE — 80053 COMPREHEN METABOLIC PANEL: CPT | Performed by: EMERGENCY MEDICINE

## 2023-01-09 RX ADMIN — SODIUM CHLORIDE, POTASSIUM CHLORIDE, SODIUM LACTATE AND CALCIUM CHLORIDE 500 ML: 600; 310; 30; 20 INJECTION, SOLUTION INTRAVENOUS at 15:52

## 2023-01-09 NOTE — ED NOTES
Patient from home via ems; call was for chest pain, productive cough. Patient was given 324 asa and 3 nitro with no relief. Cp is worse with movement and deep breath. Patient family reports pcp recently has taken patient off of a lot of his normal medications. Patient has hx of dementia .

## 2023-01-10 LAB — QT INTERVAL: 357 MS

## 2023-01-10 NOTE — DISCHARGE INSTRUCTIONS
Follow-up with your cardiologist soon as possible.  Return to the emergency department for worsening or persistent chest pain, pain radiating to neck/jaw/arm, shortness of breath, or other concern.

## 2023-01-10 NOTE — ED NOTES
Patient's grandson will be driving from [place] to help assist patient home. Patient requires assistance from 2 people to transfer from bed to wheelchair. PT is set up to come into patient's home, per patient's wife. Wife reports he has had difficulty ambulating since his hip replacement in October.

## 2023-02-04 ENCOUNTER — APPOINTMENT (OUTPATIENT)
Dept: GENERAL RADIOLOGY | Facility: HOSPITAL | Age: 88
End: 2023-02-04
Payer: MEDICARE

## 2023-02-04 ENCOUNTER — HOSPITAL ENCOUNTER (OUTPATIENT)
Facility: HOSPITAL | Age: 88
Setting detail: OBSERVATION
Discharge: HOME | End: 2023-02-06
Attending: EMERGENCY MEDICINE | Admitting: EMERGENCY MEDICINE
Payer: MEDICARE

## 2023-02-04 ENCOUNTER — APPOINTMENT (OUTPATIENT)
Dept: CT IMAGING | Facility: HOSPITAL | Age: 88
End: 2023-02-04
Payer: MEDICARE

## 2023-02-04 DIAGNOSIS — I10 HYPERTENSION, UNSPECIFIED TYPE: ICD-10-CM

## 2023-02-04 DIAGNOSIS — R07.9 CHEST PAIN, UNSPECIFIED TYPE: Primary | ICD-10-CM

## 2023-02-04 DIAGNOSIS — D64.9 CHRONIC ANEMIA: ICD-10-CM

## 2023-02-04 DIAGNOSIS — E78.5 HYPERLIPIDEMIA, UNSPECIFIED HYPERLIPIDEMIA TYPE: ICD-10-CM

## 2023-02-04 LAB
ALBUMIN SERPL-MCNC: 3.4 G/DL (ref 3.5–5.2)
ALBUMIN/GLOB SERPL: 1.4 G/DL
ALP SERPL-CCNC: 53 U/L (ref 39–117)
ALT SERPL W P-5'-P-CCNC: 11 U/L (ref 1–41)
ANION GAP SERPL CALCULATED.3IONS-SCNC: 5.8 MMOL/L (ref 5–15)
AST SERPL-CCNC: 14 U/L (ref 1–40)
BASOPHILS # BLD AUTO: 0.01 10*3/MM3 (ref 0–0.2)
BASOPHILS NFR BLD AUTO: 0.1 % (ref 0–1.5)
BILIRUB SERPL-MCNC: 0.2 MG/DL (ref 0–1.2)
BUN SERPL-MCNC: 33 MG/DL (ref 8–23)
BUN/CREAT SERPL: 34.7 (ref 7–25)
CALCIUM SPEC-SCNC: 8.7 MG/DL (ref 8.6–10.5)
CHLORIDE SERPL-SCNC: 104 MMOL/L (ref 98–107)
CHOLEST SERPL-MCNC: 115 MG/DL (ref 0–200)
CO2 SERPL-SCNC: 26.2 MMOL/L (ref 22–29)
CREAT SERPL-MCNC: 0.95 MG/DL (ref 0.76–1.27)
DEPRECATED RDW RBC AUTO: 40.9 FL (ref 37–54)
EGFRCR SERPLBLD CKD-EPI 2021: 76.5 ML/MIN/1.73
EOSINOPHIL # BLD AUTO: 0.06 10*3/MM3 (ref 0–0.4)
EOSINOPHIL NFR BLD AUTO: 0.5 % (ref 0.3–6.2)
ERYTHROCYTE [DISTWIDTH] IN BLOOD BY AUTOMATED COUNT: 13.1 % (ref 12.3–15.4)
GLOBULIN UR ELPH-MCNC: 2.4 GM/DL
GLUCOSE SERPL-MCNC: 124 MG/DL (ref 65–99)
HCT VFR BLD AUTO: 31.3 % (ref 37.5–51)
HDLC SERPL-MCNC: 67 MG/DL (ref 40–60)
HGB BLD-MCNC: 10.3 G/DL (ref 13–17.7)
HOLD SPECIMEN: NORMAL
HOLD SPECIMEN: NORMAL
IMM GRANULOCYTES # BLD AUTO: 0.06 10*3/MM3 (ref 0–0.05)
IMM GRANULOCYTES NFR BLD AUTO: 0.5 % (ref 0–0.5)
LDLC SERPL CALC-MCNC: 27 MG/DL (ref 0–100)
LDLC/HDLC SERPL: 0.35 {RATIO}
LYMPHOCYTES # BLD AUTO: 3.89 10*3/MM3 (ref 0.7–3.1)
LYMPHOCYTES NFR BLD AUTO: 34.9 % (ref 19.6–45.3)
MCH RBC QN AUTO: 28 PG (ref 26.6–33)
MCHC RBC AUTO-ENTMCNC: 32.9 G/DL (ref 31.5–35.7)
MCV RBC AUTO: 85.1 FL (ref 79–97)
MONOCYTES # BLD AUTO: 0.75 10*3/MM3 (ref 0.1–0.9)
MONOCYTES NFR BLD AUTO: 6.7 % (ref 5–12)
NEUTROPHILS NFR BLD AUTO: 57.3 % (ref 42.7–76)
NEUTROPHILS NFR BLD AUTO: 6.39 10*3/MM3 (ref 1.7–7)
NRBC BLD AUTO-RTO: 0 /100 WBC (ref 0–0.2)
PLATELET # BLD AUTO: 230 10*3/MM3 (ref 140–450)
PMV BLD AUTO: 10.3 FL (ref 6–12)
POTASSIUM SERPL-SCNC: 3.7 MMOL/L (ref 3.5–5.2)
PROT SERPL-MCNC: 5.8 G/DL (ref 6–8.5)
QT INTERVAL: 353 MS
RBC # BLD AUTO: 3.68 10*6/MM3 (ref 4.14–5.8)
SODIUM SERPL-SCNC: 136 MMOL/L (ref 136–145)
TRIGL SERPL-MCNC: 122 MG/DL (ref 0–150)
TROPONIN T SERPL-MCNC: 0.03 NG/ML (ref 0–0.03)
TROPONIN T SERPL-MCNC: 0.03 NG/ML (ref 0–0.03)
VLDLC SERPL-MCNC: 21 MG/DL (ref 5–40)
WBC NRBC COR # BLD: 11.16 10*3/MM3 (ref 3.4–10.8)
WHOLE BLOOD HOLD COAG: NORMAL
WHOLE BLOOD HOLD SPECIMEN: NORMAL

## 2023-02-04 PROCEDURE — 80061 LIPID PANEL: CPT | Performed by: NURSE PRACTITIONER

## 2023-02-04 PROCEDURE — G0378 HOSPITAL OBSERVATION PER HR: HCPCS

## 2023-02-04 PROCEDURE — 84484 ASSAY OF TROPONIN QUANT: CPT | Performed by: EMERGENCY MEDICINE

## 2023-02-04 PROCEDURE — 93010 ELECTROCARDIOGRAM REPORT: CPT | Performed by: INTERNAL MEDICINE

## 2023-02-04 PROCEDURE — 80053 COMPREHEN METABOLIC PANEL: CPT | Performed by: EMERGENCY MEDICINE

## 2023-02-04 PROCEDURE — 71275 CT ANGIOGRAPHY CHEST: CPT

## 2023-02-04 PROCEDURE — 85025 COMPLETE CBC W/AUTO DIFF WBC: CPT | Performed by: EMERGENCY MEDICINE

## 2023-02-04 PROCEDURE — 71045 X-RAY EXAM CHEST 1 VIEW: CPT

## 2023-02-04 PROCEDURE — 93005 ELECTROCARDIOGRAM TRACING: CPT | Performed by: EMERGENCY MEDICINE

## 2023-02-04 PROCEDURE — 93005 ELECTROCARDIOGRAM TRACING: CPT

## 2023-02-04 PROCEDURE — 99285 EMERGENCY DEPT VISIT HI MDM: CPT

## 2023-02-04 PROCEDURE — 93005 ELECTROCARDIOGRAM TRACING: CPT | Performed by: NURSE PRACTITIONER

## 2023-02-04 PROCEDURE — 0 IOPAMIDOL PER 1 ML: Performed by: EMERGENCY MEDICINE

## 2023-02-04 PROCEDURE — 36415 COLL VENOUS BLD VENIPUNCTURE: CPT

## 2023-02-04 RX ORDER — SODIUM CHLORIDE 0.9 % (FLUSH) 0.9 %
10 SYRINGE (ML) INJECTION AS NEEDED
Status: DISCONTINUED | OUTPATIENT
Start: 2023-02-04 | End: 2023-02-06 | Stop reason: HOSPADM

## 2023-02-04 RX ORDER — MIDODRINE HYDROCHLORIDE 5 MG/1
5 TABLET ORAL
Status: DISCONTINUED | OUTPATIENT
Start: 2023-02-05 | End: 2023-02-06 | Stop reason: HOSPADM

## 2023-02-04 RX ORDER — MIDODRINE HYDROCHLORIDE 5 MG/1
5 TABLET ORAL
COMMUNITY

## 2023-02-04 RX ORDER — SODIUM CHLORIDE 0.9 % (FLUSH) 0.9 %
10 SYRINGE (ML) INJECTION EVERY 12 HOURS SCHEDULED
Status: DISCONTINUED | OUTPATIENT
Start: 2023-02-04 | End: 2023-02-06 | Stop reason: HOSPADM

## 2023-02-04 RX ORDER — SODIUM CHLORIDE 9 MG/ML
40 INJECTION, SOLUTION INTRAVENOUS AS NEEDED
Status: DISCONTINUED | OUTPATIENT
Start: 2023-02-04 | End: 2023-02-06 | Stop reason: HOSPADM

## 2023-02-04 RX ORDER — NITROGLYCERIN 0.4 MG/1
0.4 TABLET SUBLINGUAL
Status: DISCONTINUED | OUTPATIENT
Start: 2023-02-04 | End: 2023-02-06 | Stop reason: HOSPADM

## 2023-02-04 RX ORDER — IBUPROFEN 600 MG/1
1 TABLET ORAL
Status: DISCONTINUED | OUTPATIENT
Start: 2023-02-04 | End: 2023-02-06 | Stop reason: HOSPADM

## 2023-02-04 RX ORDER — ATORVASTATIN CALCIUM 10 MG/1
10 TABLET, FILM COATED ORAL NIGHTLY
Status: DISCONTINUED | OUTPATIENT
Start: 2023-02-04 | End: 2023-02-06 | Stop reason: HOSPADM

## 2023-02-04 RX ORDER — INSULIN LISPRO 100 [IU]/ML
0-9 INJECTION, SOLUTION INTRAVENOUS; SUBCUTANEOUS
Status: DISCONTINUED | OUTPATIENT
Start: 2023-02-05 | End: 2023-02-06 | Stop reason: HOSPADM

## 2023-02-04 RX ORDER — ASPIRIN 81 MG/1
324 TABLET, CHEWABLE ORAL ONCE
Status: DISCONTINUED | OUTPATIENT
Start: 2023-02-04 | End: 2023-02-06 | Stop reason: HOSPADM

## 2023-02-04 RX ORDER — DEXTROSE MONOHYDRATE 25 G/50ML
25 INJECTION, SOLUTION INTRAVENOUS
Status: DISCONTINUED | OUTPATIENT
Start: 2023-02-04 | End: 2023-02-06 | Stop reason: HOSPADM

## 2023-02-04 RX ORDER — NICOTINE POLACRILEX 4 MG
15 LOZENGE BUCCAL
Status: DISCONTINUED | OUTPATIENT
Start: 2023-02-04 | End: 2023-02-06 | Stop reason: HOSPADM

## 2023-02-04 RX ADMIN — NITROGLYCERIN 1 INCH: 20 OINTMENT TOPICAL at 18:21

## 2023-02-04 RX ADMIN — NITROGLYCERIN 0.4 MG: 0.4 TABLET SUBLINGUAL at 16:49

## 2023-02-04 RX ADMIN — IOPAMIDOL 100 ML: 755 INJECTION, SOLUTION INTRAVENOUS at 17:04

## 2023-02-04 NOTE — ED NOTES
Pt presents via EMS from home for chest pain that started around noon today. Pt took 3 NTG over the course of 2 hours and then called EMS due to unrelieved pain. EMS reports EKG as NSR with PACs. NTG x1 given SL by EMS and 324mg ASA. GCS 15. Pt is currently taking Prednisone due to a cough.

## 2023-02-04 NOTE — ED PROVIDER NOTES
EMERGENCY DEPARTMENT ENCOUNTER    Room Number:  107/1  Date of encounter:  2/4/2023  PCP: Patric Cameron DO  Historian: Patient  Relevant information and history provided by sources other than the patient will be included below and in the ED Course.  Review of pertinent past medical records may also be included in record below and ED Course.    HPI:  Chief Complaint: Chest pain  A complete HPI/ROS/PMH/PSH/SH/FH are unobtainable due to: This patient is a poor historian.  He is also very hard of hearing.  Context: Yoni Bonner Jr. is a 89 y.o. male who presents to the ED c/o chest pain started about 1-1/2 hours ago.  He was at his home.  He currently lives with other family members.  It started at rest.  He states the pain is sharp.  He points to the left side of his chest.  He states that he has shortness of breath with it.  He states that his been worse with exertion.  When I ask him to clarify if he has had chest pain recently with exertion he has a hard time understanding and providing a clear answer.  He states this pain is also worse when you press on his chest and is also worse when he takes a deep breath.  He states this feels like his previous heart attack.  He thinks he is having another heart attack.  He also does report shortness of breath with it.  He denies any nausea or vomiting and any radiation of the pain.  He ambulates very small distances but uncertain really to what extent.  He states that he is trying to walk again as he is recovering from a fracture that he had in his leg about 1 year ago.  He denies any abdominal pain, headache or any focal weakness to his arms or legs.  No other family member or friend present.        Previous Episodes: Yes he states this is the same as his previous heart attack  Current Symptoms: See above    MEDICAL HISTORY REVIEWED    Patient was seen in the emergency department on January 9, 2023 with chest pain.  He had lab work including a troponin.  His  troponin was 0.035 and his BNP was 3249 viral post panel was unremarkable he had anemia with a hemoglobin of 10.5.  He had a chest x-ray that was essentially unremarkable there is also report that in October 2022 when he came in for unresponsive episodes and sepsis from UTI he had a pulmonary embolism.  He was discharged home from the emergency department.      Patient had an echo on October 24, 2022.  She has history of coronary disease carotid artery disease, hyperlipidemia and hypertension.  On the echo report showed an ejection fraction of 50% left ventricular systolic function was normal she had hypokinesis in the apical anterior and apex of the left ventricular wall.    Patient had a cardiac cath and that report was reviewed and that was done on February 21, 2020.  She had coronary artery disease with stable angina and this was done because of an abnormal stress test.  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  The cardiac cath was done because of an abnormal stress test and it also showed an area of infarct with angela-infarct ischemia  PAST MEDICAL HISTORY  Active Ambulatory Problems     Diagnosis Date Noted   • HTN (hypertension) 03/08/2017   • Complete tear of right rotator cuff 04/13/2016   • Onychomycosis due to dermatophyte 04/27/2015   • Left rotator cuff tear arthropathy 01/13/2016   • Obesity 04/27/2015   • Anemia, chronic disease 03/29/2017   • Right shoulder pain 04/13/2016   • S/p reverse total shoulder arthroplasty 04/11/2017   • Episode of syncope 04/16/2018   • Coronary artery disease involving native coronary artery of native heart without angina pectoris 04/16/2018   • Chest pain 12/06/2018   • CKD (chronic kidney disease) stage 3, GFR 30-59 ml/min ([facility]) 01/06/2019   • Nonrheumatic aortic valve stenosis 03/20/2019   • COPD (chronic obstructive pulmonary disease) ([facility]) 07/24/2019   •  HLD (hyperlipidemia) 07/25/2019   • Contusion of right leg 05/19/2021   • Knee pain 02/11/2020   • Primary osteoarthritis of left knee 02/11/2020   • Orthostatic hypotension 01/13/2022   • Hyponatremia 09/23/2022   • Atrial fibrillation ([facility]) 09/23/2022   • Sepsis due to Escherichia coli ([facility]) 10/10/2022   • S/P hip replacement 10/10/2022   • Acute pulmonary embolism ([facility]) 10/10/2022   • Sepsis, due to unspecified organism, unspecified whether acute organ dysfunction present ([facility]) 10/10/2022   • UTI due to extended-spectrum beta lactamase (ESBL) producing Escherichia coli 10/13/2022   • Diastolic CHF, acute on chronic ([facility]) 10/13/2022   • Type 2 MI (myocardial infarction) ([facility]) 10/15/2022   • Episode of unresponsiveness 10/22/2022     Resolved Ambulatory Problems     Diagnosis Date Noted   • Acute ST elevation myocardial infarction (STEMI) of inferior wall ([facility]) 04/08/2018   • Contusion of left knee 01/09/2016   • Left shoulder pain 04/13/2016   • Tobacco abuse 04/16/2018   • History of coronary artery stent placement 04/16/2018   • Chronic systolic congestive heart failure ([facility]) 04/16/2018   • Ischemic cardiomyopathy 05/08/2018   • Exertional chest pain 07/18/2018   • Acute bronchitis due to Rhinovirus 01/09/2019   • Elevated troponin 07/25/2019   • Slurred speech 09/23/2022   • Left displaced femoral neck fracture ([facility]) 09/23/2022   • Shortness of breath 10/10/2022     Past Medical History:   Diagnosis Date   • Bronchitis    • CAD (coronary artery disease)    • Carotid arterial disease ([facility])    • Chronic bronchitis ([facility])    • Gout    • Hyperlipidemia    • Hypertension    • Mild aortic stenosis    • Mitral regurgitation    • Precordial chest pain    • Rheumatic fever    • Syncopal episodes    • Tobacco use          PAST SURGICAL HISTORY  Past Surgical History:   Procedure Laterality Date   • CARDIAC CATHETERIZATION     • CARDIAC CATHETERIZATION N/A 4/8/2018    Procedure: Left Heart Cath;  Surgeon: Nicolas Jerez MD;   [facility]AS[facility];  Service: Cardiovascular   • CARDIAC CATHETERIZATION  4/8/2018    Procedure: Percutaneous Manual Thrombectomy;  Surgeon: Nicolas Jerez MD;  [facility];  Service: Cardiovascular   • CARDIAC CATHETERIZATION N/A 4/8/2018    Procedure: Stent CARLENE coronary;  Surgeon: Nicolas Jerez MD;  Location: [facility] CATH INVASIVE LOCATION;  Service: Cardiovascular   • CARDIAC CATHETERIZATION N/A 4/8/2018    Procedure: Right Heart Cath;  Surgeon: Nicolas Jerez MD;  Location: [facility] CATH INVASIVE LOCATION;  Service: Cardiovascular   • CARDIAC CATHETERIZATION Left 2/21/2020    Procedure: Cardiac Catheterization/Vascular Study;  Surgeon: Alejandro Jenkins MD;  [facility];  Service: Cardiovascular;  Laterality: Left;   • CARDIAC CATHETERIZATION N/A 2/21/2020    Procedure: Coronary angiography;  Surgeon: Alejandro Jenkins MD;  Location: [facility] CATH INVASIVE LOCATION;  Service: Cardiovascular;  Laterality: N/A;   • EYE SURGERY      cataract surg   • HAND SURGERY     • HERNIA REPAIR     • KNEE SURGERY     • TESTICLE SURGERY     • TOTAL HIP ARTHROPLASTY Left 9/25/2022    Procedure: TOTAL HIP ARTHROPLASTY ANTERIOR WITH HANA TABLE;  Surgeon: Jeff Rodriguez II, MD;  Location: [facility] OR;  Service: Orthopedics;  Laterality: Left;         FAMILY HISTORY  Family History   Problem Relation Age of Onset   • Heart disease Father    • Hypertension Father    • Stroke Father    • Diabetes Sister    • Cancer Brother    • Heart disease Brother    • Hypertension Brother    • No Known Problems Maternal Grandmother    • No Known Problems Maternal Grandfather    • No Known Problems Paternal Grandmother    • No Known Problems Paternal Grandfather          SOCIAL HISTORY  Social History     Socioeconomic History   • Marital status:    Tobacco Use   • Smoking status: Never   • Smokeless tobacco: Current     Types: Chew   • Tobacco comments:     chewing  tobacco since 4 years old   Vaping Use   • Vaping Use: Never used   Substance and Sexual Activity   • Alcohol use: No     Comment: caffeine use   • Drug use: Never   • Sexual activity: Defer         ALLERGIES  No known drug allergy        REVIEW OF SYSTEMS  Review of Systems     All systems reviewed and negative except for those discussed in HPI.       PHYSICAL EXAM    I have reviewed the triage vital signs and nursing notes.    ED Triage Vitals [02/04/23 1538]   Temp Heart Rate Resp BP SpO2   98.2 °F (36.8 °C) 104 15 126/68 100 %      Temp src Heart Rate Source Patient Position BP Location FiO2 (%)   Oral Monitor -- Right arm --       GENERAL: Elderly frail male.  No acute distress.Vital signs on my initial evaluation unremarkable  HENT: nares patent  Head/neck/ face are symmetric without gross deformity, signs of trauma, or swelling  EYES: no scleral icterus, no conjunctival pallor.  NECK: Supple, no meningismus  CV: regular rhythm, regular rate with intact distal pulses.  No murmur or rub.  Pain is reproducible on palpation to his chest it is in palpating his left side of his chest.  Normal inspection.  No crepitance or subcutaneous air  RESPIRATORY: normal effort and no respiratory distress.  Clear to auscultation bilaterally  ABDOMEN: soft and nontender.  MUSCULOSKELETAL: no deformity.  Patient has no edema.  Patient has intact distal pulses to upper and lower extremities that are equal strong and symmetric.  There is no coolness, cyanosis or pallor.  He does have some muscle atrophy diffusely.  NEURO: alert and appropriate, moves all extremities, follows commands.  No focal motor or sensory changes.  He has a chronic slurred speech.  He is also very hard of hearing.  SKIN: warm, dry    Vital signs and nursing notes reviewed.        LAB RESULTS  Recent Results (from the past 24 hour(s))   ECG 12 Lead Chest Pain    Collection Time: 02/04/23  3:42 PM   Result Value Ref Range    QT Interval 353 ms   Comprehensive  Metabolic Panel    Collection Time: 02/04/23  4:14 PM    Specimen: Blood   Result Value Ref Range    Glucose 124 (H) 65 - 99 mg/dL    BUN 33 (H) 8 - 23 mg/dL    Creatinine 0.95 0.76 - 1.27 mg/dL    Sodium 136 136 - 145 mmol/L    Potassium 3.7 3.5 - 5.2 mmol/L    Chloride 104 98 - 107 mmol/L    CO2 26.2 22.0 - 29.0 mmol/L    Calcium 8.7 8.6 - 10.5 mg/dL    Total Protein 5.8 (L) 6.0 - 8.5 g/dL    Albumin 3.4 (L) 3.5 - 5.2 g/dL    ALT (SGPT) 11 1 - 41 U/L    AST (SGOT) 14 1 - 40 U/L    Alkaline Phosphatase 53 39 - 117 U/L    Total Bilirubin 0.2 0.0 - 1.2 mg/dL    Globulin 2.4 gm/dL    A/G Ratio 1.4 g/dL    BUN/Creatinine Ratio 34.7 (H) 7.0 - 25.0    Anion Gap 5.8 5.0 - 15.0 mmol/L    eGFR 76.5 >60.0 mL/min/1.73   Troponin    Collection Time: 02/04/23  4:14 PM    Specimen: Blood   Result Value Ref Range    Troponin T 0.029 0.000 - 0.030 ng/mL   Green Top (Gel)    Collection Time: 02/04/23  4:14 PM   Result Value Ref Range    Extra Tube Hold for add-ons.    Lavender Top    Collection Time: 02/04/23  4:14 PM   Result Value Ref Range    Extra Tube hold for add-on    Gold Top - SST    Collection Time: 02/04/23  4:14 PM   Result Value Ref Range    Extra Tube Hold for add-ons.    Light Blue Top    Collection Time: 02/04/23  4:14 PM   Result Value Ref Range    Extra Tube Hold for add-ons.    CBC Auto Differential    Collection Time: 02/04/23  4:14 PM    Specimen: Blood   Result Value Ref Range    WBC 11.16 (H) 3.40 - 10.80 10*3/mm3    RBC 3.68 (L) 4.14 - 5.80 10*6/mm3    Hemoglobin 10.3 (L) 13.0 - 17.7 g/dL    Hematocrit 31.3 (L) 37.5 - 51.0 %    MCV 85.1 79.0 - 97.0 fL    MCH 28.0 26.6 - 33.0 pg    MCHC 32.9 31.5 - 35.7 g/dL    RDW 13.1 12.3 - 15.4 %    RDW-SD 40.9 37.0 - 54.0 fl    MPV 10.3 6.0 - 12.0 fL    Platelets 230 140 - 450 10*3/mm3    Neutrophil % 57.3 42.7 - 76.0 %    Lymphocyte % 34.9 19.6 - 45.3 %    Monocyte % 6.7 5.0 - 12.0 %    Eosinophil % 0.5 0.3 - 6.2 %    Basophil % 0.1 0.0 - 1.5 %    Immature Grans % 0.5  0.0 - 0.5 %    Neutrophils, Absolute 6.39 1.70 - 7.00 10*3/mm3    Lymphocytes, Absolute 3.89 (H) 0.70 - 3.10 10*3/mm3    Monocytes, Absolute 0.75 0.10 - 0.90 10*3/mm3    Eosinophils, Absolute 0.06 0.00 - 0.40 10*3/mm3    Basophils, Absolute 0.01 0.00 - 0.20 10*3/mm3    Immature Grans, Absolute 0.06 (H) 0.00 - 0.05 10*3/mm3    nRBC 0.0 0.0 - 0.2 /100 WBC   Lipid Panel    Collection Time: 02/04/23  4:14 PM    Specimen: Blood   Result Value Ref Range    Total Cholesterol 115 0 - 200 mg/dL    Triglycerides 122 0 - 150 mg/dL    HDL Cholesterol 67 (H) 40 - 60 mg/dL    LDL Cholesterol  27 0 - 100 mg/dL    VLDL Cholesterol 21 5 - 40 mg/dL    LDL/HDL Ratio 0.35    Troponin    Collection Time: 02/04/23  6:25 PM    Specimen: Blood   Result Value Ref Range    Troponin T 0.027 0.000 - 0.030 ng/mL   ECG 12 Lead Chest Pain    Collection Time: 02/04/23  9:39 PM   Result Value Ref Range    QT Interval 368 ms       Ordered the above labs and independently reviewed the results.        RADIOLOGY  XR Chest 1 View    Result Date: 2/4/2023  STAT PORTABLE RADIOGRAPHIC VIEW OF THE CHEST  CLINICAL HISTORY: Chest pain.  FINDINGS:  The lungs are clear of acute infiltrates. The cardiomediastinal silhouette is stable in appearance when compared to the prior chest x-ray dated 01/09/2023. The osseous structures are incidentally notable for a left shoulder arthroplasty prosthesis.       No active disease in the chest.  This report was finalized on 2/4/2023 4:37 PM by Dr. Aris Alicea M.D.      CT Angiogram Chest    Result Date: 2/4/2023  Patient: JALIL JOSHI  Time Out: 19:06 Exam(s): CTA CHEST EXAM:   CT Angiography Chest With Intravenous Contrast CLINICAL HISTORY:    Reason for exam: PE protocol. Chest pain. TECHNIQUE:   Axial computed tomographic angiography images of the chest with intravenous contrast.  CTDI is 7.69 mGy and DLP is 270.2 mGy-cm.  This CT exam was performed according to the principle of ALARA (As Low As Reasonably Achievable)  by using one or more of the following dose reduction techniques: automated exposure control, adjustment of the mA and or kV according to patient size, and or use of iterative reconstruction technique.   3D reconstructed images were created and reviewed. COMPARISON:   CT chest on 10 22 2022 FINDINGS:   Pulmonary arteries:  Unremarkable.  No pulmonary embolus identified.   Aorta:  Atherosclerotic changes of the aorta.  No aortic aneurysm or dissection.   Lungs:  Mild bronchial wall thickening is concerning for bronchitis.  Nonspecific 9 mm nodule in the right upper lobe, stable.  Mild dependent atelectasis bilaterally.  No focal consolidation.   Pleural space:  Unremarkable.  No significant effusion.  No pneumothorax.   Heart:  Coronary artery and aortic valve calcifications.  No significant pericardial effusion.  No evidence of RV dysfunction.   Mediastinum:  Nonspecific mildly prominent mediastinal and hilar lymph nodes.  Many of the lymph nodes are calcified.  Small hiatal hernia.   Bones joints:  Degenerative changes of the spine.  Stable chronic superior endplate depressions of the T2 and T8 3 and T4 vertebral bodies.  No acute fracture.  No dislocation.   Soft tissues:  Unremarkable.   Lymph nodes:  Unremarkable.  No enlarged lymph nodes.   Pancreas:  Atrophic pancreas.  Pancreatic cystic lesions.  Again noted, not well evaluated on this exam.   Kidneys and ureters:  Right renal cysts.   Other findings:  Small calcified granulomas bilaterally. IMPRESSION:     1.  No pulmonary embolus identified. 2.  Mild bronchial wall thickening is concerning for bronchitis.  Left shoulder arthroplasty causes streak artifact that limits.  Portions of the exam. 3.  Atherosclerotic changes of the aorta.  No aortic aneurysm or dissection. 4.  Nonspecific 9 mm nodule in the right upper lobe, stable.     Electronically signed by Noemi Cross M.D. on 02-04-23 at 1906      I ordered the above noted radiological studies. Reviewed by  me and discussed with radiologist.  See dictation for official radiology interpretation.      PROCEDURES    Procedures      MEDICATIONS GIVEN IN ER    Medications   sodium chloride 0.9 % flush 10 mL (has no administration in time range)   aspirin chewable tablet 324 mg (324 mg Oral Not Given 2/4/23 1650)   nitroglycerin (NITROSTAT) SL tablet 0.4 mg (0.4 mg Sublingual Given 2/4/23 1649)   sodium chloride 0.9 % flush 10 mL (has no administration in time range)   sodium chloride 0.9 % flush 10 mL (has no administration in time range)   sodium chloride 0.9 % infusion 40 mL (has no administration in time range)   sodium chloride 0.9 % flush 10 mL (has no administration in time range)   sodium chloride 0.9 % flush 10 mL (has no administration in time range)   sodium chloride 0.9 % infusion 40 mL (has no administration in time range)   atorvastatin (LIPITOR) tablet 10 mg (has no administration in time range)   midodrine (PROAMATINE) tablet 5 mg (has no administration in time range)   dextrose (GLUTOSE) oral gel 15 g (has no administration in time range)   dextrose (D50W) (25 g/50 mL) IV injection 25 g (has no administration in time range)   glucagon (GLUCAGEN) injection 1 mg (has no administration in time range)   insulin lispro (ADMELOG) injection 0-9 Units (has no administration in time range)   apixaban (ELIQUIS) tablet 5 mg (has no administration in time range)   iopamidol (ISOVUE-370) 76 % injection 100 mL (100 mL Intravenous Given 2/4/23 1704)   nitroglycerin (NITROSTAT) ointment 1 inch (1 inch Topical Given 2/4/23 1821)         All labs have been independently reviewed by me.  All radiology studies have been reviewed by me and I discussed with radiologist dictating the report when indicated below.  All EKG's independently viewed and interpreted by me.  Discussion below represents my analysis of pertinent findings related to patient's condition, differential diagnosis, treatment plan and final  disposition.        PROGRESS, DATA ANALYSIS, CONSULTS, AND MEDICAL DECISION MAKING    DDx includes acute coronary syndrome, pulmonary embolism, thoracic aortic dissection, pneumonia, pneumothorax, musculoskeletal pain, GERD or esophageal spasm, PUD, esophagitis, anxiety, myocarditis/pericarditis, esophageal rupture, pancreatitis.     This is a patient that has a history of coronary artery disease and an MI in the past.  He has multiple comorbidities and he is elderly and frail.  He again is a poor vague historian and this is also complicated in the fact that he has significant hearing impaired.  I do not see any acute process on his EKG.  He did have some improvement with nitroglycerin sublingual.  We will get a try some nitroglycerin sublingual here.  We will then check cardiac enzymes.  I am also going to check a CT angiogram of his chest.  I do not see any obvious acute injury pattern and looks very similar to the previous EKG.  He is not a very mobile person he does have increased risk factors for PEs.  And again history is impaired he states this pain is sharp as well as pressure and tightness.  He states that it is worse with exertion and similar to his previous MI and he also reports that is worse with breathing and palpating his chest and started suddenly when he was at rest.  All questions answered at this time.  Currently no family or friend present in the emergency department.  He appears stable  This gentleman will very likely need to be admitted to the hospital.  Given his complaints and comorbidities.  We are currently under a pandemic from the COVID19 infection.  The patient presented to the emergency department by ambulance or personal vehicle. I followed the current protocols required by Infection Control at [facility] in my evaluation and treatment of the patient. The patient was wearing a face mask during my evaluation and throughout my encounter. During my whole encounter with this  patient I used appropriate personal protective equipment.  This equipment consisted of eye protection, facemask, gown, and gloves.  I applied this equipment before entering the room.        ED Course as of 02/04/23 2346   Sat Feb 04, 2023   1604 EKG was reviewed  EKG was done at 3:42 PM it is a rate of 93 there is artifact it is normal sinus rhythm there is some mild intraventricular conduction delay  Normal axis  I do not appreciate any acute ST elevation  Add on appreciate any acute ST depression  Some nonspecific changes has some borderline left axis deviation  I compared to his previous EKG that was done on January 9, 2023 and it looked fairly similar. [MM]   1646 I looked at this x-ray and my own independent interpretation I do not appreciate any focal pulmonary consolidation.  He is status post hardware in his left shoulder.  I do not appreciate a pneumothorax.  The cardiomediastinal silhouette appears similar to previous x-ray on January 9, 2023.  I do not appreciate any acute disease.  I also reviewed the radiologist report. [MM]   1646 Troponin T: 0.029  Previous troponins have been in similar range. [MM]   1647 I would like to clarify something in the history.  Again this patient is a poor historian but when asked him he did have some improvement with his pain with the nitroglycerin.  His pain went down to 2.  It was then back up again when I evaluated him.  He states it was 8 out of 10 during my evaluation.  EMS did give him aspirin and nitroglycerin sublingual prior to arrival here [MM]   1917 I reviewed the CT angiogram report from the chest.  I reviewed the radiologist report.  There is no pulmonary emboli identified.  There is mild bronchial wall thickening concerning for possible bronchitis.  Atherosclerotic changes of the aorta there is no aortic aneurysm or dissection.  There is a 9 mm nodule in the right upper lobe that appears stable.  Please see complete dictated report from the radiologist [MM]    1941 Patient currently is pain-free.  Nitroglycerin has helped his pain.  Patient's spouse and family member which is his daughter is present.  Informed the results of the test and results of the CT scan.  My plan is to admit him for observation and have cardiology see him tomorrow.  Vital signs are unremarkable.  All questions answered [MM]   1943 I spoke with Herminia who is the midlevel provider in the observation unit.  I informed him of the patient's presenting symptoms and results of tests and how his symptoms have 100% resolved and his pain is resolved.  Informed of the conversation I had with the patient as well as the family.  He agrees to admit to the observation unit.  All questions answered. [MM]      ED Course User Index  [MM] Idris Sifuentes MD       AS OF 23:46 EST VITALS:    BP - 113/80  HR - 72  TEMP - 97.7 °F (36.5 °C) (Oral)  02 SATS - 99%    SOCIAL DETERMINANTS OF HEALTH THAT IMPACT OR LIMIT CARE (For example..Homelessness,safe discharge, inability to obtain care, follow up, or prescriptions):      DIAGNOSIS  Final diagnoses:   Chest pain, unspecified type   Hypertension, unspecified type   Hyperlipidemia, unspecified hyperlipidemia type   Chronic anemia         DISPOSITION  Patient will admitted to a monitored bed in the observation unit.          DICTATED UTILIZING DRAGON DICTATION    Note Disclaimer: At [facility], we believe that sharing information builds trust and better relationships. You are receiving this note because you recently visited [facility]. It is possible you will see health information before a provider has talked with you about it. This kind of information can be easy to misunderstand. To help you fully understand what it means for your health, we urge you to discuss this note with your provider.     Idris Sifuentes MD  02/04/23 3728

## 2023-02-05 LAB
ANION GAP SERPL CALCULATED.3IONS-SCNC: 9.3 MMOL/L (ref 5–15)
BUN SERPL-MCNC: 28 MG/DL (ref 8–23)
BUN/CREAT SERPL: 34.6 (ref 7–25)
CALCIUM SPEC-SCNC: 8.5 MG/DL (ref 8.6–10.5)
CHLORIDE SERPL-SCNC: 106 MMOL/L (ref 98–107)
CO2 SERPL-SCNC: 24.7 MMOL/L (ref 22–29)
CREAT SERPL-MCNC: 0.81 MG/DL (ref 0.76–1.27)
DEPRECATED RDW RBC AUTO: 41.6 FL (ref 37–54)
EGFRCR SERPLBLD CKD-EPI 2021: 84.3 ML/MIN/1.73
ERYTHROCYTE [DISTWIDTH] IN BLOOD BY AUTOMATED COUNT: 13.6 % (ref 12.3–15.4)
GLUCOSE BLDC GLUCOMTR-MCNC: 84 MG/DL (ref 70–130)
GLUCOSE BLDC GLUCOMTR-MCNC: 86 MG/DL (ref 70–130)
GLUCOSE BLDC GLUCOMTR-MCNC: 87 MG/DL (ref 70–130)
GLUCOSE SERPL-MCNC: 84 MG/DL (ref 65–99)
HCT VFR BLD AUTO: 29.1 % (ref 37.5–51)
HGB BLD-MCNC: 9.6 G/DL (ref 13–17.7)
MAGNESIUM SERPL-MCNC: 2 MG/DL (ref 1.6–2.4)
MCH RBC QN AUTO: 28.3 PG (ref 26.6–33)
MCHC RBC AUTO-ENTMCNC: 33 G/DL (ref 31.5–35.7)
MCV RBC AUTO: 85.8 FL (ref 79–97)
PLATELET # BLD AUTO: 207 10*3/MM3 (ref 140–450)
PMV BLD AUTO: 10.6 FL (ref 6–12)
POTASSIUM SERPL-SCNC: 3.8 MMOL/L (ref 3.5–5.2)
QT INTERVAL: 362 MS
QT INTERVAL: 368 MS
RBC # BLD AUTO: 3.39 10*6/MM3 (ref 4.14–5.8)
SODIUM SERPL-SCNC: 140 MMOL/L (ref 136–145)
TROPONIN T SERPL-MCNC: 0.03 NG/ML (ref 0–0.03)
TROPONIN T SERPL-MCNC: 0.04 NG/ML (ref 0–0.03)
TROPONIN T SERPL-MCNC: 0.04 NG/ML (ref 0–0.03)
WBC NRBC COR # BLD: 9.01 10*3/MM3 (ref 3.4–10.8)

## 2023-02-05 PROCEDURE — 99214 OFFICE O/P EST MOD 30 MIN: CPT | Performed by: INTERNAL MEDICINE

## 2023-02-05 PROCEDURE — 85027 COMPLETE CBC AUTOMATED: CPT | Performed by: NURSE PRACTITIONER

## 2023-02-05 PROCEDURE — G0378 HOSPITAL OBSERVATION PER HR: HCPCS

## 2023-02-05 PROCEDURE — 84484 ASSAY OF TROPONIN QUANT: CPT | Performed by: NURSE PRACTITIONER

## 2023-02-05 PROCEDURE — 84484 ASSAY OF TROPONIN QUANT: CPT | Performed by: INTERNAL MEDICINE

## 2023-02-05 PROCEDURE — 80048 BASIC METABOLIC PNL TOTAL CA: CPT | Performed by: NURSE PRACTITIONER

## 2023-02-05 PROCEDURE — 82962 GLUCOSE BLOOD TEST: CPT

## 2023-02-05 PROCEDURE — 83735 ASSAY OF MAGNESIUM: CPT | Performed by: NURSE PRACTITIONER

## 2023-02-05 RX ORDER — ACETAMINOPHEN 325 MG/1
650 TABLET ORAL EVERY 6 HOURS PRN
Status: DISCONTINUED | OUTPATIENT
Start: 2023-02-05 | End: 2023-02-06 | Stop reason: HOSPADM

## 2023-02-05 RX ADMIN — MIDODRINE HYDROCHLORIDE 5 MG: 5 TABLET ORAL at 17:35

## 2023-02-05 RX ADMIN — MIDODRINE HYDROCHLORIDE 5 MG: 5 TABLET ORAL at 07:24

## 2023-02-05 RX ADMIN — Medication 10 ML: at 09:10

## 2023-02-05 RX ADMIN — Medication 10 ML: at 21:33

## 2023-02-05 RX ADMIN — METOPROLOL TARTRATE 12.5 MG: 25 TABLET, FILM COATED ORAL at 21:28

## 2023-02-05 RX ADMIN — ACETAMINOPHEN 650 MG: 325 TABLET, FILM COATED ORAL at 21:28

## 2023-02-05 RX ADMIN — MIDODRINE HYDROCHLORIDE 5 MG: 5 TABLET ORAL at 11:42

## 2023-02-05 RX ADMIN — ATORVASTATIN CALCIUM 10 MG: 10 TABLET, FILM COATED ORAL at 00:11

## 2023-02-05 RX ADMIN — ATORVASTATIN CALCIUM 10 MG: 10 TABLET, FILM COATED ORAL at 21:28

## 2023-02-05 RX ADMIN — APIXABAN 5 MG: 5 TABLET, FILM COATED ORAL at 00:11

## 2023-02-05 RX ADMIN — Medication 10 ML: at 00:13

## 2023-02-05 RX ADMIN — Medication 10 ML: at 00:12

## 2023-02-05 NOTE — CONSULTS
[facility] Consult Note    Patient Name: Yoni Bonner Jr.  :1933  89 y.o.    Date of Admission: 2023  Date of Consultation:  23  Encounter Provider: Melissa Templeton MD  Place of Service: [facility] CARDIOLOGY  Referring Provider: Fred Kuo MD  Patient Care Team:  Patric Cameron DO as PCP - General (Family Medicine)      Chief complaint: Chest pain    History of Present Illness:    Yoni Bonner Jr. is a 89 y.o. 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     Over the last year the patient has presented intermittently for complaints of chest pain.  Most recently he was admitted in 10/2022 was noted to have elevated troponins at the time.  This was in the setting of sepsis and pulmonary embolism.  He was treated medically at that time.  He returned later that month with altered mental status and again was found to have elevated troponins and this time abnormal EKG.  However at that time he denied any chest pain.  Due to his overall frailty, lack of symptoms and con commitment to anticoagulation with apixaban opted to treat him medically.  Opted to treat him medically at that time.      Cardiac catheterization has been discussed with the patient in the past (although discussion has been limited by the patient's significant deafness) and he has declined cardiac catheterization in the past.  Medical management of his chest pain has been limited because of issues with orthostatic hypotension.  His last cardiac catheterization in 2020 showed 70% mid LAD stenosis that was not easily amenable to PCI, under percent  chronic total occlusion of his proximal left circumflex artery with a right to left collateral filling, and patent right coronary artery stents.  Last echocardiogram in 10/2022 showed apical hypokinesis but otherwise normal left ventricular function.    The patient presented back to the hospital yesterday after sudden onset of chest pain at home.  He was at rest when the pain started.  He was given 3 sublingual nitroglycerin without any improvement in pain.  Following his arrival to the hospital his pain has since improved.  He has had no further pain since then.  His initial troponins were in the indeterminate range.  This morning his troponin has risen and has become minimally abnormal at 0.035.  Repeat a few hours later was essentially unchanged at 0.036.  It appears that since his last admission in October he presented to the emergency room on 1/9/2023 with chest pain.  His troponin was mildly elevated at 0.035 at that time.  Repeat troponin normalized into the indeterminate range.  Creatinine was also mildly elevated during that admission.  The patient was discharged from the emergency room.    Patient's had no further chest pain since his admission.  He is asking when he can go home this morning.  Patient's wife indicates he has been working with home health physical therapy at home and has been doing okay with this.        Past Medical History:   Diagnosis Date   • Bronchitis    • CAD (coronary artery disease)    • Carotid arterial disease ([facility])    • Chronic bronchitis ([facility])    • COPD (chronic obstructive pulmonary disease) ([facility])    • Gout    • Hyperlipidemia    • Hypertension    • Ischemic cardiomyopathy     resolved, EF was 60% in 2018   • Mild aortic stenosis    • Mitral regurgitation    • Precordial chest pain    • Rheumatic fever    • Syncopal episodes    • Tobacco use        Past Surgical History:   Procedure Laterality Date   • CARDIAC CATHETERIZATION     • CARDIAC CATHETERIZATION N/A 4/8/2018     Procedure: Left Heart Cath;  Surgeon: Nicolas Jerez MD;  Location:  [facility] C[facility]VASIVE [facility];  Service: Cardiovascular   • CARDIAC CATHETERIZATION  4/8/2018    Procedure: Percutaneous Manual Thrombectomy;  Surgeon: Nicolas Jerez MD;  Location:  [facility];  Service: Cardiovascular   • CARDIAC CATHETERIZATION N/A 4/8/2018    Procedure: Stent CARLENE coronary;  Surgeon: Nicolas Jerez MD;  [facility];  Service: Cardiovascular   • CARDIAC CATHETERIZATION N/A 4/8/2018    Procedure: Right Heart Cath;  Surgeon: Nicolas Jerez MD;  Location:  [facility]ASIVE LOCATION;  Service: Cardiovascular   • CARDIAC CATHETERIZATION Left 2/21/2020    Procedure: Cardiac Catheterization/Vascular Study;  Surgeon: Alejandro Jenkins MD;  [facility];  Service: Cardiovascular;  Laterality: Left;   • CARDIAC CATHETERIZATION N/A 2/21/2020    Procedure: Coronary angiography;  Surgeon: Alejandro Jenkins MD;  [facility];  Service: Cardiovascular;  Laterality: N/A;   • EYE SURGERY      cataract surg   • HAND SURGERY     • HERNIA REPAIR     • KNEE SURGERY     • TESTICLE SURGERY     • TOTAL HIP ARTHROPLASTY Left 9/25/2022    Procedure: TOTAL HIP ARTHROPLASTY ANTERIOR WITH HANA TABLE;  Surgeon: Jeff Rodriguez II, MD;  Location: [facility] MAIN OR;  Service: Orthopedics;  Laterality: Left;         Prior to Admission medications    Medication Sig Start Date End Date Taking? Authorizing Provider   acetaminophen (TYLENOL) 325 MG tablet Take 650 mg by mouth Every 4 (Four) Hours As Needed for Mild Pain or Moderate Pain.   Yes Provider, MD Miguelina   albuterol (PROVENTIL) (2.5 MG/3ML) 0.083% nebulizer solution Take 2.5 mg by nebulization Every 4 (Four) Hours As Needed for Wheezing or Shortness of Air. 1/9/19  Yes Raj Bey MD   albuterol sulfate  (90 Base) MCG/ACT inhaler Inhale 2 puffs Every 4 (Four) Hours As Needed for Wheezing.   Yes  ProviderMiguelina MD   apixaban (ELIQUIS) 5 MG tablet tablet Take 5 mg by mouth 2 (Two) Times a Day.   Yes Miguelina Myles MD   aspirin 81 MG EC tablet Take 1 tablet by mouth Daily. 4/11/18  Yes Melissa Templeton MD   atorvastatin (LIPITOR) 10 MG tablet Take 1 tablet by mouth Daily.  Patient taking differently: Take 10 mg by mouth Every Night. 9/21/22  Yes Vinh Christianson APRN   midodrine (PROAMATINE) 5 MG tablet Take 5 mg by mouth 3 (Three) Times a Day Before Meals.   Yes Miguelina Myles MD   bisacodyl (DULCOLAX) 10 MG suppository Insert 1 suppository into the rectum Daily As Needed for constipation. 10/27/22      busPIRone (BUSPAR) 5 MG tablet Take 1 tablet by mouth Daily. 10/1/22   ProviderMiguelina MD   LORazepam (Ativan) 0.5 MG tablet Take 1 tablet by mouth Every 6 (Six) Hours As Needed for anxiety. 10/27/22      magnesium hydroxide (MILK OF MAGNESIA) 400 MG/5ML suspension Take 5 mL by mouth Daily As Needed for Constipation.    ProviderMiguelina MD   metoprolol tartrate (LOPRESSOR) 25 MG tablet Take 0.5 tablets by mouth Every 12 (Twelve) Hours for 30 days. 10/28/22 11/27/22  Richard Carlos MD       Allergies   Allergen Reactions   • No Known Drug Allergy Unknown (See Comments)     NKA       Social History     Socioeconomic History   • Marital status:    Tobacco Use   • Smoking status: Never   • Smokeless tobacco: Current     Types: Chew   • Tobacco comments:     chewing tobacco since 4 years old   Vaping Use   • Vaping Use: Never used   Substance and Sexual Activity   • Alcohol use: No     Comment: caffeine use   • Drug use: Never   • Sexual activity: Defer       Family History   Problem Relation Age of Onset   • Heart disease Father    • Hypertension Father    • Stroke Father    • Diabetes Sister    • Cancer Brother    • Heart disease Brother    • Hypertension Brother    • No Known Problems Maternal Grandmother    • No Known Problems Maternal Grandfather    • No Known  "Problems Paternal Grandmother    • No Known Problems Paternal Grandfather        REVIEW OF SYSTEMS:   All systems reviewed.  Pertinent positives identified in HPI.  All other systems are negative.      Objective:     Vitals:    02/04/23 1901 02/04/23 2102 02/05/23 0026 02/05/23 0430   BP: 117/57 113/80 105/52 104/52   BP Location:  Left arm Left arm Right arm   Patient Position:  Lying Lying Lying   Pulse: 75 72 70 80   Resp:  16 16 16   Temp:  97.7 °F (36.5 °C) 97.5 °F (36.4 °C) 97.7 °F (36.5 °C)   TempSrc:  Oral Oral Oral   SpO2: 99% 99% 100% 100%   Weight:  76.5 kg (168 lb 9.6 oz)     Height:  175.3 cm (69\")       Body mass index is 24.9 kg/m².    General Appearance:    Alert, cooperative, in no acute distress   Head:    Normocephalic, without obvious abnormality, atraumatic   Eyes:            Lids and lashes normal, conjunctivae and sclerae normal, no icterus, no pallor, corneas clear, PERRLA   Ears:    Ears appear intact with no abnormalities noted   Neck:   No adenopathy, supple, trachea midline, no thyromegaly, no carotid bruit, no JVD   Lungs:     Clear to auscultation, respirations regular, even and unlabored    Heart:    Regular rhythm and normal rate, normal S1 and S2, no murmur, no gallop, no rub, no click   Chest Wall:    No abnormalities observed   Abdomen:     Normal bowel sounds, no masses, no organomegaly, soft, nontender, nondistended, no guarding, no rebound  tenderness   Extremities:   Moves all extremities well, no edema, no cyanosis, no redness   Pulses:   Pulses palpable and equal bilaterally. Normal radial, carotid, femoral, dorsalis pedis and posterior tibial pulses bilaterally. Normal abdominal aorta   Skin:  Psychiatric:   No bleeding, bruising or rash    Alert and oriented x 3, normal mood and affect   Lab Review:     Results from last 7 days   Lab Units 02/05/23  0522 02/04/23  1614   SODIUM mmol/L 140 136   POTASSIUM mmol/L 3.8 3.7   CHLORIDE mmol/L 106 104   CO2 mmol/L 24.7 26.2   BUN " mg/dL 28* 33*   CREATININE mg/dL 0.81 0.95   CALCIUM mg/dL 8.5* 8.7   BILIRUBIN mg/dL  --  0.2   ALK PHOS U/L  --  53   ALT (SGPT) U/L  --  11   AST (SGOT) U/L  --  14   GLUCOSE mg/dL 84 124*     Results from last 7 days   Lab Units 02/05/23  0522 02/04/23  1825 02/04/23  1614   TROPONIN T ng/mL 0.035* 0.027 0.029     Results from last 7 days   Lab Units 02/05/23  0522   WBC 10*3/mm3 9.01   HEMOGLOBIN g/dL 9.6*   HEMATOCRIT % 29.1*   PLATELETS 10*3/mm3 207         Results from last 7 days   Lab Units 02/05/23  0522   MAGNESIUM mg/dL 2.0     Results from last 7 days   Lab Units 02/04/23  1614   CHOLESTEROL mg/dL 115   TRIGLYCERIDES mg/dL 122   HDL CHOL mg/dL 67*   LDL CHOL mg/dL 27               I personally viewed and interpreted the patient's EKG/Telemetry data.        Assessment and Plan:       1.  Chest pain.  No acute EKG changes.  Troponin is minimally elevated but not rapidly trending up or down.  2.  Coronary artery disease.  Known right coronary artery stents.  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  Medical management is been limited by orthostatic hypotension.  3.  Orthostatic hypotension  4.  Hyperlipidemia  5.  History of pulmonary embolism.  On chronic anticoagulation with apixaban.  6.  Tobacco use  7.  Hard of hearing    - Discussed options with the patient and the family.  Patient does have limited understanding because of his severe deafness.  When the subject of cardiac catheterization was broached the patient did not seem interested in pursuing this.  - Troponin remains elevated although stable.  - Continue to trend troponin to see if it declines.  - Continue medical management for now.  - Monitor for any further symptoms.    Melissa Templeton MD  02/05/23  07:09 EST

## 2023-02-05 NOTE — H&P
.   [facility]   HISTORY AND PHYSICAL    Patient Name: Yoni Bonner Jr.  : 1933  MRN: [id number]  Primary Care Physician:  Patric Cameron DO  Date of admission: 2023    Subjective   Subjective     Chief Complaint: Chest pain    HPI:    Yoni Bonner Jr. is a 89 y.o. male and past medical history including not limited to CAD, DM 2, coronary disease, chronic bronchitis, COPD, hyperlipidemia, hypertension, and ischemic cardiomyopathy presents to [facility] with chest pain.  Patient reports sudden onset of left anterior chest pain that he describes as sharp that was radiating into his left shoulder and arm.  Reports that he took 3 sublingual nitroglycerin at home with little to no improvement.  Symptoms lasted for about 1-1/2 and resolved after receiving full dose of aspirin by EMS.  Have not associated shortness of breath but denies nausea, vomiting diaphoresis, or back pain.  Pain is nonpleuritic, nonexertional.  Currently patient asymptomatic.  Denies abdominal pain, nausea, vomiting, or diarrhea.  Denies cough, chills, fever, or lower extremities edema.    In the ED patient was found to have /68, , T98.2, RR 15, and O2 sat 100% on room air.  Laboratory evaluation shows negative troponin x2, creatinine 0.95, BUN 30, WBC 11.16, hemoglobin 10.3, and platelets 230.  EKG showed sinus rhythm without any evidence of ischemia.    Echo on 10/2022 shows LVEF 50% and normal systolic function    Review of Systems   All systems were reviewed and negative except for: The mentioned above in HPI    Personal History     Past Medical History:   Diagnosis Date   • Bronchitis    • CAD (coronary artery disease)    • Carotid arterial disease ([facility])    • Chronic bronchitis ([facility])    • COPD (chronic obstructive pulmonary disease) ([facility])    • Gout    • Hyperlipidemia    • Hypertension    • Ischemic cardiomyopathy     resolved, EF was 60% in 2018   • Mild aortic stenosis    • Mitral  regurgitation    • Precordial chest pain    • Rheumatic fever    • Syncopal episodes    • Tobacco use        Past Surgical History:   Procedure Laterality Date   • CARDIAC CATHETERIZATION     • CARDIAC CATHETERIZATION N/A 4/8/2018    Procedure: Left Heart Cath;  Surgeon: Nicolas Jerez MD;  [facility];  Service: Cardiovascular   • CARDIAC CATHETERIZATION  4/8/2018    Procedure: Percutaneous Manual Thrombectomy;  Surgeon: Nicolas Jerez MD;  Location: [facility] CATH INVASIVE LOCATION;  Service: Cardiovascular   • CARDIAC CATHETERIZATION N/A 4/8/2018    Procedure: Stent CARLENE coronary;  Surgeon: Nicolas Jerez MD;  [facility];  Service: Cardiovascular   • CARDIAC CATHETERIZATION N/A 4/8/2018    Procedure: Right Heart Cath;  Surgeon: Nicolas Jerez MD;  Location: [facility] CATH INVASIVE LOCATION;  Service: Cardiovascular   • CARDIAC CATHETERIZATION Left 2/21/2020    Procedure: Cardiac Catheterization/Vascular Study;  Surgeon: Alejandro Jenkins MD;  Location: [facility] CATH INVASIVE LOCATION;  Service: Cardiovascular;  Laterality: Left;   • CARDIAC CATHETERIZATION N/A 2/21/2020    Procedure: Coronary angiography;  Surgeon: Alejandro Jenkins MD;  Location: [facility] CATH INVASIVE LOCATION;  Service: Cardiovascular;  Laterality: N/A;   • EYE SURGERY      cataract surg   • HAND SURGERY     • HERNIA REPAIR     • KNEE SURGERY     • TESTICLE SURGERY     • TOTAL HIP ARTHROPLASTY Left 9/25/2022    Procedure: TOTAL HIP ARTHROPLASTY ANTERIOR WITH HANA TABLE;  Surgeon: Jeff Rodriguez II, MD;  Location: [facility];  Service: Orthopedics;  Laterality: Left;       Family History: family history includes Cancer in his brother; Diabetes in his sister; Heart disease in his brother and father; Hypertension in his brother and father; No Known Problems in his maternal grandfather, maternal grandmother, paternal grandfather, and paternal grandmother; Stroke in his father. Otherwise pertinent FHx  was reviewed and not pertinent to current issue.    Social History:  reports that he has never smoked. His smokeless tobacco use includes chew. He reports that he does not drink alcohol and does not use drugs.    Home Medications:  LORazepam, acetaminophen, albuterol, albuterol sulfate HFA, aspirin, atorvastatin, bisacodyl, busPIRone, magnesium hydroxide, metoprolol tartrate, morphine, and ondansetron ODT    Allergies:  Allergies   Allergen Reactions   • No Known Drug Allergy Unknown (See Comments)     NKA       Objective   Objective     Vitals:   Temp:  [98 °F (36.7 °C)-98.2 °F (36.8 °C)] 98 °F (36.7 °C)  Heart Rate:  [] 75  Resp:  [15] 15  BP: ()/(53-68) 117/57  Physical Exam    Constitutional: Awake, alert   Eyes: PERRLA, sclerae anicteric, no conjunctival injection   HENT: NCAT, mucous membranes moist   Neck: Supple, no thyromegaly, no lymphadenopathy, trachea midline   Respiratory: Clear to auscultation bilaterally, nonlabored respirations    Cardiovascular: RRR, +murmurs, no rubs, or gallops, palpable pedal pulses bilaterally   Gastrointestinal: Positive bowel sounds, soft, nontender, nondistended   Musculoskeletal: No bilateral ankle edema, no clubbing or cyanosis to extremities   Psychiatric: Appropriate affect, cooperative   Neurologic: Oriented x 3, strength symmetric in all extremities, Cranial Nerves grossly intact to confrontation, speech clear   Skin: No rashes     Result Review    Result Review:  I have personally reviewed the results from the time of this admission to 2/4/2023 20:38 EST and agree with these findings:  []  Laboratory list / accordion  []  Microbiology  []  Radiology  []  EKG/Telemetry   []  Cardiology/Vascular   []  Pathology  []  Old records  []  Other:  Most notable findings include:       Assessment & Plan   Assessment / Plan     Brief Patient Summary:  Yoni Bonner Stephanie is a 89 y.o. male who was seen and evaluated the ED for chest pain.  Patient's been admitted to the  observation unit for further evaluation and cardiology consult.    Active Hospital Problems:  Active Hospital Problems    Diagnosis    • **Chest pain      Plan:   Chest pain  CAD  -Initial and repeat troponin 0.029-0.027, troponin this a.m. slightly elevated 0.035 and patient remained asymptomatic.    -Initial EKG EKG nonischemic and chest x-ray shows no active disease   -Repeat EKG this a.m. shows sinus rhythm with a rate of 69 and multiform PVCs  -CTA negative for PE and  shows stable 9mm nodules in the right upper lobe patient was encouraged to follow-up with his PCP for further evaluation  -Cardiac monitor  -Nitroglycerin ointment every 6 hours  -N.p.o. after midnight  -Continue aspirin, atorvastatin, metoprolol    Hyperlipidemia  -Continue atorvastatin  -Lipid panel pending    DM2  -Accu-Chek before meals and at bedtime  -Insulin sliding scale  -A1c pending    History of PE  -CTA negative for PE  -Continue Eliquis    CKD  - Creatinine at baseline  -Avoid nephrotoxic    Diastolic CHF  -Patient appears euvolemic without any evidence of fluid overload  -Continue midodrine       DVT prophylaxis:  No DVT prophylaxis order currently exists.    CODE STATUS:    Level Of Support Discussed With: Health Care Surrogate  Code Status (Patient has no pulse and is not breathing): No CPR (Do Not Attempt to Resuscitate)  Medical Interventions (Patient has pulse or is breathing): Comfort Measures  Release to patient: Routine Release    Admission Status:  I believe this patient meets observation status.    .I wore an face mask, eye protection, and gloves during this patient encounter. Patient also wearing a surgical mask. Hand hygeine performed before and after seeing the patient.    .Total of 75 minutes has been spent on this H&P including face-to-face encounter and reviewing labs and imaging    Electronically signed by BOOGIE Chapin, 02/04/23, 8:38 PM EST.

## 2023-02-05 NOTE — PLAN OF CARE
Goal Outcome Evaluation:  Plan of Care Reviewed With: patient, spouse      Pt admitted over night for chest pain. Pt has been chest pain free all day. Troponin's have been elevated but remain consistent. Dr Templeton evaluated pt and would like to monitor pt over night and repeat troponin in AM. Pt has slept off and on. Purewick in place. Pt alert to self and surroundings. Spouse remains at bedside. Pt has not eaten much but has had a boost. Pt afib on monitor and VSS. Will continue to monitor.

## 2023-02-05 NOTE — PLAN OF CARE
Goal Outcome Evaluation:  Plan of Care Reviewed With: patient, spouse        Progress: no change  Outcome Evaluation: No c/o chest pain after arrival to floor, troponin elevated, reported to provider, EKG done, mild wheezing this AM cleared by cough, family at bedside

## 2023-02-05 NOTE — CASE MANAGEMENT/SOCIAL WORK
Discharge Planning Assessment  [facility]     Patient Name: Yoni Bonner Jr.  MRN: [id number]  Today's Date: 2/5/2023    Admit Date: 2/4/2023    Plan: Plans to return home at d/c-ALAYNA Mendez RN   Discharge Needs Assessment     Row Name 02/05/23 1105       Living Environment    People in Home child(genevieve), adult;grandchild(genevieve);spouse    Name(s) of People in Home spouse Danielle, rao Fuller and lydia Jaramillo    Current Living Arrangements home    Primary Care Provided by spouse/significant other;child(genevieve)    Provides Primary Care For no one    Family Caregiver if Needed child(genevieve), adult;grandchild(genevieve), adult;spouse    Family Caregiver Names spouse Danielle, rao Fuller and lydia Kebede    Quality of Family Relationships supportive    Able to Return to Prior Arrangements yes       Resource/Environmental Concerns    Resource/Environmental Concerns none       Transition Planning    Patient/Family Anticipates Transition to home with family    Patient/Family Anticipated Services at Transition home health care  current w/ [facility]    Transportation Anticipated health plan transportation       Discharge Needs Assessment    Equipment Currently Used at Home commode;wheelchair;walker, standard;shower chair;hospital bed    Concerns to be Addressed no discharge needs identified    Anticipated Changes Related to Illness none    Equipment Needed After Discharge none    Provided Post Acute Provider List? N/A    Provided Post Acute Provider Quality & Resource List? N/A               Discharge Plan     Row Name 02/05/23 1107       Plan    Plan Plans to return home at wally/Oly Mendez RN    Patient/Family in Agreement with Plan yes    Provided Post Acute Provider List? N/A    Provided Post Acute Provider Quality & Resource List? N/A    Plan Comments Spoke w/ wife Danielle and daughter Alina at bedside. PPE used. Introduced self and explained role. All info on facesheet, including PCP  as alen Langley. Lives in single story home w/ two steps to get into home, w/ wife Danielle, daughter Alina and lydia Kebede. They all assist w/ care. Uses BSC, W/C, walker, shower chair and hospital bed at home. Current w/ [facility] and Nursing. Uses [facility] in [place] and family is able to  and pay for meds. Denies need for any further DME or community resources at d/c. To return home at d/c; will need ambulance transport. CM will continue to follow-ALAYNA Mendez RN              Continued Care and Services - Admitted Since 2/4/2023    Coordination has not been started for this encounter.          Demographic Summary     Row Name 02/05/23 1101       General Information    Admission Type observation    Arrived From emergency department    Required Notices Provided Observation Status Notice    Referral Source admission list;nursing    Reason for Consult discharge planning;transportation    Preferred Language English               Functional Status     Row Name 02/05/23 1102       Functional Status    Usual Activity Tolerance fair    Current Activity Tolerance fair       Functional Status, IADL    Medications completely dependent    Meal Preparation completely dependent    Housekeeping completely dependent    Laundry completely dependent    Shopping completely dependent    IADL Comments wife Danielle, rao Fuller and Apeavjdib-yzcpnvhedwjl-yvpocfv care-ALAYNA Mendez RN       Mental Status    General Appearance WDL WDL       Mental Status Summary    Recent Changes in Mental Status/Cognitive Functioning no changes               Psychosocial     Row Name 02/05/23 1103       Behavior WDL    Behavior WDL WDL       Emotion Mood WDL    Emotion/Mood/Affect WDL WDL               Abuse/Neglect    No documentation.                Legal    No documentation.                Substance Abuse    No documentation.                Patient Forms    No documentation.                   Vanessa BARFIELD  ZINA Mendez

## 2023-02-05 NOTE — PROGRESS NOTES
MD ATTESTATION NOTE    The ADA and I have discussed this patient's history, physical exam, and treatment plan.  I have reviewed the documentation and personally had a face to face interaction with the patient. I affirm the documentation and agree with the treatment and plan.  The attached note describes my personal findings.      I provided a substantive portion of the care of the patient.  I personally performed the physical exam in its entirety, and below are my findings.  For this patient encounter, the patient wore surgical mask, I wore full protective PPE including N95 and eye protection.      Brief HPI: This patient is an 89-year-old male admitted to the observation unit today secondary to chest discomfort.  He reports a history of coronary artery disease previously.  He describes the chest discomfort as a sharp sensation to the left side of his chest that is made worse by exertion.  He denies radiation of the pain, nausea/vomiting, or diaphoresis.  He does report some associated shortness of breath.    PHYSICAL EXAM  ED Triage Vitals [02/04/23 1538]   Temp Heart Rate Resp BP SpO2   98.2 °F (36.8 °C) 104 15 126/68 100 %      Temp src Heart Rate Source Patient Position BP Location FiO2 (%)   Oral Monitor -- Right arm --         GENERAL: Resting comfortably and in no acute distress, nontoxic in appearance  HENT: nares patent  EYES: no scleral icterus  CV: regular rhythm, normal rate, no M/R/G  RESPIRATORY: normal effort, lungs clear bilaterally  ABDOMEN: soft, nontender, no rebound or guarding  MUSCULOSKELETAL: no deformity, no edema  NEURO: alert, moves all extremities, follows commands  PSYCH:  calm, cooperative  SKIN: warm, dry    Vital signs and nursing notes reviewed.        Plan: The patient's EKG is nonischemic.  We will obtain serial cardiac enzymes throughout the evening and ask cardiology to see in a.m. consultation.

## 2023-02-05 NOTE — PROGRESS NOTES
ED OBSERVATION PROGRESS/DISCHARGE SUMMARY    Date of Admission: 2/4/2023   LOS: 0 days   PCP: Patric Cameron,     Subjective  Patient denies recurrent chest pain during admission.    Hospital Outcome: 89-year-old male admitted to the observation unit for further evaluation of chest pain.  Troponins 0.029, 0.027, 0.035, patient has CTA chest which was negative for pulmonary embolism, mild bronchial wall thickening concerning for bronchitis.  EKG shows sinus rhythm with PACs and PVCs.  Patient was seen by cardiology this morning, discussed with Dr. Templeton.  Dr. Templeton discussed heart catheterization with patient and he is not agreeable to this at the time.  Repeat troponin 0.036, 0.035.  We will continue to trend his troponins overnight and cardiology will reevaluate in the morning.  Patient is agreeable at this time.  I anticipate patient will be discharged home tomorrow.    ROS:  General: no fevers, chills  Respiratory: no cough, dyspnea  Cardiovascular: no chest pain, palpitations  Abdomen: No abdominal pain, nausea, vomiting, or diarrhea  Neurologic: No focal weakness    Objective   Physical Exam:  I have reviewed the vital signs.  Temp:  [97.5 °F (36.4 °C)-98.2 °F (36.8 °C)] 97.7 °F (36.5 °C)  Heart Rate:  [] 80  Resp:  [15-16] 16  BP: ()/(52-80) 104/52  General Appearance:    Alert, cooperative, no distress  Head:    Normocephalic, atraumatic  Eyes:    Sclerae anicteric  Neck:   Supple, no mass  Lungs: Clear to auscultation bilaterally, respirations unlabored  Heart: Regular rate and rhythm, S1 and S2 normal, no murmur, rub or gallop  Abdomen:  Soft, non-tender, bowel sounds active, nondistended  Extremities: No clubbing, cyanosis, or edema to lower extremities  Pulses:  2+ and symmetric in distal lower extremities  Skin: No rashes   Neurologic: Oriented x3, Normal strength to extremities    Results Review:    I have reviewed the labs, radiology results and diagnostic studies.    Results from  last 7 days   Lab Units 02/05/23  0522   WBC 10*3/mm3 9.01   HEMOGLOBIN g/dL 9.6*   HEMATOCRIT % 29.1*   PLATELETS 10*3/mm3 207     Results from last 7 days   Lab Units 02/05/23  0522 02/04/23  1614   SODIUM mmol/L 140 136   POTASSIUM mmol/L 3.8 3.7   CHLORIDE mmol/L 106 104   CO2 mmol/L 24.7 26.2   BUN mg/dL 28* 33*   CREATININE mg/dL 0.81 0.95   CALCIUM mg/dL 8.5* 8.7   BILIRUBIN mg/dL  --  0.2   ALK PHOS U/L  --  53   ALT (SGPT) U/L  --  11   AST (SGOT) U/L  --  14   GLUCOSE mg/dL 84 124*     Imaging Results (Last 24 Hours)     Procedure Component Value Units Date/Time    CT Angiogram Chest [[id number]] Collected: 02/04/23 1908     Updated: 02/04/23 1908    Narrative:        Patient: JALIL JOSHI  Time Out: 19:06  Exam(s): CTA CHEST     EXAM:    CT Angiography Chest With Intravenous Contrast    CLINICAL HISTORY:     Reason for exam: PE protocol. Chest pain.    TECHNIQUE:    Axial computed tomographic angiography images of the chest with   intravenous contrast.  CTDI is 7.69 mGy and DLP is 270.2 mGy-cm.  This CT   exam was performed according to the principle of ALARA (As Low As   Reasonably Achievable) by using one or more of the following dose   reduction techniques: automated exposure control, adjustment of the mA   and or kV according to patient size, and or use of iterative   reconstruction technique.    3D reconstructed images were created and reviewed.    COMPARISON:    CT chest on 10 22 2022    FINDINGS:    Pulmonary arteries:  Unremarkable.  No pulmonary embolus identified.    Aorta:  Atherosclerotic changes of the aorta.  No aortic aneurysm or   dissection.    Lungs:  Mild bronchial wall thickening is concerning for bronchitis.    Nonspecific 9 mm nodule in the right upper lobe, stable.  Mild dependent   atelectasis bilaterally.  No focal consolidation.    Pleural space:  Unremarkable.  No significant effusion.  No   pneumothorax.    Heart:  Coronary artery and aortic valve calcifications.  No    significant pericardial effusion.  No evidence of RV dysfunction.    Mediastinum:  Nonspecific mildly prominent mediastinal and hilar lymph   nodes.  Many of the lymph nodes are calcified.  Small hiatal hernia.    Bones joints:  Degenerative changes of the spine.  Stable chronic   superior endplate depressions of the T2 and T8 3 and T4 vertebral bodies.    No acute fracture.  No dislocation.    Soft tissues:  Unremarkable.    Lymph nodes:  Unremarkable.  No enlarged lymph nodes.    Pancreas:  Atrophic pancreas.  Pancreatic cystic lesions.  Again noted,   not well evaluated on this exam.    Kidneys and ureters:  Right renal cysts.    Other findings:  Small calcified granulomas bilaterally.    IMPRESSION:       1.  No pulmonary embolus identified.  2.  Mild bronchial wall thickening is concerning for bronchitis.  Left   shoulder arthroplasty causes streak artifact that limits.  Portions of   the exam.  3.  Atherosclerotic changes of the aorta.  No aortic aneurysm or   dissection.  4.  Nonspecific 9 mm nodule in the right upper lobe, stable.      Impression:          Electronically signed by Noemi Cross M.D. on 02-04-23 at 1906    XR Chest 1 View [[id number]] Collected: 02/04/23 1637     Updated: 02/04/23 1640    Narrative:      STAT PORTABLE RADIOGRAPHIC VIEW OF THE CHEST     CLINICAL HISTORY: Chest pain.     FINDINGS:     The lungs are clear of acute infiltrates. The cardiomediastinal  silhouette is stable in appearance when compared to the prior chest  x-ray dated 01/09/2023. The osseous structures are incidentally notable  for a left shoulder arthroplasty prosthesis.       Impression:         No active disease in the chest.     This report was finalized on 2/4/2023 4:37 PM by Dr. Aris Alicea M.D.             I have reviewed the medications.  ---------------------------------------------------------------------------------------------  Assessment & Plan   Assessment/Problem List    Chest  pain      Plan:    Chest pain  CAD  -Troponin elevated to 0.035, 0.036, 0.033  -Initial EKG nonischemic and chest x-ray shows no active disease   -Repeat EKG this a.m. shows sinus rhythm with a rate of 69 and multiform PVCs  -CTA negative for PE and  shows stable 9mm nodules in the right upper lobe patient was encouraged to follow-up with his PCP for further evaluation  -Continue aspirin, atorvastatin, metoprolol  -Cardiology consulted, discussed with Dr. Templeton  -Continue to trend troponins, cardiology will reevaluate in the a.m.     Hyperlipidemia  -Continue atorvastatin  -Lipid panel pending     DM2  -Accu-Chek before meals and at bedtime  -Insulin sliding scale  -A1c pending     History of PE  -CTA negative for PE  -Continue Eliquis     CKD  - Creatinine at baseline  -Avoid nephrotoxic     Diastolic CHF  -Patient appears euvolemic without any evidence of fluid overload  -Continue midodrine    Disposition: I anticipate patient will be discharged home tomorrow    62 minutes has been spent by [facility] providers in the care of this patient while under observation status     This note will serve as progress note    ABENA Zelaya 02/05/23 07:26 EST

## 2023-02-06 ENCOUNTER — READMISSION MANAGEMENT (OUTPATIENT)
Dept: CALL CENTER | Facility: HOSPITAL | Age: 88
End: 2023-02-06
Payer: MEDICARE

## 2023-02-06 ENCOUNTER — TELEPHONE (OUTPATIENT)
Dept: CARDIOLOGY | Facility: CLINIC | Age: 88
End: 2023-02-06
Payer: MEDICARE

## 2023-02-06 VITALS
HEART RATE: 60 BPM | RESPIRATION RATE: 18 BRPM | WEIGHT: 168.6 LBS | OXYGEN SATURATION: 100 % | SYSTOLIC BLOOD PRESSURE: 108 MMHG | TEMPERATURE: 97.6 F | HEIGHT: 69 IN | DIASTOLIC BLOOD PRESSURE: 50 MMHG | BODY MASS INDEX: 24.97 KG/M2

## 2023-02-06 LAB
ANION GAP SERPL CALCULATED.3IONS-SCNC: 6.7 MMOL/L (ref 5–15)
BASOPHILS # BLD AUTO: 0.02 10*3/MM3 (ref 0–0.2)
BASOPHILS NFR BLD AUTO: 0.2 % (ref 0–1.5)
BUN SERPL-MCNC: 23 MG/DL (ref 8–23)
BUN/CREAT SERPL: 29.1 (ref 7–25)
CALCIUM SPEC-SCNC: 8.8 MG/DL (ref 8.6–10.5)
CHLORIDE SERPL-SCNC: 103 MMOL/L (ref 98–107)
CO2 SERPL-SCNC: 26.3 MMOL/L (ref 22–29)
CREAT SERPL-MCNC: 0.79 MG/DL (ref 0.76–1.27)
DEPRECATED RDW RBC AUTO: 42.9 FL (ref 37–54)
EGFRCR SERPLBLD CKD-EPI 2021: 84.9 ML/MIN/1.73
EOSINOPHIL # BLD AUTO: 0.14 10*3/MM3 (ref 0–0.4)
EOSINOPHIL NFR BLD AUTO: 1.5 % (ref 0.3–6.2)
ERYTHROCYTE [DISTWIDTH] IN BLOOD BY AUTOMATED COUNT: 13.6 % (ref 12.3–15.4)
GLUCOSE BLDC GLUCOMTR-MCNC: 80 MG/DL (ref 70–130)
GLUCOSE SERPL-MCNC: 89 MG/DL (ref 65–99)
HCT VFR BLD AUTO: 33.1 % (ref 37.5–51)
HGB BLD-MCNC: 10.6 G/DL (ref 13–17.7)
IMM GRANULOCYTES # BLD AUTO: 0.05 10*3/MM3 (ref 0–0.05)
IMM GRANULOCYTES NFR BLD AUTO: 0.5 % (ref 0–0.5)
LYMPHOCYTES # BLD AUTO: 3.65 10*3/MM3 (ref 0.7–3.1)
LYMPHOCYTES NFR BLD AUTO: 39.4 % (ref 19.6–45.3)
MCH RBC QN AUTO: 28 PG (ref 26.6–33)
MCHC RBC AUTO-ENTMCNC: 32 G/DL (ref 31.5–35.7)
MCV RBC AUTO: 87.6 FL (ref 79–97)
MONOCYTES # BLD AUTO: 0.67 10*3/MM3 (ref 0.1–0.9)
MONOCYTES NFR BLD AUTO: 7.2 % (ref 5–12)
NEUTROPHILS NFR BLD AUTO: 4.74 10*3/MM3 (ref 1.7–7)
NEUTROPHILS NFR BLD AUTO: 51.2 % (ref 42.7–76)
NRBC BLD AUTO-RTO: 0 /100 WBC (ref 0–0.2)
PLATELET # BLD AUTO: 213 10*3/MM3 (ref 140–450)
PMV BLD AUTO: 10.8 FL (ref 6–12)
POTASSIUM SERPL-SCNC: 4.1 MMOL/L (ref 3.5–5.2)
RBC # BLD AUTO: 3.78 10*6/MM3 (ref 4.14–5.8)
SODIUM SERPL-SCNC: 136 MMOL/L (ref 136–145)
TROPONIN T SERPL-MCNC: 0.02 NG/ML (ref 0–0.03)
WBC NRBC COR # BLD: 9.27 10*3/MM3 (ref 3.4–10.8)

## 2023-02-06 PROCEDURE — 80048 BASIC METABOLIC PNL TOTAL CA: CPT | Performed by: PHYSICIAN ASSISTANT

## 2023-02-06 PROCEDURE — 82962 GLUCOSE BLOOD TEST: CPT

## 2023-02-06 PROCEDURE — 99214 OFFICE O/P EST MOD 30 MIN: CPT | Performed by: INTERNAL MEDICINE

## 2023-02-06 PROCEDURE — G0378 HOSPITAL OBSERVATION PER HR: HCPCS

## 2023-02-06 PROCEDURE — 85025 COMPLETE CBC W/AUTO DIFF WBC: CPT | Performed by: PHYSICIAN ASSISTANT

## 2023-02-06 PROCEDURE — 84484 ASSAY OF TROPONIN QUANT: CPT | Performed by: INTERNAL MEDICINE

## 2023-02-06 RX ADMIN — METOPROLOL TARTRATE 12.5 MG: 25 TABLET, FILM COATED ORAL at 08:51

## 2023-02-06 RX ADMIN — Medication 10 ML: at 08:51

## 2023-02-06 RX ADMIN — ACETAMINOPHEN 650 MG: 325 TABLET, FILM COATED ORAL at 08:51

## 2023-02-06 RX ADMIN — MIDODRINE HYDROCHLORIDE 5 MG: 5 TABLET ORAL at 07:20

## 2023-02-06 NOTE — PROGRESS NOTES
[facility] Follow Up    Chief Complaint: Follow up CAD    Interval History: Denies any further chest pain.  He denies any shortness of breath.  He wants to go home.    Objective:     Objective:  Temp:  [97.5 °F (36.4 °C)-97.9 °F (36.6 °C)] 97.6 °F (36.4 °C)  Heart Rate:  [53-77] 60  Resp:  [16-18] 18  BP: (108-166)/(50-77) 108/50     Intake/Output Summary (Last 24 hours) at 2/6/2023 0735  Last data filed at 2/6/2023 0646  Gross per 24 hour   Intake --   Output 1000 ml   Net -1000 ml     Body mass index is 24.9 kg/m².      02/04/23 2102   Weight: 76.5 kg (168 lb 9.6 oz)     Weight change:       Physical Exam:   General : Alert, cooperative, in no acute distress.  Neuro: Alert,cooperative and oriented.  Lungs: CTAB. Normal respiratory effort and rate.  CV: Regular rate and rhythm, normal S1 and S2, no murmurs, gallops or rubs.  ABD: Soft, nontender, nondistended. Positive bowel sounds.  Extr: No edema or cyanosis, moves all extremities.    Lab Review:   Results from last 7 days   Lab Units 02/06/23 0412 02/05/23 0522 02/04/23  1614   SODIUM mmol/L 136 140 136   POTASSIUM mmol/L 4.1 3.8 3.7   CHLORIDE mmol/L 103 106 104   CO2 mmol/L 26.3 24.7 26.2   BUN mg/dL 23 28* 33*   CREATININE mg/dL 0.79 0.81 0.95   GLUCOSE mg/dL 89 84 124*   CALCIUM mg/dL 8.8 8.5* 8.7   AST (SGOT) U/L  --   --  14   ALT (SGPT) U/L  --   --  11     Results from last 7 days   Lab Units 02/06/23 0412 02/05/23  1333 02/05/23  0912 02/05/23  0522 02/04/23  1825 02/04/23  1614   TROPONIN T ng/mL 0.023 0.033* 0.036* 0.035* 0.027 0.029     Results from last 7 days   Lab Units 02/06/23 0412 02/05/23  0522   WBC 10*3/mm3 9.27 9.01   HEMOGLOBIN g/dL 10.6* 9.6*   HEMATOCRIT % 33.1* 29.1*   PLATELETS 10*3/mm3 213 207         Results from last 7 days   Lab Units 02/05/23  0522   MAGNESIUM mg/dL 2.0     Results from last 7 days   Lab Units 02/04/23  1614   CHOLESTEROL mg/dL 115   TRIGLYCERIDES mg/dL 122   HDL CHOL mg/dL 67*              I reviewed the patient's new clinical results.  I personally viewed and interpreted the patient's EKG  Current Medications:   Scheduled Meds:aspirin, 324 mg, Oral, Once  atorvastatin, 10 mg, Oral, Nightly  insulin lispro, 0-9 Units, Subcutaneous, TID AC  metoprolol tartrate, 12.5 mg, Oral, Q12H  midodrine, 5 mg, Oral, TID AC  sodium chloride, 10 mL, Intravenous, Q12H  sodium chloride, 10 mL, Intravenous, Q12H      Continuous Infusions:     Allergies:  Allergies   Allergen Reactions   • No Known Drug Allergy Unknown (See Comments)     NKA       Assessment/Plan:     1.  Chest pain.  No acute EKG changes.  Troponin remain minimally elevated yesterday but now has declined to the indeterminate range.  He remains asymptomatic.    2.  Coronary artery disease.  Known right coronary artery stents.  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  Medical management is been limited by orthostatic hypotension.  3.  Orthostatic hypotension  4.  Hyperlipidemia  5.  History of pulmonary embolism.  On chronic anticoagulation with apixaban.  6.  Tobacco use  7.  Hard of hearing    - Resumed on low-dose metoprolol tartrate yesterday.  He seems to be tolerating it so far.  Continue as tolerates for coronary artery disease and recurrent angina.  Unfortunately do not have a lot of blood pressure room to uptitrate antianginal regimen much further.  - Okay to be discharged from my standpoint this morning.  We will arrange for the patient to return for follow-up in a month.    Melissa Templeton MD  02/06/23  07:35 EST

## 2023-02-06 NOTE — PLAN OF CARE
Goal Outcome Evaluation:  Plan of Care Reviewed With: patient, spouse      Pt has had a negative cardiac work up and troponin's have trended down. Pt to be discharged home with appropriate follow up. Pt and family agreeable with plan. Pt alert to self and place. Pt VSS, afib on monitor. EMS transport here to take pt home.   Progress: improving

## 2023-02-06 NOTE — CASE MANAGEMENT/SOCIAL WORK
Discharge Planning Assessment  [facility]     Patient Name: Yoni Bonner Jr.  MRN: [id number]  Today's Date: 2/6/2023    Admit Date: 2/4/2023    Plan: Plans to return home at d/Oly Mendez RN   Discharge Needs Assessment    No documentation.                Discharge Plan     Row Name 02/06/23 0856       Plan    Plan Comments Call placed to [facility] to schedule transport per primary RN request; spoke w/Mony who gave an ETA of 0930; updated primary RN.              Continued Care and Services - Admitted Since 2/4/2023    Coordination has not been started for this encounter.       Expected Discharge Date and Time     Expected Discharge Date Expected Discharge Time    Feb 6, 2023          Demographic Summary    No documentation.                Functional Status    No documentation.                Psychosocial    No documentation.                Abuse/Neglect    No documentation.                Legal    No documentation.                Substance Abuse    No documentation.                Patient Forms    No documentation.                   Zuly Parrish RN

## 2023-02-06 NOTE — PLAN OF CARE
Goal Outcome Evaluation:         Pts troponin 0.023 this morning. No chest pain reported overnight. VSS

## 2023-02-06 NOTE — PROGRESS NOTES
ED OBSERVATION PROGRESS/DISCHARGE SUMMARY    Date of Admission: 2/4/2023   LOS: 0 days   PCP: Patric Cameron, DO      Subjective    No acute events overnight.     Hospital Outcome:   89-year-old male admitted to the observation unit for further evaluation of chest pain.  Troponins 0.029, 0.027, 0.035, patient has CTA chest which was negative for pulmonary embolism, mild bronchial wall thickening concerning for bronchitis.  EKG shows sinus rhythm with PACs and PVCs.  Patient was seen by cardiology this morning, Dr. Templeton discussed heart catheterization with patient and he is not agreeable to this at the time.  Repeat troponin 0.036, 0.033, 0.023. Patient was seen and evaluated by Dr. Templeton again this morning and plan for discharge home with office follow up in 1 month.     ROS:  General: no fevers, chills  Respiratory: no cough, dyspnea  Cardiovascular: no chest pain, palpitations  Abdomen: No abdominal pain, nausea, vomiting, or diarrhea  Neurologic: No focal weakness    Objective   Physical Exam:  I have reviewed the vital signs.  Temp:  [97.2 °F (36.2 °C)-97.9 °F (36.6 °C)] 97.9 °F (36.6 °C)  Heart Rate:  [53-80] 60  Resp:  [16-18] 18  BP: (104-166)/(49-77) 138/55  General Appearance:    Alert, cooperative, no distress, elderly/frail appearing, hard of hearing  Head:    Normocephalic, atraumatic  Eyes:    Sclerae anicteric  Neck:   Supple, no mass  Lungs: Clear to auscultation bilaterally, respirations unlabored  Heart: Regular rate and rhythm, S1 and S2 normal, no murmur, rub or gallop  Abdomen:  Soft, non-tender, bowel sounds active, nondistended  Extremities: No clubbing, cyanosis, or edema to lower extremities  Pulses:  2+ and symmetric in distal lower extremities  Skin: No rashes   Neurologic: Oriented x3, Normal strength to extremities    Results Review:    I have reviewed the labs, radiology results and diagnostic studies.    Results from last 7 days   Lab Units 02/05/23  0522   WBC 10*3/mm3 9.01    HEMOGLOBIN g/dL 9.6*   HEMATOCRIT % 29.1*   PLATELETS 10*3/mm3 207     Results from last 7 days   Lab Units 02/05/23  0522 02/04/23  1614   SODIUM mmol/L 140 136   POTASSIUM mmol/L 3.8 3.7   CHLORIDE mmol/L 106 104   CO2 mmol/L 24.7 26.2   BUN mg/dL 28* 33*   CREATININE mg/dL 0.81 0.95   CALCIUM mg/dL 8.5* 8.7   BILIRUBIN mg/dL  --  0.2   ALK PHOS U/L  --  53   ALT (SGPT) U/L  --  11   AST (SGOT) U/L  --  14   GLUCOSE mg/dL 84 124*     Imaging Results (Last 24 Hours)     ** No results found for the last 24 hours. **          I have reviewed the medications.  ---------------------------------------------------------------------------------------------  Assessment & Plan   Assessment/Problem List    Chest pain      Plan:    Chest pain  CAD  -Troponin elevated to 0.035, 0.036, 0.033, 0.023  -EKG nonischemic  -Chest x-ray shows no active disease   -CTA negative for PE and  shows stable 9mm nodules in the right upper lobe patient was encouraged to follow-up with his PCP for further evaluation  -Continue aspirin, atorvastatin, metoprolol  -Cardiology consulted, clear for discharge home     Hyperlipidemia  -Lipid panel unremarkable   -Continue atorvastatin    DM2  -Resume home medications following discharge     History of PE  -CTA negative for PE  -Continue Eliquis     CKD  -Creatinine at baseline  -Avoid nephrotoxic medications      Diastolic CHF  -Patient appears euvolemic without any evidence of fluid overload  -Continue midodrine       Disposition: Home    Follow-up after Discharge: cardiology in 1 month    This note will serve as a discharge summary      32 minutes have been spent by [facility] providers in the care of this patient while under observation status.      I wore an face mask, eye protection, and gloves during this patient encounter. Patient also wearing a surgical mask. Hand hygeine performed before and after seeing the patient.      Lindsay Douglas PA-C 02/06/23  04:11 EST

## 2023-02-06 NOTE — TELEPHONE ENCOUNTER
[phone]    Alexia Mora called stating the pt was released from [facility] today.  She states the pt has asa 81mg po daily on his med rec and that the wife states he does not take this. Alexia would like to know if he should be taking.  Please advise.    [facility]

## 2023-02-07 NOTE — OUTREACH NOTE
Prep Survey    Flowsheet Row Responses   [facility] facility patient discharged from? [facility]   Is LACE score < 7 ? No   Eligibility Readm Mgmt   Discharge diagnosis Chest pain   Does the patient have one of the following disease processes/diagnoses(primary or secondary)? Other   Does the patient have Home health ordered? No   Is there a DME ordered? No   Prep survey completed? Yes          CORRINE ESTRADA - Registered Nurse

## 2023-02-10 ENCOUNTER — READMISSION MANAGEMENT (OUTPATIENT)
Dept: CALL CENTER | Facility: HOSPITAL | Age: 88
End: 2023-02-10
Payer: MEDICARE

## 2023-02-10 NOTE — OUTREACH NOTE
Medical Week 1 Survey    Flowsheet Row Responses   [facility] patient discharged from? [place]   Does the patient have one of the following disease processes/diagnoses(primary or secondary)? Other   Week 1 attempt successful? Yes   Call start time 1525   Call end time 1527   Discharge diagnosis Chest pain   Is patient permission given to speak with other caregiver? Yes   Person spoke with today (if not patient) and relationship Spouse- Danielle   Meds reviewed with patient/caregiver? Yes   Prescription comments No med changes    Is the patient taking all medications as directed (includes completed medication regime)? Yes   Comments regarding PCP Wife will call next week to set up pcp fu   Has home health visited the patient within 72 hours of discharge? N/A   Psychosocial issues? No   Did the patient receive a copy of their discharge instructions? Yes   What is the patient's perception of their health status since discharge? Improving   Is the patient/caregiver able to teach back signs and symptoms related to disease process for when to call PCP? Yes   Is the patient/caregiver able to teach back signs and symptoms related to disease process for when to call 911? Yes   Is the patient/caregiver able to teach back the hierarchy of who to call/visit for symptoms/problems? PCP, Specialist, Home health nurse, Urgent Care, ED, 911 Yes   Week 1 call completed? Yes   Wrap up additional comments Per spouse patient is doing well, She will call his pcp to set up fu appt. no concerns or questions at this time          Avis Gann Nurse

## 2023-03-13 NOTE — TELEPHONE ENCOUNTER
Caller: Danielle Bonner    Relationship: Emergency Contact    Best call back number: [phone]    Requested Prescriptions:   Requested Prescriptions     Pending Prescriptions Disp Refills   • apixaban (ELIQUIS) 5 MG tablet tablet 60 tablet      Sig: Take 1 tablet by mouth 2 (Two) Times a Day.        Pharmacy where request should be sent:  [facility]     Additional details provided by patient:     Does the patient have less than a 3 day supply:  [x] Yes  [] No    Would you like a call back once the refill request has been completed: [] Yes [x] No    If the office needs to give you a call back, can they leave a voicemail: [x] Yes [] No    Frances Lee Rep   03/13/23 12:59 EDT

## 2023-05-03 RX ORDER — MIDODRINE HYDROCHLORIDE 5 MG/1
TABLET ORAL
Qty: 90 TABLET | Refills: 3 | Status: SHIPPED | OUTPATIENT
Start: 2023-05-03

## 2023-08-03 NOTE — PROGRESS NOTES
Subjective:     Encounter Date:08/04/2023      Patient ID: Yoni Bonner Jr. is a 89 y.o. male.    Chief Complaint:follow up CAD  History of Present Illness  This is a 89 y/o man who follows with Dr. Templeton and is new to me today. He has a pmhx of coronary artery disease s/p RCA stent placement,  of the left circumflex artery, chronic stenosis of the mid LAD that is medically managed, orthostatic hypotension, hyperlipidemia, tobacco abuse, and severe hearing impairment.    He is here today for a follow up visit. He reports good days and bad days. He has been having some issues with low blood pressure with readings in the 90s/50s. He is symptomatic with this. He stopped taking metoprolol a while back at the suggestion of his physical therapist. He becomes dizzy when his blood pressure is low like this and becomes diaphoretic at times as well. This causes anxiety as he became diaphoretic with his previous MI as well. He is taking sublingual nitro about 2 times a week for angina. He has chronic and stable shortness of breath. He denies palpitations, dizziness or syncope. He denies any swelling in his lower extremities, orthopnea or PND.    Prior history:  Dr. Templeton began following the patient in 2015 for recurrent syncope felt to be due to orthostasis at the time. An echocardiogram showed normal left ventricular systolic function and wall motion, an ejection fraction of 54%, grade IA diastolic dysfunction and no significant valvular disease.  His carotid ultrasound revealed moderate bilateral stenosis. In 2017, he reported some worsening of his baseline dyspnea and a dobutamine stress test was negative for ischemia.     In 2018, he was admitted for an inferior MI. He was found to have a thrombotic subtotal occlusion of his proximal RCA for which he underwent thrombectomy and CARLENE placement. Additionally, he was noted to have chronic occlusion of his proximal left circumflex artery and diffuse LAD stenosis. An  echocardiogram post-cath showed an EF of 42%, inferior wall motion abnormalities, mild to moderate MR, mild AS and AR and grade 1 diastolic dysfunction. He had a repeat echocardiogram in August 2018 that showed a normalized EF of 60%.     He had a repeat cardiac catheterization in February 2020 for complaints of chest pain and abnormal stress test. It showed stable disease including 70% mid LAD stenosis, chronic total occlusion of his left circumflex artery with right to left collateral filling and a patent right coronary artery stent. Medical management was continued with carvedilol and isosorbide.     He was seen in the ED in March 2021 with chest pain, His workup was unremarkable so his isosorbide was increased to 30 mg BID. In January 2022, he was having issues with orthostatic hypotension. Isosorbide and carvedilol were stopped and he was started on midodrine. Repeat echocardiogram showed an EF of 56%, moderate LVH, normal diastolic function and moderate aortic stenosis. Due to worsening chest pain, he was placed back on isosorbide and Toprol XL. However, he was unable to tolerate this so the isosorbide was changed to Ranexa and Toprol XL was changed to BID dosing.    He has had multiple admission since July 2022 for both chest pain and syncope. His most recent admission was in February 2023. He has remained on medical management of his coronary disease as he has declined cardiac catheterizations during recent hospitalizations. His last echocardiogram was in 10/2022 and showed apical hypokinesis but otherwise normal left ventricular function.     I have reviewed and updated as appropriate allergies, current medications, past family history, past medical history, past surgical history and problem list.    Review of Systems   Constitutional: Positive for malaise/fatigue. Negative for fever, weight gain and weight loss.   HENT:  Negative for congestion, hoarse voice and sore throat.    Eyes:  Negative for blurred  vision and double vision.   Cardiovascular:  Positive for dyspnea on exertion. Negative for chest pain, leg swelling, orthopnea, palpitations and syncope.   Respiratory:  Positive for shortness of breath. Negative for cough and wheezing.    Gastrointestinal:  Negative for abdominal pain, hematemesis, hematochezia and melena.   Genitourinary:  Negative for dysuria and hematuria.   Neurological:  Negative for dizziness, headaches, light-headedness and numbness.   Psychiatric/Behavioral:  Negative for depression. The patient is not nervous/anxious.        Current Outpatient Medications:     acetaminophen (TYLENOL) 325 MG tablet, Take 2 tablets by mouth Every 4 (Four) Hours As Needed for Mild Pain or Moderate Pain., Disp: , Rfl:     albuterol (PROVENTIL) (2.5 MG/3ML) 0.083% nebulizer solution, Take 2.5 mg by nebulization Every 4 (Four) Hours As Needed for Wheezing or Shortness of Air., Disp: , Rfl: 12    albuterol sulfate  (90 Base) MCG/ACT inhaler, Inhale 2 puffs Every 4 (Four) Hours As Needed for Wheezing., Disp: , Rfl:     apixaban (ELIQUIS) 5 MG tablet tablet, Take 1 tablet by mouth 2 (Two) Times a Day., Disp: 60 tablet, Rfl: 11    aspirin 81 MG EC tablet, Take 1 tablet by mouth Daily., Disp: 30 tablet, Rfl: 5    atorvastatin (LIPITOR) 10 MG tablet, Take 1 tablet by mouth Daily. (Patient taking differently: Take 1 tablet by mouth Every Night.), Disp: 90 tablet, Rfl: 0    midodrine (PROAMATINE) 10 MG tablet, Take 1 tablet by mouth 3 (Three) Times a Day Before Meals., Disp: 270 tablet, Rfl: 3    nitroglycerin (NITROSTAT) 0.4 MG SL tablet, , Disp: , Rfl:     bisacodyl (DULCOLAX) 10 MG suppository, Insert 1 suppository into the rectum Daily As Needed for constipation., Disp: 12 suppository, Rfl: 6    busPIRone (BUSPAR) 5 MG tablet, Take 1 tablet by mouth Daily. (Patient not taking: Reported on 8/4/2023), Disp: , Rfl:     LORazepam (Ativan) 0.5 MG tablet, Take 1 tablet by mouth Every 6 (Six) Hours As Needed for  anxiety. (Patient not taking: Reported on 8/4/2023), Disp: 45 tablet, Rfl: 4    magnesium hydroxide (MILK OF MAGNESIA) 400 MG/5ML suspension, Take 5 mL by mouth Daily As Needed for Constipation., Disp: , Rfl:     metoprolol tartrate (LOPRESSOR) 25 MG tablet, Take 0.5 tablets by mouth Every 12 (Twelve) Hours for 30 days., Disp: 30 tablet, Rfl: 0    Past Medical History:   Diagnosis Date    Bronchitis     CAD (coronary artery disease)     Carotid arterial disease     Chronic bronchitis     COPD (chronic obstructive pulmonary disease)     Gout     Hyperlipidemia     Hypertension     Ischemic cardiomyopathy     resolved, EF was 60% in 2018    Mild aortic stenosis     Mitral regurgitation     Precordial chest pain     Rheumatic fever     Syncopal episodes     Tobacco use        Past Surgical History:   Procedure Laterality Date    CARDIAC CATHETERIZATION      CARDIAC CATHETERIZATION N/A 4/8/2018    Procedure: Left Heart Cath;  Surgeon: Nicolas Jerez MD;  Location: [facility] CATH INVASIVE LOCATION;  Service: Cardiovascular    CARDIAC CATHETERIZATION  4/8/2018    Procedure: Percutaneous Manual Thrombectomy;  Surgeon: Nciolas Jerez MD;  Location: [facility] CATH INVASIVE LOCATION;  Service: Cardiovascular    CARDIAC CATHETERIZATION N/A 4/8/2018    Procedure: Stent CARLENE coronary;  Surgeon: Nicolas Jerez MD;  Location: [facility] CATH INVASIVE LOCATION;  Service: Cardiovascular    CARDIAC CATHETERIZATION N/A 4/8/2018    Procedure: Right Heart Cath;  Surgeon: Nicolas Jerez MD;  Location: [facility] CATH INVASIVE LOCATION;  Service: Cardiovascular    CARDIAC CATHETERIZATION Left 2/21/2020    Procedure: Cardiac Catheterization/Vascular Study;  Surgeon: Alejandro Jenkins MD;  Location: [facility] CATH INVASIVE LOCATION;  Service: Cardiovascular;  Laterality: Left;    CARDIAC CATHETERIZATION N/A 2/21/2020    Procedure: Coronary angiography;  Surgeon: Alejandro Jenkins MD;  Location: [facility] CATH INVASIVE LOCATION;  Service: Cardiovascular;  Laterality: N/A;     "EYE SURGERY      cataract surg    HAND SURGERY      HERNIA REPAIR      KNEE SURGERY      TESTICLE SURGERY      TOTAL HIP ARTHROPLASTY Left 9/25/2022    Procedure: TOTAL HIP ARTHROPLASTY ANTERIOR WITH HANA TABLE;  Surgeon: Jeff Rodriguez II, MD;  Location: [facility];  Service: Orthopedics;  Laterality: Left;       Family History   Problem Relation Age of Onset    Heart disease Father     Hypertension Father     Stroke Father     Diabetes Sister     Cancer Brother     Heart disease Brother     Hypertension Brother     No Known Problems Maternal Grandmother     No Known Problems Maternal Grandfather     No Known Problems Paternal Grandmother     No Known Problems Paternal Grandfather        Social History     Tobacco Use    Smoking status: Never    Smokeless tobacco: Current     Types: Chew    Tobacco comments:     chewing tobacco since 4 years old   Vaping Use    Vaping Use: Never used   Substance Use Topics    Alcohol use: No     Comment: caffeine use    Drug use: Never         ECG 12 Lead    Date/Time: 8/4/2023 3:40 PM  Performed by: Nneka Honeycutt APRN  Authorized by: Nneka Honeycutt APRN   Comparison: compared with previous ECG from 2/5/2023  Similar to previous ECG  Rhythm: sinus rhythm  Ectopy: atrial premature contractions  Other findings: non-specific ST-T wave changes  Comments: No significant change from previous EKG           Objective:     Visit Vitals  /80   Pulse 98   Ht 175.3 cm (69\")   Wt 72.1 kg (159 lb)   BMI 23.48 kg/mý             Physical Exam  Constitutional:       Appearance: Normal appearance. He is normal weight.   HENT:      Head: Normocephalic.   Neck:      Vascular: No carotid bruit.   Cardiovascular:      Rate and Rhythm: Normal rate and regular rhythm.      Chest Wall: PMI is not displaced.      Pulses: Normal pulses.           Radial pulses are 2+ on the right side and 2+ on the left side.        Posterior tibial pulses are 2+ on the right side and 2+ on the left " side.      Heart sounds: Murmur heard.   Systolic murmur is present with a grade of 3/6.     No friction rub. No gallop.   Pulmonary:      Effort: Pulmonary effort is normal.      Breath sounds: Normal breath sounds.   Abdominal:      General: Bowel sounds are normal. There is no distension.      Palpations: Abdomen is soft.   Musculoskeletal:      Right lower leg: No edema.      Left lower leg: No edema.   Skin:     General: Skin is warm and dry.      Capillary Refill: Capillary refill takes less than 2 seconds.   Neurological:      Mental Status: He is alert and oriented to person, place, and time.   Psychiatric:         Mood and Affect: Mood normal.         Behavior: Behavior normal.         Thought Content: Thought content normal.        Lab Review:   Lipid Panel          9/23/2022    06:37 2/4/2023    16:14   Lipid Panel   Total Cholesterol 126  115    Triglycerides 71  122    HDL Cholesterol 70  67    VLDL Cholesterol 14  21    LDL Cholesterol  42  27    LDL/HDL Ratio 0.60  0.35          Cardiac Procedures:       Assessment:         Diagnoses and all orders for this visit:    1. Primary hypertension (Primary)    2. Coronary artery disease involving native coronary artery of native heart without angina pectoris    3. Nonrheumatic aortic valve stenosis    4. Hyperlipidemia, unspecified hyperlipidemia type    5. Orthostatic hypotension    6. Diastolic CHF, acute on chronic    Other orders  -     midodrine (PROAMATINE) 10 MG tablet; Take 1 tablet by mouth 3 (Three) Times a Day Before Meals.  Dispense: 270 tablet; Refill: 3  -     ECG 12 Lead            Plan:       CAD: No anginal symptoms reported. On low dose Toprol XL. EKG is stable with no new ischemic changes noted. Continue with medical management.  Orthostatic hypotension: on midodrine with no reported falls or syncope. He is having issues with frequent BP readings in the 90s/50s. I am going to increase midodrine to 10mg TID.  Chronic diastolic CHF: appears  euvolemic on exam with no reported orthopnea or exertional shortness of breath  Aortic valve stenosis: moderate on echocardiogram in October 2022. Continue with routine surveillance.  CKD stage 3  COPD  HLD: on statin therapy. Goal LDL < 70.    Thank you for allowing me to participate in this patient's care. Please call with any questions or concerns. Mr. Bonner will follow up with Dr. Templeton in 3 months.          Your medication list            Accurate as of August 4, 2023  3:42 PM. If you have any questions, ask your nurse or doctor.                CHANGE how you take these medications        Instructions Last Dose Given Next Dose Due   atorvastatin 10 MG tablet  Commonly known as: LIPITOR  What changed: when to take this      Take 1 tablet by mouth Daily.       midodrine 10 MG tablet  Commonly known as: PROAMATINE  What changed:   medication strength  how much to take  Changed by: BOOGIE Ferro      Take 1 tablet by mouth 3 (Three) Times a Day Before Meals.              CONTINUE taking these medications        Instructions Last Dose Given Next Dose Due   acetaminophen 325 MG tablet  Commonly known as: TYLENOL      Take 2 tablets by mouth Every 4 (Four) Hours As Needed for Mild Pain or Moderate Pain.       albuterol sulfate  (90 Base) MCG/ACT inhaler  Commonly known as: PROVENTIL HFA;VENTOLIN HFA;PROAIR HFA      Inhale 2 puffs Every 4 (Four) Hours As Needed for Wheezing.       albuterol (2.5 MG/3ML) 0.083% nebulizer solution  Commonly known as: PROVENTIL      Take 2.5 mg by nebulization Every 4 (Four) Hours As Needed for Wheezing or Shortness of Air.       apixaban 5 MG tablet tablet  Commonly known as: ELIQUIS      Take 1 tablet by mouth 2 (Two) Times a Day.       aspirin 81 MG EC tablet      Take 1 tablet by mouth Daily.       busPIRone 5 MG tablet  Commonly known as: BUSPAR      Take 1 tablet by mouth Daily.       Gentle Laxative 10 MG suppository  Generic drug: bisacodyl      Insert 1 suppository  into the rectum Daily As Needed for constipation.       LORazepam 0.5 MG tablet  Commonly known as: Ativan      Take 1 tablet by mouth Every 6 (Six) Hours As Needed for anxiety.       magnesium hydroxide 400 MG/5ML suspension  Commonly known as: MILK OF MAGNESIA      Take 5 mL by mouth Daily As Needed for Constipation.       metoprolol tartrate 25 MG tablet  Commonly known as: LOPRESSOR      Take 0.5 tablets by mouth Every 12 (Twelve) Hours for 30 days.       nitroglycerin 0.4 MG SL tablet  Commonly known as: NITROSTAT                     Where to Get Your Medications        These medications were sent to [facility] [id number] - [place], [place] - [address] [place] [address] AT [address] - [phone]  - [phone] [address] [address]      Phone: [phone]   midodrine 10 MG tablet           BOOGIE Ferro  08/04/23  2:59 PM EDT

## 2023-08-04 ENCOUNTER — OFFICE VISIT (OUTPATIENT)
Dept: CARDIOLOGY | Facility: CLINIC | Age: 88
End: 2023-08-04
Payer: MEDICARE

## 2023-08-04 VITALS
HEIGHT: 69 IN | SYSTOLIC BLOOD PRESSURE: 130 MMHG | HEART RATE: 98 BPM | WEIGHT: 159 LBS | BODY MASS INDEX: 23.55 KG/M2 | DIASTOLIC BLOOD PRESSURE: 80 MMHG

## 2023-08-04 DIAGNOSIS — I10 PRIMARY HYPERTENSION: Primary | ICD-10-CM

## 2023-08-04 DIAGNOSIS — I95.1 ORTHOSTATIC HYPOTENSION: ICD-10-CM

## 2023-08-04 DIAGNOSIS — I35.0 NONRHEUMATIC AORTIC VALVE STENOSIS: ICD-10-CM

## 2023-08-04 DIAGNOSIS — E78.5 HYPERLIPIDEMIA, UNSPECIFIED HYPERLIPIDEMIA TYPE: ICD-10-CM

## 2023-08-04 DIAGNOSIS — I25.10 CORONARY ARTERY DISEASE INVOLVING NATIVE CORONARY ARTERY OF NATIVE HEART WITHOUT ANGINA PECTORIS: ICD-10-CM

## 2023-08-04 DIAGNOSIS — I50.33 DIASTOLIC CHF, ACUTE ON CHRONIC: ICD-10-CM

## 2023-08-04 PROCEDURE — 99214 OFFICE O/P EST MOD 30 MIN: CPT | Performed by: NURSE PRACTITIONER

## 2023-08-04 PROCEDURE — 93000 ELECTROCARDIOGRAM COMPLETE: CPT | Performed by: NURSE PRACTITIONER

## 2023-08-04 PROCEDURE — 1160F RVW MEDS BY RX/DR IN RCRD: CPT | Performed by: NURSE PRACTITIONER

## 2023-08-04 PROCEDURE — 1159F MED LIST DOCD IN RCRD: CPT | Performed by: NURSE PRACTITIONER

## 2023-08-04 RX ORDER — NITROGLYCERIN 0.4 MG/1
TABLET SUBLINGUAL
COMMUNITY
Start: 2023-04-29

## 2023-08-04 RX ORDER — MIDODRINE HYDROCHLORIDE 10 MG/1
10 TABLET ORAL
Qty: 270 TABLET | Refills: 3 | Status: SHIPPED | OUTPATIENT
Start: 2023-08-04

## 2023-09-03 ENCOUNTER — HOSPITAL ENCOUNTER (INPATIENT)
Facility: HOSPITAL | Age: 88
LOS: 2 days | Discharge: HOME HEALTH | DRG: 194 | End: 2023-09-05
Attending: EMERGENCY MEDICINE | Admitting: INTERNAL MEDICINE
Payer: MEDICARE

## 2023-09-03 ENCOUNTER — APPOINTMENT (OUTPATIENT)
Dept: GENERAL RADIOLOGY | Facility: HOSPITAL | Age: 88
DRG: 194 | End: 2023-09-03
Payer: MEDICARE

## 2023-09-03 DIAGNOSIS — I35.0 NONRHEUMATIC AORTIC VALVE STENOSIS: ICD-10-CM

## 2023-09-03 DIAGNOSIS — N18.9 ACUTE KIDNEY INJURY SUPERIMPOSED ON CHRONIC KIDNEY DISEASE: ICD-10-CM

## 2023-09-03 DIAGNOSIS — N17.9 ACUTE KIDNEY INJURY SUPERIMPOSED ON CHRONIC KIDNEY DISEASE: ICD-10-CM

## 2023-09-03 DIAGNOSIS — R07.9 CHEST PAIN, UNSPECIFIED TYPE: Primary | ICD-10-CM

## 2023-09-03 DIAGNOSIS — E87.1 HYPONATREMIA: ICD-10-CM

## 2023-09-03 DIAGNOSIS — J18.9 PNEUMONIA DUE TO INFECTIOUS ORGANISM, UNSPECIFIED LATERALITY, UNSPECIFIED PART OF LUNG: ICD-10-CM

## 2023-09-03 DIAGNOSIS — R06.02 SHORTNESS OF BREATH: ICD-10-CM

## 2023-09-03 DIAGNOSIS — I95.1 ORTHOSTATIC HYPOTENSION: ICD-10-CM

## 2023-09-03 PROBLEM — I50.32 CHRONIC HEART FAILURE WITH PRESERVED EJECTION FRACTION (HFPEF): Status: ACTIVE | Noted: 2022-10-13

## 2023-09-03 LAB
ALBUMIN SERPL-MCNC: 3.9 G/DL (ref 3.5–5.2)
ALBUMIN/GLOB SERPL: 1.4 G/DL
ALP SERPL-CCNC: 50 U/L (ref 39–117)
ALT SERPL W P-5'-P-CCNC: 12 U/L (ref 1–41)
ANION GAP SERPL CALCULATED.3IONS-SCNC: 11.7 MMOL/L (ref 5–15)
ANION GAP SERPL CALCULATED.3IONS-SCNC: 13.4 MMOL/L (ref 5–15)
AST SERPL-CCNC: 19 U/L (ref 1–40)
B PARAPERT DNA SPEC QL NAA+PROBE: NOT DETECTED
B PERT DNA SPEC QL NAA+PROBE: NOT DETECTED
BASOPHILS # BLD AUTO: 0.01 10*3/MM3 (ref 0–0.2)
BASOPHILS NFR BLD AUTO: 0.1 % (ref 0–1.5)
BILIRUB SERPL-MCNC: 0.2 MG/DL (ref 0–1.2)
BUN SERPL-MCNC: 36 MG/DL (ref 8–23)
BUN SERPL-MCNC: 40 MG/DL (ref 8–23)
BUN/CREAT SERPL: 23.4 (ref 7–25)
BUN/CREAT SERPL: 26.5 (ref 7–25)
C PNEUM DNA NPH QL NAA+NON-PROBE: NOT DETECTED
CALCIUM SPEC-SCNC: 8.8 MG/DL (ref 8.2–9.6)
CALCIUM SPEC-SCNC: 9.1 MG/DL (ref 8.2–9.6)
CHLORIDE SERPL-SCNC: 102 MMOL/L (ref 98–107)
CHLORIDE SERPL-SCNC: 96 MMOL/L (ref 98–107)
CO2 SERPL-SCNC: 19.3 MMOL/L (ref 22–29)
CO2 SERPL-SCNC: 19.6 MMOL/L (ref 22–29)
CREAT SERPL-MCNC: 1.51 MG/DL (ref 0.76–1.27)
CREAT SERPL-MCNC: 1.54 MG/DL (ref 0.76–1.27)
D-LACTATE SERPL-SCNC: 1.3 MMOL/L (ref 0.5–2)
D-LACTATE SERPL-SCNC: 2.1 MMOL/L (ref 0.5–2)
D-LACTATE SERPL-SCNC: 2.9 MMOL/L (ref 0.5–2)
DEPRECATED RDW RBC AUTO: 45.4 FL (ref 37–54)
EGFRCR SERPLBLD CKD-EPI 2021: 42.6 ML/MIN/1.73
EGFRCR SERPLBLD CKD-EPI 2021: 43.6 ML/MIN/1.73
EOSINOPHIL # BLD AUTO: 0 10*3/MM3 (ref 0–0.4)
EOSINOPHIL NFR BLD AUTO: 0 % (ref 0.3–6.2)
ERYTHROCYTE [DISTWIDTH] IN BLOOD BY AUTOMATED COUNT: 14.3 % (ref 12.3–15.4)
FLUAV SUBTYP SPEC NAA+PROBE: NOT DETECTED
FLUBV RNA ISLT QL NAA+PROBE: NOT DETECTED
GEN 5 2HR TROPONIN T REFLEX: 49 NG/L
GLOBULIN UR ELPH-MCNC: 2.7 GM/DL
GLUCOSE SERPL-MCNC: 102 MG/DL (ref 65–99)
GLUCOSE SERPL-MCNC: 146 MG/DL (ref 65–99)
HADV DNA SPEC NAA+PROBE: NOT DETECTED
HCOV 229E RNA SPEC QL NAA+PROBE: NOT DETECTED
HCOV HKU1 RNA SPEC QL NAA+PROBE: NOT DETECTED
HCOV NL63 RNA SPEC QL NAA+PROBE: NOT DETECTED
HCOV OC43 RNA SPEC QL NAA+PROBE: NOT DETECTED
HCT VFR BLD AUTO: 31.4 % (ref 37.5–51)
HGB BLD-MCNC: 10.5 G/DL (ref 13–17.7)
HMPV RNA NPH QL NAA+NON-PROBE: NOT DETECTED
HPIV1 RNA ISLT QL NAA+PROBE: NOT DETECTED
HPIV2 RNA SPEC QL NAA+PROBE: NOT DETECTED
HPIV3 RNA NPH QL NAA+PROBE: NOT DETECTED
HPIV4 P GENE NPH QL NAA+PROBE: NOT DETECTED
IMM GRANULOCYTES # BLD AUTO: 0.05 10*3/MM3 (ref 0–0.05)
IMM GRANULOCYTES NFR BLD AUTO: 0.6 % (ref 0–0.5)
INR PPP: 1.2 (ref 0.9–1.1)
L PNEUMO1 AG UR QL IA: NEGATIVE
LYMPHOCYTES # BLD AUTO: 1.46 10*3/MM3 (ref 0.7–3.1)
LYMPHOCYTES NFR BLD AUTO: 16.4 % (ref 19.6–45.3)
M PNEUMO IGG SER IA-ACNC: NOT DETECTED
MCH RBC QN AUTO: 29.2 PG (ref 26.6–33)
MCHC RBC AUTO-ENTMCNC: 33.4 G/DL (ref 31.5–35.7)
MCV RBC AUTO: 87.2 FL (ref 79–97)
MONOCYTES # BLD AUTO: 0.79 10*3/MM3 (ref 0.1–0.9)
MONOCYTES NFR BLD AUTO: 8.9 % (ref 5–12)
NEUTROPHILS NFR BLD AUTO: 6.6 10*3/MM3 (ref 1.7–7)
NEUTROPHILS NFR BLD AUTO: 74 % (ref 42.7–76)
NRBC BLD AUTO-RTO: 0 /100 WBC (ref 0–0.2)
NT-PROBNP SERPL-MCNC: 7259 PG/ML (ref 0–1800)
PLATELET # BLD AUTO: 232 10*3/MM3 (ref 140–450)
PMV BLD AUTO: 11.1 FL (ref 6–12)
POTASSIUM SERPL-SCNC: 4.2 MMOL/L (ref 3.5–5.2)
POTASSIUM SERPL-SCNC: 4.2 MMOL/L (ref 3.5–5.2)
PROCALCITONIN SERPL-MCNC: 0.14 NG/ML (ref 0–0.25)
PROT SERPL-MCNC: 6.6 G/DL (ref 6–8.5)
PROTHROMBIN TIME: 15.4 SECONDS (ref 11.7–14.2)
QT INTERVAL: 342 MS
QT INTERVAL: 346 MS
QTC INTERVAL: 421 MS
QTC INTERVAL: 455 MS
RBC # BLD AUTO: 3.6 10*6/MM3 (ref 4.14–5.8)
RHINOVIRUS RNA SPEC NAA+PROBE: NOT DETECTED
RSV RNA NPH QL NAA+NON-PROBE: NOT DETECTED
S PNEUM AG SPEC QL LA: NEGATIVE
SARS-COV-2 RNA NPH QL NAA+NON-PROBE: NOT DETECTED
SODIUM SERPL-SCNC: 129 MMOL/L (ref 136–145)
SODIUM SERPL-SCNC: 133 MMOL/L (ref 136–145)
TROPONIN T DELTA: 8 NG/L
TROPONIN T SERPL HS-MCNC: 41 NG/L
WBC NRBC COR # BLD: 8.91 10*3/MM3 (ref 3.4–10.8)

## 2023-09-03 PROCEDURE — 93005 ELECTROCARDIOGRAM TRACING: CPT | Performed by: NURSE PRACTITIONER

## 2023-09-03 PROCEDURE — 87449 NOS EACH ORGANISM AG IA: CPT | Performed by: INTERNAL MEDICINE

## 2023-09-03 PROCEDURE — 85610 PROTHROMBIN TIME: CPT | Performed by: PHYSICIAN ASSISTANT

## 2023-09-03 PROCEDURE — 36415 COLL VENOUS BLD VENIPUNCTURE: CPT | Performed by: NURSE PRACTITIONER

## 2023-09-03 PROCEDURE — 25010000002 CEFTRIAXONE PER 250 MG: Performed by: NURSE PRACTITIONER

## 2023-09-03 PROCEDURE — 93010 ELECTROCARDIOGRAM REPORT: CPT | Performed by: INTERNAL MEDICINE

## 2023-09-03 PROCEDURE — 83880 ASSAY OF NATRIURETIC PEPTIDE: CPT | Performed by: PHYSICIAN ASSISTANT

## 2023-09-03 PROCEDURE — 87040 BLOOD CULTURE FOR BACTERIA: CPT | Performed by: NURSE PRACTITIONER

## 2023-09-03 PROCEDURE — 0202U NFCT DS 22 TRGT SARS-COV-2: CPT | Performed by: PHYSICIAN ASSISTANT

## 2023-09-03 PROCEDURE — 71045 X-RAY EXAM CHEST 1 VIEW: CPT

## 2023-09-03 PROCEDURE — 84145 PROCALCITONIN (PCT): CPT | Performed by: NURSE PRACTITIONER

## 2023-09-03 PROCEDURE — 80053 COMPREHEN METABOLIC PANEL: CPT | Performed by: PHYSICIAN ASSISTANT

## 2023-09-03 PROCEDURE — 93005 ELECTROCARDIOGRAM TRACING: CPT

## 2023-09-03 PROCEDURE — 25010000002 MORPHINE PER 10 MG: Performed by: NURSE PRACTITIONER

## 2023-09-03 PROCEDURE — 99285 EMERGENCY DEPT VISIT HI MDM: CPT

## 2023-09-03 PROCEDURE — 25010000002 ONDANSETRON PER 1 MG: Performed by: NURSE PRACTITIONER

## 2023-09-03 PROCEDURE — 99222 1ST HOSP IP/OBS MODERATE 55: CPT | Performed by: INTERNAL MEDICINE

## 2023-09-03 PROCEDURE — 84484 ASSAY OF TROPONIN QUANT: CPT | Performed by: PHYSICIAN ASSISTANT

## 2023-09-03 PROCEDURE — 85025 COMPLETE CBC W/AUTO DIFF WBC: CPT | Performed by: PHYSICIAN ASSISTANT

## 2023-09-03 PROCEDURE — 83605 ASSAY OF LACTIC ACID: CPT | Performed by: NURSE PRACTITIONER

## 2023-09-03 RX ORDER — SODIUM CHLORIDE 0.9 % (FLUSH) 0.9 %
10 SYRINGE (ML) INJECTION AS NEEDED
Status: DISCONTINUED | OUTPATIENT
Start: 2023-09-03 | End: 2023-09-05 | Stop reason: HOSPADM

## 2023-09-03 RX ORDER — ACETAMINOPHEN 160 MG/5ML
650 SOLUTION ORAL EVERY 4 HOURS PRN
Status: DISCONTINUED | OUTPATIENT
Start: 2023-09-03 | End: 2023-09-05 | Stop reason: HOSPADM

## 2023-09-03 RX ORDER — MIDODRINE HYDROCHLORIDE 5 MG/1
10 TABLET ORAL
Status: DISCONTINUED | OUTPATIENT
Start: 2023-09-03 | End: 2023-09-05 | Stop reason: HOSPADM

## 2023-09-03 RX ORDER — SODIUM CHLORIDE 9 MG/ML
40 INJECTION, SOLUTION INTRAVENOUS AS NEEDED
Status: DISCONTINUED | OUTPATIENT
Start: 2023-09-03 | End: 2023-09-05 | Stop reason: HOSPADM

## 2023-09-03 RX ORDER — SODIUM CHLORIDE 9 MG/ML
100 INJECTION, SOLUTION INTRAVENOUS CONTINUOUS
Status: DISCONTINUED | OUTPATIENT
Start: 2023-09-03 | End: 2023-09-03

## 2023-09-03 RX ORDER — ACETAMINOPHEN 325 MG/1
650 TABLET ORAL EVERY 4 HOURS PRN
Status: DISCONTINUED | OUTPATIENT
Start: 2023-09-03 | End: 2023-09-05 | Stop reason: HOSPADM

## 2023-09-03 RX ORDER — ONDANSETRON 2 MG/ML
4 INJECTION INTRAMUSCULAR; INTRAVENOUS ONCE
Status: COMPLETED | OUTPATIENT
Start: 2023-09-03 | End: 2023-09-03

## 2023-09-03 RX ORDER — ASPIRIN 81 MG/1
81 TABLET ORAL DAILY
Status: DISCONTINUED | OUTPATIENT
Start: 2023-09-03 | End: 2023-09-05 | Stop reason: HOSPADM

## 2023-09-03 RX ORDER — MORPHINE SULFATE 2 MG/ML
1 INJECTION, SOLUTION INTRAMUSCULAR; INTRAVENOUS EVERY 4 HOURS PRN
Status: DISCONTINUED | OUTPATIENT
Start: 2023-09-03 | End: 2023-09-05 | Stop reason: HOSPADM

## 2023-09-03 RX ORDER — HYDROCODONE BITARTRATE AND ACETAMINOPHEN 5; 325 MG/1; MG/1
1 TABLET ORAL EVERY 4 HOURS PRN
Status: DISCONTINUED | OUTPATIENT
Start: 2023-09-03 | End: 2023-09-05 | Stop reason: HOSPADM

## 2023-09-03 RX ORDER — ONDANSETRON 2 MG/ML
4 INJECTION INTRAMUSCULAR; INTRAVENOUS EVERY 6 HOURS PRN
Status: DISCONTINUED | OUTPATIENT
Start: 2023-09-03 | End: 2023-09-05 | Stop reason: HOSPADM

## 2023-09-03 RX ORDER — SODIUM CHLORIDE 0.9 % (FLUSH) 0.9 %
10 SYRINGE (ML) INJECTION EVERY 12 HOURS SCHEDULED
Status: DISCONTINUED | OUTPATIENT
Start: 2023-09-03 | End: 2023-09-05 | Stop reason: HOSPADM

## 2023-09-03 RX ORDER — ACETAMINOPHEN 650 MG/1
650 SUPPOSITORY RECTAL EVERY 4 HOURS PRN
Status: DISCONTINUED | OUTPATIENT
Start: 2023-09-03 | End: 2023-09-05 | Stop reason: HOSPADM

## 2023-09-03 RX ORDER — SODIUM CHLORIDE 9 MG/ML
100 INJECTION, SOLUTION INTRAVENOUS CONTINUOUS
Status: ACTIVE | OUTPATIENT
Start: 2023-09-03 | End: 2023-09-04

## 2023-09-03 RX ORDER — MORPHINE SULFATE 2 MG/ML
2 INJECTION, SOLUTION INTRAMUSCULAR; INTRAVENOUS ONCE
Status: COMPLETED | OUTPATIENT
Start: 2023-09-03 | End: 2023-09-03

## 2023-09-03 RX ORDER — BISACODYL 10 MG
10 SUPPOSITORY, RECTAL RECTAL DAILY PRN
Status: DISCONTINUED | OUTPATIENT
Start: 2023-09-03 | End: 2023-09-05 | Stop reason: HOSPADM

## 2023-09-03 RX ORDER — ONDANSETRON 4 MG/1
4 TABLET, FILM COATED ORAL EVERY 6 HOURS PRN
Status: DISCONTINUED | OUTPATIENT
Start: 2023-09-03 | End: 2023-09-05 | Stop reason: HOSPADM

## 2023-09-03 RX ORDER — ALBUTEROL SULFATE 2.5 MG/3ML
2.5 SOLUTION RESPIRATORY (INHALATION) EVERY 6 HOURS PRN
Status: DISCONTINUED | OUTPATIENT
Start: 2023-09-03 | End: 2023-09-05 | Stop reason: HOSPADM

## 2023-09-03 RX ORDER — GUAIFENESIN 200 MG/10ML
10 LIQUID ORAL 3 TIMES DAILY PRN
COMMUNITY

## 2023-09-03 RX ORDER — ATORVASTATIN CALCIUM 80 MG/1
80 TABLET, FILM COATED ORAL DAILY
Status: DISCONTINUED | OUTPATIENT
Start: 2023-09-03 | End: 2023-09-05 | Stop reason: HOSPADM

## 2023-09-03 RX ORDER — NITROGLYCERIN 0.4 MG/1
0.4 TABLET SUBLINGUAL
Status: DISCONTINUED | OUTPATIENT
Start: 2023-09-03 | End: 2023-09-05 | Stop reason: HOSPADM

## 2023-09-03 RX ORDER — BISACODYL 5 MG/1
5 TABLET, DELAYED RELEASE ORAL DAILY PRN
Status: DISCONTINUED | OUTPATIENT
Start: 2023-09-03 | End: 2023-09-05 | Stop reason: HOSPADM

## 2023-09-03 RX ORDER — AMOXICILLIN 250 MG
2 CAPSULE ORAL 2 TIMES DAILY
Status: DISCONTINUED | OUTPATIENT
Start: 2023-09-03 | End: 2023-09-05 | Stop reason: HOSPADM

## 2023-09-03 RX ORDER — POLYETHYLENE GLYCOL 3350 17 G/17G
17 POWDER, FOR SOLUTION ORAL DAILY PRN
Status: DISCONTINUED | OUTPATIENT
Start: 2023-09-03 | End: 2023-09-05 | Stop reason: HOSPADM

## 2023-09-03 RX ORDER — NALOXONE HCL 0.4 MG/ML
0.4 VIAL (ML) INJECTION AS NEEDED
Status: DISCONTINUED | OUTPATIENT
Start: 2023-09-03 | End: 2023-09-05 | Stop reason: HOSPADM

## 2023-09-03 RX ADMIN — SENNOSIDES AND DOCUSATE SODIUM 2 TABLET: 50; 8.6 TABLET ORAL at 10:33

## 2023-09-03 RX ADMIN — APIXABAN 5 MG: 5 TABLET, FILM COATED ORAL at 21:39

## 2023-09-03 RX ADMIN — Medication 10 ML: at 21:41

## 2023-09-03 RX ADMIN — Medication 10 ML: at 10:33

## 2023-09-03 RX ADMIN — SODIUM CHLORIDE 100 ML/HR: 9 INJECTION, SOLUTION INTRAVENOUS at 14:04

## 2023-09-03 RX ADMIN — ATORVASTATIN CALCIUM 80 MG: 80 TABLET, FILM COATED ORAL at 12:00

## 2023-09-03 RX ADMIN — ASPIRIN 81 MG: 81 TABLET, COATED ORAL at 12:00

## 2023-09-03 RX ADMIN — MIDODRINE HYDROCHLORIDE 10 MG: 5 TABLET ORAL at 16:34

## 2023-09-03 RX ADMIN — MIDODRINE HYDROCHLORIDE 10 MG: 5 TABLET ORAL at 12:00

## 2023-09-03 RX ADMIN — MORPHINE SULFATE 2 MG: 2 INJECTION, SOLUTION INTRAMUSCULAR; INTRAVENOUS at 07:24

## 2023-09-03 RX ADMIN — ONDANSETRON 4 MG: 2 INJECTION INTRAMUSCULAR; INTRAVENOUS at 07:24

## 2023-09-03 RX ADMIN — CEFTRIAXONE SODIUM 1000 MG: 1 INJECTION, POWDER, FOR SOLUTION INTRAMUSCULAR; INTRAVENOUS at 07:40

## 2023-09-03 RX ADMIN — SODIUM CHLORIDE 500 ML: 9 INJECTION, SOLUTION INTRAVENOUS at 07:24

## 2023-09-03 RX ADMIN — SODIUM CHLORIDE 75 ML/HR: 9 INJECTION, SOLUTION INTRAVENOUS at 10:33

## 2023-09-03 NOTE — CONSULTS
Date of Hospital Visit: 23  Encounter Provider: Kami Coronado MD  Place of Service: [facility]  Patient Name: Yoni Bonner Jr.  :1933  Referral Provider: Carlo Leo MD    Chief complaint: Chest pain     History of Present Illness:      This is a 89-year-old patient follows with Dr. Templeton, has a past medical history significant for CAD -s/p stent placement RCA for inferior wall myocardial infarction and has chronic occlusion of the proximal circumflex medically managed with anginal chest pain, COPD, hyperlipidemia, hypertension, orthostasis on midodrine with recurrent syncope, intolerance of isosorbide-on Ranexa, ischemic cardiomyopathy and mitral insufficiency.    He was admitted 2023 with chest pain and his metoprolol was increased.  He had no ischemic evaluation due to frailty and age.    He presented on 9/3/2023 with chest pain began at 3 AM, described as an intermittent achy left-sided chest pain with radiation to his left shoulder and arm, along with short windedness, nausea, diaphoresis and cough.  He has been on Levaquin and steroid for URI.  He took nitroglycerin at home with no improvement.  His troponin on arrival was 41 then to 49 with creatinine 1.54, proBNP 7259, sodium 129, unremarkable CBC, lactate 2.9.  Chest x-ray showed possible atelectasis or pneumonia.  We have been consulted for chest pain.  Sodium this morning is 133 with creatinine 1.51.  Vitals on arrival are stable except pulse at 117, patient afebrile.  He was started on Rocephin.    Last echo 10/2022 showed ejection fraction 51% with apical hypokinesis.  Echo done 2022 showed grade 1A diastolic dysfunction.Carotid study done 2022 showed moderate bilateral internal carotid artery stenosis.    He says that he has been coughing quite a bit and has slight headache.  Has been mildly nauseated but no vomiting.  He denies fevers.  He said the chest pain came on with  the coughing.  He has been taking his medications as directed.    ECHO 10/24/22    Calculated left ventricular EF = 50.5% . Left ventricular systolic function is normal.    The following left ventricular wall segments are hypokinetic: apical anterior and apex hypokinetic.    Stress Test with Myocardial Perfusion 2/21/20  Left ventricular ejection fraction is normal (Calculated EF = 54%).  Myocardial perfusion imaging indicates a large-sized infarct located in the lateral wall with mild angela-infarct ischemia.  Compared to stress nuc in 2018, the lateral wall defect is more severe.    Cardiac Catheterization 2/21/20  Findings:  1. Coronary Artery Anatomy:  Dominance: Right  Left Main: Angiographically normal.  Left Anterior Descending: Small in caliber size.  The proximal segment contains a 50% stenoses extending to the mid segment.  The mid segment contains a focal 70% segment stenoses followed by luminal irregularities in the distal segment.  Circumflex Artery: 100% proximal circumflex stenoses, chronic total occlusion.  Distal vessel is fed from right to left collaterals via epicardial's from the posterior lateral branch system.  Right Coronary Artery: Moderate in caliber size.  The proximal portion contains a patent stent with minimal in-stent restenosis followed by luminal irregularities within the mid segment.  The distal segment contains luminal irregularities.  The posterior descending artery contains luminal irregularities.  The posterior lateral branch system has scattered 20% stenoses throughout and supplies collaterals to a inferior marginal branch.     2. Hemodynamics:  Left Ventricle: 124/6/8 mmHg  Aorta: 115/53/81 mmHg     Conclusions:  Severe two-vessel coronary artery disease with 70% mid LAD stenoses, 100% occlusion of proximal circumflex.  There is a patent stent within the right coronary artery with essentially luminal irregularities throughout the rest of the vessel supplies right to left  collaterals to the distal circumflex.  Normal left ventricular filling pressures of 8 mmHg.     Recommendations:   Will continue medical management of patient's known chronic total occlusion of the proximal circumflex.  Would also continue medical management of mid LAD stenoses given that his stress test did not demonstrate significant abnormalities in this territory and has been known since 2018.  Given the small vessel size would be somewhat challenging to approach from a percutaneous standpoint and would continue medical therapy as tolerated.  IV hydration overnight      Past Medical History:   Diagnosis Date    Bronchitis     CAD (coronary artery disease)     Carotid arterial disease     Chronic bronchitis     COPD (chronic obstructive pulmonary disease)     Gout     Hyperlipidemia     Hypertension     Ischemic cardiomyopathy     resolved, EF was 60% in 2018    Mild aortic stenosis     Mitral regurgitation     Precordial chest pain     Rheumatic fever     Syncopal episodes     Tobacco use        Past Surgical History:   Procedure Laterality Date    CARDIAC CATHETERIZATION      CARDIAC CATHETERIZATION N/A 4/8/2018    Procedure: Left Heart Cath;  Surgeon: Nicolas Jerez MD;  Location: [facility] INVASIVE LOCATION;  Service: Cardiovascular    CARDIAC CATHETERIZATION  4/8/2018    Procedure: Percutaneous Manual Thrombectomy;  Surgeon: Nicolas Jerez MD;  Location: [facility] INVASIVE LOCATION;  Service: Cardiovascular    CARDIAC CATHETERIZATION N/A 4/8/2018    Procedure: Stent CARLENE coronary;  Surgeon: Nicolas Jerez MD;  Location: [facility] CATH INVASIVE LOCATION;  Service: Cardiovascular    CARDIAC CATHETERIZATION N/A 4/8/2018    Procedure: Right Heart Cath;  Surgeon: Nicolas Jerez MD;  Location: [facility] INVASIVE LOCATION;  Service: Cardiovascular    CARDIAC CATHETERIZATION Left 2/21/2020    Procedure: Cardiac Catheterization/Vascular Study;  Surgeon: Alejandro Jenkins MD;  Location: [facility] CATH INVASIVE LOCATION;  Service:  Cardiovascular;  Laterality: Left;    CARDIAC CATHETERIZATION N/A 2/21/2020    Procedure: Coronary angiography;  Surgeon: Alejandro Jenkins MD;  Location: [facility] CATH INVASIVE LOCATION;  Service: Cardiovascular;  Laterality: N/A;    EYE SURGERY      cataract surg    HAND SURGERY      HERNIA REPAIR      KNEE SURGERY      TESTICLE SURGERY      TOTAL HIP ARTHROPLASTY Left 9/25/2022    Procedure: TOTAL HIP ARTHROPLASTY ANTERIOR WITH HANA TABLE;  Surgeon: Jeff Rodriguez II, MD;  Location: [facility] MAIN OR;  Service: Orthopedics;  Laterality: Left;       Medications Prior to Admission   Medication Sig Dispense Refill Last Dose    albuterol (PROVENTIL) (2.5 MG/3ML) 0.083% nebulizer solution Take 2.5 mg by nebulization Every 4 (Four) Hours As Needed for Wheezing or Shortness of Air.  12 9/3/2023 at 0300    albuterol sulfate  (90 Base) MCG/ACT inhaler Inhale 2 puffs Every 4 (Four) Hours As Needed for Wheezing.   9/3/2023 at 0300    apixaban (ELIQUIS) 5 MG tablet tablet Take 1 tablet by mouth 2 (Two) Times a Day. 60 tablet 11 9/2/2023 at 0800    aspirin 81 MG EC tablet Take 1 tablet by mouth Daily. 30 tablet 5 9/2/2023 at 0800    atorvastatin (LIPITOR) 10 MG tablet Take 1 tablet by mouth Daily. (Patient taking differently: Take 1 tablet by mouth Every Night.) 90 tablet 0 9/2/2023 at 2000    guaifenesin (ROBITUSSIN) 100 MG/5ML liquid Take 10 mL by mouth 3 (Three) Times a Day As Needed for Cough.   9/2/2023 at 2000    midodrine (PROAMATINE) 10 MG tablet Take 1 tablet by mouth 3 (Three) Times a Day Before Meals. 270 tablet 3 9/2/2023 at 2000    nitroglycerin (NITROSTAT) 0.4 MG SL tablet    9/3/2023 at 0330    acetaminophen (TYLENOL) 325 MG tablet Take 2 tablets by mouth Every 4 (Four) Hours As Needed for Mild Pain or Moderate Pain.   9/1/2023 at 2000    bisacodyl (DULCOLAX) 10 MG suppository Insert 1 suppository into the rectum Daily As Needed for constipation. 12 suppository 6 Unknown    magnesium hydroxide  (MILK OF MAGNESIA) 400 MG/5ML suspension Take 5 mL by mouth Daily As Needed for Constipation.   Unknown    metoprolol tartrate (LOPRESSOR) 25 MG tablet Take 0.5 tablets by mouth Every 12 (Twelve) Hours for 30 days. 30 tablet 0        Current Meds  Scheduled Meds:aspirin, 81 mg, Oral, Daily  atorvastatin, 80 mg, Oral, Daily  [START ON 9/4/2023] cefTRIAXone, 1,000 mg, Intravenous, Q24H  midodrine, 10 mg, Oral, TID AC  senna-docusate sodium, 2 tablet, Oral, BID  sodium chloride, 10 mL, Intravenous, Q12H      Continuous Infusions:sodium chloride, 75 mL/hr, Last Rate: 75 mL/hr (09/03/23 1033)      PRN Meds:.  acetaminophen **OR** acetaminophen **OR** acetaminophen    albuterol    senna-docusate sodium **AND** polyethylene glycol **AND** bisacodyl **AND** bisacodyl    HYDROcodone-acetaminophen    Morphine    naloxone    nitroglycerin    ondansetron **OR** ondansetron    [COMPLETED] Insert Peripheral IV **AND** sodium chloride    sodium chloride    sodium chloride    Allergies as of 09/03/2023 - Reviewed 09/03/2023   Allergen Reaction Noted    No known drug allergy Unknown (See Comments) 02/04/2015       Social History     Socioeconomic History    Marital status:    Tobacco Use    Smoking status: Never    Smokeless tobacco: Current     Types: Chew    Tobacco comments:     chewing tobacco since 4 years old   Vaping Use    Vaping Use: Never used   Substance and Sexual Activity    Alcohol use: No     Comment: caffeine use    Drug use: Never    Sexual activity: Defer       Family History   Problem Relation Age of Onset    Heart disease Father     Hypertension Father     Stroke Father     Diabetes Sister     Cancer Brother     Heart disease Brother     Hypertension Brother     No Known Problems Maternal Grandmother     No Known Problems Maternal Grandfather     No Known Problems Paternal Grandmother     No Known Problems Paternal Grandfather        REVIEW OF SYSTEMS:   12 point ROS was performed and is negative except as  "outlined in HPI        Objective:   Temp:  [97.7 °F (36.5 °C)-98 °F (36.7 °C)] 97.7 °F (36.5 °C)  Heart Rate:  [] 83  Resp:  [18-22] 18  BP: (102-128)/() 118/87  Body mass index is 26.05 kg/m².  Flowsheet Rows      Flowsheet Row First Filed Value   Admission Height 175.3 cm (69\") Documented at 09/03/2023 0516   Admission Weight 72.1 kg (159 lb) Documented at 09/03/2023 0516          Vitals:    09/03/23 1035   BP: 118/87   Pulse: 83   Resp: 18   Temp: 97.7 °F (36.5 °C)   SpO2: 100%       General Appearance:    Alert, cooperative, in no acute distress   Head:    Normocephalic, without obvious abnormality, atraumatic   Eyes:            Lids and lashes normal, conjunctivae and sclerae normal, no   icterus, no pallor, corneas clear   Ears:    Ears appear intact with no abnormalities noted   Throat:   No oral lesions, no thrush, oral mucosa moist   Neck:   No adenopathy, supple, trachea midline, no thyromegaly, no   carotid bruit, no JVD   Back:     No kyphosis present, no scoliosis present, no skin lesions, erythema or scars, no tenderness to palpation, range of motion normal   Lungs:   Expiratory wheezing respirations regular, even and unlabored    Heart:    Regular rhythm and normal rate, normal S1 and S2, 3/6 HSM, no gallop, no rub, no click   Chest Wall:    No abnormalities observed   Abdomen:     Normal bowel sounds, no masses, no organomegaly, soft, nontender, nondistended, no guarding, no rebound  tenderness   Extremities:   Moves all extremities well, no edema, no cyanosis, no redness   Pulses:   Pulses palpable and equal bilaterally. Normal radial, carotid, dorsalis pedis and posterior tibial pulses bilaterally.    Skin:  Psychiatric:   No bleeding, bruising or rash    Alert and oriented x 3, normal mood and affect                 Lab Review:      Results from last 7 days   Lab Units 09/03/23  0538   SODIUM mmol/L 129*   POTASSIUM mmol/L 4.2   CHLORIDE mmol/L 96*   CO2 mmol/L 19.6*   BUN mg/dL 36* "   CREATININE mg/dL 1.54*   CALCIUM mg/dL 9.1   BILIRUBIN mg/dL 0.2   ALK PHOS U/L 50   ALT (SGPT) U/L 12   AST (SGOT) U/L 19   GLUCOSE mg/dL 146*     Results from last 7 days   Lab Units 09/03/23  0740 09/03/23  0538   HSTROP T ng/L 49* 41*     @LABRCNT(bnp)@  Results from last 7 days   Lab Units 09/03/23  0538   WBC 10*3/mm3 8.91   HEMOGLOBIN g/dL 10.5*   HEMATOCRIT % 31.4*   PLATELETS 10*3/mm3 232     Results from last 7 days   Lab Units 09/03/23  0538   INR  1.20*         Lipid Panel          9/23/2022    06:37 2/4/2023    16:14   Lipid Panel   Total Cholesterol 126  115    Triglycerides 71  122    HDL Cholesterol 70  67    VLDL Cholesterol 14  21    LDL Cholesterol  42  27    LDL/HDL Ratio 0.60  0.35          I personally viewed and interpreted the patient's EKG/Telemetry data        Chest pain    HTN (hypertension)    Anemia, chronic disease    CKD (chronic kidney disease) stage 3, GFR 30-59 ml/min    Nonrheumatic aortic valve stenosis    COPD (chronic obstructive pulmonary disease)    Atrial fibrillation    Chronic heart failure with preserved ejection fraction (HFpEF)    PNA (pneumonia)      Assessment and Plan:    Short winded with recent URI, wheezing and coughing, has been on Levaquin and prednisone per PCP but worsening symptoms.  proBNP elevated.  Likely this is combination of an acute respiratory issue plus a mild heart failure.  Watch IV fluid-getting for sepsis protocol.  Blood cultures are pending, respiratory viral panel normal.  Chest x-ray does not show pulmonary edema.  May need gentle diuresis, will monitor.  Chest pain, does not sound cardiac.  It is really in the epigastric area and came on with his coughing.  It is possible this somewhat anginal just because of the stress of his URI but I think it could be also epigastric pain from the antibiotics and prednisone.  No plans at this time to change his antianginals.  Troponin slightly elevated but no evidence of acute coronary syndrome, EKG  stable.  CKD  Murmur on examination, set up echocardiogram, sounds like mitral insufficiency.  Multivessel CAD, treated medically  Orthostasis, on midodrine with history of recurrent syncope, worsened with antihypertensive medications and isosorbide.    Plan is to continue his home medications, check echocardiogram, give 1 dose of IV Lasix.  Would treat his URI.  We will follow.    Kami Coronado MD  09/03/23  11:28 EDT.  Time spent in reviewing chart, discussion and examination:

## 2023-09-03 NOTE — ED NOTES
"Nursing report ED to floor  Yoni Bonner Jr.  89 y.o.  male    HPI :     MD ATTESTATION NOTE     The ADA and I have discussed this patient's history, physical exam, and treatment plan.  I have reviewed the documentation and personally had a face to face interaction with the patient. I affirm the documentation and agree with the treatment and plan.  The attached note describes my personal findings.       I provided a substantive portion of the care of the patient.  I personally performed the physical exam in its entirety, and below are my findings.       Brief HPI: Patient presents for evaluation of cough congestion chest pain.  Patient states he has been on antibiotics.  Patient states symptoms seem to be worse.  Pain is worse with deep breathing and movement       Chief Complaint   Patient presents with    Chest Pain       Admitting doctor:   Carlo Leo MD    Admitting diagnosis:   The primary encounter diagnosis was Chest pain, unspecified type. Diagnoses of Shortness of breath, Acute kidney injury superimposed on chronic kidney disease, and Hyponatremia were also pertinent to this visit.    Code status:   Current Code Status       Date Active Code Status Order ID Comments User Context       Prior            Allergies:   No known drug allergy    Isolation:   No active isolations    Intake and Output  No intake or output data in the 24 hours ending 09/03/23 0813    Weight:       09/03/23 0516   Weight: 72.1 kg (159 lb)       Most recent vitals:   Vitals:    09/03/23 0516 09/03/23 0522 09/03/23 0540   BP: 128/74  120/68   BP Location: Right arm     Patient Position: Lying     Pulse: 117  112   Resp:  22    Temp: 98 °F (36.7 °C)     TempSrc: Oral     SpO2: 97%  100%   Weight: 72.1 kg (159 lb)     Height: 175.3 cm (69\")         Active LDAs/IV Access:   Lines, Drains & Airways       Active LDAs       Name Placement date Placement time Site Days    Peripheral IV 09/03/23 0518 Right Antecubital 09/03/23 0518  " Antecubital  less than 1                    Labs (abnormal labs have a star):   Labs Reviewed   COMPREHENSIVE METABOLIC PANEL - Abnormal; Notable for the following components:       Result Value    Glucose 146 (*)     BUN 36 (*)     Creatinine 1.54 (*)     Sodium 129 (*)     Chloride 96 (*)     CO2 19.6 (*)     eGFR 42.6 (*)     All other components within normal limits    Narrative:     GFR Normal >60  Chronic Kidney Disease <60  Kidney Failure <15    The GFR formula is only valid for adults with stable renal function between ages 18 and 70.   BNP (IN-HOUSE) - Abnormal; Notable for the following components:    proBNP 7,259.0 (*)     All other components within normal limits    Narrative:     Among patients with dyspnea, NT-proBNP is highly sensitive for the detection of acute congestive heart failure. In addition NT-proBNP of <300 pg/ml effectively rules out acute congestive heart failure with 99% negative predictive value.     TROPONIN - Abnormal; Notable for the following components:    HS Troponin T 41 (*)     All other components within normal limits    Narrative:     High Sensitive Troponin T Reference Range:  <10.0 ng/L- Negative Female for AMI  <15.0 ng/L- Negative Male for AMI  >=10 - Abnormal Female indicating possible myocardial injury.  >=15 - Abnormal Male indicating possible myocardial injury.   Clinicians would have to utilize clinical acumen, EKG, Troponin, and serial changes to determine if it is an Acute Myocardial Infarction or myocardial injury due to an underlying chronic condition.        PROTIME-INR - Abnormal; Notable for the following components:    Protime 15.4 (*)     INR 1.20 (*)     All other components within normal limits   CBC WITH AUTO DIFFERENTIAL - Abnormal; Notable for the following components:    RBC 3.60 (*)     Hemoglobin 10.5 (*)     Hematocrit 31.4 (*)     Lymphocyte % 16.4 (*)     Eosinophil % 0.0 (*)     Immature Grans % 0.6 (*)     All other components within normal limits  "  LACTIC ACID, PLASMA - Abnormal; Notable for the following components:    Lactate 2.9 (*)     All other components within normal limits   RESPIRATORY PANEL PCR W/ COVID-19 (SARS-COV-2) NEFTALY/VERONICA/MARVIN/PAD/COR/MAD/ÁNGELA IN-HOUSE, NP SWAB IN UTM/VTP, 3-4 HR TAT - Normal    Narrative:     In the setting of a positive respiratory panel with a viral infection PLUS a negative procalcitonin without other underlying concern for bacterial infection, consider observing off antibiotics or discontinuation of antibiotics and continue supportive care. If the respiratory panel is positive for atypical bacterial infection (Bordetella pertussis, Chlamydophila pneumoniae, or Mycoplasma pneumoniae), consider antibiotic de-escalation to target atypical bacterial infection.   PROCALCITONIN - Normal    Narrative:     As a Marker for Sepsis (Non-Neonates):    1. <0.5 ng/mL represents a low risk of severe sepsis and/or septic shock.  2. >2 ng/mL represents a high risk of severe sepsis and/or septic shock.    As a Marker for Lower Respiratory Tract Infections that require antibiotic therapy:    PCT on Admission    Antibiotic Therapy       6-12 Hrs later    >0.5                Strongly Recommended  >0.25 - <0.5        Recommended   0.1 - 0.25          Discouraged              Remeasure/reassess PCT  <0.1                Strongly Discouraged     Remeasure/reassess PCT    As 28 day mortality risk marker: \"Change in Procalcitonin Result\" (>80% or <=80%) if Day 0 (or Day 1) and Day 4 values are available. Refer to http://www.[facility]-pct-calculator.com    Change in PCT <=80%  A decrease of PCT levels below or equal to 80% defines a positive change in PCT test result representing a higher risk for 28-day all-cause mortality of patients diagnosed with severe sepsis for septic shock.    Change in PCT >80%  A decrease of PCT levels of more than 80% defines a negative change in PCT result representing a lower risk for 28-day all-cause mortality of patients " diagnosed with severe sepsis or septic shock.      BLOOD CULTURE   BLOOD CULTURE   HIGH SENSITIVITIY TROPONIN T 2HR   LACTIC ACID, REFLEX   CBC AND DIFFERENTIAL    Narrative:     The following orders were created for panel order CBC & Differential.  Procedure                               Abnormality         Status                     ---------                               -----------         ------                     CBC Auto Differential[[id number]]        Abnormal            Final result                 Please view results for these tests on the individual orders.       EKG:   ECG 12 Lead Chest Pain   Preliminary Result   HEART RATE= 106  bpm   RR Interval= 566  ms   FL Interval= 194  ms   P Horizontal Axis= -36  deg   P Front Axis= -13  deg   QRSD Interval= 98  ms   QT Interval= 342  ms   QTcB= 455  ms   QRS Axis= -18  deg   T Wave Axis= 94  deg   - ABNORMAL ECG -   Sinus tachycardia   Multiple premature complexes, vent & supraven   Borderline left axis deviation   Abnormal R-wave progression, early transition   Nonspecific T abnormalities, lateral leads   Baseline wander in lead(s) I,III,aVL   Electronically Signed By:    Date and Time of Study: 2023-09-03 05:32:04          Meds given in ED:   Medications   sodium chloride 0.9 % flush 10 mL (has no administration in time range)   sodium chloride 0.9 % bolus 500 mL (0 mL Intravenous Stopped 9/3/23 0812)   cefTRIAXone (ROCEPHIN) 1,000 mg in sodium chloride 0.9 % 100 mL IVPB-VTB (0 mg Intravenous Stopped 9/3/23 0813)   morphine injection 2 mg (2 mg Intravenous Given 9/3/23 0724)   ondansetron (ZOFRAN) injection 4 mg (4 mg Intravenous Given 9/3/23 0724)       Imaging results:  XR Chest 1 View    Result Date: 9/3/2023  Low lung volumes with mild increased density at the right lung base representing atelectasis or pneumonia.  This report was finalized on 9/3/2023 6:31 AM by Dr. Christiano Eason M.D.       Ambulatory status:   - bedrest    Social issues:   Social  History     Socioeconomic History    Marital status:    Tobacco Use    Smoking status: Never    Smokeless tobacco: Current     Types: Chew    Tobacco comments:     chewing tobacco since 4 years old   Vaping Use    Vaping Use: Never used   Substance and Sexual Activity    Alcohol use: No     Comment: caffeine use    Drug use: Never    Sexual activity: Defer       NIH Stroke Scale:       Anjana Deleon RN  09/03/23 08:13 EDT

## 2023-09-03 NOTE — ED PROVIDER NOTES
MD ATTESTATION NOTE    The ADA and I have discussed this patient's history, physical exam, and treatment plan.  I have reviewed the documentation and personally had a face to face interaction with the patient. I affirm the documentation and agree with the treatment and plan.  The attached note describes my personal findings.      I provided a substantive portion of the care of the patient.  I personally performed the physical exam in its entirety, and below are my findings.      Brief HPI: Patient presents for evaluation of cough congestion chest pain.  Patient states he has been on antibiotics.  Patient states symptoms seem to be worse.  Pain is worse with deep breathing and movement    PHYSICAL EXAM  ED Triage Vitals   Temp Heart Rate Resp BP SpO2   09/03/23 0516 09/03/23 0516 09/03/23 0522 09/03/23 0516 09/03/23 0516   98 °F (36.7 °C) 117 22 128/74 97 %      Temp src Heart Rate Source Patient Position BP Location FiO2 (%)   09/03/23 0516 09/03/23 0516 09/03/23 0516 09/03/23 0516 --   Oral Monitor Lying Right arm          GENERAL: no acute distress  HENT: nares patent  EYES: no scleral icterus  CV: regular rhythm, normal rate  RESPIRATORY: normal effort  ABDOMEN: soft  MUSCULOSKELETAL: no deformity.  Tenderness to left chest  NEURO: alert, moves all extremities, follows commands  PSYCH:  calm, cooperative  SKIN: warm, dry    Vital signs and nursing notes reviewed.        Plan: Chest pain.  Worsening renal insufficiency       Simon Bernal MD  09/03/23 8026

## 2023-09-03 NOTE — ED PROVIDER NOTES
EMERGENCY DEPARTMENT ENCOUNTER    Room Number:  BRANDIN/BRANDIN  Date of encounter:  9/3/2023  PCP: Patric Cameron DO  Patient Care Team:  Patric Cameron DO as PCP - General (Family Medicine)   Independent Historians: Patient and family            HPI:  Chief Complaint: Chest pain  A complete HPI/ROS/PMH/PSH/SH/FH are unobtainable due to: Patient hard of hearing    Chronic or social conditions impacting patient care (social determinants of health): None    Context: Yoni Bonner Jr. is a 89 y.o. male with a history of HTN, hyperlipidemia, COPD, CAD status post RCA stent placement, ischemic cardiomyopathy, anticoagulated on Eliquis who arrives to the ED via EMS from home.  Patient presents with c/o intermittent, achy, moderate left-sided chest pain that started around 315 this morning. Patient also complains of shortness of breath, nausea, diaphoresis, cough.  At home patient took 3 nitro, this made no improvement of his pain, wife gave him 2 more.  He had 4 baby aspirin's per EMS.  States that his pain is down to about a 6 out of 10.  Patient denies fever, chills, vomiting, weakness, dizziness.  Patient states that nothing makes the symptoms better and nothing worsens symptoms.      Review of prior external notes (non-ED): Medical records reviewed in epic, patient followed by cardiology, last visit was 8/4/2023.    Review of prior external test results outside of this encounter: Transthoracic echo performed 10/24/2022 showed an LVEF of 50.5%, normal LV systolic function, and no evidence of left ventricular thrombus or mass present.    PAST MEDICAL HISTORY  Active Ambulatory Problems     Diagnosis Date Noted    HTN (hypertension) 03/08/2017    Complete tear of right rotator cuff 04/13/2016    Onychomycosis due to dermatophyte 04/27/2015    Left rotator cuff tear arthropathy 01/13/2016    Obesity 04/27/2015    Anemia, chronic disease 03/29/2017    Right shoulder pain 04/13/2016    S/p reverse total shoulder  arthroplasty 04/11/2017    Episode of syncope 04/16/2018    Coronary artery disease involving native coronary artery of native heart without angina pectoris 04/16/2018    Chest pain 12/06/2018    CKD (chronic kidney disease) stage 3, GFR 30-59 ml/min 01/06/2019    Nonrheumatic aortic valve stenosis 03/20/2019    COPD (chronic obstructive pulmonary disease) 07/24/2019    HLD (hyperlipidemia) 07/25/2019    Contusion of right leg 05/19/2021    Knee pain 02/11/2020    Primary osteoarthritis of left knee 02/11/2020    Orthostatic hypotension 01/13/2022    Hyponatremia 09/23/2022    Atrial fibrillation 09/23/2022    Sepsis due to Escherichia coli 10/10/2022    S/P hip replacement 10/10/2022    Acute pulmonary embolism 10/10/2022    Sepsis, due to unspecified organism, unspecified whether acute organ dysfunction present 10/10/2022    UTI due to extended-spectrum beta lactamase (ESBL) producing Escherichia coli 10/13/2022    Diastolic CHF, acute on chronic 10/13/2022    Type 2 MI (myocardial infarction) 10/15/2022    Episode of unresponsiveness 10/22/2022     Resolved Ambulatory Problems     Diagnosis Date Noted    Acute ST elevation myocardial infarction (STEMI) of inferior wall 04/08/2018    Contusion of left knee 01/09/2016    Left shoulder pain 04/13/2016    Tobacco abuse 04/16/2018    History of coronary artery stent placement 04/16/2018    Chronic systolic congestive heart failure 04/16/2018    Ischemic cardiomyopathy 05/08/2018    Exertional chest pain 07/18/2018    Acute bronchitis due to Rhinovirus 01/09/2019    Elevated troponin 07/25/2019    Slurred speech 09/23/2022    Left displaced femoral neck fracture 09/23/2022    Shortness of breath 10/10/2022     Past Medical History:   Diagnosis Date    Bronchitis     CAD (coronary artery disease)     Carotid arterial disease     Chronic bronchitis     Gout     Hyperlipidemia     Hypertension     Mild aortic stenosis     Mitral regurgitation     Precordial chest pain      Rheumatic fever     Syncopal episodes     Tobacco use        The patient has started, but not completed, their COVID-19 vaccination series.    PAST SURGICAL HISTORY  Past Surgical History:   Procedure Laterality Date    CARDIAC CATHETERIZATION      CARDIAC CATHETERIZATION N/A 4/8/2018    Procedure: Left Heart Cath;  Surgeon: Nicolas Jerez MD;  Location: [facility] CATH INVASIVE LOCATION;  Service: Cardiovascular    CARDIAC CATHETERIZATION  4/8/2018    Procedure: Percutaneous Manual Thrombectomy;  Surgeon: Nicolas Jerez MD;  [facility] CATH [facility]VASIVE LOC[facility];  Service: Cardiovascular    CARDIAC CATHETERIZATION N/A 4/8/2018    Procedure: Stent CARLENE coronary;  Surgeon: Nicolas Jerez MD;  Location: [facility] CATH INVASIVE LOCATION;  Service: Cardiovascular    CARDIAC CATHETERIZATION N/A 4/8/2018    Procedure: Right Heart Cath;  Surgeon: Nicolas Jerez MD;  Location: [facility] CATH INVASIVE LOCATION;  Service: Cardiovascular    CARDIAC CATHETERIZATION Left 2/21/2020    Procedure: Cardiac Catheterization/Vascular Study;  Surgeon: Alejandro Jenkins MD;  Location: [facility] CATH INVASIVE LOCATION;  Service: Cardiovascular;  Laterality: Left;    CARDIAC CATHETERIZATION N/A 2/21/2020    Procedure: Coronary angiography;  Surgeon: Alejandro Jenkins MD;  Location: [facility] CATH INVASIVE LOCATION;  Service: Cardiovascular;  Laterality: N/A;    EYE SURGERY      cataract surg    HAND SURGERY      HERNIA REPAIR      KNEE SURGERY      TESTICLE SURGERY      TOTAL HIP ARTHROPLASTY Left 9/25/2022    Procedure: TOTAL HIP ARTHROPLASTY ANTERIOR WITH HANA TABLE;  Surgeon: Jeff Rodriguez II, MD;  Location: [facility] OR;  Service: Orthopedics;  Laterality: Left;         FAMILY HISTORY  Family History   Problem Relation Age of Onset    Heart disease Father     Hypertension Father     Stroke Father     Diabetes Sister     Cancer Brother     Heart disease Brother     Hypertension Brother     No Known Problems Maternal Grandmother     No Known Problems  Maternal Grandfather     No Known Problems Paternal Grandmother     No Known Problems Paternal Grandfather          SOCIAL HISTORY  Social History     Socioeconomic History    Marital status:    Tobacco Use    Smoking status: Never    Smokeless tobacco: Current     Types: Chew    Tobacco comments:     chewing tobacco since 4 years old   Vaping Use    Vaping Use: Never used   Substance and Sexual Activity    Alcohol use: No     Comment: caffeine use    Drug use: Never    Sexual activity: Defer         ALLERGIES  No known drug allergy        REVIEW OF SYSTEMS  Review of Systems     All systems reviewed and negative except for those discussed in HPI.       PHYSICAL EXAM    I have reviewed the triage vital signs and nursing notes.    ED Triage Vitals   Temp Heart Rate Resp BP SpO2   09/03/23 0516 09/03/23 0516 09/03/23 0522 09/03/23 0516 09/03/23 0516   98 °F (36.7 °C) 117 22 128/74 97 %       Physical Exam  GENERAL: Frail and elderly appearing, nontoxic appearing, not distressed  HENT: normocephalic, atraumatic dry mucous membranes  EYES: no scleral icterus, PERRL  CV: regular rhythm, regular rate, no murmur  RESPIRATORY: normal effort, CTAB  ABDOMEN: soft but nontender  MUSCULOSKELETAL: no deformity, no calf tenderness or lower extremity edema bilaterally  NEURO: alert, moves all extremities, follows commands, mental status normal/baseline  SKIN: warm, dry, no rash   Psych: Appropriate mood and affect  Nursing notes and vital signs reviewed          LAB RESULTS  Recent Results (from the past 24 hour(s))   ECG 12 Lead Chest Pain    Collection Time: 09/03/23  5:32 AM   Result Value Ref Range    QT Interval 342 ms    QTC Interval 455 ms   Comprehensive Metabolic Panel    Collection Time: 09/03/23  5:38 AM    Specimen: Blood   Result Value Ref Range    Glucose 146 (H) 65 - 99 mg/dL    BUN 36 (H) 8 - 23 mg/dL    Creatinine 1.54 (H) 0.76 - 1.27 mg/dL    Sodium 129 (L) 136 - 145 mmol/L    Potassium 4.2 3.5 - 5.2  mmol/L    Chloride 96 (L) 98 - 107 mmol/L    CO2 19.6 (L) 22.0 - 29.0 mmol/L    Calcium 9.1 8.2 - 9.6 mg/dL    Total Protein 6.6 6.0 - 8.5 g/dL    Albumin 3.9 3.5 - 5.2 g/dL    ALT (SGPT) 12 1 - 41 U/L    AST (SGOT) 19 1 - 40 U/L    Alkaline Phosphatase 50 39 - 117 U/L    Total Bilirubin 0.2 0.0 - 1.2 mg/dL    Globulin 2.7 gm/dL    A/G Ratio 1.4 g/dL    BUN/Creatinine Ratio 23.4 7.0 - 25.0    Anion Gap 13.4 5.0 - 15.0 mmol/L    eGFR 42.6 (L) >60.0 mL/min/1.73   BNP    Collection Time: 09/03/23  5:38 AM    Specimen: Blood   Result Value Ref Range    proBNP 7,259.0 (H) 0.0 - 1,800.0 pg/mL   High Sensitivity Troponin T    Collection Time: 09/03/23  5:38 AM    Specimen: Blood   Result Value Ref Range    HS Troponin T 41 (H) <15 ng/L   Protime-INR    Collection Time: 09/03/23  5:38 AM    Specimen: Blood   Result Value Ref Range    Protime 15.4 (H) 11.7 - 14.2 Seconds    INR 1.20 (H) 0.90 - 1.10   CBC Auto Differential    Collection Time: 09/03/23  5:38 AM    Specimen: Blood   Result Value Ref Range    WBC 8.91 3.40 - 10.80 10*3/mm3    RBC 3.60 (L) 4.14 - 5.80 10*6/mm3    Hemoglobin 10.5 (L) 13.0 - 17.7 g/dL    Hematocrit 31.4 (L) 37.5 - 51.0 %    MCV 87.2 79.0 - 97.0 fL    MCH 29.2 26.6 - 33.0 pg    MCHC 33.4 31.5 - 35.7 g/dL    RDW 14.3 12.3 - 15.4 %    RDW-SD 45.4 37.0 - 54.0 fl    MPV 11.1 6.0 - 12.0 fL    Platelets 232 140 - 450 10*3/mm3    Neutrophil % 74.0 42.7 - 76.0 %    Lymphocyte % 16.4 (L) 19.6 - 45.3 %    Monocyte % 8.9 5.0 - 12.0 %    Eosinophil % 0.0 (L) 0.3 - 6.2 %    Basophil % 0.1 0.0 - 1.5 %    Immature Grans % 0.6 (H) 0.0 - 0.5 %    Neutrophils, Absolute 6.60 1.70 - 7.00 10*3/mm3    Lymphocytes, Absolute 1.46 0.70 - 3.10 10*3/mm3    Monocytes, Absolute 0.79 0.10 - 0.90 10*3/mm3    Eosinophils, Absolute 0.00 0.00 - 0.40 10*3/mm3    Basophils, Absolute 0.01 0.00 - 0.20 10*3/mm3    Immature Grans, Absolute 0.05 0.00 - 0.05 10*3/mm3    nRBC 0.0 0.0 - 0.2 /100 WBC   Procalcitonin    Collection Time:  09/03/23  5:38 AM    Specimen: Blood   Result Value Ref Range    Procalcitonin 0.14 0.00 - 0.25 ng/mL   Respiratory Panel PCR w/COVID-19(SARS-CoV-2) NEFTALY/VERONICA/MARVIN/PAD/COR/MAD/ÁNGELA In-House, NP Swab in UTM/VTM, 3-4 HR TAT - Swab, Nasopharynx    Collection Time: 09/03/23  5:41 AM    Specimen: Nasopharynx; Swab   Result Value Ref Range    ADENOVIRUS, PCR Not Detected Not Detected    Coronavirus 229E Not Detected Not Detected    Coronavirus HKU1 Not Detected Not Detected    Coronavirus NL63 Not Detected Not Detected    Coronavirus OC43 Not Detected Not Detected    COVID19 Not Detected Not Detected - Ref. Range    Human Metapneumovirus Not Detected Not Detected    Human Rhinovirus/Enterovirus Not Detected Not Detected    Influenza A PCR Not Detected Not Detected    Influenza B PCR Not Detected Not Detected    Parainfluenza Virus 1 Not Detected Not Detected    Parainfluenza Virus 2 Not Detected Not Detected    Parainfluenza Virus 3 Not Detected Not Detected    Parainfluenza Virus 4 Not Detected Not Detected    RSV, PCR Not Detected Not Detected    Bordetella pertussis pcr Not Detected Not Detected    Bordetella parapertussis PCR Not Detected Not Detected    Chlamydophila pneumoniae PCR Not Detected Not Detected    Mycoplasma pneumo by PCR Not Detected Not Detected   Lactic Acid, Plasma    Collection Time: 09/03/23  7:07 AM    Specimen: Blood   Result Value Ref Range    Lactate 2.9 (C) 0.5 - 2.0 mmol/L   High Sensitivity Troponin T 2Hr    Collection Time: 09/03/23  7:40 AM    Specimen: Blood   Result Value Ref Range    HS Troponin T 49 (H) <15 ng/L    Troponin T Delta 8 (C) >=-4 - <+4 ng/L   ECG 12 Lead Chest Pain    Collection Time: 09/03/23  8:32 AM   Result Value Ref Range    QT Interval 346 ms    QTC Interval 421 ms       Ordered the above labs and independently reviewed the results.        RADIOLOGY  XR Chest 1 View    Result Date: 9/3/2023  PORTABLE CHEST X-RAY  HISTORY: Chest pain.  TECHNIQUE: Portable chest x-ray is  correlated with chest x-ray February 4, 2023.  FINDINGS: Left shoulder arthroplasty hardware. Normal cardiomediastinal silhouette. Asymmetric opacity in the right lung base; lung volumes are lower today than previously. The lungs otherwise appear clear. Vascular volume is normal. No pneumothorax or pleural effusion.      Low lung volumes with mild increased density at the right lung base representing atelectasis or pneumonia.  This report was finalized on 9/3/2023 6:31 AM by Dr. Christiano Eason M.D.       I ordered the above noted radiological studies. Reviewed by me and discussed with radiologist.  See dictation for official radiology interpretation.      PROCEDURES    Procedures    HEART SCORE    History Moderately suspicious (1)  ECG Normal (0)  Age > or = 65 (2)  Risk factors > or = to 3 RF for atherosclerotic dx (2)  Troponin 1-3x normal limit (1)    This patient's HEART score is 6    HEART Score Key:  Scores 0-3: 0.9-1.7% risk of adverse cardiac event. In the HEART Score study, these patients were discharged (0.99% in the retrospective study, 1.7% in the prospective study)  Scores 4-6: 12-16.6% risk of adverse cardiac event. In the HEART Score study, these patients were admitted to the hospital. (11.6% retrospective, 16.6% prospective)  Scores ?7: 50-65% risk of adverse cardiac event. In the HEART Score study, these patients were candidates for early invasive measures. (65.2% retrospective, 50.1% prospective)      DIFFERENTIAL DIAGNOSIS:  Differential diagnosis for chest pain include but are not limited to the following:  Anxiety, muscle strain, costochondritis, pleurisy, herpes zoster, MI, ACS, Aortic dissection, PE, pneumonia, pneumothorax, GERD      PROGRESS, DATA ANALYSIS, CONSULTS, AND MEDICAL DECISION MAKING    All labs have been independently reviewed by me.  All radiology studies have been reviewed by me and discussed with radiologist dictating the report.   EKG's independently viewed and interpreted  by me.  Discussion below represents my analysis of pertinent findings related to patient's condition, differential diagnosis, treatment plan and final disposition.        ED Course as of 09/03/23 0919   Sun Sep 03, 2023   8630 First look: 89-year-old male with past medical history of HTN, HLD, CAD/MI s/p stenting, ischemic cardiomyopathy and anticoagulated on Eliquis presents to the ER with complaint of chest pain since yesterday afternoon at around 3:00.  States the pain is located in the middle of the chest, does not radiate, and was associated with shortness of breath, sweats, nausea.  Patient's family gave nitro with temporary and minimal relief.  Pain started getting worse and so he finally decided to present to the ER this morning. [CIRILO]   0646 BUN(!): 36 [MS]   0646 Creatinine(!): 1.54 [MS]   0646 Sodium(!): 129 [MS]   0646 HS Troponin T(!): 41 [MS]   0646 proBNP(!): 7,259.0 [MS]   0701 Radiology study chest independently interpreted by me and my findings are opacity in right lung base, no bony abnormality.  See dictation for official radiology interpretation.   [MS]   0715 Reviewed pt's history and workup with Dr. Bernal.  After a bedside evaluation, he agrees with the plan of care.     [MS]   0720 Consult Note    Discussed care with Dr Motley  Reviewed patient's history, exam, results and need for admission secondary to chest pain, dyspnea, hyponatremia  Dr. Motley accepts the patient to be admitted to telemetry inpatient bed.     [MS]   0812 Lactate(!!): 2.9 [MS]      ED Course User Index  [CIRILO] Christiano Stanton PA  [MS] Vanesa Ryder, APRN         DISPOSITION  ED Disposition       ED Disposition   Decision to Admit    Condition   --    Comment   Level of Care: Telemetry [5]   Diagnosis: Chest pain [772303]   Admitting Physician: RAMESH MOTLEY [[id number]]   Attending Physician: RAMESH MOTLEY [[id number]]   Certification: I Certify That Inpatient Hospital Services Are Medically Necessary  For Greater Than 2 Midnights               ADMISSION    Discussed treatment plan and reason for admission with pt/family and admitting physician.  Pt/family voiced understanding of the plan for admission for further testing/treatment as needed.      DIAGNOSIS  Final diagnoses:   Chest pain, unspecified type   Shortness of breath   Acute kidney injury superimposed on chronic kidney disease   Hyponatremia         AS OF 09:19 EDT VITALS:    BP - 125/100  HR - 84  TEMP - 98 °F (36.7 °C) (Oral)  O2 SATS - 99%      MEDICATIONS GIVEN IN ER    Medications   sodium chloride 0.9 % flush 10 mL (has no administration in time range)   sodium chloride 0.9 % flush 10 mL (has no administration in time range)   sodium chloride 0.9 % flush 10 mL (has no administration in time range)   sodium chloride 0.9 % infusion 40 mL (has no administration in time range)   sennosides-docusate (PERICOLACE) 8.6-50 MG per tablet 2 tablet (has no administration in time range)     And   polyethylene glycol (MIRALAX) packet 17 g (has no administration in time range)     And   bisacodyl (DULCOLAX) EC tablet 5 mg (has no administration in time range)     And   bisacodyl (DULCOLAX) suppository 10 mg (has no administration in time range)   nitroglycerin (NITROSTAT) SL tablet 0.4 mg (has no administration in time range)   acetaminophen (TYLENOL) tablet 650 mg (has no administration in time range)     Or   acetaminophen (TYLENOL) 160 MG/5ML solution 650 mg (has no administration in time range)     Or   acetaminophen (TYLENOL) suppository 650 mg (has no administration in time range)   HYDROcodone-acetaminophen (NORCO) 5-325 MG per tablet 1 tablet (has no administration in time range)   ondansetron (ZOFRAN) tablet 4 mg (has no administration in time range)     Or   ondansetron (ZOFRAN) injection 4 mg (has no administration in time range)   cefTRIAXone (ROCEPHIN) 1,000 mg in sodium chloride 0.9 % 100 mL IVPB-VTB (has no administration in time range)   sodium  chloride 0.9 % infusion (has no administration in time range)   sodium chloride 0.9 % bolus 500 mL (0 mL Intravenous Stopped 9/3/23 0812)   cefTRIAXone (ROCEPHIN) 1,000 mg in sodium chloride 0.9 % 100 mL IVPB-VTB (0 mg Intravenous Stopped 9/3/23 0813)   morphine injection 2 mg (2 mg Intravenous Given 9/3/23 0724)   ondansetron (ZOFRAN) injection 4 mg (4 mg Intravenous Given 9/3/23 0724)                Note Disclaimer: At [facility], we believe that sharing information builds trust and better relationships. You are receiving this note because you recently visited [facility]. It is possible you will see health information before a provider has talked with you about it. This kind of information can be easy to misunderstand. To help you fully understand what it means for your health, we urge you to discuss this note with your provider.         Vanesa Ryder, APRN  09/03/23 [id number]

## 2023-09-03 NOTE — ED TRIAGE NOTES
"Pt arrives from home via EMS.    EMS states \"Pt wife called out due to Pt having chest pain since 0300. Pt took 4 nitro PTA our arrival, Pt given 324 of asa and 1 nitro in route. Pt sill complains of 8/10 pain burning in nature.\"    Pt is [place].     "

## 2023-09-03 NOTE — ED NOTES
BIBA from home, cp since 1500 yesterday, nitro given by wife at home x 4, chronic cough, no vomiting, diarrhea, denies fever, [place], no edema

## 2023-09-03 NOTE — H&P
Patient Name:  Yoni Bonner Jr.  YOB: 1933  MRN:  [id number]  Admit Date:  9/3/2023  Patient Care Team:  Patric Cameron DO as PCP - General (Family Medicine)      Subjective   History Present Illness     Chief Complaint   Patient presents with   • Chest Pain       Mr. Bonner is a 89 y.o. with a history of CHF, PE, aortic valve stenosis, CAD, CKD, hypertension but more recently hypotension requiring midodrine, COPD that presents to [facility] complaining of intermittent sternal chest pain which started early this morning.  He reports that it is currently at a level 2 out of 10 which is improved compared to home.  It did radiate to his left shoulder and upper arm.  He is having shortness of breath and productive cough.  Not reporting any fevers or chills.  He has been on Levaquin and steroid for URI for the last several days as an outpatient.  He is not reporting any nausea vomiting diarrhea abdominal pain or dysuria.  Family reports that he did have some cellulitis of his left foot previously but that improved.  In route to the hospital he received aspirin by EMS and did require supplemental oxygen.  Prior to EMS arrival he took 3 nitroglycerin at home and then apparently took 2 additional ones.      Review of Systems   Constitutional:  Negative for chills and fever.   HENT:  Negative for sore throat and trouble swallowing.    Eyes:  Negative for pain.   Respiratory:  Positive for cough and shortness of breath. Negative for wheezing.    Cardiovascular:  Positive for chest pain. Negative for palpitations and leg swelling.   Gastrointestinal:  Negative for constipation, diarrhea, nausea and vomiting.   Endocrine: Negative for cold intolerance and heat intolerance.   Genitourinary:  Negative for difficulty urinating and dysuria.   Musculoskeletal:  Negative for neck pain and neck stiffness.   Skin:  Negative for pallor and rash.   Allergic/Immunologic: Negative for  environmental allergies and food allergies.   Neurological:  Negative for seizures and syncope.   Hematological:  Negative for adenopathy. Does not bruise/bleed easily.   Psychiatric/Behavioral:  Negative for agitation and confusion.       Personal History     Past Medical History:   Diagnosis Date   • Bronchitis    • CAD (coronary artery disease)    • Carotid arterial disease    • Chronic bronchitis    • COPD (chronic obstructive pulmonary disease)    • Gout    • Hyperlipidemia    • Hypertension    • Ischemic cardiomyopathy     resolved, EF was 60% in 2018   • Mild aortic stenosis    • Mitral regurgitation    • Precordial chest pain    • Rheumatic fever    • Syncopal episodes    • Tobacco use      Past Surgical History:   Procedure Laterality Date   • CARDIAC CATHETERIZATION     • CARDIAC CATHETERIZATION N/A 4/8/2018    Procedure: Left Heart Cath;  Surgeon: Nicolas Jerez MD;  Location: [facility] CATH INVASIVE LOCATION;  Service: Cardiovascular   • CARDIAC CATHETERIZATION  4/8/2018    Procedure: Percutaneous Manual Thrombectomy;  Surgeon: Nicolas Jerez MD;  Location: [facility] CATH [facility]VASIVE LOCATION;  Service: Cardiovascular   • CARDIAC CATHETERIZATION N/A 4/8/2018    Procedure: Stent CARLENE coronary;  Surgeon: Nicolas Jerez MD;  Location: [facility] CATH INVASIVE LOCATION;  Service: Cardiovascular   • CARDIAC CATHETERIZATION N/A 4/8/2018    Procedure: Right Heart Cath;  Surgeon: Nicolas Jerez MD;  Location: [facility] CATH [facility]VASIVE LOCATION;  Service: Cardiovascular   • CARDIAC CATHETERIZATION Left 2/21/2020    Procedure: Cardiac Catheterization/Vascular Study;  Surgeon: Alejandro Jenkins MD;  Location: [facility] CATH INVASIVE LOCATION;  Service: Cardiovascular;  Laterality: Left;   • CARDIAC CATHETERIZATION N/A 2/21/2020    Procedure: Coronary angiography;  Surgeon: Alejandro Jenkins MD;  Location: [facility] CATH INVASIVE LOCATION;  Service: Cardiovascular;  Laterality: N/A;   • EYE SURGERY      cataract surg   • HAND SURGERY     • HERNIA REPAIR      • KNEE SURGERY     • TESTICLE SURGERY     • TOTAL HIP ARTHROPLASTY Left 9/25/2022    Procedure: TOTAL HIP ARTHROPLASTY ANTERIOR WITH HANA TABLE;  Surgeon: Jeff Rodriguez II, MD;  Location: [facility];  Service: Orthopedics;  Laterality: Left;     Family History   Problem Relation Age of Onset   • Heart disease Father    • Hypertension Father    • Stroke Father    • Diabetes Sister    • Cancer Brother    • Heart disease Brother    • Hypertension Brother    • No Known Problems Maternal Grandmother    • No Known Problems Maternal Grandfather    • No Known Problems Paternal Grandmother    • No Known Problems Paternal Grandfather      Social History     Tobacco Use   • Smoking status: Never   • Smokeless tobacco: Current     Types: Chew   • Tobacco comments:     chewing tobacco since 4 years old   Vaping Use   • Vaping Use: Never used   Substance Use Topics   • Alcohol use: No     Comment: caffeine use   • Drug use: Never     No current facility-administered medications on file prior to encounter.     Current Outpatient Medications on File Prior to Encounter   Medication Sig Dispense Refill   • albuterol (PROVENTIL) (2.5 MG/3ML) 0.083% nebulizer solution Take 2.5 mg by nebulization Every 4 (Four) Hours As Needed for Wheezing or Shortness of Air.  12   • albuterol sulfate  (90 Base) MCG/ACT inhaler Inhale 2 puffs Every 4 (Four) Hours As Needed for Wheezing.     • apixaban (ELIQUIS) 5 MG tablet tablet Take 1 tablet by mouth 2 (Two) Times a Day. 60 tablet 11   • aspirin 81 MG EC tablet Take 1 tablet by mouth Daily. 30 tablet 5   • atorvastatin (LIPITOR) 10 MG tablet Take 1 tablet by mouth Daily. (Patient taking differently: Take 1 tablet by mouth Every Night.) 90 tablet 0   • guaifenesin (ROBITUSSIN) 100 MG/5ML liquid Take 10 mL by mouth 3 (Three) Times a Day As Needed for Cough.     • midodrine (PROAMATINE) 10 MG tablet Take 1 tablet by mouth 3 (Three) Times a Day Before Meals. 270 tablet 3   •  nitroglycerin (NITROSTAT) 0.4 MG SL tablet      • acetaminophen (TYLENOL) 325 MG tablet Take 2 tablets by mouth Every 4 (Four) Hours As Needed for Mild Pain or Moderate Pain.     • bisacodyl (DULCOLAX) 10 MG suppository Insert 1 suppository into the rectum Daily As Needed for constipation. 12 suppository 6   • busPIRone (BUSPAR) 5 MG tablet Take 1 tablet by mouth Daily. (Patient not taking: Reported on 8/4/2023)     • LORazepam (Ativan) 0.5 MG tablet Take 1 tablet by mouth Every 6 (Six) Hours As Needed for anxiety. (Patient not taking: Reported on 8/4/2023) 45 tablet 4   • magnesium hydroxide (MILK OF MAGNESIA) 400 MG/5ML suspension Take 5 mL by mouth Daily As Needed for Constipation.     • metoprolol tartrate (LOPRESSOR) 25 MG tablet Take 0.5 tablets by mouth Every 12 (Twelve) Hours for 30 days. 30 tablet 0     Allergies   Allergen Reactions   • No Known Drug Allergy Unknown (See Comments)     NKA       Objective    Objective     Vital Signs  Temp:  [98 °F (36.7 °C)] 98 °F (36.7 °C)  Heart Rate:  [] 84  Resp:  [18-22] 18  BP: (120-128)/() 125/100  SpO2:  [97 %-100 %] 99 %  on  Flow (L/min):  [2] 2;   Device (Oxygen Therapy): nasal cannula  Body mass index is 26.05 kg/m².    Physical Exam  Vitals and nursing note reviewed.   Constitutional:       General: He is not in acute distress.     Appearance: He is not diaphoretic.   HENT:      Head: Atraumatic.   Eyes:      General:         Right eye: No discharge.         Left eye: No discharge.      Pupils: Pupils are equal, round, and reactive to light.   Cardiovascular:      Rate and Rhythm: Normal rate and regular rhythm.      Pulses: Normal pulses.      Heart sounds: Murmur heard.   Pulmonary:      Effort: Pulmonary effort is normal.      Breath sounds: Rhonchi present. No wheezing.   Abdominal:      General: There is no distension.      Palpations: Abdomen is soft.      Tenderness: There is no abdominal tenderness. There is no guarding or rebound.    Musculoskeletal:         General: No tenderness.      Cervical back: Neck supple. No tenderness.      Right lower leg: No edema.      Left lower leg: No edema.   Skin:     General: Skin is warm and dry.   Neurological:      Mental Status: He is alert.      Cranial Nerves: No cranial nerve deficit.      Comments: [facility]   Psychiatric:         Mood and Affect: Mood normal.         Behavior: Behavior normal.       Results Review:  I reviewed the patient's new clinical results.  I reviewed the patient's new imaging results and agree with the interpretation.  I personally viewed and interpreted the patient's EKG/Telemetry data  I reviewed prior records.    Lab Results (last 24 hours)       Procedure Component Value Units Date/Time    CBC & Differential [[id number]]  (Abnormal) Collected: 09/03/23 0538    Specimen: Blood Updated: 09/03/23 0558    Narrative:      The following orders were created for panel order CBC & Differential.  Procedure                               Abnormality         Status                     ---------                               -----------         ------                     CBC Auto Differential[[id number]]        Abnormal            Final result                 Please view results for these tests on the individual orders.    Comprehensive Metabolic Panel [[id number]]  (Abnormal) Collected: 09/03/23 0538    Specimen: Blood Updated: 09/03/23 0623     Glucose 146 mg/dL      BUN 36 mg/dL      Creatinine 1.54 mg/dL      Sodium 129 mmol/L      Potassium 4.2 mmol/L      Comment: Slight hemolysis detected by analyzer. Results may be affected.        Chloride 96 mmol/L      CO2 19.6 mmol/L      Calcium 9.1 mg/dL      Total Protein 6.6 g/dL      Albumin 3.9 g/dL      ALT (SGPT) 12 U/L      AST (SGOT) 19 U/L      Alkaline Phosphatase 50 U/L      Total Bilirubin 0.2 mg/dL      Globulin 2.7 gm/dL      A/G Ratio 1.4 g/dL      BUN/Creatinine Ratio 23.4     Anion Gap 13.4 mmol/L      eGFR 42.6 mL/min/1.73      Narrative:      GFR Normal >60  Chronic Kidney Disease <60  Kidney Failure <15    The GFR formula is only valid for adults with stable renal function between ages 18 and 70.    BNP [[id number]]  (Abnormal) Collected: 09/03/23 0538    Specimen: Blood Updated: 09/03/23 0615     proBNP 7,259.0 pg/mL     Narrative:      Among patients with dyspnea, NT-proBNP is highly sensitive for the detection of acute congestive heart failure. In addition NT-proBNP of <300 pg/ml effectively rules out acute congestive heart failure with 99% negative predictive value.      High Sensitivity Troponin T [[id number]]  (Abnormal) Collected: 09/03/23 0538    Specimen: Blood Updated: 09/03/23 0620     HS Troponin T 41 ng/L     Narrative:      High Sensitive Troponin T Reference Range:  <10.0 ng/L- Negative Female for AMI  <15.0 ng/L- Negative Male for AMI  >=10 - Abnormal Female indicating possible myocardial injury.  >=15 - Abnormal Male indicating possible myocardial injury.   Clinicians would have to utilize clinical acumen, EKG, Troponin, and serial changes to determine if it is an Acute Myocardial Infarction or myocardial injury due to an underlying chronic condition.         Protime-INR [[id number]]  (Abnormal) Collected: 09/03/23 0538    Specimen: Blood Updated: 09/03/23 0619     Protime 15.4 Seconds      INR 1.20    CBC Auto Differential [[id number]]  (Abnormal) Collected: 09/03/23 0538    Specimen: Blood Updated: 09/03/23 0558     WBC 8.91 10*3/mm3      RBC 3.60 10*6/mm3      Hemoglobin 10.5 g/dL      Hematocrit 31.4 %      MCV 87.2 fL      MCH 29.2 pg      MCHC 33.4 g/dL      RDW 14.3 %      RDW-SD 45.4 fl      MPV 11.1 fL      Platelets 232 10*3/mm3      Neutrophil % 74.0 %      Lymphocyte % 16.4 %      Monocyte % 8.9 %      Eosinophil % 0.0 %      Basophil % 0.1 %      Immature Grans % 0.6 %      Neutrophils, Absolute 6.60 10*3/mm3      Lymphocytes, Absolute 1.46 10*3/mm3      Monocytes, Absolute 0.79 10*3/mm3      Eosinophils,  "Absolute 0.00 10*3/mm3      Basophils, Absolute 0.01 10*3/mm3      Immature Grans, Absolute 0.05 10*3/mm3      nRBC 0.0 /100 WBC     Procalcitonin [[id number]]  (Normal) Collected: 09/03/23 0538    Specimen: Blood Updated: 09/03/23 0736     Procalcitonin 0.14 ng/mL     Narrative:      As a Marker for Sepsis (Non-Neonates):    1. <0.5 ng/mL represents a low risk of severe sepsis and/or septic shock.  2. >2 ng/mL represents a high risk of severe sepsis and/or septic shock.    As a Marker for Lower Respiratory Tract Infections that require antibiotic therapy:    PCT on Admission    Antibiotic Therapy       6-12 Hrs later    >0.5                Strongly Recommended  >0.25 - <0.5        Recommended   0.1 - 0.25          Discouraged              Remeasure/reassess PCT  <0.1                Strongly Discouraged     Remeasure/reassess PCT    As 28 day mortality risk marker: \"Change in Procalcitonin Result\" (>80% or <=80%) if Day 0 (or Day 1) and Day 4 values are available. Refer to http://www.[employer]-pct-calculator.com    Change in PCT <=80%  A decrease of PCT levels below or equal to 80% defines a positive change in PCT test result representing a higher risk for 28-day all-cause mortality of patients diagnosed with severe sepsis for septic shock.    Change in PCT >80%  A decrease of PCT levels of more than 80% defines a negative change in PCT result representing a lower risk for 28-day all-cause mortality of patients diagnosed with severe sepsis or septic shock.       Respiratory Panel PCR w/COVID-19(SARS-CoV-2) NEFTALY/VERONICA/MARVIN/PAD/COR/MAD/ÁNGELA In-House, NP Swab in UTM/[facility], 3-4 HR TAT - Swab, Nasopharynx [[id number]]  (Normal) Collected: 09/03/23 0541    Specimen: Swab from Nasopharynx Updated: 09/03/23 0646     ADENOVIRUS, PCR Not Detected     Coronavirus 229E Not Detected     Coronavirus HKU1 Not Detected     Coronavirus NL63 Not Detected     Coronavirus OC43 Not Detected     COVID19 Not Detected     Human Metapneumovirus Not " Detected     Human Rhinovirus/Enterovirus Not Detected     Influenza A PCR Not Detected     Influenza B PCR Not Detected     Parainfluenza Virus 1 Not Detected     Parainfluenza Virus 2 Not Detected     Parainfluenza Virus 3 Not Detected     Parainfluenza Virus 4 Not Detected     RSV, PCR Not Detected     Bordetella pertussis pcr Not Detected     Bordetella parapertussis PCR Not Detected     Chlamydophila pneumoniae PCR Not Detected     Mycoplasma pneumo by PCR Not Detected    Narrative:      In the setting of a positive respiratory panel with a viral infection PLUS a negative procalcitonin without other underlying concern for bacterial infection, consider observing off antibiotics or discontinuation of antibiotics and continue supportive care. If the respiratory panel is positive for atypical bacterial infection (Bordetella pertussis, Chlamydophila pneumoniae, or Mycoplasma pneumoniae), consider antibiotic de-escalation to target atypical bacterial infection.    Lactic Acid, Plasma [[id number]]  (Abnormal) Collected: 09/03/23 0707    Specimen: Blood Updated: 09/03/23 0741     Lactate 2.9 mmol/L     Blood Culture - Blood, Arm, Left [[id number]] Collected: 09/03/23 0707    Specimen: Blood from Arm, Left Updated: 09/03/23 0712    High Sensitivity Troponin T 2Hr [[id number]]  (Abnormal) Collected: 09/03/23 0740    Specimen: Blood Updated: 09/03/23 0817     HS Troponin T 49 ng/L      Troponin T Delta 8 ng/L     Narrative:      High Sensitive Troponin T Reference Range:  <10.0 ng/L- Negative Female for AMI  <15.0 ng/L- Negative Male for AMI  >=10 - Abnormal Female indicating possible myocardial injury.  >=15 - Abnormal Male indicating possible myocardial injury.   Clinicians would have to utilize clinical acumen, EKG, Troponin, and serial changes to determine if it is an Acute Myocardial Infarction or myocardial injury due to an underlying chronic condition.         Blood Culture - Blood, Arm, Left [[id number]] Collected:  09/03/23 0740    Specimen: Blood from Arm, Left Updated: 09/03/23 0744            Imaging Results (Last 24 Hours)       Procedure Component Value Units Date/Time    XR Chest 1 View [[id number]] Collected: 09/03/23 0630     Updated: 09/03/23 0634    Narrative:      PORTABLE CHEST X-RAY     HISTORY: Chest pain.     TECHNIQUE: Portable chest x-ray is correlated with chest x-ray February 4, 2023.     FINDINGS: Left shoulder arthroplasty hardware. Normal cardiomediastinal  silhouette. Asymmetric opacity in the right lung base; lung volumes are  lower today than previously. The lungs otherwise appear clear. Vascular  volume is normal. No pneumothorax or pleural effusion.       Impression:      Low lung volumes with mild increased density at the right  lung base representing atelectasis or pneumonia.     This report was finalized on 9/3/2023 6:31 AM by Dr. Christiano Eason M.D.               Results for orders placed during the hospital encounter of 10/22/22    Adult Transthoracic Echo Limited W/ Cont if Necessary Per Protocol    Interpretation Summary  •  Calculated left ventricular EF = 50.5% . Left ventricular systolic function is normal.  •  The following left ventricular wall segments are hypokinetic: apical anterior and apex hypokinetic.      ECG 12 Lead Chest Pain   Preliminary Result   HEART RATE= 89  bpm   RR Interval= 674  ms   PA Interval= 208  ms   P Horizontal Axis= -1  deg   P Front Axis= 1  deg   QRSD Interval= 94  ms   QT Interval= 346  ms   QTcB= 421  ms   QRS Axis= -18  deg   T Wave Axis= 138  deg   - ABNORMAL ECG -   Sinus rhythm   Atrial premature complex   Borderline left axis deviation   Abnormal R-wave progression, early transition   Abnormal T, consider ischemia, lateral leads   Electronically Signed By:    Date and Time of Study: 2023-09-03 08:32:33      ECG 12 Lead Chest Pain   Preliminary Result   HEART RATE= 106  bpm   RR Interval= 566  ms   PA Interval= 194  ms   P Horizontal Axis= -36  deg   P  Front Axis= -13  deg   QRSD Interval= 98  ms   QT Interval= 342  ms   QTcB= 455  ms   QRS Axis= -18  deg   T Wave Axis= 94  deg   - ABNORMAL ECG -   Sinus tachycardia   Multiple premature complexes, vent & supraven   Borderline left axis deviation   Abnormal R-wave progression, early transition   Nonspecific T abnormalities, lateral leads   Baseline wander in lead(s) I,III,aVL   Electronically Signed By:    Date and Time of Study: 2023-09-03 05:32:04           Assessment/Plan     Active Hospital Problems    Diagnosis  POA   • **Chest pain [R07.9]  Yes   • PNA (pneumonia) [J18.9]  Yes   • Chronic heart failure with preserved ejection fraction (HFpEF) [I50.32]  Yes   • Atrial fibrillation [I48.91]  Yes   • COPD (chronic obstructive pulmonary disease) [J44.9]  Yes   • Nonrheumatic aortic valve stenosis [I35.0]  Yes   • CKD (chronic kidney disease) stage 3, GFR 30-59 ml/min [N18.30]  Yes   • Anemia, chronic disease [D63.8]  Yes   • HTN (hypertension) [I10]  Yes      Resolved Hospital Problems   No resolved problems to display.       Mr. Bonner is a 89 y.o.     Chest pain: Troponin slightly elevated but flat.  He has had improvement of chest pain since presentation.  Plan to continue aspirin and initiate high-dose statin.  He has had issues with hypotension so is not on beta-blocker.  BNP elevated compared to prior value however has no peripheral edema.  He is on Eliquis chronically.  Consult cardiology and could hold Eliquis and start heparin if intervention is required.  Pneumonia: Infectious markers are low with outpatient antibiotic.  Blood cultures pending.  Will check urinary antigens and continue Rocephin.  COPD: Did not hear any acute bronchospasm on exam.  Plan to hold on steroids for now and could resume them if bronchospasm develops.  Hypertension/hypotension: Resume midodrine.  Monitor . adjust as needed by cardiology.  RADHA: Has CKD listed in chart but most recent creatinine was normal.  He does have elevated  creatinine now and also elevated lactic acid level.  Gentle hydration and repeat lactate is pending.  He did receive fluid bolus in the ER.  Hyponatremia: Monitor with fluids.  ?PAF vs Hx PE: I do not see this diagnosis listed in most recent cardiology note.  He does have a history of PE and is on chronic anticoagulation.  Will defer to cardiology.  Code Status: Last discharge summary noted hospice evaluation and outpatient hospice.  Will consult palliative.  PPx:As above  I discussed the patient's findings and my recommendations with patient, family, and ED provider.      Carlo Leo MD  [facility]  09/03/23  10:19 EDT    Dictated portions of note using dragon dictation software.

## 2023-09-04 ENCOUNTER — APPOINTMENT (OUTPATIENT)
Dept: CARDIOLOGY | Facility: HOSPITAL | Age: 88
DRG: 194 | End: 2023-09-04
Payer: MEDICARE

## 2023-09-04 LAB
ALBUMIN SERPL-MCNC: 3.3 G/DL (ref 3.5–5.2)
ALBUMIN/GLOB SERPL: 1.6 G/DL
ALP SERPL-CCNC: 45 U/L (ref 39–117)
ALT SERPL W P-5'-P-CCNC: 8 U/L (ref 1–41)
ANION GAP SERPL CALCULATED.3IONS-SCNC: 10 MMOL/L (ref 5–15)
AORTIC DIMENSIONLESS INDEX: 0.2 (DI)
AST SERPL-CCNC: 13 U/L (ref 1–40)
BASOPHILS # BLD AUTO: 0.02 10*3/MM3 (ref 0–0.2)
BASOPHILS NFR BLD AUTO: 0.3 % (ref 0–1.5)
BH CV ECHO MEAS - ACS: 0.8 CM
BH CV ECHO MEAS - AO MAX PG: 67.2 MMHG
BH CV ECHO MEAS - AO MEAN PG: 37.4 MMHG
BH CV ECHO MEAS - AO V2 MAX: 410 CM/SEC
BH CV ECHO MEAS - AO V2 VTI: 93.2 CM
BH CV ECHO MEAS - AVA(I,D): 0.78 CM2
BH CV ECHO MEAS - EDV(CUBED): 48.5 ML
BH CV ECHO MEAS - EDV(MOD-SP2): 81 ML
BH CV ECHO MEAS - EDV(MOD-SP4): 106 ML
BH CV ECHO MEAS - EF(MOD-BP): 44.7 %
BH CV ECHO MEAS - EF(MOD-SP2): 48.1 %
BH CV ECHO MEAS - EF(MOD-SP4): 44.3 %
BH CV ECHO MEAS - ESV(MOD-SP2): 42 ML
BH CV ECHO MEAS - ESV(MOD-SP4): 59 ML
BH CV ECHO MEAS - IVS/LVPW: 1.37 CM
BH CV ECHO MEAS - IVSD: 1.48 CM
BH CV ECHO MEAS - LAT PEAK E' VEL: 9.8 CM/SEC
BH CV ECHO MEAS - LV DIASTOLIC VOL/BSA (35-75): 54.2 CM2
BH CV ECHO MEAS - LV MASS(C)D: 159.9 GRAMS
BH CV ECHO MEAS - LV MAX PG: 4 MMHG
BH CV ECHO MEAS - LV MEAN PG: 2.14 MMHG
BH CV ECHO MEAS - LV SYSTOLIC VOL/BSA (12-30): 30.2 CM2
BH CV ECHO MEAS - LV V1 MAX: 100.4 CM/SEC
BH CV ECHO MEAS - LV V1 VTI: 23.1 CM
BH CV ECHO MEAS - LVIDD: 3.6 CM
BH CV ECHO MEAS - LVOT AREA: 3.1 CM2
BH CV ECHO MEAS - LVOT DIAM: 2 CM
BH CV ECHO MEAS - LVPWD: 1.08 CM
BH CV ECHO MEAS - MED PEAK E' VEL: 4.6 CM/SEC
BH CV ECHO MEAS - MV A MAX VEL: 158 CM/SEC
BH CV ECHO MEAS - MV DEC SLOPE: 567.8 CM/SEC2
BH CV ECHO MEAS - MV DEC TIME: 0.28 MSEC
BH CV ECHO MEAS - MV E MAX VEL: 96.9 CM/SEC
BH CV ECHO MEAS - MV E/A: 0.61
BH CV ECHO MEAS - MV MAX PG: 12.1 MMHG
BH CV ECHO MEAS - MV MEAN PG: 3.8 MMHG
BH CV ECHO MEAS - MV P1/2T: 62.2 MSEC
BH CV ECHO MEAS - MV V2 VTI: 36.1 CM
BH CV ECHO MEAS - MVA(P1/2T): 3.5 CM2
BH CV ECHO MEAS - MVA(VTI): 2.01 CM2
BH CV ECHO MEAS - PA ACC TIME: 0.1 SEC
BH CV ECHO MEAS - RV MAX PG: 2.7 MMHG
BH CV ECHO MEAS - RV V1 MAX: 82.7 CM/SEC
BH CV ECHO MEAS - RV V1 VTI: 18.6 CM
BH CV ECHO MEAS - SI(MOD-SP2): 19.9 ML/M2
BH CV ECHO MEAS - SI(MOD-SP4): 24 ML/M2
BH CV ECHO MEAS - SV(LVOT): 72.5 ML
BH CV ECHO MEAS - SV(MOD-SP2): 39 ML
BH CV ECHO MEAS - SV(MOD-SP4): 47 ML
BH CV ECHO MEASUREMENTS AVERAGE E/E' RATIO: 13.46
BH CV XLRA - RV BASE: 3.3 CM
BH CV XLRA - RV LENGTH: 7.6 CM
BH CV XLRA - RV MID: 2.45 CM
BILIRUB SERPL-MCNC: 0.2 MG/DL (ref 0–1.2)
BUN SERPL-MCNC: 34 MG/DL (ref 8–23)
BUN/CREAT SERPL: 25 (ref 7–25)
CALCIUM SPEC-SCNC: 8.7 MG/DL (ref 8.2–9.6)
CHLORIDE SERPL-SCNC: 103 MMOL/L (ref 98–107)
CHOLEST SERPL-MCNC: 154 MG/DL (ref 0–200)
CO2 SERPL-SCNC: 20 MMOL/L (ref 22–29)
CREAT SERPL-MCNC: 1.36 MG/DL (ref 0.76–1.27)
DEPRECATED RDW RBC AUTO: 43.9 FL (ref 37–54)
EGFRCR SERPLBLD CKD-EPI 2021: 49.4 ML/MIN/1.73
EOSINOPHIL # BLD AUTO: 0.06 10*3/MM3 (ref 0–0.4)
EOSINOPHIL NFR BLD AUTO: 0.9 % (ref 0.3–6.2)
ERYTHROCYTE [DISTWIDTH] IN BLOOD BY AUTOMATED COUNT: 14 % (ref 12.3–15.4)
GLOBULIN UR ELPH-MCNC: 2.1 GM/DL
GLUCOSE SERPL-MCNC: 94 MG/DL (ref 65–99)
HCT VFR BLD AUTO: 27.9 % (ref 37.5–51)
HDLC SERPL-MCNC: 53 MG/DL (ref 40–60)
HGB BLD-MCNC: 9.1 G/DL (ref 13–17.7)
IMM GRANULOCYTES # BLD AUTO: 0.04 10*3/MM3 (ref 0–0.05)
IMM GRANULOCYTES NFR BLD AUTO: 0.6 % (ref 0–0.5)
LDLC SERPL CALC-MCNC: 80 MG/DL (ref 0–100)
LDLC/HDLC SERPL: 1.47 {RATIO}
LEFT ATRIUM VOLUME INDEX: 48.4 ML/M2
LYMPHOCYTES # BLD AUTO: 2.86 10*3/MM3 (ref 0.7–3.1)
LYMPHOCYTES NFR BLD AUTO: 40.7 % (ref 19.6–45.3)
MCH RBC QN AUTO: 28.2 PG (ref 26.6–33)
MCHC RBC AUTO-ENTMCNC: 32.6 G/DL (ref 31.5–35.7)
MCV RBC AUTO: 86.4 FL (ref 79–97)
MONOCYTES # BLD AUTO: 0.64 10*3/MM3 (ref 0.1–0.9)
MONOCYTES NFR BLD AUTO: 9.1 % (ref 5–12)
NEUTROPHILS NFR BLD AUTO: 3.41 10*3/MM3 (ref 1.7–7)
NEUTROPHILS NFR BLD AUTO: 48.4 % (ref 42.7–76)
NRBC BLD AUTO-RTO: 0 /100 WBC (ref 0–0.2)
PLATELET # BLD AUTO: 190 10*3/MM3 (ref 140–450)
PMV BLD AUTO: 10.9 FL (ref 6–12)
POTASSIUM SERPL-SCNC: 4.2 MMOL/L (ref 3.5–5.2)
PROT SERPL-MCNC: 5.4 G/DL (ref 6–8.5)
RBC # BLD AUTO: 3.23 10*6/MM3 (ref 4.14–5.8)
SINUS: 3.2 CM
SODIUM SERPL-SCNC: 133 MMOL/L (ref 136–145)
TRIGL SERPL-MCNC: 115 MG/DL (ref 0–150)
TSH SERPL DL<=0.05 MIU/L-ACNC: 1.08 UIU/ML (ref 0.27–4.2)
VLDLC SERPL-MCNC: 21 MG/DL (ref 5–40)
WBC NRBC COR # BLD: 7.03 10*3/MM3 (ref 3.4–10.8)

## 2023-09-04 PROCEDURE — 85025 COMPLETE CBC W/AUTO DIFF WBC: CPT | Performed by: INTERNAL MEDICINE

## 2023-09-04 PROCEDURE — 25010000002 CEFTRIAXONE PER 250 MG: Performed by: INTERNAL MEDICINE

## 2023-09-04 PROCEDURE — 99232 SBSQ HOSP IP/OBS MODERATE 35: CPT | Performed by: NURSE PRACTITIONER

## 2023-09-04 PROCEDURE — 80061 LIPID PANEL: CPT | Performed by: INTERNAL MEDICINE

## 2023-09-04 PROCEDURE — 93306 TTE W/DOPPLER COMPLETE: CPT

## 2023-09-04 PROCEDURE — 97530 THERAPEUTIC ACTIVITIES: CPT

## 2023-09-04 PROCEDURE — 94664 DEMO&/EVAL PT USE INHALER: CPT

## 2023-09-04 PROCEDURE — 25510000001 PERFLUTREN (DEFINITY) 8.476 MG IN SODIUM CHLORIDE (PF) 0.9 % 10 ML INJECTION: Performed by: INTERNAL MEDICINE

## 2023-09-04 PROCEDURE — 36415 COLL VENOUS BLD VENIPUNCTURE: CPT | Performed by: INTERNAL MEDICINE

## 2023-09-04 PROCEDURE — 94799 UNLISTED PULMONARY SVC/PX: CPT

## 2023-09-04 PROCEDURE — 94761 N-INVAS EAR/PLS OXIMETRY MLT: CPT

## 2023-09-04 PROCEDURE — 97162 PT EVAL MOD COMPLEX 30 MIN: CPT

## 2023-09-04 PROCEDURE — 93306 TTE W/DOPPLER COMPLETE: CPT | Performed by: INTERNAL MEDICINE

## 2023-09-04 PROCEDURE — 84443 ASSAY THYROID STIM HORMONE: CPT | Performed by: INTERNAL MEDICINE

## 2023-09-04 PROCEDURE — 94640 AIRWAY INHALATION TREATMENT: CPT

## 2023-09-04 PROCEDURE — 80053 COMPREHEN METABOLIC PANEL: CPT | Performed by: INTERNAL MEDICINE

## 2023-09-04 RX ADMIN — PERFLUTREN 2 ML: 6.52 INJECTION, SUSPENSION INTRAVENOUS at 14:08

## 2023-09-04 RX ADMIN — Medication 10 ML: at 09:24

## 2023-09-04 RX ADMIN — ALBUTEROL SULFATE 2.5 MG: 2.5 SOLUTION RESPIRATORY (INHALATION) at 11:23

## 2023-09-04 RX ADMIN — MIDODRINE HYDROCHLORIDE 10 MG: 5 TABLET ORAL at 12:43

## 2023-09-04 RX ADMIN — ATORVASTATIN CALCIUM 80 MG: 80 TABLET, FILM COATED ORAL at 09:24

## 2023-09-04 RX ADMIN — MIDODRINE HYDROCHLORIDE 10 MG: 5 TABLET ORAL at 17:49

## 2023-09-04 RX ADMIN — APIXABAN 5 MG: 5 TABLET, FILM COATED ORAL at 20:23

## 2023-09-04 RX ADMIN — ALBUTEROL SULFATE 2.5 MG: 2.5 SOLUTION RESPIRATORY (INHALATION) at 03:36

## 2023-09-04 RX ADMIN — CEFTRIAXONE SODIUM 1000 MG: 1 INJECTION, POWDER, FOR SOLUTION INTRAMUSCULAR; INTRAVENOUS at 06:33

## 2023-09-04 RX ADMIN — ASPIRIN 81 MG: 81 TABLET, COATED ORAL at 09:24

## 2023-09-04 RX ADMIN — Medication 10 ML: at 20:23

## 2023-09-04 RX ADMIN — SENNOSIDES AND DOCUSATE SODIUM 2 TABLET: 50; 8.6 TABLET ORAL at 09:24

## 2023-09-04 RX ADMIN — MIDODRINE HYDROCHLORIDE 10 MG: 5 TABLET ORAL at 06:33

## 2023-09-04 RX ADMIN — APIXABAN 5 MG: 5 TABLET, FILM COATED ORAL at 09:24

## 2023-09-04 NOTE — PROGRESS NOTES
Name: Yoni Bonner Jr. ADMIT: 9/3/2023   : 1933  PCP: Patric Cameron DO    MRN: [id number] LOS: 1 days   AGE/SEX: 89 y.o. male  ROOM: [facility]     Subjective   Subjective   Chief Complaint   Patient presents with    Chest Pain     No additional chest pain.  He reports his dyspnea is improving and cough is improving as well.  Not reporting any nausea vomiting or diarrhea.     Objective   Objective   Vital Signs  Temp:  [97.3 °F (36.3 °C)-98.4 °F (36.9 °C)] 97.3 °F (36.3 °C)  Heart Rate:  [64-71] 68  Resp:  [18] 18  BP: (101-109)/(46-65) 103/46  SpO2:  [94 %-100 %] 96 %  on  Flow (L/min):  [2] 2;   Device (Oxygen Therapy): room air  Body mass index is 26.05 kg/m².    Physical Exam  Results Review  I reviewed the patient's new clinical results.    Results from last 7 days   Lab Units 23  0724 23  0538   WBC 10*3/mm3 7.03 8.91   HEMOGLOBIN g/dL 9.1* 10.5*   PLATELETS 10*3/mm3 190 232     Results from last 7 days   Lab Units 23  0724 23  1110 23  0538   SODIUM mmol/L 133* 133* 129*   POTASSIUM mmol/L 4.2 4.2 4.2   CHLORIDE mmol/L 103 102 96*   CO2 mmol/L 20.0* 19.3* 19.6*   BUN mg/dL 34* 40* 36*   CREATININE mg/dL 1.36* 1.51* 1.54*   GLUCOSE mg/dL 94 102* 146*   EGFR mL/min/1.73 49.4* 43.6* 42.6*     Results from last 7 days   Lab Units 23  0724 23  0538   ALBUMIN g/dL 3.3* 3.9   BILIRUBIN mg/dL 0.2 0.2   ALK PHOS U/L 45 50   AST (SGOT) U/L 13 19   ALT (SGPT) U/L 8 12     Results from last 7 days   Lab Units 23  0724 23  1110 23  0538   CALCIUM mg/dL 8.7 8.8 9.1   ALBUMIN g/dL 3.3*  --  3.9     Results from last 7 days   Lab Units 23  1400 23  1110 23  0707 23  0538   PROCALCITONIN ng/mL  --   --   --  0.14   LACTATE mmol/L 1.3 2.1* 2.9*  --      No results found for: HGBA1C, POCGLU    XR Chest 1 View    Result Date: 9/3/2023  Low lung volumes with mild increased density at the right lung base representing atelectasis  or pneumonia.  This report was finalized on 9/3/2023 6:31 AM by Dr. Christiano Eason M.D.       I have personally reviewed all medications:  Scheduled Medications  apixaban, 5 mg, Oral, BID  aspirin, 81 mg, Oral, Daily  atorvastatin, 80 mg, Oral, Daily  cefTRIAXone, 1,000 mg, Intravenous, Q24H  midodrine, 10 mg, Oral, TID AC  senna-docusate sodium, 2 tablet, Oral, BID  sodium chloride, 10 mL, Intravenous, Q12H      Infusions     Diet  Diet: Cardiac Diets; Healthy Heart (2-3 Na+); Texture: Regular Texture (IDDSI 7); Fluid Consistency: Thin (IDDSI 0)    I have personally reviewed:  [x]  Laboratory   [x]  Microbiology   []  Radiology   []  EKG/Telemetry  []  Cardiology/Vascular   []  Pathology    []  Records       Assessment/Plan     Active Hospital Problems    Diagnosis  POA    **Chest pain [R07.9]  Yes    PNA (pneumonia) [J18.9]  Yes    Chronic heart failure with preserved ejection fraction (HFpEF) [I50.32]  Yes    Atrial fibrillation [I48.91]  Yes    COPD (chronic obstructive pulmonary disease) [J44.9]  Yes    Nonrheumatic aortic valve stenosis [I35.0]  Yes    CKD (chronic kidney disease) stage 3, GFR 30-59 ml/min [N18.30]  Yes    Anemia, chronic disease [D63.8]  Yes    HTN (hypertension) [I10]  Yes      Resolved Hospital Problems   No resolved problems to display.       89 y.o. male admitted with Chest pain.    Pneumonia/URI: His wheezing is improved with breathing treatments.  Infectious work-up negative and procalcitonin was low on outpatient antibiotics.  Continue Rocephin.  Chronic diastolic heart failure/aortic valve stenosis: Prior cardiology notes did not report any A-fib.  He does have documented prior PE so is on anticoagulation.  Echo planned.  Cardiology following.  History of PE: Continue Eliquis  COPD: Continue breathing treatments.  Held steroids for now.  RADHA: Received fluids with lactic acidosis present on admission.  That is improved and fluids stopped.  Monitor creatinine.  Hypotension:  Midodrine  Goals of care: Last hospitalization he went home with plan for outpatient hospice.  Palliative consult.  PPX: AC as above  Disposition: TBD/few days    Expected Discharge Date: 9/7/2023; Expected Discharge Time:      Carlo Leo MD  [facility]  09/04/23  12:57 EDT    Dictated portions of note using Dragon dictation software.  Copied text in this note has been reviewed by me and remains accurate as of 09/04/23

## 2023-09-04 NOTE — PLAN OF CARE
Problem: Fall Injury Risk  Goal: Absence of Fall and Fall-Related Injury  9/4/2023 0350 by Alexia Cook RN  Outcome: Ongoing, Progressing  9/4/2023 0350 by Alexia Cook RN  Outcome: Ongoing, Progressing  Intervention: Identify and Manage Contributors  Recent Flowsheet Documentation  Taken 9/4/2023 0201 by Alexia Cook RN  Medication Review/Management: medications reviewed  Taken 9/4/2023 0012 by Alexia Cook RN  Medication Review/Management: medications reviewed  Taken 9/3/2023 2215 by Alexia Cook RN  Medication Review/Management: medications reviewed  Taken 9/3/2023 2048 by Alexia Cook RN  Medication Review/Management: medications reviewed  Intervention: Promote Injury-Free Environment  Recent Flowsheet Documentation  Taken 9/4/2023 0201 by Alexia Cook RN  Safety Promotion/Fall Prevention:   activity supervised   assistive device/personal items within reach   clutter free environment maintained   fall prevention program maintained   lighting adjusted   nonskid shoes/slippers when out of bed   safety round/check completed   room organization consistent  Taken 9/4/2023 0012 by Alexia Cook RN  Safety Promotion/Fall Prevention:   activity supervised   assistive device/personal items within reach   clutter free environment maintained   fall prevention program maintained   lighting adjusted   nonskid shoes/slippers when out of bed   room organization consistent   safety round/check completed  Taken 9/3/2023 2215 by Alexia Cook RN  Safety Promotion/Fall Prevention:   activity supervised   assistive device/personal items within reach   clutter free environment maintained   fall prevention program maintained   lighting adjusted   nonskid shoes/slippers when out of bed   room organization consistent   safety round/check completed  Taken 9/3/2023 2048 by Alexia Cook, ZINA  Safety Promotion/Fall Prevention:   activity supervised   assistive device/personal items within reach   clutter free environment  maintained   fall prevention program maintained   lighting adjusted   nonskid shoes/slippers when out of bed   room organization consistent   safety round/check completed     Problem: Chest Pain  Goal: Resolution of Chest Pain Symptoms  9/4/2023 0350 by Alexia Cook RN  Outcome: Ongoing, Progressing  9/4/2023 0350 by Alexia Cook RN  Outcome: Ongoing, Progressing     Problem: Skin Injury Risk Increased  Goal: Skin Health and Integrity  9/4/2023 0350 by Alexia Cook RN  Outcome: Ongoing, Progressing  9/4/2023 0350 by Alexia Cook RN  Outcome: Ongoing, Progressing  Intervention: Optimize Skin Protection  Recent Flowsheet Documentation  Taken 9/4/2023 0012 by Alexia Cook RN  Pressure Reduction Techniques:   frequent weight shift encouraged   weight shift assistance provided  Pressure Reduction Devices: pressure-redistributing mattress utilized  Skin Protection:   adhesive use limited   incontinence pads utilized   transparent dressing maintained   tubing/devices free from skin contact  Taken 9/3/2023 2048 by Alexia Cook RN  Pressure Reduction Techniques:   frequent weight shift encouraged   weight shift assistance provided  Head of Bed (HOB) Positioning: HOB at 30-45 degrees  Pressure Reduction Devices: pressure-redistributing mattress utilized  Skin Protection:   adhesive use limited   incontinence pads utilized   transparent dressing maintained   tubing/devices free from skin contact     Problem: Adult Inpatient Plan of Care  Goal: Plan of Care Review  Outcome: Ongoing, Progressing  Flowsheets (Taken 9/4/2023 0350)  Progress: no change  Plan of Care Reviewed With:   patient   family  Outcome Evaluation:   No s/sx of distress noted at this time. Rested well throughout night. Vital signs WDL. On room air   tolerated. Fall precautions maintained. Echo ordered. Will cont with current plan of care.  Goal: Patient-Specific Goal (Individualized)  Outcome: Ongoing, Progressing  Goal: Absence of Hospital-Acquired  Illness or Injury  Outcome: Ongoing, Progressing  Intervention: Identify and Manage Fall Risk  Recent Flowsheet Documentation  Taken 9/4/2023 0201 by Alexia Cook RN  Safety Promotion/Fall Prevention:   activity supervised   assistive device/personal items within reach   clutter free environment maintained   fall prevention program maintained   lighting adjusted   nonskid shoes/slippers when out of bed   safety round/check completed   room organization consistent  Taken 9/4/2023 0012 by Alexia Cook RN  Safety Promotion/Fall Prevention:   activity supervised   assistive device/personal items within reach   clutter free environment maintained   fall prevention program maintained   lighting adjusted   nonskid shoes/slippers when out of bed   room organization consistent   safety round/check completed  Taken 9/3/2023 2215 by Alexia Cook RN  Safety Promotion/Fall Prevention:   activity supervised   assistive device/personal items within reach   clutter free environment maintained   fall prevention program maintained   lighting adjusted   nonskid shoes/slippers when out of bed   room organization consistent   safety round/check completed  Taken 9/3/2023 2048 by Alexia Cook RN  Safety Promotion/Fall Prevention:   activity supervised   assistive device/personal items within reach   clutter free environment maintained   fall prevention program maintained   lighting adjusted   nonskid shoes/slippers when out of bed   room organization consistent   safety round/check completed  Intervention: Prevent Skin Injury  Recent Flowsheet Documentation  Taken 9/4/2023 0012 by Alexia Cook RN  Skin Protection:   adhesive use limited   incontinence pads utilized   transparent dressing maintained   tubing/devices free from skin contact  Taken 9/3/2023 2048 by Alexia Cook RN  Body Position: weight shifting  Skin Protection:   adhesive use limited   incontinence pads utilized   transparent dressing maintained   tubing/devices free  from skin contact  Intervention: Prevent and Manage VTE (Venous Thromboembolism) Risk  Recent Flowsheet Documentation  Taken 9/4/2023 0201 by Alexia Cook RN  Activity Management: bedrest  Taken 9/4/2023 0012 by Alexia Cook RN  Activity Management: bedrest  Taken 9/3/2023 2215 by Alexia Cook RN  Activity Management: bedrest  Taken 9/3/2023 2048 by Alexia Cook RN  Activity Management: bedrest  VTE Prevention/Management: (Eliquis) --  Intervention: Prevent Infection  Recent Flowsheet Documentation  Taken 9/4/2023 0201 by Alexia Cook RN  Infection Prevention:   hand hygiene promoted   rest/sleep promoted   single patient room provided  Taken 9/4/2023 0012 by Alexia Cook RN  Infection Prevention:   hand hygiene promoted   single patient room provided   rest/sleep promoted  Taken 9/3/2023 2215 by Alexia Cook RN  Infection Prevention:   hand hygiene promoted   single patient room provided   rest/sleep promoted  Taken 9/3/2023 2048 by Alexia Cook RN  Infection Prevention:   hand hygiene promoted   single patient room provided   rest/sleep promoted  Goal: Optimal Comfort and Wellbeing  Outcome: Ongoing, Progressing  Intervention: Provide Person-Centered Care  Recent Flowsheet Documentation  Taken 9/3/2023 2048 by Alexia Cook RN  Trust Relationship/Rapport:   care explained   reassurance provided   thoughts/feelings acknowledged  Goal: Readiness for Transition of Care  Outcome: Ongoing, Progressing   Goal Outcome Evaluation:  Plan of Care Reviewed With: patient, family        Progress: no change  Outcome Evaluation: No s/sx of distress noted at this time. Rested well throughout night. Vital signs WDL. On room air; tolerated. Fall precautions maintained. Echo ordered. Will cont with current plan of care.

## 2023-09-04 NOTE — PLAN OF CARE
Goal Outcome Evaluation:  Plan of Care Reviewed With: patient, spouse           Outcome Evaluation: Pt is a 89 y/o M admitted to [facility] with c/o chest pain. Pt has a past med hx of CHF, PE, aortic valve stenosis, CAD, CKD, HTN, and COPD. Pt reports he lives with his spouse and grown grandchildren with a ramp to enter. Pt uses both SW & RW for mobility at BL. Pt presents to PT with generalized weakness and decreased endurance. Pt required min A to reach sitting EOB. Pt stood requiring min A and ambulated 150' c RW requiring CGA. Pt demo's a slow pace but no LOB. Pt required 2 standing rest breaks secondary to SOA and fatigue. Wheezing noted throughout but O2 sats remained in the high 90's-100% on RA during ambulation. RN notified. PT recommends home with assist and HHPT at D/C.      Anticipated Discharge Disposition (PT): home with assist, home with home health

## 2023-09-04 NOTE — THERAPY EVALUATION
Patient Name: Yoni Bonner Jr.  : 1933    MRN: [id number]                              Today's Date: 2023       Admit Date: 9/3/2023    Visit Dx:     ICD-10-CM ICD-9-CM   1. Chest pain, unspecified type  R07.9 786.50   2. Shortness of breath  R06.02 786.05   3. Acute kidney injury superimposed on chronic kidney disease  N17.9 584.9    N18.9 585.9   4. Hyponatremia  E87.1 276.1     Patient Active Problem List   Diagnosis    HTN (hypertension)    Complete tear of right rotator cuff    Onychomycosis due to dermatophyte    Left rotator cuff tear arthropathy    Obesity    Anemia, chronic disease    Right shoulder pain    S/p reverse total shoulder arthroplasty    Episode of syncope    Coronary artery disease involving native coronary artery of native heart without angina pectoris    Chest pain    CKD (chronic kidney disease) stage 3, GFR 30-59 ml/min    Nonrheumatic aortic valve stenosis    COPD (chronic obstructive pulmonary disease)    HLD (hyperlipidemia)    Contusion of right leg    Knee pain    Primary osteoarthritis of left knee    Orthostatic hypotension    Hyponatremia    Atrial fibrillation    Sepsis due to Escherichia coli    S/P hip replacement    Acute pulmonary embolism    Sepsis, due to unspecified organism, unspecified whether acute organ dysfunction present    UTI due to extended-spectrum beta lactamase (ESBL) producing Escherichia coli    Chronic heart failure with preserved ejection fraction (HFpEF)    Type 2 MI (myocardial infarction)    Episode of unresponsiveness    PNA (pneumonia)     Past Medical History:   Diagnosis Date    Bronchitis     CAD (coronary artery disease)     Carotid arterial disease     Chronic bronchitis     COPD (chronic obstructive pulmonary disease)     Gout     Hyperlipidemia     Hypertension     Ischemic cardiomyopathy     resolved, EF was 60% in 2018    Mild aortic stenosis     Mitral regurgitation     Precordial chest pain     Rheumatic fever     Syncopal  episodes     Tobacco use      Past Surgical History:   Procedure Laterality Date    CARDIAC CATHETERIZATION      CARDIAC CATHETERIZATION N/A 4/8/2018    Procedure: Left Heart Cath;  Surgeon: Nicolas Jerez MD;  Location: [facility] CATH INVASIVE LOCATION;  Service: Cardiovascular    CARDIAC CATHETERIZATION  4/8/2018    Procedure: Percutaneous Manual Thrombectomy;  Surgeon: Nicolas Jerez MD;  Location: [facility] CATH INVASIVE LOCATION;  Service: Cardiovascular    CARDIAC CATHETERIZATION N/A 4/8/2018    Procedure: Stent CARLENE coronary;  Surgeon: Nicolas Jerez MD;  Location: [facility] CATH INVASIVE LOCATION;  Service: Cardiovascular    CARDIAC CATHETERIZATION N/A 4/8/2018    Procedure: Right Heart Cath;  Surgeon: Nicolas Jerez MD;  Location: [facility] CATH INVASIVE LOCATION;  Service: Cardiovascular    CARDIAC CATHETERIZATION Left 2/21/2020    Procedure: Cardiac Catheterization/Vascular Study;  Surgeon: Alejandro Jenkins MD;  Location: [facility] CATH INVASIVE LOCATION;  Service: Cardiovascular;  Laterality: Left;    CARDIAC CATHETERIZATION N/A 2/21/2020    Procedure: Coronary angiography;  Surgeon: Alejandro Jenkins MD;  Location: [facility] CATH INVASIVE LOCATION;  Service: Cardiovascular;  Laterality: N/A;    EYE SURGERY      cataract surg    HAND SURGERY      HERNIA REPAIR      KNEE SURGERY      TESTICLE SURGERY      TOTAL HIP ARTHROPLASTY Left 9/25/2022    Procedure: TOTAL HIP ARTHROPLASTY ANTERIOR WITH HANA TABLE;  Surgeon: Jeff Rodriguez II, MD;  Location: [facility];  Service: Orthopedics;  Laterality: Left;      General Information       Row Name 09/04/23 1020          Physical Therapy Time and Intention    Document Type evaluation  -CS     Mode of Treatment individual therapy;physical therapy  -CS       Row Name 09/04/23 1020          General Information    Patient Profile Reviewed yes  -CS     Prior Level of Function independent:;gait;transfer;bed mobility  SW for mobility at   -CS     Existing Precautions/Restrictions fall   -CS     Barriers to Rehab hearing deficit  [place]  -CS       Row Name 09/04/23 1020          Living Environment    People in Home spouse;grandchild(genevieve)  -CS       Row Name 09/04/23 1020          Home Main Entrance    Number of Stairs, Main Entrance none;other (see comments)  ramp  -CS       Row Name 09/04/23 1020          Stairs Within Home, Primary    Number of Stairs, Within Home, Primary none  -CS       Row Name 09/04/23 1020          Cognition    Orientation Status (Cognition) oriented x 3  -CS       Row Name 09/04/23 1020          Safety Issues, Functional Mobility    Impairments Affecting Function (Mobility) endurance/activity tolerance;shortness of breath;strength  -CS               User Key  (r) = Recorded By, (t) = Taken By, (c) = Cosigned By      Initials Name Provider Type    CS Inga Perkins, PT Physical Therapist                   Mobility       Row Name 09/04/23 1021          Bed Mobility    Bed Mobility supine-sit;sit-supine  -CS     Supine-Sit [place] (Bed Mobility) minimum assist (75% patient effort)  -CS     Sit-Supine [place] (Bed Mobility) standby assist  -CS     Comment, (Bed Mobility) increased time to complete  -CS       Row Name 09/04/23 1021          Sit-Stand Transfer    Sit-Stand [place] (Transfers) minimum assist (75% patient effort)  -CS     Assistive Device (Sit-Stand Transfers) walker, front-wheeled  -       Row Name 09/04/23 1021          Gait/Stairs (Locomotion)    [place] Level (Gait) contact guard  -CS     Assistive Device (Gait) walker, front-wheeled  -CS     Distance in Feet (Gait) 150'  -CS     Deviations/Abnormal Patterns (Gait) dino decreased;gait speed decreased;stride length decreased  -CS     Bilateral Gait Deviations forward flexed posture  -CS     [place] Level (Stairs) not tested  -CS     Comment, (Gait/Stairs) slow pace but no LOB; 2 standing rest breaks secondary to fatigue; wheezing throughout with O2 sats in high 90's on RA during  ambulation  -               User Key  (r) = Recorded By, (t) = Taken By, (c) = Cosigned By      Initials Name Provider Type    CS Inga Perkins, ABDULLAHI Physical Therapist                   Obj/Interventions       Row Name 09/04/23 1022          Range of Motion Comprehensive    General Range of Motion bilateral lower extremity ROM WFL  -       Row Name 09/04/23 1022          Strength Comprehensive (MMT)    General Manual Muscle Testing (MMT) Assessment other (see comments)  -     Comment, General Manual Muscle Testing (MMT) Assessment generalized weakness; B LE >/= 3/5  -       Row Name 09/04/23 1022          Balance    Balance Assessment sitting static balance;sitting dynamic balance;standing static balance;standing dynamic balance  -CS     Static Sitting Balance standby assist  -CS     Dynamic Sitting Balance standby assist  -CS     Position, Sitting Balance unsupported;sitting edge of bed  -CS     Static Standing Balance contact guard  -CS     Dynamic Standing Balance contact guard  -CS     Position/Device Used, Standing Balance supported;walker, front-wheeled  -CS               User Key  (r) = Recorded By, (t) = Taken By, (c) = Cosigned By      Initials Name Provider Type     Inga Perkins PT Physical Therapist                   Goals/Plan       Row Name 09/04/23 1027          Bed Mobility Goal 1 (PT)    Activity/Assistive Device (Bed Mobility Goal 1, PT) bed mobility activities, all  -CS     [place] Level/Cues Needed (Bed Mobility Goal 1, PT) standby assist  -CS     Time Frame (Bed Mobility Goal 1, PT) 1 week  -       Row Name 09/04/23 1027          Transfer Goal 1 (PT)    Activity/Assistive Device (Transfer Goal 1, PT) sit-to-stand/stand-to-sit;bed-to-chair/chair-to-bed  -CS     [place] Level/Cues Needed (Transfer Goal 1, PT) standby assist  -CS     Time Frame (Transfer Goal 1, PT) 1 week  -       Row Name 09/04/23 1027          Gait Training Goal 1 (PT)    Activity/Assistive Device  (Gait Training Goal 1, PT) gait (walking locomotion);assistive device use;decrease fall risk;increase endurance/gait distance  -CS     [place] Level (Gait Training Goal 1, PT) standby assist  -CS     Distance (Gait Training Goal 1, PT) 150'  -CS     Time Frame (Gait Training Goal 1, PT) 1 week  -CS               User Key  (r) = Recorded By, (t) = Taken By, (c) = Cosigned By      Initials Name Provider Type    CS Inga Perkins, PT Physical Therapist                   Clinical Impression       Row Name 09/04/23 1023          Pain    Pretreatment Pain Rating 0/10 - no pain  -CS     Posttreatment Pain Rating 0/10 - no pain  -CS       Row Name 09/04/23 1023          Plan of Care Review    Plan of Care Reviewed With patient;spouse  -CS     Outcome Evaluation Pt is a 89 y/o M admitted [facility] with c/o chest pain. Pt has a past med hx of CHF, PE, aortic valve stenosis, CAD, CKD, HTN, and COPD. Pt reports he lives with his spouse and grown grandchildren with a ramp to enter. Pt uses both SW & RW for mobility at . Pt presents to PT with generalized weakness and decreased endurance. Pt required min A to reach sitting EOB. Pt stood requiring min A and ambulated 150' c RW requiring CGA. Pt demo's a slow pace but no LOB. Pt required 2 standing rest breaks secondary to SOA and fatigue. Wheezing noted throughout but O2 sats remained in the high 90's-100% on RA during ambulation. RN notified. PT recommends home with assist and HHPT at D/C.  -CS       Row Name 09/04/23 1023          Therapy Assessment/Plan (PT)    Patient/Family Therapy Goals Statement (PT) to return home  -CS     Rehab Potential (PT) good, to achieve stated therapy goals  -CS     Criteria for Skilled Interventions Met (PT) yes;meets criteria  -CS     Therapy Frequency (PT) 5 times/wk  -CS       Row Name 09/04/23 1023          Vital Signs    Pre SpO2 (%) 98  -CS     O2 Delivery Pre Treatment room air  -CS     Intra SpO2 (%) 100  -CS     O2 Delivery Intra  Treatment room air  -CS     Post SpO2 (%) 97  -CS     O2 Delivery Post Treatment room air  -CS       Row Name 09/04/23 1023          Positioning and Restraints    Pre-Treatment Position in bed  -CS     Post Treatment Position bed  -CS     In Bed notified nsg;fowlers;call light within reach;encouraged to call for assist;exit alarm on;with family/caregiver;heels elevated  -CS               User Key  (r) = Recorded By, (t) = Taken By, (c) = Cosigned By      Initials Name Provider Type    Inga Pedroza, ABDULLAHI Physical Therapist                   Outcome Measures       Row Name 09/04/23 1027          How much help from another person do you currently need...    Turning from your back to your side while in flat bed without using bedrails? 3  -CS     Moving from lying on back to sitting on the side of a flat bed without bedrails? 3  -CS     Moving to and from a bed to a chair (including a wheelchair)? 3  -CS     Standing up from a chair using your arms (e.g., wheelchair, bedside chair)? 3  -CS     Climbing 3-5 steps with a railing? 2  -CS     To walk in hospital room? 3  -CS     AM-PAC 6 Clicks Score (PT) 17  -CS     Highest level of mobility 5 --> Static standing  -CS       Row Name 09/04/23 1027          Functional Assessment    Outcome Measure Options AM-PAC 6 Clicks Basic Mobility (PT)  -CS               User Key  (r) = Recorded By, (t) = Taken By, (c) = Cosigned By      Initials Name Provider Type    Inga Pedroza, ABDULLAHI Physical Therapist                                 Physical Therapy Education       Title: PT OT SLP Therapies (In Progress)       Topic: Physical Therapy (In Progress)       Point: Mobility training (Done)       Learning Progress Summary             Patient Acceptance, E,TB, VU,DU by  at 9/4/2023 1028                         Point: Home exercise program (Not Started)       Learner Progress:  Not documented in this visit.              Point: Body mechanics (Done)       Learning Progress Summary              Patient Acceptance, E,TB, VU,DU by  at 9/4/2023 1028                         Point: Precautions (Done)       Learning Progress Summary             Patient Acceptance, E,TB, VU,DU by  at 9/4/2023 1028                                         User Key       Initials Effective Dates Name Provider Type Discipline     09/22/22 -  Inga Perkins, PT Physical Therapist PT                  PT Recommendation and Plan     Plan of Care Reviewed With: patient, spouse  Outcome Evaluation: Pt is a 89 y/o M admitted to [facility] with c/o chest pain. Pt has a past med hx of CHF, PE, aortic valve stenosis, CAD, CKD, HTN, and COPD. Pt reports he lives with his spouse and grown grandchildren with a ramp to enter. Pt uses both SW & RW for mobility at BL. Pt presents to PT with generalized weakness and decreased endurance. Pt required min A to reach sitting EOB. Pt stood requiring min A and ambulated 150' c RW requiring CGA. Pt demo's a slow pace but no LOB. Pt required 2 standing rest breaks secondary to SOA and fatigue. Wheezing noted throughout but O2 sats remained in the high 90's-100% on RA during ambulation. RN notified. PT recommends home with assist and HHPT at D/C.     Time Calculation:         PT Charges       Row Name 09/04/23 1028             Time Calculation    Start Time 0928  -CS      Stop Time 0946  -CS      Time Calculation (min) 18 min  -CS      PT Received On 09/04/23  -      PT - Next Appointment 09/05/23  -      PT Goal Re-Cert Due Date 09/11/23  -         Time Calculation- PT    Total Timed Code Minutes- PT 16 minute(s)  -CS         Timed Charges    50035 - PT Therapeutic Activity Minutes 16  -CS         Total Minutes    Timed Charges Total Minutes 16  -CS       Total Minutes 16  -CS                User Key  (r) = Recorded By, (t) = Taken By, (c) = Cosigned By      Initials Name Provider Type    CS Inga Perkins, PT Physical Therapist                  Therapy Charges for Today       Code  Description Service Date Service Provider Modifiers Qty    84692327687  PT THERAPEUTIC ACT EA 15 MIN 9/4/2023 Inga Perkins, PT GP 1    [id number] HC PT EVAL MOD COMPLEXITY 3 9/4/2023 Inga Perkins, PT GP 1            PT G-Codes  Outcome Measure Options: AM-PAC 6 Clicks Basic Mobility (PT)  AM-PAC 6 Clicks Score (PT): 17  PT Discharge Summary  Anticipated Discharge Disposition (PT): home with assist, home with home health    Inga Perkins PT  9/4/2023

## 2023-09-04 NOTE — PROGRESS NOTES
"    Patient Name: Yoni Bonner Jr.  :1933  89 y.o.      Patient Care Team:  Patric Cameron DO as PCP - General (Family Medicine)    Chief Complaint: Chest pain with coughing    Interval History:   Breathing is comfortable on room air. He admits that he has been unstable on his feet and recently had a fall. No chest pain at this time.     Objective   Vital Signs  Temp:  [97.3 °F (36.3 °C)-98.4 °F (36.9 °C)] 97.3 °F (36.3 °C)  Heart Rate:  [64-71] 68  Resp:  [18] 18  BP: (101-109)/(46-65) 109/61    Intake/Output Summary (Last 24 hours) at 2023 1441  Last data filed at 2023 1243  Gross per 24 hour   Intake 860 ml   Output 1800 ml   Net -940 ml     Flowsheet Rows      Flowsheet Row First Filed Value   Admission Height 175.3 cm (69\") Documented at 2023 0516   Admission Weight 72.1 kg (159 lb) Documented at 2023 0516            Physical Exam:   General Appearance:    Alert, cooperative, in no acute distress, frail   Lungs:     Clear to auscultation.  Normal respiratory effort and rate.      Heart:    Regular rhythm and normal rate, normal S1 and S2, III/VI murmur, gallops or rubs.     Chest Wall:    No abnormalities observed   Abdomen:     Soft, nontender, positive bowel sounds.     Extremities:   no cyanosis, clubbing or edema.  No marked joint deformities.  Adequate musculoskeletal strength.       Results Review:    Results from last 7 days   Lab Units 23  0724   SODIUM mmol/L 133*   POTASSIUM mmol/L 4.2   CHLORIDE mmol/L 103   CO2 mmol/L 20.0*   BUN mg/dL 34*   CREATININE mg/dL 1.36*   GLUCOSE mg/dL 94   CALCIUM mg/dL 8.7     Results from last 7 days   Lab Units 23  0740 23  0538   HSTROP T ng/L 49* 41*     Results from last 7 days   Lab Units 23  0724   WBC 10*3/mm3 7.03   HEMOGLOBIN g/dL 9.1*   HEMATOCRIT % 27.9*   PLATELETS 10*3/mm3 190     Results from last 7 days   Lab Units 23  0538   INR  1.20*         Results from last 7 days   Lab Units " 09/04/23  0724   CHOLESTEROL mg/dL 154   TRIGLYCERIDES mg/dL 115   HDL CHOL mg/dL 53   LDL CHOL mg/dL 80               Medication Review:   apixaban, 5 mg, Oral, BID  aspirin, 81 mg, Oral, Daily  atorvastatin, 80 mg, Oral, Daily  cefTRIAXone, 1,000 mg, Intravenous, Q24H  midodrine, 10 mg, Oral, TID AC  senna-docusate sodium, 2 tablet, Oral, BID  sodium chloride, 10 mL, Intravenous, Q12H              Assessment & Plan     Short winded with recent URI, wheezing and coughing, has been on Levaquin and prednisone per PCP but worsening symptoms.  proBNP elevated. Lungs clear after one dose IV lasix. Severe AS on echocardiogram.  Severe aortic stenosis: Noted on echocardiogram today. He is compensated by exam.   Chest pain, does not sound cardiac.  It is really in the epigastric area and came on with his coughing.  It is possible this somewhat anginal just because of the stress of his URI but I think it could be also epigastric pain from the antibiotics and prednisone.  No plans at this time to change his antianginals.  Troponin slightly elevated but no evidence of acute coronary syndrome, EKG stable.   CKD - stable.  Murmur on examination, set up echocardiogram, sounds like mitral insufficiency.  Multivessel CAD, treated medically  Orthostasis, on midodrine with history of recurrent syncope, worsened with antihypertensive medications and isosorbide.     Plan is to continue his home medications. Would treat his URI.  We will follow.     Severe aortic stenosis on echocardiogram today.     BOOGIE Fink  [facility]  09/04/23  14:41 EDT

## 2023-09-04 NOTE — PLAN OF CARE
Problem: Fall Injury Risk  Goal: Absence of Fall and Fall-Related Injury  Outcome: Ongoing, Progressing  Intervention: Promote Injury-Free Environment  Recent Flowsheet Documentation  Taken 9/4/2023 1600 by Kylee Da Silva RN  Safety Promotion/Fall Prevention:   fall prevention program maintained   nonskid shoes/slippers when out of bed   safety round/check completed  Taken 9/4/2023 1400 by Kylee Da Silva RN  Safety Promotion/Fall Prevention:   fall prevention program maintained   nonskid shoes/slippers when out of bed   safety round/check completed  Taken 9/4/2023 1200 by Kylee Da Silva RN  Safety Promotion/Fall Prevention:   fall prevention program maintained   nonskid shoes/slippers when out of bed  Taken 9/4/2023 1000 by Kylee Da Silva RN  Safety Promotion/Fall Prevention:   fall prevention program maintained   nonskid shoes/slippers when out of bed   safety round/check completed  Taken 9/4/2023 0800 by Kylee Da Silva RN  Safety Promotion/Fall Prevention:   safety round/check completed   nonskid shoes/slippers when out of bed   fall prevention program maintained     Problem: Skin Injury Risk Increased  Goal: Skin Health and Integrity  Outcome: Ongoing, Progressing  Intervention: Optimize Skin Protection  Recent Flowsheet Documentation  Taken 9/4/2023 1600 by Kylee Da Silva RN  Head of Bed (HOB) Positioning: HOB elevated  Taken 9/4/2023 1400 by Kylee Da Silva RN  Head of Bed (HOB) Positioning: HOB elevated  Taken 9/4/2023 1200 by Kylee Da Silva RN  Head of Bed (HOB) Positioning: HOB elevated  Taken 9/4/2023 1000 by Kylee Da Silva RN  Head of Bed (HOB) Positioning: HOB elevated  Taken 9/4/2023 0800 by Kylee Da Silva RN  Pressure Reduction Techniques:   frequent weight shift encouraged   weight shift assistance provided  Head of Bed (HOB) Positioning: HOB elevated  Pressure Reduction Devices: pressure-redistributing mattress utilized     Problem: Adult Inpatient Plan of Care  Goal:  Plan of Care Review  9/4/2023 1753 by Kylee Da Silva, RN  Flowsheets (Taken 9/4/2023 1753)  Outcome Evaluation: VSS. on room air, Echo today.. Worked w/ PT. No c/o pain or SOA. bed , Spouse at bedside, Bed alarm om. call light with in reach  9/4/2023 1541 by Kylee Da Silva, RN  Outcome: Ongoing, Progressing   Goal Outcome Evaluation:              Outcome Evaluation: VSS. on room air, Echo today.. Worked w/ PT. No c/o pain or SOA. bed , Spouse at bedside, Bed alarm om. call light with in reach

## 2023-09-05 ENCOUNTER — READMISSION MANAGEMENT (OUTPATIENT)
Dept: CALL CENTER | Facility: HOSPITAL | Age: 88
End: 2023-09-05
Payer: MEDICARE

## 2023-09-05 VITALS
TEMPERATURE: 98.2 F | HEIGHT: 69 IN | HEART RATE: 76 BPM | BODY MASS INDEX: 26.12 KG/M2 | DIASTOLIC BLOOD PRESSURE: 78 MMHG | RESPIRATION RATE: 18 BRPM | OXYGEN SATURATION: 100 % | SYSTOLIC BLOOD PRESSURE: 128 MMHG | WEIGHT: 176.37 LBS

## 2023-09-05 LAB
ANION GAP SERPL CALCULATED.3IONS-SCNC: 10.1 MMOL/L (ref 5–15)
BUN SERPL-MCNC: 26 MG/DL (ref 8–23)
BUN/CREAT SERPL: 21 (ref 7–25)
CALCIUM SPEC-SCNC: 8.6 MG/DL (ref 8.2–9.6)
CHLORIDE SERPL-SCNC: 103 MMOL/L (ref 98–107)
CO2 SERPL-SCNC: 19.9 MMOL/L (ref 22–29)
CREAT SERPL-MCNC: 1.24 MG/DL (ref 0.76–1.27)
DEPRECATED RDW RBC AUTO: 46.3 FL (ref 37–54)
EGFRCR SERPLBLD CKD-EPI 2021: 55.2 ML/MIN/1.73
ERYTHROCYTE [DISTWIDTH] IN BLOOD BY AUTOMATED COUNT: 14.3 % (ref 12.3–15.4)
GLUCOSE SERPL-MCNC: 98 MG/DL (ref 65–99)
HCT VFR BLD AUTO: 31.7 % (ref 37.5–51)
HGB BLD-MCNC: 10.2 G/DL (ref 13–17.7)
MCH RBC QN AUTO: 28.9 PG (ref 26.6–33)
MCHC RBC AUTO-ENTMCNC: 32.2 G/DL (ref 31.5–35.7)
MCV RBC AUTO: 89.8 FL (ref 79–97)
PLATELET # BLD AUTO: 186 10*3/MM3 (ref 140–450)
PMV BLD AUTO: 11.1 FL (ref 6–12)
POTASSIUM SERPL-SCNC: 4.1 MMOL/L (ref 3.5–5.2)
RBC # BLD AUTO: 3.53 10*6/MM3 (ref 4.14–5.8)
SODIUM SERPL-SCNC: 133 MMOL/L (ref 136–145)
WBC NRBC COR # BLD: 7.94 10*3/MM3 (ref 3.4–10.8)

## 2023-09-05 PROCEDURE — 99232 SBSQ HOSP IP/OBS MODERATE 35: CPT | Performed by: INTERNAL MEDICINE

## 2023-09-05 PROCEDURE — 80048 BASIC METABOLIC PNL TOTAL CA: CPT | Performed by: INTERNAL MEDICINE

## 2023-09-05 PROCEDURE — 94799 UNLISTED PULMONARY SVC/PX: CPT

## 2023-09-05 PROCEDURE — 97166 OT EVAL MOD COMPLEX 45 MIN: CPT

## 2023-09-05 PROCEDURE — 85027 COMPLETE CBC AUTOMATED: CPT | Performed by: INTERNAL MEDICINE

## 2023-09-05 PROCEDURE — 97530 THERAPEUTIC ACTIVITIES: CPT

## 2023-09-05 PROCEDURE — 25010000002 CEFTRIAXONE PER 250 MG: Performed by: INTERNAL MEDICINE

## 2023-09-05 RX ORDER — CEFDINIR 300 MG/1
300 CAPSULE ORAL 2 TIMES DAILY
Qty: 6 CAPSULE | Refills: 0 | Status: SHIPPED | OUTPATIENT
Start: 2023-09-05 | End: 2023-09-08

## 2023-09-05 RX ADMIN — ASPIRIN 81 MG: 81 TABLET, COATED ORAL at 11:24

## 2023-09-05 RX ADMIN — CEFTRIAXONE SODIUM 1000 MG: 1 INJECTION, POWDER, FOR SOLUTION INTRAMUSCULAR; INTRAVENOUS at 06:33

## 2023-09-05 RX ADMIN — ATORVASTATIN CALCIUM 80 MG: 80 TABLET, FILM COATED ORAL at 11:24

## 2023-09-05 RX ADMIN — Medication 10 ML: at 11:25

## 2023-09-05 RX ADMIN — MIDODRINE HYDROCHLORIDE 10 MG: 5 TABLET ORAL at 06:33

## 2023-09-05 RX ADMIN — MIDODRINE HYDROCHLORIDE 10 MG: 5 TABLET ORAL at 11:24

## 2023-09-05 RX ADMIN — ALBUTEROL SULFATE 2.5 MG: 2.5 SOLUTION RESPIRATORY (INHALATION) at 02:24

## 2023-09-05 RX ADMIN — APIXABAN 5 MG: 5 TABLET, FILM COATED ORAL at 11:24

## 2023-09-05 NOTE — DISCHARGE SUMMARY
Patient Name: Yoni Bonner Jr.  : 1933  MRN: [id number]    Date of Admission: 9/3/2023  Date of Discharge:  2023  Primary Care Physician: Patric Cameron DO      Chief Complaint:   Chest Pain      Discharge Diagnoses     Active Hospital Problems    Diagnosis  POA   • **Chest pain [R07.9]  Yes   • PNA (pneumonia) [J18.9]  Yes   • Chronic heart failure with preserved ejection fraction (HFpEF) [I50.32]  Yes   • Atrial fibrillation [I48.91]  Yes   • COPD (chronic obstructive pulmonary disease) [J44.9]  Yes   • Nonrheumatic aortic valve stenosis [I35.0]  Yes   • CKD (chronic kidney disease) stage 3, GFR 30-59 ml/min [N18.30]  Yes   • Anemia, chronic disease [D63.8]  Yes   • HTN (hypertension) [I10]  Yes      Resolved Hospital Problems   No resolved problems to display.        Hospital Course     Mr. Bonner is a 89 y.o. 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  Patient was found to have pneumonia and treated with ceftriaxone which is being transferred to cefdinir on discharge.  Cardiology was consulted and echo was done that showed severe aortic stenosis.  Consider TAVR work-up as an outpatient.  With patient's age and comorbidities family to discuss if this is something they would like to proceed with.  Previously on hospice care, but patient wanted physical therapy so  hospice was discontinued.  Agree with cardiology that patient can decrease his apixaban 2.5 mg twice daily.  Discussed with wife.  PT/OT saw and recommended home health.    At the time of discharge patient was told to take all medications as prescribed, keep all follow-up  appointments, and call their doctor or return to the hospital with any worsening or concerning symptoms.    Day of Discharge     Subjective:  Patient lying comfortably in bed without any complaints.  Wife at bedside..  Patient's only question is when he is allowed to leave the hospital.    Review of Systems   Constitutional:  Negative for chills and fever.   Respiratory:  Negative for cough and shortness of breath.    Cardiovascular:  Negative for chest pain and palpitations.   Gastrointestinal:  Negative for abdominal pain, diarrhea, nausea and vomiting.     Physical Exam:  Temp:  [97.7 °F (36.5 °C)-98.6 °F (37 °C)] 98.2 °F (36.8 °C)  Heart Rate:  [60-88] 76  Resp:  [18] 18  BP: (109-142)/(62-78) 128/78  Body mass index is 26.12 kg/m².  Physical Exam  Vitals and nursing note reviewed.   Constitutional:       General: He is not in acute distress.     Comments: Elderly, frail   Cardiovascular:      Rate and Rhythm: Normal rate and regular rhythm.   Pulmonary:      Effort: Pulmonary effort is normal. No respiratory distress.   Abdominal:      General: Abdomen is flat. There is no distension.      Tenderness: There is no abdominal tenderness.   Musculoskeletal:         General: No swelling or deformity.   Skin:     General: Skin is warm and dry.   Neurological:      General: No focal deficit present.      Mental Status: He is alert. Mental status is at baseline.       Consultants     Consult Orders (all) (From admission, onward)       Start     Ordered    09/04/23 1450  Inpatient Infection Control Nurse Consult  Once        Provider:  (Not yet assigned)    09/04/23 1449    09/03/23 1038  Inpatient Palliative Care Team Consult  Once        Provider:  (Not yet assigned)    09/03/23 1037    09/03/23 0850  Inpatient Cardiology Consult  Once        Specialty:  Cardiology  Provider:  Melissa Templeton MD    09/03/23 0849    09/03/23 0701  [facility] (on-call MD unless specified) Details  Once        Specialty:  Hospitalist   Provider:  Carlo Leo MD    09/03/23 0700                  Procedures     Imaging Results (All)       Procedure Component Value Units Date/Time    XR Chest 1 View [[id number]] Collected: 09/03/23 0630     Updated: 09/03/23 0634    Narrative:      PORTABLE CHEST X-RAY     HISTORY: Chest pain.     TECHNIQUE: Portable chest x-ray is correlated with chest x-ray February 4, 2023.     FINDINGS: Left shoulder arthroplasty hardware. Normal cardiomediastinal  silhouette. Asymmetric opacity in the right lung base; lung volumes are  lower today than previously. The lungs otherwise appear clear. Vascular  volume is normal. No pneumothorax or pleural effusion.       Impression:      Low lung volumes with mild increased density at the right  lung base representing atelectasis or pneumonia.     This report was finalized on 9/3/2023 6:31 AM by Dr. Christiano Eason M.D.               Pertinent Labs     Results from last 7 days   Lab Units 09/05/23  0737 09/04/23  0724 09/03/23  0538   WBC 10*3/mm3 7.94 7.03 8.91   HEMOGLOBIN g/dL 10.2* 9.1* 10.5*   PLATELETS 10*3/mm3 186 190 232     Results from last 7 days   Lab Units 09/05/23  0737 09/04/23  0724 09/03/23  1110 09/03/23  0538   SODIUM mmol/L 133* 133* 133* 129*   POTASSIUM mmol/L 4.1 4.2 4.2 4.2   CHLORIDE mmol/L 103 103 102 96*   CO2 mmol/L 19.9* 20.0* 19.3* 19.6*   BUN mg/dL 26* 34* 40* 36*   CREATININE mg/dL 1.24 1.36* 1.51* 1.54*   GLUCOSE mg/dL 98 94 102* 146*   Estimated Creatinine Clearance: 44.8 mL/min (by C-G formula based on SCr of 1.24 mg/dL).  Results from last 7 days   Lab Units 09/04/23 0724 09/03/23  0538   ALBUMIN g/dL 3.3* 3.9   BILIRUBIN mg/dL 0.2 0.2   ALK PHOS U/L 45 50   AST (SGOT) U/L 13 19   ALT (SGPT) U/L 8 12     Results from last 7 days   Lab Units 09/05/23  0737 09/04/23  0724 09/03/23  1110 09/03/23  0538   CALCIUM mg/dL 8.6 8.7 8.8 9.1   ALBUMIN g/dL  --  3.3*  --  3.9       Results from last 7 days   Lab Units 09/03/23  0740 09/03/23  0538    HSTROP T ng/L 49* 41*   PROBNP pg/mL  --  7,259.0*       Results from last 7 days   Lab Units 09/04/23  0724   CHOLESTEROL mg/dL 154   TRIGLYCERIDES mg/dL 115   HDL CHOL mg/dL 53   LDL CHOL mg/dL 80     Results from last 7 days   Lab Units 09/03/23  0740 09/03/23  0707   BLOODCX  No growth at 2 days No growth at 2 days       Test Results Pending at Discharge     Pending Labs       Order Current Status    Blood Culture - Blood, Arm, Left Preliminary result    Blood Culture - Blood, Arm, Left Preliminary result            Discharge Details        Discharge Medications        New Medications        Instructions Start Date   cefdinir 300 MG capsule  Commonly known as: OMNICEF   300 mg, Oral, 2 Times Daily             Changes to Medications        Instructions Start Date   apixaban 2.5 MG tablet tablet  Commonly known as: ELIQUIS  What changed: medication strength   5 mg, Oral, 2 Times Daily      atorvastatin 10 MG tablet  Commonly known as: LIPITOR  What changed: when to take this   10 mg, Oral, Daily             Continue These Medications        Instructions Start Date   acetaminophen 325 MG tablet  Commonly known as: TYLENOL   650 mg, Oral, Every 4 Hours PRN      albuterol sulfate  (90 Base) MCG/ACT inhaler  Commonly known as: PROVENTIL HFA;VENTOLIN HFA;PROAIR HFA   2 puffs, Inhalation, Every 4 Hours PRN      albuterol (2.5 MG/3ML) 0.083% nebulizer solution  Commonly known as: PROVENTIL   2.5 mg, Nebulization, Every 4 Hours PRN      aspirin 81 MG EC tablet   81 mg, Oral, Daily      Gentle Laxative 10 MG suppository  Generic drug: bisacodyl   Insert 1 suppository into the rectum Daily As Needed for constipation.      guaifenesin 100 MG/5ML liquid  Commonly known as: ROBITUSSIN   200 mg, Oral, 3 Times Daily PRN      magnesium hydroxide 400 MG/5ML suspension  Commonly known as: MILK OF MAGNESIA   5 mL, Oral, Daily PRN      metoprolol tartrate 25 MG tablet  Commonly known as: LOPRESSOR   12.5 mg, Oral, Every 12  Hours Scheduled      midodrine 10 MG tablet  Commonly known as: PROAMATINE   10 mg, Oral, 3 Times Daily Before Meals      nitroglycerin 0.4 MG SL tablet  Commonly known as: NITROSTAT                Allergies   Allergen Reactions   • No Known Drug Allergy Unknown (See Comments)     NKA         Discharge Disposition:  Home-Health Care Svc    Discharge Diet:  Diet Order   Procedures   • Diet: Cardiac Diets; Healthy Heart (2-3 Na+); Texture: Regular Texture (IDDSI 7); Fluid Consistency: Thin (IDDSI 0)       Discharge Activity:   As tolerated    CODE STATUS:    Code Status and Medical Interventions:   Ordered at: 09/03/23 1037     Medical Intervention Limits:    NO intubation (DNI)     Code Status (Patient has no pulse and is not breathing):    No CPR (Do Not Attempt to Resuscitate)     Medical Interventions (Patient has pulse or is breathing):    Limited Support       Future Appointments   Date Time Provider Department Center   11/6/2023 11:00 AM Nneka Honeycutt APRN [facility]     Additional Instructions for the Follow-ups that You Need to Schedule       Ambulatory Referral to Home Health ([facility]   As directed      Face to Face Visit Date: 9/5/2023   Follow-up provider for Plan of Care?: I treated the patient in an acute care facility and will not continue treatment after discharge.   Follow-up provider: ALEISHA CAMERON [[id number]]   Reason/Clinical Findings: AS, dyspnea   Describe mobility limitations that make leaving home difficult: AS, dyspnea   Nursing/Therapeutic Services Requested: Physical Therapy   PT orders: Therapeutic exercise Transfer training Strengthening   Frequency: 1 Week 1               Follow-up Information       Aleisha Cameron DO .    Specialty: Family Medicine  Contact information:  [address] [place] [id number] [phone]                             Additional Instructions for the Follow-ups that You Need to Schedule       Ambulatory Referral to Home Health  (Hospital)   As directed      Face to Face Visit Date: 9/5/2023   Follow-up provider for Plan of Care?: I treated the patient in an acute care facility and will not continue treatment after discharge.   Follow-up provider: ALEISHA ROBERTS [[id number]]   Reason/Clinical Findings: AS, dyspnea   Describe mobility limitations that make leaving home difficult: AS, dyspnea   Nursing/Therapeutic Services Requested: Physical Therapy   PT orders: Therapeutic exercise Transfer training Strengthening   Frequency: 1 Week 1            Time Spent on Discharge:  Greater than 30 minutes      Michel Blackwell MD  [facility]  09/05/23  14:18 EDT

## 2023-09-05 NOTE — THERAPY EVALUATION
Patient Name: Yoni Bonner Jr.  : 1933    MRN: [id number]                              Today's Date: 2023       Admit Date: 9/3/2023    Visit Dx:     ICD-10-CM ICD-9-CM   1. Chest pain, unspecified type  R07.9 786.50   2. Shortness of breath  R06.02 786.05   3. Acute kidney injury superimposed on chronic kidney disease  N17.9 584.9    N18.9 585.9   4. Hyponatremia  E87.1 276.1   5. Pneumonia due to infectious organism, unspecified laterality, unspecified part of lung  J18.9 486   6. Orthostatic hypotension  I95.1 458.0   7. Nonrheumatic aortic valve stenosis  I35.0 424.1     Patient Active Problem List   Diagnosis    HTN (hypertension)    Complete tear of right rotator cuff    Onychomycosis due to dermatophyte    Left rotator cuff tear arthropathy    Obesity    Anemia, chronic disease    Right shoulder pain    S/p reverse total shoulder arthroplasty    Episode of syncope    Coronary artery disease involving native coronary artery of native heart without angina pectoris    Chest pain    CKD (chronic kidney disease) stage 3, GFR 30-59 ml/min    Nonrheumatic aortic valve stenosis    COPD (chronic obstructive pulmonary disease)    HLD (hyperlipidemia)    Contusion of right leg    Knee pain    Primary osteoarthritis of left knee    Orthostatic hypotension    Hyponatremia    Atrial fibrillation    Sepsis due to Escherichia coli    S/P hip replacement    Acute pulmonary embolism    Sepsis, due to unspecified organism, unspecified whether acute organ dysfunction present    UTI due to extended-spectrum beta lactamase (ESBL) producing Escherichia coli    Chronic heart failure with preserved ejection fraction (HFpEF)    Type 2 MI (myocardial infarction)    Episode of unresponsiveness    PNA (pneumonia)     Past Medical History:   Diagnosis Date    Bronchitis     CAD (coronary artery disease)     Carotid arterial disease     Chronic bronchitis     COPD (chronic obstructive pulmonary disease)     Gout      Hyperlipidemia     Hypertension     Ischemic cardiomyopathy     resolved, EF was 60% in 2018    Mild aortic stenosis     Mitral regurgitation     Precordial chest pain     Rheumatic fever     Syncopal episodes     Tobacco use      Past Surgical History:   Procedure Laterality Date    CARDIAC CATHETERIZATION      CARDIAC CATHETERIZATION N/A 4/8/2018    Procedure: Left Heart Cath;  Surgeon: Nicolas Jerez MD;  Location[facility] C[facility]AS[facility] LOC[facility];  Service: Cardiovascular    CARDIAC CATHETERIZATION  4/8/2018    Procedure: Percutaneous Manual Thrombectomy;  Surgeon: Nicolas Jerez MD;  Location[facility] LOC[facility];  Service: Cardiovascular    CARDIAC CATHETERIZATION N/A 4/8/2018    Procedure: Stent CARLENE coronary;  Surgeon: Nicolas Jerez MD;  [facility];  Service: Cardiovascular    CARDIAC CATHETERIZATION N/A 4/8/2018    Procedure: Right Heart Cath;  Surgeon: Nicolas Jerez MD;  [facility];  Service: Cardiovascular    CARDIAC CATHETERIZATION Left 2/21/2020    Procedure: Cardiac Catheterization/Vascular Study;  Surgeon: Alejandro Jenkins MD;  Location: [facility] CATH INVASIVE LOCATION;  Service: Cardiovascular;  Laterality: Left;    CARDIAC CATHETERIZATION N/A 2/21/2020    Procedure: Coronary angiography;  Surgeon: Alejandro Jenkins MD;  Location: [facility] CATH INVASIVE LOCATION;  Service: Cardiovascular;  Laterality: N/A;    EYE SURGERY      cataract surg    HAND SURGERY      HERNIA REPAIR      KNEE SURGERY      TESTICLE SURGERY      TOTAL HIP ARTHROPLASTY Left 9/25/2022    Procedure: TOTAL HIP ARTHROPLASTY ANTERIOR WITH HANA TABLE;  Surgeon: Jeff Rodriguez II, MD;  Location: [facility];  Service: Orthopedics;  Laterality: Left;      General Information       Row Name 09/05/23 1153          OT Time and Intention    Document Type evaluation  -PP     Mode of Treatment individual therapy;occupational therapy  -PP       Row Name 09/05/23 1153           General Information    Patient Profile Reviewed yes  -PP     Prior Level of Function independent:;all household mobility;ADL's  Pt's wife reports pt is Independent for ADLs/ household fxnl mob/ xfers at RWX level.  -PP     Existing Precautions/Restrictions fall  -PP     Barriers to Rehab hearing deficit  -PP       Row Name 09/05/23 1153          Living Environment    People in Home spouse;grandchild(genevieve)  -PP       Row Name 09/05/23 1153          Home Main Entrance    Number of Stairs, Main Entrance none  ramp  -PP       Row Name 09/05/23 1153          Stairs Within Home, Primary    Number of Stairs, Within Home, Primary none  -PP       Row Name 09/05/23 1153          Cognition    Orientation Status (Cognition) oriented x 3  -PP       Row Name 09/05/23 1153          Safety Issues, Functional Mobility    Impairments Affecting Function (Mobility) endurance/activity tolerance;shortness of breath;strength  -PP               User Key  (r) = Recorded By, (t) = Taken By, (c) = Cosigned By      Initials Name Provider Type    PP Suha Olson, OT Occupational Therapist                     Mobility/ADL's       Row Name 09/05/23 1157          Bed Mobility    Bed Mobility supine-sit;sit-supine  -PP     Supine-Sit [place] (Bed Mobility) standby assist  -PP     Sit-Supine [place] (Bed Mobility) standby assist  -PP     Assistive Device (Bed Mobility) bed rails  -PP     Comment, (Bed Mobility) increased time to complete  -PP       Row Name 09/05/23 1157          Transfers    Transfers sit-stand transfer;stand-sit transfer  -PP       Row Name 09/05/23 1157          Sit-Stand Transfer    Sit-Stand [place] (Transfers) standby assist  -PP     Assistive Device (Sit-Stand Transfers) walker, front-wheeled  -PP       Row Name 09/05/23 1157          Stand-Sit Transfer    Stand-Sit [place] (Transfers) standby assist  -PP     Assistive Device (Stand-Sit Transfers) walker, front-wheeled  -PP       Row Name 09/05/23  1157          Functional Mobility    Functional Mobility- Ind. Level standby assist;contact guard assist  -PP     Functional Mobility- Device walker, front-wheeled  -PP     Functional Mobility- Comment Pt ambulated to/from room and nsg station and around nsg station per pt request to simulate prolonged distance for ADLs, w/ no LOB noted  -PP       Row Name 09/05/23 1157          Activities of Daily Living    BADL Assessment/Intervention lower body dressing;grooming  -PP       Row Name 09/05/23 1157          Lower Body Dressing Assessment/Training    Comment, (Lower Body Dressing) Pt reports using reacher during LBD at baseline d/t limited shoulder ROM  -PP       Row Name 09/05/23 1157          Grooming Assessment/Training    [place] Level (Grooming) wash face, hands;set up  -PP     Position (Grooming) edge of bed sitting  -PP               User Key  (r) = Recorded By, (t) = Taken By, (c) = Cosigned By      Initials Name Provider Type    PP [place], Suha, OT Occupational Therapist                   Obj/Interventions       Row Name 09/05/23 1205          Sensory Assessment (Somatosensory)    Sensory Assessment (Somatosensory) UE sensation intact  -PP       Row Name 09/05/23 1205          Vision Assessment/Intervention    Visual Impairment/Limitations WFL  -PP       Row Name 09/05/23 1205          Range of Motion Comprehensive    General Range of Motion other (see comments)  -PP     Comment, General Range of Motion Pt has limited shoulder ROM d/t past injuries and hx of L shoulder reverse arthroplasty, Pt elbow/wrist/hand ROM WFL  -PP       Row Name 09/05/23 1205          Strength Comprehensive (MMT)    General Manual Muscle Testing (MMT) Assessment other (see comments)  -PP     Comment, General Manual Muscle Testing (MMT) Assessment generalized weakness noted; B UE grossly 3/5  -PP       Row Name 09/05/23 1205          Balance    Balance Assessment sitting static balance;sit to stand dynamic balance;standing  dynamic balance;standing static balance  -PP     Static Sitting Balance supervision  -PP     Position, Sitting Balance unsupported;sitting edge of bed  -PP     Sit to Stand Dynamic Balance standby assist  -PP     Static Standing Balance standby assist  -PP     Dynamic Standing Balance contact guard;standby assist  -PP     Position/Device Used, Standing Balance supported;walker, front-wheeled  -PP     Balance Interventions sitting;standing;sit to stand;supported;dynamic;static  -PP     Comment, Balance Pt had no LOB noted.  -PP               User Key  (r) = Recorded By, (t) = Taken By, (c) = Cosigned By      Initials Name Provider Type    PP Suha Olson OT Occupational Therapist                   Goals/Plan       Row Name 09/05/23 1246          Transfer Goal 1 (OT)    Activity/Assistive Device (Transfer Goal 1, OT) transfers, all  -PP     [place] Level/Cues Needed (Transfer Goal 1, OT) modified independence  -PP     Time Frame (Transfer Goal 1, OT) 2 weeks;short term goal (STG)  -PP     Progress/Outcome (Transfer Goal 1, OT) goal ongoing  -PP       Row Name 09/05/23 1246          Toileting Goal 1 (OT)    Activity/Device (Toileting Goal 1, OT) toileting skills, all  -PP     [place] Level/Cues Needed (Toileting Goal 1, OT) modified independence  -PP     Time Frame (Toileting Goal 1, OT) short term goal (STG);2 weeks  -PP     Progress/Outcome (Toileting Goal 1, OT) goal ongoing  -PP       Row Name 09/05/23 1246          Grooming Goal 1 (OT)    Activity/Device (Grooming Goal 1, OT) grooming skills, all  -PP     [place] (Grooming Goal 1, OT) modified independence  -PP     Time Frame (Grooming Goal 1, OT) short term goal (STG);2 weeks  -PP     Strategies/Barriers (Grooming Goal 1, OT) maintaining good O2 levels while standing.  -PP     Progress/Outcome (Grooming Goal 1, OT) goal ongoing  -PP       Row Name 09/05/23 1246          Problem Specific Goal 1 (OT)    Problem Specific Goal 1 (OT) Pt will  elia increased safety awareness techniques during xfers previously educated/trained by OT.  -PP     Time Frame (Problem Specific Goal 1, OT) short term goal (STG);2 weeks  -PP     Progress/Outcome (Problem Specific Goal 1, OT) goal ongoing  -PP       Row Name 09/05/23 1246          Therapy Assessment/Plan (OT)    Planned Therapy Interventions (OT) activity tolerance training;BADL retraining;functional balance retraining;strengthening exercise;transfer/mobility retraining  -PP               User Key  (r) = Recorded By, (t) = Taken By, (c) = Cosigned By      Initials Name Provider Type    PP Suha Olson OT Occupational Therapist                   Clinical Impression       Row Name 09/05/23 1225          Pain Assessment    Pretreatment Pain Rating 0/10 - no pain  -PP     Posttreatment Pain Rating 0/10 - no pain  -PP     Pre/Posttreatment Pain Comment Pt has no c/o pain when asked  -PP       Row Name 09/05/23 1225          Plan of Care Review    Plan of Care Reviewed With spouse;patient  -PP     Progress improving  -PP     Outcome Evaluation Pt is a 89 y/o male admitted from home to ED for c/o chest pain. Pt PMHx of COPD, CAD, s/p RCA stent placement, ischemic cardiomyopathy, anticoagulated on Eliquis, HTN, and hyperlipidemia. Pt pleasantly [place] and A&O x3. Per wife report at bedside, pt (I) at baseline w/ ADLs using reacher and (I) w/ fxnl mob using RWX. Pt shows improvement this date, SBA for supine <> sit EOB and STS w/ RWX. Pt sat EOB w/ fair+ sitting balance while washing face w/ SET UP. Pt expresses wanting to go home and requested to ambulate for prolonged distance to/from room and nsg station, around nsg station w/ CGA/SBA using RWX. He has limited Jose shoulder ROM d/t past injuires but close to baseline. Skilled OT is req to address noted deficits in decreased strength, decreased endurance and SOB. Pt rec to DC home w/ assist/ HHPT. OT will continue to monitor for progress.  -PP       Row Name 09/05/23  1225          Therapy Assessment/Plan (OT)    Patient/Family Therapy Goal Statement (OT) To go home.  -PP     Rehab Potential (OT) good, to achieve stated therapy goals  -PP     Criteria for Skilled Therapeutic Interventions Met (OT) yes;skilled treatment is necessary  -PP     Therapy Frequency (OT) 5 times/wk  -PP       Row Name 09/05/23 1225          Therapy Plan Review/Discharge Plan (OT)    Anticipated Discharge Disposition (OT) home with assist;home with home health  HHPT  -PP       Row Name 09/05/23 1225          Vital Signs    Pre SpO2 (%) 96  -PP     O2 Delivery Pre Treatment room air  -PP     Intra SpO2 (%) 93  -PP     O2 Delivery Intra Treatment room air  -PP     Post SpO2 (%) 94  -PP     O2 Delivery Post Treatment room air  -PP     Pre Patient Position Supine  -PP     Intra Patient Position Standing  -PP     Post Patient Position Sitting  -PP       Row Name 09/05/23 1225          Positioning and Restraints    Pre-Treatment Position in bed  -PP     Post Treatment Position bed  -PP     In Bed fowlers;call light within reach;encouraged to call for assist;exit alarm on  -PP               User Key  (r) = Recorded By, (t) = Taken By, (c) = Cosigned By      Initials Name Provider Type    PP Suha Olson, OT Occupational Therapist                   Outcome Measures       Row Name 09/05/23 1250          How much help from another is currently needed...    Putting on and taking off regular lower body clothing? 3  -PP     Bathing (including washing, rinsing, and drying) 3  -PP     Toileting (which includes using toilet bed pan or urinal) 3  -PP     Putting on and taking off regular upper body clothing 3  -PP     Taking care of personal grooming (such as brushing teeth) 3  -PP     Eating meals 4  -PP     AM-PAC 6 Clicks Score (OT) 19  -PP       Row Name 09/05/23 1250          Functional Assessment    Outcome Measure Options AM-PAC 6 Clicks Daily Activity (OT)  -PP               User Key  (r) = Recorded By, (t)  = Taken By, (c) = Cosigned By      Initials Name Provider Type    PP Suha Olson OT Occupational Therapist                    Occupational Therapy Education       Title: PT OT SLP Therapies (In Progress)       Topic: Occupational Therapy (In Progress)       Point: ADL training (Done)       Description:   Instruct learner(s) on proper safety adaptation and remediation techniques during self care or transfers.   Instruct in proper use of assistive devices.                  Learning Progress Summary             Patient Acceptance, E, VU,DU by PP at 9/5/2023 1251    Comment: Pt educated in OT role, DC planning and call light function.Pt also educated in safe xfer tech for hand placement.   Family Acceptance, E, VU,DU by PP at 9/5/2023 1251    Comment: Pt educated in OT role, DC planning and call light function.Pt also educated in safe xfer tech for hand placement.                         Point: Home exercise program (Not Started)       Description:   Instruct learner(s) on appropriate technique for monitoring, assisting and/or progressing therapeutic exercises/activities.                  Learner Progress:  Not documented in this visit.              Point: Precautions (Done)       Description:   Instruct learner(s) on prescribed precautions during self-care and functional transfers.                  Learning Progress Summary             Patient Acceptance, E, VU,DU by PP at 9/5/2023 1251    Comment: Pt educated in OT role, DC planning and call light function.Pt also educated in safe xfer tech for hand placement.   Family Acceptance, E, VU,DU by PP at 9/5/2023 1251    Comment: Pt educated in OT role, DC planning and call light function.Pt also educated in safe xfer tech for hand placement.                         Point: Body mechanics (Done)       Description:   Instruct learner(s) on proper positioning and spine alignment during self-care, functional mobility activities and/or exercises.                   Learning Progress Summary             Patient Acceptance, E, VU,DU by PP at 9/5/2023 1251    Comment: Pt educated in OT role, DC planning and call light function.Pt also educated in safe xfer tech for hand placement.   Family Acceptance, E, VU,DU by PP at 9/5/2023 1251    Comment: Pt educated in OT role, DC planning and call light function.Pt also educated in safe xfer tech for hand placement.                                         User Key       Initials Effective Dates Name Provider Type Discipline    PP 06/09/23 -  Suha Olson OT Occupational Therapist OT                  OT Recommendation and Plan  Planned Therapy Interventions (OT): activity tolerance training, BADL retraining, functional balance retraining, strengthening exercise, transfer/mobility retraining  Therapy Frequency (OT): 5 times/wk  Plan of Care Review  Plan of Care Reviewed With: spouse, patient  Progress: improving  Outcome Evaluation: Pt is a 89 y/o male admitted from home to ED for c/o chest pain. Pt PMHx of COPD, CAD, s/p RCA stent placement, ischemic cardiomyopathy, anticoagulated on Eliquis, HTN, and hyperlipidemia. Pt pleasantly [place] and A&O x3. Per wife report at bedside, pt (I) at baseline w/ ADLs using reacher and (I) w/ fxnl mob using RWX. Pt shows improvement this date, SBA for supine <> sit EOB and STS w/ RWX. Pt sat EOB w/ fair+ sitting balance while washing face w/ SET UP. Pt expresses wanting to go home and requested to ambulate for prolonged distance to/from room and nsg station, around nsg station w/ CGA/SBA using RWX. He has limited [place] shoulder ROM d/t past injuires but close to baseline. Skilled OT is req to address noted deficits in decreased strength, decreased endurance and SOB. Pt rec to DC home w/ assist/ HHPT. OT will continue to monitor for progress.     Time Calculation:   Evaluation Complexity (OT)  Review Occupational Profile/Medical/Therapy History Complexity: expanded/moderate complexity  Assessment,  Occupational Performance/Identification of Deficit Complexity: 3-5 performance deficits  Clinical Decision Making Complexity (OT): detailed assessment/moderate complexity  Overall Complexity of Evaluation (OT): moderate complexity     Time Calculation- OT       Row Name 09/05/23 1253             Time Calculation- OT    OT Start Time 1053  -PP      OT Stop Time 1109  -PP      OT Time Calculation (min) 16 min  -PP      Total Timed Code Minutes- OT 16 minute(s)  -PP      OT Received On 09/05/23  -PP      OT - Next Appointment 09/06/23  -PP      OT Goal Re-Cert Due Date 09/19/23  -PP         Timed Charges    89457 - OT Therapeutic Activity Minutes 8  -PP         Untimed Charges    OT Eval/Re-eval Minutes 8  -PP         Total Minutes    Timed Charges Total Minutes 8  -PP      Untimed Charges Total Minutes 8  -PP       Total Minutes 16  -PP                User Key  (r) = Recorded By, (t) = Taken By, (c) = Cosigned By      Initials Name Provider Type    PP [place], Porshia, OT Occupational Therapist                  Therapy Charges for Today       Code Description Service Date Service Provider Modifiers Qty    34887518319  OT THERAPEUTIC ACT EA 15 MIN 9/5/2023 [place], Porshia, OT GO 1    17378425116 HC OT EVAL MOD COMPLEXITY 2 9/5/2023 Whitney, Porshia, OT GO 1                 Porshia [place], OT  9/5/2023

## 2023-09-05 NOTE — CONSULTS
Consult for removal of Covid rule out infection header. Patient reviewed, Covid test negative, no reported exposures, Covid rule out removed.

## 2023-09-05 NOTE — PROGRESS NOTES
[facility] Follow Up    Chief Complaint: Follow up CAD, aortic stenosis    Interval History: Denies any chest pain or shortness of breath.  Appears comfortable.  On room air.    Objective:     Objective:  Temp:  [97.3 °F (36.3 °C)-98.1 °F (36.7 °C)] 97.7 °F (36.5 °C)  Heart Rate:  [61-88] 67  Resp:  [18] 18  BP: (103-120)/(46-73) 120/73     Intake/Output Summary (Last 24 hours) at 9/5/2023 0729  Last data filed at 9/5/2023 0219  Gross per 24 hour   Intake 840 ml   Output 1450 ml   Net -610 ml     Body mass index is 26.12 kg/m².      09/03/23  0516 09/03/23  0851 09/04/23  1407   Weight: 72.1 kg (159 lb) 80 kg (176 lb 6.4 oz) 80 kg (176 lb 5.9 oz)     Weight change: -0.014 kg (-0.5 oz)      Physical Exam:   General : Alert, cooperative, in no acute distress.  Neuro: Alert,cooperative and oriented.  Lungs: CTAB. Normal respiratory effort and rate.  CV: Regular rate and rhythm, normal S1 and S2, no murmurs, gallops or rubs.  ABD: Soft, nontender, nondistended. Positive bowel sounds.  Extr: No edema or cyanosis, moves all extremities.    Lab Review:   Results from last 7 days   Lab Units 09/04/23  0724 09/03/23  1110 09/03/23  0538   SODIUM mmol/L 133* 133* 129*   POTASSIUM mmol/L 4.2 4.2 4.2   CHLORIDE mmol/L 103 102 96*   CO2 mmol/L 20.0* 19.3* 19.6*   BUN mg/dL 34* 40* 36*   CREATININE mg/dL 1.36* 1.51* 1.54*   GLUCOSE mg/dL 94 102* 146*   CALCIUM mg/dL 8.7 8.8 9.1   AST (SGOT) U/L 13  --  19   ALT (SGPT) U/L 8  --  12     Results from last 7 days   Lab Units 09/03/23  0740 09/03/23  0538   HSTROP T ng/L 49* 41*     Results from last 7 days   Lab Units 09/04/23  0724 09/03/23  0538   WBC 10*3/mm3 7.03 8.91   HEMOGLOBIN g/dL 9.1* 10.5*   HEMATOCRIT % 27.9* 31.4*   PLATELETS 10*3/mm3 190 232     Results from last 7 days   Lab Units 09/03/23  0538   INR  1.20*         Results from last 7 days   Lab Units 09/04/23  0724   CHOLESTEROL mg/dL 154   TRIGLYCERIDES mg/dL 115   HDL CHOL mg/dL 53      Results from last 7 days   Lab Units 09/03/23  0538   PROBNP pg/mL 7,259.0*     Results from last 7 days   Lab Units 09/04/23  0724   TSH uIU/mL 1.080     I reviewed the patient's new clinical results.  I personally viewed and interpreted the patient's EKG  Current Medications:   Scheduled Meds:apixaban, 5 mg, Oral, BID  aspirin, 81 mg, Oral, Daily  atorvastatin, 80 mg, Oral, Daily  cefTRIAXone, 1,000 mg, Intravenous, Q24H  midodrine, 10 mg, Oral, TID AC  senna-docusate sodium, 2 tablet, Oral, BID  sodium chloride, 10 mL, Intravenous, Q12H      Continuous Infusions:     Allergies:  Allergies   Allergen Reactions    No Known Drug Allergy Unknown (See Comments)     NKA       Assessment/Plan:     URI/Pneumonia  Abnormal troponin/atypical chest pain  Aortic valve stenosis.  Now appears to be in the severe range.  Chronic diastolic CHF.  Low normal EF with stable apical wall motion abnormalities.   Coronary artery disease.  Prior stent to the RCA.  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  Known severe mid to distal LAD stenosis that has not been easily amenable to PCI and has been medically managed.  CKD.  Stable.  Anemia.  Stable.  Orthostatic hypotension. Low normal BP with high dose midodrine.   COPD  History of PE.  On anticoagulation with apixaban.    -Continue current cardiac management.  - Consider reducing apixaban to 2.5 mg twice a day as a maintenance dose since its been almost a year since his original pulmonary embolism.  - Discussed that progression in aortic valve stenosis with the patient's wife at bedside.  I am not sure how good of a candidate he would be even for TAVR but this is something that can be discussed and worked up further as an outpatient.  -I think before we decide to pursue any further potential work-up in the patient and his family need to decide on goals for care.  Agree with palliative care consult.  -He is otherwise okay for discharge from a  cardiac standpoint.  He will need to keep his scheduled follow-up appointment.      Melissa Templeton MD  09/05/23  07:29 EDT

## 2023-09-05 NOTE — PLAN OF CARE
Problem: Fall Injury Risk  Goal: Absence of Fall and Fall-Related Injury  Outcome: Ongoing, Progressing  Intervention: Identify and Manage Contributors  Recent Flowsheet Documentation  Taken 9/5/2023 0219 by Alexia Cook RN  Medication Review/Management: medications reviewed  Taken 9/5/2023 0040 by Alexia Cook RN  Medication Review/Management: medications reviewed  Taken 9/4/2023 2230 by Alexia Cook RN  Medication Review/Management: medications reviewed  Taken 9/4/2023 2019 by Alexia Cook RN  Medication Review/Management: medications reviewed  Intervention: Promote Injury-Free Environment  Recent Flowsheet Documentation  Taken 9/5/2023 0219 by Alexia Cook RN  Safety Promotion/Fall Prevention:   activity supervised   assistive device/personal items within reach   clutter free environment maintained   fall prevention program maintained   lighting adjusted   nonskid shoes/slippers when out of bed   room organization consistent   safety round/check completed  Taken 9/5/2023 0040 by Alexia Cook RN  Safety Promotion/Fall Prevention:   activity supervised   assistive device/personal items within reach   clutter free environment maintained   fall prevention program maintained   lighting adjusted   nonskid shoes/slippers when out of bed   room organization consistent   safety round/check completed  Taken 9/4/2023 2230 by Alexia Cook RN  Safety Promotion/Fall Prevention:   assistive device/personal items within reach   activity supervised   clutter free environment maintained   fall prevention program maintained   lighting adjusted   nonskid shoes/slippers when out of bed   room organization consistent   safety round/check completed  Taken 9/4/2023 2019 by Alexia Cook RN  Safety Promotion/Fall Prevention:   activity supervised   assistive device/personal items within reach   clutter free environment maintained   fall prevention program maintained   lighting adjusted   nonskid shoes/slippers when out of bed    room organization consistent   safety round/check completed     Problem: Chest Pain  Goal: Resolution of Chest Pain Symptoms  Outcome: Ongoing, Progressing     Problem: Skin Injury Risk Increased  Goal: Skin Health and Integrity  Outcome: Ongoing, Progressing  Intervention: Optimize Skin Protection  Recent Flowsheet Documentation  Taken 9/5/2023 0040 by Alexia Cook, RN  Pressure Reduction Techniques:   frequent weight shift encouraged   weight shift assistance provided  Pressure Reduction Devices: alternating pressure pump (ADD)  Skin Protection:   adhesive use limited   incontinence pads utilized   transparent dressing maintained   tubing/devices free from skin contact  Taken 9/4/2023 2019 by Alexia Cook RN  Pressure Reduction Techniques:   frequent weight shift encouraged   weight shift assistance provided  Pressure Reduction Devices: alternating pressure pump (ADD)  Skin Protection:   adhesive use limited   incontinence pads utilized   transparent dressing maintained   tubing/devices free from skin contact     Problem: Adult Inpatient Plan of Care  Goal: Plan of Care Review  Outcome: Ongoing, Progressing  Flowsheets (Taken 9/5/2023 0308)  Progress: no change  Plan of Care Reviewed With:   patient   spouse  Outcome Evaluation:   No s/sx of distress noted at this time. Rested well throughout night. Vital signs WDL. On room air   tolerated. Fall precautions maintained. Up to bathroom with x1 assist and walker. Will cont with current plan of care.  Goal: Patient-Specific Goal (Individualized)  Outcome: Ongoing, Progressing  Goal: Absence of Hospital-Acquired Illness or Injury  Outcome: Ongoing, Progressing  Intervention: Identify and Manage Fall Risk  Recent Flowsheet Documentation  Taken 9/5/2023 0219 by Alexia Cook, RN  Safety Promotion/Fall Prevention:   activity supervised   assistive device/personal items within reach   clutter free environment maintained   fall prevention program maintained   lighting  adjusted   nonskid shoes/slippers when out of bed   room organization consistent   safety round/check completed  Taken 9/5/2023 0040 by Alexia Cook RN  Safety Promotion/Fall Prevention:   activity supervised   assistive device/personal items within reach   clutter free environment maintained   fall prevention program maintained   lighting adjusted   nonskid shoes/slippers when out of bed   room organization consistent   safety round/check completed  Taken 9/4/2023 2230 by Alexia Cook RN  Safety Promotion/Fall Prevention:   assistive device/personal items within reach   activity supervised   clutter free environment maintained   fall prevention program maintained   lighting adjusted   nonskid shoes/slippers when out of bed   room organization consistent   safety round/check completed  Taken 9/4/2023 2019 by Alexia Cook RN  Safety Promotion/Fall Prevention:   activity supervised   assistive device/personal items within reach   clutter free environment maintained   fall prevention program maintained   lighting adjusted   nonskid shoes/slippers when out of bed   room organization consistent   safety round/check completed  Intervention: Prevent Skin Injury  Recent Flowsheet Documentation  Taken 9/5/2023 0219 by Alexia Cook, RN  Body Position: weight shifting  Taken 9/5/2023 0040 by Alexia Cook RN  Skin Protection:   adhesive use limited   incontinence pads utilized   transparent dressing maintained   tubing/devices free from skin contact  Taken 9/4/2023 2019 by Alexia Cook RN  Body Position:   supine   weight shifting  Skin Protection:   adhesive use limited   incontinence pads utilized   transparent dressing maintained   tubing/devices free from skin contact  Intervention: Prevent and Manage VTE (Venous Thromboembolism) Risk  Recent Flowsheet Documentation  Taken 9/5/2023 0219 by Alexia Cook, RN  Activity Management: activity encouraged  Taken 9/5/2023 0040 by Alexia Cook, RN  Activity Management: activity  encouraged  Taken 9/4/2023 2230 by Alexia Cook RN  Activity Management: activity encouraged  Taken 9/4/2023 2019 by Alexia Cook RN  Activity Management: activity encouraged  VTE Prevention/Management: (eliquis) other (see comments)  Intervention: Prevent Infection  Recent Flowsheet Documentation  Taken 9/5/2023 0219 by Alexia Cook RN  Infection Prevention:   hand hygiene promoted   personal protective equipment utilized   rest/sleep promoted   single patient room provided  Taken 9/5/2023 0040 by Alexia Cook RN  Infection Prevention:   hand hygiene promoted   rest/sleep promoted   single patient room provided  Taken 9/4/2023 2230 by Alexia Cook RN  Infection Prevention:   hand hygiene promoted   rest/sleep promoted   single patient room provided   personal protective equipment utilized  Taken 9/4/2023 2019 by Alexia Cook RN  Infection Prevention:   hand hygiene promoted   personal protective equipment utilized   rest/sleep promoted   single patient room provided  Goal: Optimal Comfort and Wellbeing  Outcome: Ongoing, Progressing  Intervention: Provide Person-Centered Care  Recent Flowsheet Documentation  Taken 9/4/2023 2019 by Alexia Cook RN  Trust Relationship/Rapport:   care explained   reassurance provided   thoughts/feelings acknowledged  Goal: Readiness for Transition of Care  Outcome: Ongoing, Progressing   Goal Outcome Evaluation:  Plan of Care Reviewed With: patient, spouse        Progress: no change  Outcome Evaluation: No s/sx of distress noted at this time. Rested well throughout night. Vital signs WDL. On room air; tolerated. Fall precautions maintained. Up to bathroom with x1 assist and walker. Will cont with current plan of care.

## 2023-09-05 NOTE — NURSING NOTE
Went over d/c paperwork w/ pt and wife. Pt understands and has no further questions at this time. Pt has all belongings and is going home. D/C IV

## 2023-09-05 NOTE — PLAN OF CARE
Goal Outcome Evaluation:  Plan of Care Reviewed With: spouse, patient        Progress: improving  Outcome Evaluation: Pt is a 89 y/o male admitted from home to ED for c/o chest pain. Pt PMHx of COPD, CAD, s/p RCA stent placement, ischemic cardiomyopathy, anticoagulated on Eliquis, HTN, and hyperlipidemia. Pt pleasantly [place] and A&O x3. Per wife report at bedside, pt (I) at baseline w/ ADLs using reacher and (I) w/ fxnl mob using RWX. Pt shows improvement this date, SBA for supine <> sit EOB and STS w/ RWX. Pt sat EOB w/ fair+ sitting balance while washing face w/ SET UP. Pt expresses wanting to go home and requested to ambulate for prolonged distance to/from room and nsg station, around nsg station w/ CGA/SBA using RWX. He has limited Jose shoulder ROM d/t past injuires but close to baseline. Skilled OT is req to address noted deficits in decreased strength, decreased endurance and SOB. Pt rec to DC home w/ assist/ HHPT. OT will continue to monitor for progress.      Anticipated Discharge Disposition (OT): home with assist, home with home health (HHPT)

## 2023-09-06 ENCOUNTER — HOME HEALTH ADMISSION (OUTPATIENT)
Dept: HOME HEALTH SERVICES | Facility: HOME HEALTHCARE | Age: 88
End: 2023-09-06
Payer: COMMERCIAL

## 2023-09-06 ENCOUNTER — DOCUMENTATION (OUTPATIENT)
Dept: HOME HEALTH SERVICES | Facility: HOME HEALTHCARE | Age: 88
End: 2023-09-06
Payer: MEDICARE

## 2023-09-06 NOTE — OUTREACH NOTE
Prep Survey      Flowsheet Row Responses   [facility] facility patient discharged from? [facility]   Is LACE score < 7 ? No   Eligibility Readm Mgmt   Discharge diagnosis chest pain,  PNA   Does the patient have one of the following disease processes/diagnoses(primary or secondary)? Pneumonia   Does the patient have Home health ordered? No   Is there a DME ordered? No   Comments regarding appointments call for apmts   Medication alerts for this patient eliquis   General alerts for this patient HX CHF, COPD   Prep survey completed? Yes            Massiel WANG - Registered Nurse

## 2023-09-06 NOTE — DISCHARGE PLACEMENT REQUEST
"Yoni Bonner  (89 y.o. Male)       Date of Birth   04/21/1933    Social Security Number       Address   [address] [place] [address]    Home Phone   [phone]    MRN   [id number]       [facility]    Marital Status                               Admission Date   9/3/23    Admission Type   Emergency    Admitting Provider   Carlo Leo MD    Attending Provider       Department, Room/Bed   [address], [address]       Discharge Date   9/5/2023    Discharge Disposition   Home-Health Care [facility]    Discharge Destination                                 Attending Provider: (none)   Allergies: No Known Drug Allergy    Isolation: None   Infection: None   Code Status: Prior    Ht: 175 cm (68.9\")   Wt: 80 kg (176 lb 5.9 oz)    Admission Cmt: None   Principal Problem: Chest pain [R07.9]                   Active Insurance as of 9/3/2023       Primary Coverage       Payor Plan Insurance Group Employer/Plan Group    ANTHEM MEDICARE REPLACEMENT ANTHEM MEDICARE ADVANTAGE [id number]       Payor Plan Address Payor Plan Phone Number Payor Plan Fax Number Effective Dates    [address] [phone]  1/1/2016 - None Entered    [facility] [id number]         Subscriber Name Subscriber Birth Date Member ID       YONI BONNER Stephanie 4/21/1933 [id number]                     Emergency Contacts        (Rel.) Home Phone Work Phone Mobile Phone    Kory Bonner (Grandchild) -- [phone] --    XI BONNER (Daughter) [phone] -- --    JOSE GARCIA (Daughter) [phone] -- --                "

## 2023-09-06 NOTE — PROGRESS NOTES
[facility] to provide home PT.  Noted referral in for home PT.  Spoke with patient's wife and verified all info on facesheet.  States her name is Danielle Bonner and she should be called to schedule visits (not noted on facesheet) [phone].  States has no current hospice or home health, has used [facility] in the past.  Per CCP, [facility] was unable to accept.  Wife is agreeable to our services, for the PT eval.  Wife states patient just saw his PCP Dr Cameron last week.

## 2023-09-06 NOTE — CASE MANAGEMENT/SOCIAL WORK
Case Management Discharge Note      Final Note: Patient discharged home with [facility].         Selected Continued Care - Discharged on 9/5/2023 Admission date: 9/3/2023 - Discharge disposition: Home-Health Care c      Destination    No services have been selected for the patient.                Durable Medical Equipment    No services have been selected for the patient.                Dialysis/Infusion    No services have been selected for the patient.                Home Medical Care Coordination complete.      Service Provider Selected Services Address Phone Fax Patient Preferred    [facility] [address] [address] [phone] --              Therapy    No services have been selected for the patient.                Community Resources    No services have been selected for the patient.                Community & DME    No services have been selected for the patient.                         Final Discharge Disposition Code: 06 - home with home health care

## 2023-09-07 ENCOUNTER — HOME CARE VISIT (OUTPATIENT)
Dept: HOME HEALTH SERVICES | Facility: HOME HEALTHCARE | Age: 88
End: 2023-09-07
Payer: COMMERCIAL

## 2023-09-07 VITALS
HEART RATE: 93 BPM | WEIGHT: 178 LBS | BODY MASS INDEX: 26.36 KG/M2 | RESPIRATION RATE: 18 BRPM | DIASTOLIC BLOOD PRESSURE: 56 MMHG | OXYGEN SATURATION: 98 % | HEIGHT: 69 IN | SYSTOLIC BLOOD PRESSURE: 126 MMHG | TEMPERATURE: 97.7 F

## 2023-09-07 PROCEDURE — G0151 HHCP-SERV OF PT,EA 15 MIN: HCPCS

## 2023-09-08 LAB
BACTERIA SPEC AEROBE CULT: NORMAL
BACTERIA SPEC AEROBE CULT: NORMAL

## 2023-09-08 NOTE — HOME HEALTH
Patient is a 89yo male recentlty discharged from [facility] for pneumonia, chest pain.Skilled Physical Therapy FOC is pneumonia with comorbidities of CHF with preserved ejection fraction, Atrial fibrillation, COPD, Nonrheumatic aortic valve stenosis, CKD (chronic kidney disease) stage 3, Anemia, chronic disease, HTN. Physical therapy to provide skilled care of therapeutic exercises, gait training, transfer training, and patient education to improve functional mobility after decline from hospital stay for chest pain. Patient lives with spouse and other family members in 1 story home, small step inside home, no steps to enter.     PLAN FOR NEXT VISIT:  --Continue therapeutic exercise  --Continue transfer training  --Continue gait training

## 2023-09-11 ENCOUNTER — HOME CARE VISIT (OUTPATIENT)
Dept: HOME HEALTH SERVICES | Facility: HOME HEALTHCARE | Age: 88
End: 2023-09-11
Payer: COMMERCIAL

## 2023-09-11 ENCOUNTER — READMISSION MANAGEMENT (OUTPATIENT)
Dept: CALL CENTER | Facility: HOSPITAL | Age: 88
End: 2023-09-11
Payer: MEDICARE

## 2023-09-11 ENCOUNTER — HOME CARE VISIT (OUTPATIENT)
Dept: HOME HEALTH SERVICES | Facility: HOME HEALTHCARE | Age: 88
End: 2023-09-11
Payer: MEDICARE

## 2023-09-11 VITALS
RESPIRATION RATE: 24 BRPM | DIASTOLIC BLOOD PRESSURE: 70 MMHG | TEMPERATURE: 97.4 F | SYSTOLIC BLOOD PRESSURE: 122 MMHG | HEART RATE: 101 BPM | OXYGEN SATURATION: 97 %

## 2023-09-11 PROCEDURE — G0299 HHS/HOSPICE OF RN EA 15 MIN: HCPCS

## 2023-09-11 PROCEDURE — G0151 HHCP-SERV OF PT,EA 15 MIN: HCPCS

## 2023-09-11 NOTE — OUTREACH NOTE
"COPD/PN Week 1 Survey      Flowsheet Row Responses   [facility] patient discharged from? [place]   Does the patient have one of the following disease processes/diagnoses(primary or secondary)? Pneumonia   Week 1 attempt successful? Yes   Call start time 1355   Call end time 1404   General alerts for this patient HX CHF, COPD   Discharge diagnosis chest pain,  PNA   Person spoke with today (if not patient) and relationship patient   Meds reviewed with patient/caregiver? Yes   Is the patient having any side effects they believe may be caused by any medication additions or changes? No   Does the patient have all medications ordered at discharge? Yes   Prescription comments completed antibiotic   Is the patient taking all medications as directed (includes completed medication regime)? Yes   Does the patient have a primary care provider?  Yes   Does the patient have an appointment with their PCP or specialist within 7 days of discharge? No   Comments regarding PCP Dr. Cameron   What is preventing the patient from scheduling follow up appointments within 7 days of discharge? --  [ nurses are reaching out to provider per spouse]   Has the patient kept scheduled appointments due by today? N/A   Comments Pt is \"coughing stuff up\" per spouse   What is the Home health agency?  [facility]   Has home health visited the patient within 72 hours of discharge? Yes   Home health comments [facility] have visited.  One is coming out today per spouse   Pulse Ox monitoring Intermittent   Pulse Ox device source Patient   O2 Sat comments O2 stays high per spouse   O2 Sat: education provided Sat levels, Monitoring frequency   Psychosocial issues? No   Did the patient receive a copy of their discharge instructions? Yes   Nursing interventions Reviewed instructions with patient   What is the patient's perception of their health status since discharge? Improving   Is the patient/caregiver able to teach back the hierarchy of who to " call/visit for symptoms/problems? PCP, Specialist, Home health nurse, Urgent Care, ED, 911 Yes   Is the patient able to teach back COPD zones? Yes   Patient reports what zone on this call? Yellow Zone   Yellow Zone I have less energy for my daily activities, Increased or thicker phlegm/mucus, Poor sleep and my symptoms woke me up   Yellow interventions Continue taking daily medications, Get plenty of rest, Call provider immediately if symptoms don't improve: they may indicate that an adjustment in medication or oxygen therapy is needed   Week 1 call completed? Yes   Is the patient interested in additional calls from an ambulatory ? No   Wrap up additional comments Spouse states they have family support that is helping.  HH is visiting and working with patient's breathing.  She denies needs at time of call.   Call end time 1404            CHACORTA DIAZ - Registered Nurse

## 2023-09-13 VITALS
RESPIRATION RATE: 21 BRPM | SYSTOLIC BLOOD PRESSURE: 90 MMHG | TEMPERATURE: 97.3 F | OXYGEN SATURATION: 96 % | HEART RATE: 106 BPM | DIASTOLIC BLOOD PRESSURE: 52 MMHG

## 2023-09-13 NOTE — HOME HEALTH
SUBJECTIVE: wife reports that patient had a terrible night.  excess coughing. shortness of breath.  anxiety.  patient sleeping upon therapist arrival  audible wheezing heard from across room.  wet cough.       No new med changes.  No recent Falls.      ASSESSMENT patient with very limited activity tolerance. O2 sats stable however HR increased to 130 with very limited activity.  abnormal lung sounds. would benefit from SN assessment to identify severity of pumonary concerns.  high risk of rehospitalization due to complex medical condition.  B LE with pitting edema, peeling skin, erythema      SKILL/EDUCATION PROVIDED: see interventions for details  PATIENT/CAREGIVER RESPONSE: see interventions for details      DATE OF NEXT APPOINTMENT WITH DOCTOR: 11/6 cardiology  ____________    PLAN FOR NEXT VISIT:   MEDICAL NECESSITY FOR ONGOING SKILLED THERAPY:  Skilled physical therapy is medically necessary for treatment of: strength and actifvity tolerance deficits following hospitalzation for  pneumonia  .Requires instruction in appropriate progression of exercises; education in fall prevention/pain/edema management; gait training to reduce reliance on caregiver  Without skilled physical therapy, patient at risk for: falls, rehospitalization, increased reliance on caregiver, chronic pain,       SPECIFIC INTERVENTIONS AND GOALS TO ADDRESS ON NEXT VISIT:  - progress HEP    - gait training to increase activity tolerance  - transfer training  - energy conservation education    FREQUENCY AND DURATION:  - 1 week 1; 2 week 2    ANY OTHER FOLLOW UP NEEDED: none    REASSESSMENT DUE DATE: 30 day: 10/6/23

## 2023-09-15 ENCOUNTER — HOME CARE VISIT (OUTPATIENT)
Dept: HOME HEALTH SERVICES | Facility: HOME HEALTHCARE | Age: 88
End: 2023-09-15
Payer: COMMERCIAL

## 2023-09-15 PROCEDURE — G0299 HHS/HOSPICE OF RN EA 15 MIN: HCPCS

## 2023-09-15 NOTE — HOME HEALTH
"Patient referred to nursing due to SOA, cough, pneumonia not improved.  Patient was coughing almost continuously throughout visit.  He was sitting in a chair in the main rom with a humidifier next to him.  He has completed his antibiotics and spouse is worried that he still sounds so bad and is still so weak.  In listening to patient's lungs and cough, and talking to spouse, I felt it best to contact his PCP to let him know patient's condition and see what he would like to do at this point.  I spoke directly to the MD who stated to \"send him for a chest Xray\" then hung up on me.  I was unable to ask if he planned on sending over an order or wanted the \[facility]\"" to call him when patient arrived.  Spouse talked to patient who said he really wanted to wait and go in the morning, because he is afraid they will readmit him and he just got home.  He promised that he would go in the morning.  I explained to wife that he really should go have xray tonight, and if he gets worse or more SOA, to call 911.  Spouse verb that she would do so.  Patient will continue to use current meds in home and go for xray in am.  I explained to spouse that if they get to the hospital and there is no order, they can call Dr Cameron and he can give them order over the phone.  I will call tomorrow afternoon to ensure patient has had xray and see if he had been admitted to hospital."

## 2023-09-16 ENCOUNTER — HOME CARE VISIT (OUTPATIENT)
Dept: HOME HEALTH SERVICES | Facility: HOME HEALTHCARE | Age: 88
End: 2023-09-16

## 2023-09-18 ENCOUNTER — HOME CARE VISIT (OUTPATIENT)
Dept: HOME HEALTH SERVICES | Facility: HOME HEALTHCARE | Age: 88
End: 2023-09-18
Payer: MEDICARE

## 2023-09-18 VITALS
TEMPERATURE: 98.1 F | SYSTOLIC BLOOD PRESSURE: 122 MMHG | DIASTOLIC BLOOD PRESSURE: 70 MMHG | RESPIRATION RATE: 20 BRPM | OXYGEN SATURATION: 94 % | HEART RATE: 89 BPM

## 2023-09-18 PROCEDURE — G0151 HHCP-SERV OF PT,EA 15 MIN: HCPCS

## 2023-09-18 NOTE — HOME HEALTH
"PLAN FOR NEXT VISIT:  patient still has nearly constant cough, states that he still feels just awful\" .  MD had called him in a short course of prednisone after he had his chest xray, but he has completed the course and sounds no better.  He appears very sick and very exhausted.  I instructed spouse that if he does not start feeling better soon, she should get him back to the hospital, as he may need IV abx, IV steroids, other interventions to help relieve the cough."

## 2023-09-19 ENCOUNTER — READMISSION MANAGEMENT (OUTPATIENT)
Dept: CALL CENTER | Facility: HOSPITAL | Age: 88
End: 2023-09-19
Payer: MEDICARE

## 2023-09-19 ENCOUNTER — HOME CARE VISIT (OUTPATIENT)
Dept: HOME HEALTH SERVICES | Facility: HOME HEALTHCARE | Age: 88
End: 2023-09-19

## 2023-09-19 VITALS
OXYGEN SATURATION: 99 % | DIASTOLIC BLOOD PRESSURE: 68 MMHG | HEART RATE: 61 BPM | RESPIRATION RATE: 19 BRPM | SYSTOLIC BLOOD PRESSURE: 98 MMHG | TEMPERATURE: 97.1 F

## 2023-09-19 PROCEDURE — G0495 RN CARE TRAIN/EDU IN HH: HCPCS

## 2023-09-19 NOTE — OUTREACH NOTE
COPD/PN Week 2 Survey      Flowsheet Row Responses   [facility] patient discharged from? [place]   Does the patient have one of the following disease processes/diagnoses(primary or secondary)? Pneumonia   Week 2 attempt successful? Yes   Call start time 1229   Call end time 1231   Person spoke with today (if not patient) and relationship Spouse- Danielle Mccormack reviewed with patient/caregiver? Yes   Is the patient taking all medications as directed (includes completed medication regime)? Yes   What is the Home health agency?  [facility]   Has home health visited the patient within 72 hours of discharge? Yes   Psychosocial issues? No   Did the patient receive a copy of their discharge instructions? Yes   Nursing interventions Reviewed instructions with patient   What is the patient's perception of their health status since discharge? Improving   Nursing Interventions Nurse provided patient education   Is the patient/caregiver able to teach back the hierarchy of who to call/visit for symptoms/problems? PCP, Specialist, Home health nurse, Urgent Care, ED, 911 Yes   Week 2 call completed? Yes   Graduated Yes   Is the patient interested in additional calls from an ambulatory ? No   Would this patient benefit from a Referral to [facility]? No   Wrap up additional comments Spouse states patient is doing very well HH is still coming. Patient doing very well walking with a walker went to town with spouse today. no concerns or questions noted.   Call end time 1231            Avis LINDER - Registered Nurse

## 2023-09-19 NOTE — HOME HEALTH
"SUBJECTIVE: wife reports that  patient went to ER last week at request of PCP and was provided breathing treatment and sent home with 3 days worth of steroids. Has been feeling \"some better.\"  Was able to ride in the car with wife yesterday for errands.  states she has been giving patient antibiotics that she found in the closet  250-125 mg augmentin BID (has 8 day supply )   and that has been helping more than anything.  Feels like patient may have had UTI based on painful urination however was not present at time of recent ER visit    No new med changes.   No recent Falls.     ASSESSMENT  marked improvement in activity tolerance.  no coughing during PT session.  increased activity tolerance and ease of transfers. limited by c/'o L knee pain which patient reports mildly improved after donning knee brace.     ____________   PLAN FOR NEXT VISIT:   MEDICAL NECESSITY FOR ONGOING SKILLED THERAPY: Skilled physical therapy is medically necessary for treatment of: strength and actifvity tolerance deficits following hospitalzation for pneumonia .Requires instruction in appropriate progression of exercises; education in fall prevention/pain/edema management; gait training to reduce reliance on caregiver Without skilled physical therapy, patient at risk for: falls, rehospitalization, increased reliance on caregiver, chronic pain,     SPECIFIC INTERVENTIONS AND GOALS TO ADDRESS ON NEXT VISIT:   - progress HEP   - gait training to increase activity tolerance   - transfer training   - energy conservation education   - reassess knee orthosis  - possible dc from skilled PT    FREQUENCY AND DURATION:   - 1 week 1; 2 week 2   ANY OTHER FOLLOW UP NEEDED: none   REASSESSMENT DUE DATE: 30 day: 10/6/23"

## 2023-09-21 ENCOUNTER — HOME CARE VISIT (OUTPATIENT)
Dept: HOME HEALTH SERVICES | Facility: HOME HEALTHCARE | Age: 88
End: 2023-09-21
Payer: COMMERCIAL

## 2023-09-21 VITALS
SYSTOLIC BLOOD PRESSURE: 96 MMHG | TEMPERATURE: 97.7 F | OXYGEN SATURATION: 96 % | RESPIRATION RATE: 17 BRPM | DIASTOLIC BLOOD PRESSURE: 50 MMHG | HEART RATE: 88 BPM

## 2023-09-21 PROCEDURE — G0151 HHCP-SERV OF PT,EA 15 MIN: HCPCS

## 2023-09-21 NOTE — Clinical Note
No care due was identified.  [facility] Embedded Care Due Messages. Reference number: [id number].   9/14/2023 8:34:44 AM CDT   Patient discharged from home health PT services as he feels that he has resumed baseline functional activity. Thank you for this referral.

## 2023-09-21 NOTE — HOME HEALTH
"SUBJECTIVE:  \"Patient states that he feels like he is back to where he was before the hospital. no longer coughing much\".  \"I feel fine.\"  Does not feel like he needs more therapy. Wife reports patient has been able to ride in car to go on errands with her.     No new med changes.   No recent Falls.     ASSESSMENT:  improved activity tolerance.  still with endurance deficits but feels like he has resumed baseline levels of function.  has walking and seated exercise program."

## 2023-09-22 ENCOUNTER — HOME CARE VISIT (OUTPATIENT)
Dept: HOME HEALTH SERVICES | Facility: HOME HEALTHCARE | Age: 88
End: 2023-09-22

## 2023-09-25 VITALS
OXYGEN SATURATION: 98 % | HEART RATE: 110 BPM | RESPIRATION RATE: 18 BRPM | SYSTOLIC BLOOD PRESSURE: 128 MMHG | TEMPERATURE: 97.3 F | DIASTOLIC BLOOD PRESSURE: 66 MMHG

## 2023-09-27 ENCOUNTER — HOSPITAL ENCOUNTER (INPATIENT)
Facility: HOSPITAL | Age: 88
LOS: 1 days | Discharge: HOSPICE | DRG: 190 | End: 2023-09-28
Attending: EMERGENCY MEDICINE | Admitting: STUDENT IN AN ORGANIZED HEALTH CARE EDUCATION/TRAINING PROGRAM
Payer: MEDICARE

## 2023-09-27 ENCOUNTER — HOME CARE VISIT (OUTPATIENT)
Dept: HOME HEALTH SERVICES | Facility: HOME HEALTHCARE | Age: 88
End: 2023-09-27
Payer: COMMERCIAL

## 2023-09-27 ENCOUNTER — APPOINTMENT (OUTPATIENT)
Dept: GENERAL RADIOLOGY | Facility: HOSPITAL | Age: 88
DRG: 190 | End: 2023-09-27
Payer: MEDICARE

## 2023-09-27 DIAGNOSIS — J06.9 UPPER RESPIRATORY TRACT INFECTION, UNSPECIFIED TYPE: ICD-10-CM

## 2023-09-27 DIAGNOSIS — J44.1 COPD EXACERBATION: ICD-10-CM

## 2023-09-27 DIAGNOSIS — I50.9 CONGESTIVE HEART FAILURE, UNSPECIFIED HF CHRONICITY, UNSPECIFIED HEART FAILURE TYPE: ICD-10-CM

## 2023-09-27 DIAGNOSIS — A41.9 SEPSIS, DUE TO UNSPECIFIED ORGANISM, UNSPECIFIED WHETHER ACUTE ORGAN DYSFUNCTION PRESENT: ICD-10-CM

## 2023-09-27 DIAGNOSIS — I48.91 ATRIAL FIBRILLATION WITH RAPID VENTRICULAR RESPONSE: ICD-10-CM

## 2023-09-27 DIAGNOSIS — I50.43 ACUTE ON CHRONIC COMBINED SYSTOLIC AND DIASTOLIC CHF (CONGESTIVE HEART FAILURE): Primary | ICD-10-CM

## 2023-09-27 PROBLEM — J20.6 ACUTE BRONCHITIS DUE TO RHINOVIRUS: Status: ACTIVE | Noted: 2023-09-27

## 2023-09-27 PROBLEM — Z86.711 HISTORY OF PULMONARY EMBOLISM: Status: ACTIVE | Noted: 2023-09-27

## 2023-09-27 PROBLEM — J18.9 PNEUMONIA: Status: ACTIVE | Noted: 2023-09-27

## 2023-09-27 PROBLEM — N18.31 STAGE 3A CHRONIC KIDNEY DISEASE: Status: ACTIVE | Noted: 2019-01-06

## 2023-09-27 PROBLEM — R73.03 PREDIABETES: Status: ACTIVE | Noted: 2023-09-27

## 2023-09-27 PROBLEM — B34.8 PARAINFLUENZA TYPE 1 INFECTION: Status: ACTIVE | Noted: 2023-09-27

## 2023-09-27 LAB
A-A DO2: 63.7 MMHG
ALBUMIN SERPL-MCNC: 4.1 G/DL (ref 3.5–5.2)
ALBUMIN/GLOB SERPL: 1.7 G/DL
ALP SERPL-CCNC: 71 U/L (ref 39–117)
ALT SERPL W P-5'-P-CCNC: 11 U/L (ref 1–41)
ANION GAP SERPL CALCULATED.3IONS-SCNC: 16 MMOL/L (ref 5–15)
ANION GAP SERPL CALCULATED.3IONS-SCNC: 17.4 MMOL/L (ref 5–15)
APTT PPP: 35.2 SECONDS (ref 22.7–35.4)
ARTERIAL PATENCY WRIST A: ABNORMAL
ARTERIAL PATENCY WRIST A: POSITIVE
ARTERIAL PATENCY WRIST A: POSITIVE
AST SERPL-CCNC: 18 U/L (ref 1–40)
ATMOSPHERIC PRESS: 749.3 MMHG
ATMOSPHERIC PRESS: 750 MMHG
ATMOSPHERIC PRESS: 751.9 MMHG
B PARAPERT DNA SPEC QL NAA+PROBE: NOT DETECTED
B PERT DNA SPEC QL NAA+PROBE: NOT DETECTED
BASE EXCESS BLDA CALC-SCNC: -16.3 MMOL/L (ref 0–2)
BASE EXCESS BLDA CALC-SCNC: -5.5 MMOL/L (ref 0–2)
BASE EXCESS BLDA CALC-SCNC: -8.8 MMOL/L (ref 0–2)
BASOPHILS # BLD AUTO: 0.01 10*3/MM3 (ref 0–0.2)
BASOPHILS # BLD AUTO: 0.02 10*3/MM3 (ref 0–0.2)
BASOPHILS NFR BLD AUTO: 0.2 % (ref 0–1.5)
BASOPHILS NFR BLD AUTO: 0.3 % (ref 0–1.5)
BDY SITE: ABNORMAL
BILIRUB SERPL-MCNC: 0.5 MG/DL (ref 0–1.2)
BILIRUB UR QL STRIP: NEGATIVE
BUN BLDA-MCNC: 30 MG/DL
BUN SERPL-MCNC: 31 MG/DL (ref 8–23)
BUN SERPL-MCNC: 32 MG/DL (ref 8–23)
BUN/CREAT SERPL: 20.3 (ref 7–25)
BUN/CREAT SERPL: 21.8 (ref 7–25)
C PNEUM DNA NPH QL NAA+NON-PROBE: NOT DETECTED
CA-I BLDA-SCNC: 1.18 MMOL/L (ref 2.05–2.4)
CALCIUM SPEC-SCNC: 8.4 MG/DL (ref 8.2–9.6)
CALCIUM SPEC-SCNC: 9.5 MG/DL (ref 8.2–9.6)
CHLORIDE BLDA-SCNC: 100 MMOL/L (ref 98–107)
CHLORIDE SERPL-SCNC: 96 MMOL/L (ref 98–107)
CHLORIDE SERPL-SCNC: 99 MMOL/L (ref 98–107)
CLARITY UR: CLEAR
CO2 BLDA-SCNC: 13.9 MMOL/L (ref 23–27)
CO2 BLDA-SCNC: 16.6 MMOL/L (ref 23–27)
CO2 BLDA-SCNC: 17.3 MMOL/L (ref 23–27)
CO2 SERPL-SCNC: 14.6 MMOL/L (ref 22–29)
CO2 SERPL-SCNC: 20 MMOL/L (ref 22–29)
COLOR UR: YELLOW
CREAT BLDA-MCNC: 1.35 MG/DL (ref 0.6–130)
CREAT SERPL-MCNC: 1.42 MG/DL (ref 0.76–1.27)
CREAT SERPL-MCNC: 1.58 MG/DL (ref 0.76–1.27)
CREAT UR-MCNC: 96.5 MG/DL
D-LACTATE SERPL-SCNC: 1.5 MMOL/L (ref 0.5–2)
D-LACTATE SERPL-SCNC: 1.6 MMOL/L (ref 0.5–2)
D-LACTATE SERPL-SCNC: 3.7 MMOL/L (ref 0.5–2)
DEPRECATED RDW RBC AUTO: 43.9 FL (ref 37–54)
DEPRECATED RDW RBC AUTO: 44.6 FL (ref 37–54)
DEVICE COMMENT: ABNORMAL
DEVICE COMMENT: NORMAL
EGFRCR SERPLBLD CKD-EPI 2021: 41.3 ML/MIN/1.73
EGFRCR SERPLBLD CKD-EPI 2021: 46.9 ML/MIN/1.73
EOSINOPHIL # BLD AUTO: 0 10*3/MM3 (ref 0–0.4)
EOSINOPHIL # BLD AUTO: 0.02 10*3/MM3 (ref 0–0.4)
EOSINOPHIL NFR BLD AUTO: 0 % (ref 0.3–6.2)
EOSINOPHIL NFR BLD AUTO: 0.3 % (ref 0.3–6.2)
ERYTHROCYTE [DISTWIDTH] IN BLOOD BY AUTOMATED COUNT: 13.9 % (ref 12.3–15.4)
ERYTHROCYTE [DISTWIDTH] IN BLOOD BY AUTOMATED COUNT: 14 % (ref 12.3–15.4)
FLUAV SUBTYP SPEC NAA+PROBE: NOT DETECTED
FLUBV RNA ISLT QL NAA+PROBE: NOT DETECTED
GAS FLOW AIRWAY: 10 LPM
GEN 5 2HR TROPONIN T REFLEX: 99 NG/L
GLOBULIN UR ELPH-MCNC: 2.4 GM/DL
GLUCOSE BLDC GLUCOMTR-MCNC: 129 MG/DL (ref 70–130)
GLUCOSE BLDC GLUCOMTR-MCNC: 148 MG/DL (ref 70–130)
GLUCOSE BLDC GLUCOMTR-MCNC: 170 MG/DL (ref 70–130)
GLUCOSE BLDC GLUCOMTR-MCNC: 176 MG/DL (ref 70–130)
GLUCOSE BLDC GLUCOMTR-MCNC: 193 MG/DL (ref 70–130)
GLUCOSE SERPL-MCNC: 130 MG/DL (ref 65–99)
GLUCOSE SERPL-MCNC: 169 MG/DL (ref 65–99)
GLUCOSE UR STRIP-MCNC: NEGATIVE MG/DL
HADV DNA SPEC NAA+PROBE: NOT DETECTED
HCO3 BLDA-SCNC: 12.7 MMOL/L (ref 22–28)
HCO3 BLDA-SCNC: 15.7 MMOL/L (ref 22–28)
HCO3 BLDA-SCNC: 17.6 MMOL/L (ref 22–28)
HCOV 229E RNA SPEC QL NAA+PROBE: NOT DETECTED
HCOV HKU1 RNA SPEC QL NAA+PROBE: NOT DETECTED
HCOV NL63 RNA SPEC QL NAA+PROBE: NOT DETECTED
HCOV OC43 RNA SPEC QL NAA+PROBE: NOT DETECTED
HCT VFR BLD AUTO: 28.7 % (ref 37.5–51)
HCT VFR BLD AUTO: 29.8 % (ref 37.5–51)
HEMODILUTION: NO
HGB BLD-MCNC: 9.5 G/DL (ref 13–17.7)
HGB BLD-MCNC: 9.8 G/DL (ref 13–17.7)
HGB UR QL STRIP.AUTO: NEGATIVE
HMPV RNA NPH QL NAA+NON-PROBE: NOT DETECTED
HPIV1 RNA ISLT QL NAA+PROBE: DETECTED
HPIV2 RNA SPEC QL NAA+PROBE: NOT DETECTED
HPIV3 RNA NPH QL NAA+PROBE: NOT DETECTED
HPIV4 P GENE NPH QL NAA+PROBE: NOT DETECTED
IMM GRANULOCYTES # BLD AUTO: 0.03 10*3/MM3 (ref 0–0.05)
IMM GRANULOCYTES NFR BLD AUTO: 0.4 % (ref 0–0.5)
INHALED O2 CONCENTRATION: 30 %
INHALED O2 CONCENTRATION: 40 %
INR PPP: 1.1 (ref 0.9–1.1)
INR PPP: 1.15 (ref 0.9–1.1)
INSPIRATORY TIME: 1
KETONES UR QL STRIP: ABNORMAL
LEUKOCYTE ESTERASE UR QL STRIP.AUTO: NEGATIVE
LYMPHOCYTES # BLD AUTO: 0.36 10*3/MM3 (ref 0.7–3.1)
LYMPHOCYTES # BLD AUTO: 1.64 10*3/MM3 (ref 0.7–3.1)
LYMPHOCYTES NFR BLD AUTO: 21.4 % (ref 19.6–45.3)
LYMPHOCYTES NFR BLD AUTO: 6.3 % (ref 19.6–45.3)
Lab: ABNORMAL
M PNEUMO IGG SER IA-ACNC: NOT DETECTED
MAGNESIUM SERPL-MCNC: 2.1 MG/DL (ref 1.6–2.4)
MCH RBC QN AUTO: 28.6 PG (ref 26.6–33)
MCH RBC QN AUTO: 28.9 PG (ref 26.6–33)
MCHC RBC AUTO-ENTMCNC: 32.9 G/DL (ref 31.5–35.7)
MCHC RBC AUTO-ENTMCNC: 33.1 G/DL (ref 31.5–35.7)
MCV RBC AUTO: 86.9 FL (ref 79–97)
MCV RBC AUTO: 87.2 FL (ref 79–97)
MODALITY: ABNORMAL
MONOCYTES # BLD AUTO: 0.13 10*3/MM3 (ref 0.1–0.9)
MONOCYTES # BLD AUTO: 0.77 10*3/MM3 (ref 0.1–0.9)
MONOCYTES NFR BLD AUTO: 10 % (ref 5–12)
MONOCYTES NFR BLD AUTO: 2.3 % (ref 5–12)
NEUTROPHILS NFR BLD AUTO: 5.14 10*3/MM3 (ref 1.7–7)
NEUTROPHILS NFR BLD AUTO: 5.2 10*3/MM3 (ref 1.7–7)
NEUTROPHILS NFR BLD AUTO: 67.6 % (ref 42.7–76)
NEUTROPHILS NFR BLD AUTO: 90.5 % (ref 42.7–76)
NITRITE UR QL STRIP: NEGATIVE
NOTIFIED WHO: ABNORMAL
NRBC BLD AUTO-RTO: 0 /100 WBC (ref 0–0.2)
NT-PROBNP SERPL-MCNC: 9845 PG/ML (ref 0–1800)
O2 A-A PPRESDIFF RESPIRATORY: 0.7 MMHG
O2 A-A PPRESDIFF RESPIRATORY: 0.7 MMHG
OSMOLALITY UR: 434 MOSM/KG
PAW @ PEAK INSP FLOW SETTING VENT: 13 CMH2O
PCO2 BLDA: 25.8 MM HG (ref 35–45)
PCO2 BLDA: 28.4 MM HG (ref 35–45)
PCO2 BLDA: 40.8 MM HG (ref 35–45)
PEEP RESPIRATORY: 6 CM[H2O]
PH BLDA: 7.1 PH UNITS (ref 7.35–7.45)
PH BLDA: 7.35 PH UNITS (ref 7.35–7.45)
PH BLDA: 7.44 PH UNITS (ref 7.35–7.45)
PH UR STRIP.AUTO: <=5 [PH] (ref 5–8)
PLATELET # BLD AUTO: 147 10*3/MM3 (ref 140–450)
PLATELET # BLD AUTO: 167 10*3/MM3 (ref 140–450)
PMV BLD AUTO: 10.8 FL (ref 6–12)
PMV BLD AUTO: 11.5 FL (ref 6–12)
PO2 BLDA: 134.2 MM HG (ref 80–100)
PO2 BLDA: 189.2 MM HG (ref 80–100)
PO2 BLDA: 365.1 MM HG (ref 80–100)
POTASSIUM BLDA-SCNC: 3.9 MMOL/L (ref 3.5–5.2)
POTASSIUM SERPL-SCNC: 4.4 MMOL/L (ref 3.5–5.2)
POTASSIUM SERPL-SCNC: 4.6 MMOL/L (ref 3.5–5.2)
POTASSIUM UR-SCNC: 38.4 MMOL/L
PROCALCITONIN SERPL-MCNC: 0.2 NG/ML (ref 0–0.25)
PROT SERPL-MCNC: 6.5 G/DL (ref 6–8.5)
PROT UR QL STRIP: NEGATIVE
PROTHROMBIN TIME: 14.4 SECONDS (ref 11.7–14.2)
PROTHROMBIN TIME: 14.8 SECONDS (ref 11.7–14.2)
QT INTERVAL: 314 MS
QTC INTERVAL: 441 MS
RBC # BLD AUTO: 3.29 10*6/MM3 (ref 4.14–5.8)
RBC # BLD AUTO: 3.43 10*6/MM3 (ref 4.14–5.8)
READ BACK: YES
RHINOVIRUS RNA SPEC NAA+PROBE: DETECTED
RSV RNA NPH QL NAA+NON-PROBE: NOT DETECTED
SAO2 % BLDCOA: 99 % (ref 92–98.5)
SAO2 % BLDCOA: 99.7 % (ref 92–98.5)
SAO2 % BLDCOA: 99.9 % (ref 92–98.5)
SARS-COV-2 RNA NPH QL NAA+NON-PROBE: NOT DETECTED
SET MECH RESP RATE: 14
SET MECH RESP RATE: 14
SODIUM BLD-SCNC: 130 MMOL/L (ref 136–145)
SODIUM SERPL-SCNC: 131 MMOL/L (ref 136–145)
SODIUM SERPL-SCNC: 132 MMOL/L (ref 136–145)
SODIUM UR-SCNC: 52 MMOL/L
SP GR UR STRIP: 1.02 (ref 1–1.03)
TOTAL RATE: 21 BREATHS/MINUTE
TOTAL RATE: 22 BREATHS/MINUTE
TROPONIN T DELTA: -25 NG/L
TROPONIN T SERPL HS-MCNC: 124 NG/L
UROBILINOGEN UR QL STRIP: ABNORMAL
UUN 24H UR-MCNC: 594 MG/DL
VENTILATOR MODE: ABNORMAL
VT ON VENT VENT: 643 ML
VT ON VENT VENT: 833 ML
WBC NRBC COR # BLD: 5.68 10*3/MM3 (ref 3.4–10.8)
WBC NRBC COR # BLD: 7.68 10*3/MM3 (ref 3.4–10.8)

## 2023-09-27 PROCEDURE — 94761 N-INVAS EAR/PLS OXIMETRY MLT: CPT

## 2023-09-27 PROCEDURE — 99285 EMERGENCY DEPT VISIT HI MDM: CPT

## 2023-09-27 PROCEDURE — 83880 ASSAY OF NATRIURETIC PEPTIDE: CPT | Performed by: EMERGENCY MEDICINE

## 2023-09-27 PROCEDURE — 36600 WITHDRAWAL OF ARTERIAL BLOOD: CPT

## 2023-09-27 PROCEDURE — 93005 ELECTROCARDIOGRAM TRACING: CPT | Performed by: EMERGENCY MEDICINE

## 2023-09-27 PROCEDURE — 80053 COMPREHEN METABOLIC PANEL: CPT | Performed by: EMERGENCY MEDICINE

## 2023-09-27 PROCEDURE — 71045 X-RAY EXAM CHEST 1 VIEW: CPT

## 2023-09-27 PROCEDURE — 0 CEFEPIME PER 500 MG: Performed by: NURSE PRACTITIONER

## 2023-09-27 PROCEDURE — 82803 BLOOD GASES ANY COMBINATION: CPT

## 2023-09-27 PROCEDURE — 94799 UNLISTED PULMONARY SVC/PX: CPT

## 2023-09-27 PROCEDURE — 83605 ASSAY OF LACTIC ACID: CPT

## 2023-09-27 PROCEDURE — 36415 COLL VENOUS BLD VENIPUNCTURE: CPT

## 2023-09-27 PROCEDURE — 80047 BASIC METABLC PNL IONIZED CA: CPT

## 2023-09-27 PROCEDURE — 82570 ASSAY OF URINE CREATININE: CPT | Performed by: STUDENT IN AN ORGANIZED HEALTH CARE EDUCATION/TRAINING PROGRAM

## 2023-09-27 PROCEDURE — 99222 1ST HOSP IP/OBS MODERATE 55: CPT | Performed by: INTERNAL MEDICINE

## 2023-09-27 PROCEDURE — 25010000002 METHYLPREDNISOLONE PER 125 MG: Performed by: NURSE PRACTITIONER

## 2023-09-27 PROCEDURE — 84300 ASSAY OF URINE SODIUM: CPT | Performed by: STUDENT IN AN ORGANIZED HEALTH CARE EDUCATION/TRAINING PROGRAM

## 2023-09-27 PROCEDURE — 85610 PROTHROMBIN TIME: CPT | Performed by: NURSE PRACTITIONER

## 2023-09-27 PROCEDURE — 82948 REAGENT STRIP/BLOOD GLUCOSE: CPT

## 2023-09-27 PROCEDURE — 25010000002 FUROSEMIDE PER 20 MG: Performed by: STUDENT IN AN ORGANIZED HEALTH CARE EDUCATION/TRAINING PROGRAM

## 2023-09-27 PROCEDURE — 25010000002 METHYLPREDNISOLONE PER 125 MG: Performed by: INTERNAL MEDICINE

## 2023-09-27 PROCEDURE — 84133 ASSAY OF URINE POTASSIUM: CPT | Performed by: STUDENT IN AN ORGANIZED HEALTH CARE EDUCATION/TRAINING PROGRAM

## 2023-09-27 PROCEDURE — 94640 AIRWAY INHALATION TREATMENT: CPT

## 2023-09-27 PROCEDURE — 25010000002 DIGOXIN PER 500 MCG: Performed by: INTERNAL MEDICINE

## 2023-09-27 PROCEDURE — 25010000002 METHYLPREDNISOLONE PER 125 MG: Performed by: EMERGENCY MEDICINE

## 2023-09-27 PROCEDURE — 94660 CPAP INITIATION&MGMT: CPT

## 2023-09-27 PROCEDURE — 94760 N-INVAS EAR/PLS OXIMETRY 1: CPT

## 2023-09-27 PROCEDURE — 87040 BLOOD CULTURE FOR BACTERIA: CPT | Performed by: EMERGENCY MEDICINE

## 2023-09-27 PROCEDURE — 85730 THROMBOPLASTIN TIME PARTIAL: CPT | Performed by: EMERGENCY MEDICINE

## 2023-09-27 PROCEDURE — 0202U NFCT DS 22 TRGT SARS-COV-2: CPT | Performed by: EMERGENCY MEDICINE

## 2023-09-27 PROCEDURE — 83735 ASSAY OF MAGNESIUM: CPT | Performed by: EMERGENCY MEDICINE

## 2023-09-27 PROCEDURE — 85025 COMPLETE CBC W/AUTO DIFF WBC: CPT | Performed by: NURSE PRACTITIONER

## 2023-09-27 PROCEDURE — 94664 DEMO&/EVAL PT USE INHALER: CPT

## 2023-09-27 PROCEDURE — 81003 URINALYSIS AUTO W/O SCOPE: CPT | Performed by: EMERGENCY MEDICINE

## 2023-09-27 PROCEDURE — 85610 PROTHROMBIN TIME: CPT | Performed by: EMERGENCY MEDICINE

## 2023-09-27 PROCEDURE — 84484 ASSAY OF TROPONIN QUANT: CPT | Performed by: STUDENT IN AN ORGANIZED HEALTH CARE EDUCATION/TRAINING PROGRAM

## 2023-09-27 PROCEDURE — 83935 ASSAY OF URINE OSMOLALITY: CPT | Performed by: STUDENT IN AN ORGANIZED HEALTH CARE EDUCATION/TRAINING PROGRAM

## 2023-09-27 PROCEDURE — 84540 ASSAY OF URINE/UREA-N: CPT | Performed by: STUDENT IN AN ORGANIZED HEALTH CARE EDUCATION/TRAINING PROGRAM

## 2023-09-27 PROCEDURE — 85025 COMPLETE CBC W/AUTO DIFF WBC: CPT | Performed by: EMERGENCY MEDICINE

## 2023-09-27 PROCEDURE — 0 CEFEPIME PER 500 MG: Performed by: EMERGENCY MEDICINE

## 2023-09-27 PROCEDURE — 84145 PROCALCITONIN (PCT): CPT | Performed by: EMERGENCY MEDICINE

## 2023-09-27 PROCEDURE — 93010 ELECTROCARDIOGRAM REPORT: CPT | Performed by: INTERNAL MEDICINE

## 2023-09-27 PROCEDURE — 83605 ASSAY OF LACTIC ACID: CPT | Performed by: EMERGENCY MEDICINE

## 2023-09-27 PROCEDURE — 99291 CRITICAL CARE FIRST HOUR: CPT

## 2023-09-27 RX ORDER — SODIUM CHLORIDE 0.9 % (FLUSH) 0.9 %
10 SYRINGE (ML) INJECTION AS NEEDED
Status: DISCONTINUED | OUTPATIENT
Start: 2023-09-27 | End: 2023-09-28 | Stop reason: HOSPADM

## 2023-09-27 RX ORDER — ASPIRIN 81 MG/1
81 TABLET ORAL DAILY
Status: DISCONTINUED | OUTPATIENT
Start: 2023-09-27 | End: 2023-09-28 | Stop reason: HOSPADM

## 2023-09-27 RX ORDER — MIDODRINE HYDROCHLORIDE 5 MG/1
10 TABLET ORAL
Status: DISCONTINUED | OUTPATIENT
Start: 2023-09-27 | End: 2023-09-28 | Stop reason: HOSPADM

## 2023-09-27 RX ORDER — POLYETHYLENE GLYCOL 3350 17 G/17G
17 POWDER, FOR SOLUTION ORAL DAILY PRN
Status: DISCONTINUED | OUTPATIENT
Start: 2023-09-27 | End: 2023-09-28 | Stop reason: HOSPADM

## 2023-09-27 RX ORDER — METHYLPREDNISOLONE SODIUM SUCCINATE 125 MG/2ML
60 INJECTION, POWDER, LYOPHILIZED, FOR SOLUTION INTRAMUSCULAR; INTRAVENOUS EVERY 12 HOURS
Status: DISCONTINUED | OUTPATIENT
Start: 2023-09-27 | End: 2023-09-28 | Stop reason: HOSPADM

## 2023-09-27 RX ORDER — ONDANSETRON 2 MG/ML
4 INJECTION INTRAMUSCULAR; INTRAVENOUS EVERY 6 HOURS PRN
Status: DISCONTINUED | OUTPATIENT
Start: 2023-09-27 | End: 2023-09-28 | Stop reason: HOSPADM

## 2023-09-27 RX ORDER — ACETAMINOPHEN 160 MG/5ML
650 SOLUTION ORAL EVERY 4 HOURS PRN
Status: DISCONTINUED | OUTPATIENT
Start: 2023-09-27 | End: 2023-09-28 | Stop reason: HOSPADM

## 2023-09-27 RX ORDER — AMOXICILLIN 250 MG
2 CAPSULE ORAL 2 TIMES DAILY
Status: DISCONTINUED | OUTPATIENT
Start: 2023-09-27 | End: 2023-09-28 | Stop reason: HOSPADM

## 2023-09-27 RX ORDER — BISACODYL 10 MG
10 SUPPOSITORY, RECTAL RECTAL DAILY PRN
Status: DISCONTINUED | OUTPATIENT
Start: 2023-09-27 | End: 2023-09-28 | Stop reason: HOSPADM

## 2023-09-27 RX ORDER — GUAIFENESIN 600 MG/1
1200 TABLET, EXTENDED RELEASE ORAL EVERY 12 HOURS
Status: DISCONTINUED | OUTPATIENT
Start: 2023-09-27 | End: 2023-09-28 | Stop reason: HOSPADM

## 2023-09-27 RX ORDER — INSULIN LISPRO 100 [IU]/ML
2-7 INJECTION, SOLUTION INTRAVENOUS; SUBCUTANEOUS
Status: DISCONTINUED | OUTPATIENT
Start: 2023-09-27 | End: 2023-09-28 | Stop reason: HOSPADM

## 2023-09-27 RX ORDER — IPRATROPIUM BROMIDE AND ALBUTEROL SULFATE 2.5; .5 MG/3ML; MG/3ML
3 SOLUTION RESPIRATORY (INHALATION) ONCE
Status: COMPLETED | OUTPATIENT
Start: 2023-09-27 | End: 2023-09-27

## 2023-09-27 RX ORDER — DEXTROSE MONOHYDRATE 25 G/50ML
25 INJECTION, SOLUTION INTRAVENOUS
Status: DISCONTINUED | OUTPATIENT
Start: 2023-09-27 | End: 2023-09-28 | Stop reason: HOSPADM

## 2023-09-27 RX ORDER — NICOTINE POLACRILEX 4 MG
15 LOZENGE BUCCAL
Status: DISCONTINUED | OUTPATIENT
Start: 2023-09-27 | End: 2023-09-28 | Stop reason: HOSPADM

## 2023-09-27 RX ORDER — METHYLPREDNISOLONE SODIUM SUCCINATE 125 MG/2ML
62.5 INJECTION, POWDER, LYOPHILIZED, FOR SOLUTION INTRAMUSCULAR; INTRAVENOUS ONCE
Status: COMPLETED | OUTPATIENT
Start: 2023-09-27 | End: 2023-09-27

## 2023-09-27 RX ORDER — ALBUTEROL SULFATE 2.5 MG/3ML
2.5 SOLUTION RESPIRATORY (INHALATION) EVERY 4 HOURS PRN
Status: DISCONTINUED | OUTPATIENT
Start: 2023-09-27 | End: 2023-09-28 | Stop reason: HOSPADM

## 2023-09-27 RX ORDER — ATORVASTATIN CALCIUM 20 MG/1
10 TABLET, FILM COATED ORAL NIGHTLY
Status: DISCONTINUED | OUTPATIENT
Start: 2023-09-27 | End: 2023-09-28 | Stop reason: HOSPADM

## 2023-09-27 RX ORDER — METHYLPREDNISOLONE SODIUM SUCCINATE 125 MG/2ML
125 INJECTION, POWDER, LYOPHILIZED, FOR SOLUTION INTRAMUSCULAR; INTRAVENOUS ONCE
Status: COMPLETED | OUTPATIENT
Start: 2023-09-27 | End: 2023-09-27

## 2023-09-27 RX ORDER — ACETAMINOPHEN 325 MG/1
650 TABLET ORAL EVERY 4 HOURS PRN
Status: DISCONTINUED | OUTPATIENT
Start: 2023-09-27 | End: 2023-09-28 | Stop reason: HOSPADM

## 2023-09-27 RX ORDER — FUROSEMIDE 10 MG/ML
40 INJECTION INTRAMUSCULAR; INTRAVENOUS DAILY
Status: DISCONTINUED | OUTPATIENT
Start: 2023-09-27 | End: 2023-09-28 | Stop reason: HOSPADM

## 2023-09-27 RX ORDER — IPRATROPIUM BROMIDE AND ALBUTEROL SULFATE 2.5; .5 MG/3ML; MG/3ML
3 SOLUTION RESPIRATORY (INHALATION)
Status: DISCONTINUED | OUTPATIENT
Start: 2023-09-27 | End: 2023-09-28 | Stop reason: HOSPADM

## 2023-09-27 RX ORDER — DIGOXIN 0.25 MG/ML
250 INJECTION INTRAMUSCULAR; INTRAVENOUS ONCE
Status: COMPLETED | OUTPATIENT
Start: 2023-09-27 | End: 2023-09-27

## 2023-09-27 RX ORDER — BISACODYL 5 MG/1
5 TABLET, DELAYED RELEASE ORAL DAILY PRN
Status: DISCONTINUED | OUTPATIENT
Start: 2023-09-27 | End: 2023-09-28 | Stop reason: HOSPADM

## 2023-09-27 RX ORDER — SODIUM CHLORIDE 0.9 % (FLUSH) 0.9 %
10 SYRINGE (ML) INJECTION EVERY 12 HOURS SCHEDULED
Status: DISCONTINUED | OUTPATIENT
Start: 2023-09-27 | End: 2023-09-28 | Stop reason: HOSPADM

## 2023-09-27 RX ORDER — IBUPROFEN 600 MG/1
1 TABLET ORAL
Status: DISCONTINUED | OUTPATIENT
Start: 2023-09-27 | End: 2023-09-28 | Stop reason: HOSPADM

## 2023-09-27 RX ORDER — ACETAMINOPHEN 650 MG/1
650 SUPPOSITORY RECTAL EVERY 4 HOURS PRN
Status: DISCONTINUED | OUTPATIENT
Start: 2023-09-27 | End: 2023-09-28 | Stop reason: HOSPADM

## 2023-09-27 RX ORDER — SODIUM CHLORIDE 9 MG/ML
40 INJECTION, SOLUTION INTRAVENOUS AS NEEDED
Status: DISCONTINUED | OUTPATIENT
Start: 2023-09-27 | End: 2023-09-28 | Stop reason: HOSPADM

## 2023-09-27 RX ADMIN — CEFEPIME 2000 MG: 2 INJECTION, POWDER, FOR SOLUTION INTRAVENOUS at 23:00

## 2023-09-27 RX ADMIN — IPRATROPIUM BROMIDE AND ALBUTEROL SULFATE 3 ML: 2.5; .5 SOLUTION RESPIRATORY (INHALATION) at 07:15

## 2023-09-27 RX ADMIN — IPRATROPIUM BROMIDE AND ALBUTEROL SULFATE 3 ML: 2.5; .5 SOLUTION RESPIRATORY (INHALATION) at 14:33

## 2023-09-27 RX ADMIN — METHYLPREDNISOLONE SODIUM SUCCINATE 125 MG: 125 INJECTION, POWDER, FOR SOLUTION INTRAMUSCULAR; INTRAVENOUS at 01:15

## 2023-09-27 RX ADMIN — CEFEPIME 2000 MG: 2 INJECTION, POWDER, FOR SOLUTION INTRAVENOUS at 02:07

## 2023-09-27 RX ADMIN — MIDODRINE HYDROCHLORIDE 10 MG: 5 TABLET ORAL at 20:12

## 2023-09-27 RX ADMIN — GUAIFENESIN 1200 MG: 600 TABLET, EXTENDED RELEASE ORAL at 10:13

## 2023-09-27 RX ADMIN — GUAIFENESIN 1200 MG: 600 TABLET, EXTENDED RELEASE ORAL at 23:00

## 2023-09-27 RX ADMIN — APIXABAN 5 MG: 5 TABLET, FILM COATED ORAL at 20:13

## 2023-09-27 RX ADMIN — MIDODRINE HYDROCHLORIDE 10 MG: 5 TABLET ORAL at 08:42

## 2023-09-27 RX ADMIN — SENNOSIDES AND DOCUSATE SODIUM 2 TABLET: 50; 8.6 TABLET ORAL at 08:42

## 2023-09-27 RX ADMIN — Medication 10 ML: at 10:14

## 2023-09-27 RX ADMIN — METHYLPREDNISOLONE SODIUM SUCCINATE 60 MG: 125 INJECTION, POWDER, FOR SOLUTION INTRAMUSCULAR; INTRAVENOUS at 08:22

## 2023-09-27 RX ADMIN — APIXABAN 5 MG: 5 TABLET, FILM COATED ORAL at 08:42

## 2023-09-27 RX ADMIN — IPRATROPIUM BROMIDE AND ALBUTEROL SULFATE 3 ML: 2.5; .5 SOLUTION RESPIRATORY (INHALATION) at 11:33

## 2023-09-27 RX ADMIN — Medication 10 ML: at 20:13

## 2023-09-27 RX ADMIN — METHYLPREDNISOLONE SODIUM SUCCINATE 60 MG: 125 INJECTION, POWDER, FOR SOLUTION INTRAMUSCULAR; INTRAVENOUS at 20:13

## 2023-09-27 RX ADMIN — ASPIRIN 81 MG: 81 TABLET, COATED ORAL at 10:13

## 2023-09-27 RX ADMIN — IPRATROPIUM BROMIDE AND ALBUTEROL SULFATE 3 ML: .5; 2.5 SOLUTION RESPIRATORY (INHALATION) at 01:18

## 2023-09-27 RX ADMIN — METHYLPREDNISOLONE SODIUM SUCCINATE 62.5 MG: 125 INJECTION, POWDER, FOR SOLUTION INTRAMUSCULAR; INTRAVENOUS at 11:30

## 2023-09-27 RX ADMIN — ATORVASTATIN CALCIUM 10 MG: 20 TABLET, FILM COATED ORAL at 20:12

## 2023-09-27 RX ADMIN — FUROSEMIDE 40 MG: 10 INJECTION, SOLUTION INTRAMUSCULAR; INTRAVENOUS at 10:13

## 2023-09-27 RX ADMIN — CEFEPIME 2000 MG: 2 INJECTION, POWDER, FOR SOLUTION INTRAVENOUS at 08:41

## 2023-09-27 RX ADMIN — SODIUM CHLORIDE 500 ML: 9 INJECTION, SOLUTION INTRAVENOUS at 02:07

## 2023-09-27 RX ADMIN — DIGOXIN 250 MCG: 0.25 INJECTION INTRAMUSCULAR; INTRAVENOUS at 11:40

## 2023-09-27 RX ADMIN — IPRATROPIUM BROMIDE AND ALBUTEROL SULFATE 3 ML: 2.5; .5 SOLUTION RESPIRATORY (INHALATION) at 19:45

## 2023-09-27 NOTE — H&P
Patient Name:  Yoni Bonner Jr.  YOB: 1933  MRN:  [id number]  Admit Date:  9/27/2023  Patient Care Team:  Patric Cameron DO as PCP - General (Family Medicine)      Subjective   History Present Illness     Chief Complaint   Patient presents with    Shortness of Breath       Mr. Bonner is a 89 y.o. non-smoker with a history of COPD, CHF, CAD, CKD stage IIIa, paroxysmal atrial fibrillation, aortic stenosis, chronic hypotension, prediabetes, anemia of chronic disease that presents to [facility] complaining of worsening dyspnea and wheezing over the past 3 days prior to arrival.  Symptoms began rather abruptly, constant and progressive in nature since onset.  Wife is present at bedside, who presents to provide collateral information.  Regarding inciting factors, he states he was recently seen by his PCP and was told he had an upper respiratory tract infection and a urinary tract infection, he was treated with medications, however, symptoms persisted.  Denies recollection of other known inciting factors.  Denies noticeable aggravating or alleviating factors, as symptoms are constant since onset.  Endorses associated intermittent pleuritic pain with deep breath, ongoing urinary urgency and frequency and lower extremity edema.  Denies fever, chills, chest pain, palpitations, abdominal pain, dizziness, lightheadedness.     Personal History     Past Medical History:   Diagnosis Date    Bronchitis     CAD (coronary artery disease)     Carotid arterial disease     Chronic bronchitis     COPD (chronic obstructive pulmonary disease)     Gout     Hyperlipidemia     Hypertension     Ischemic cardiomyopathy     resolved, EF was 60% in 2018    Mild aortic stenosis     Mitral regurgitation     Precordial chest pain     Rheumatic fever     Syncopal episodes     Tobacco use      Past Surgical History:   Procedure Laterality Date    CARDIAC CATHETERIZATION      CARDIAC CATHETERIZATION N/A  4/8/2018    Procedure: Left Heart Cath;  Surgeon: Nicolas Jerez MD;  Location: [facility] CATH INVASIVE LOCATION;  Service: Cardiovascular    CARDIAC CATHETERIZATION  4/8/2018    Procedure: Percutaneous Manual Thrombectomy;  Surgeon: Nicolas Jerez MD;  Location: [facility] CATH INVASIVE LOCATION;  Service: Cardiovascular    CARDIAC CATHETERIZATION N/A 4/8/2018    Procedure: Stent CARLENE coronary;  Surgeon: Nicolas Jerez MD;  Location: [facility] CATH INVASIVE LOCATION;  Service: Cardiovascular    CARDIAC CATHETERIZATION N/A 4/8/2018    Procedure: Right Heart Cath;  Surgeon: Nicolas Jerez MD;  Location: [facility] CATH INVASIVE LOCATION;  Service: Cardiovascular    CARDIAC CATHETERIZATION Left 2/21/2020    Procedure: Cardiac Catheterization/Vascular Study;  Surgeon: Alejandro Jenkins MD;  Location: [facility] CATH INVASIVE LOCATION;  Service: Cardiovascular;  Laterality: Left;    CARDIAC CATHETERIZATION N/A 2/21/2020    Procedure: Coronary angiography;  Surgeon: Alejandro Jenkins MD;  Location: [facility] CATH INVASIVE LOCATION;  Service: Cardiovascular;  Laterality: N/A;    EYE SURGERY      cataract surg    HAND SURGERY      HERNIA REPAIR      KNEE SURGERY      TESTICLE SURGERY      TOTAL HIP ARTHROPLASTY Left 9/25/2022    Procedure: TOTAL HIP ARTHROPLASTY ANTERIOR WITH HANA TABLE;  Surgeon: Jeff Rodriguez II, MD;  Location: [facility] OR;  Service: Orthopedics;  Laterality: Left;     Family History   Problem Relation Age of Onset    Heart disease Father     Hypertension Father     Stroke Father     Diabetes Sister     Cancer Brother     Heart disease Brother     Hypertension Brother     No Known Problems Maternal Grandmother     No Known Problems Maternal Grandfather     No Known Problems Paternal Grandmother     No Known Problems Paternal Grandfather      Social History     Tobacco Use    Smoking status: Never    Smokeless tobacco: Current     Types: Chew    Tobacco comments:     chewing tobacco since 4 years old   Vaping Use    Vaping  Use: Never used   Substance Use Topics    Alcohol use: No     Comment: caffeine use    Drug use: Never     No current facility-administered medications on file prior to encounter.     Current Outpatient Medications on File Prior to Encounter   Medication Sig Dispense Refill    acetaminophen (TYLENOL) 325 MG tablet Take 2 tablets by mouth Every 4 (Four) Hours As Needed for Mild Pain or Moderate Pain. Indications: Fever, Pain      albuterol (PROVENTIL) (2.5 MG/3ML) 0.083% nebulizer solution Take 2.5 mg by nebulization Every 4 (Four) Hours As Needed for Wheezing or Shortness of Air.  12    albuterol sulfate  (90 Base) MCG/ACT inhaler Inhale 2 puffs Every 4 (Four) Hours As Needed for Wheezing. Indications: Chronic Obstructive Lung Disease      apixaban (ELIQUIS) 2.5 MG tablet tablet Take 2 tablets by mouth 2 (Two) Times a Day for 30 days. 120 tablet 0    atorvastatin (LIPITOR) 10 MG tablet Take 1 tablet by mouth Daily. (Patient taking differently: Take 1 tablet by mouth Every Night.) 90 tablet 0    bisacodyl (DULCOLAX) 10 MG suppository Insert 1 suppository into the rectum Daily As Needed for constipation. 12 suppository 6    guaifenesin (ROBITUSSIN) 100 MG/5ML liquid Take 10 mL by mouth 3 (Three) Times a Day As Needed for Cough. Indications: Cough      midodrine (PROAMATINE) 10 MG tablet Take 1 tablet by mouth 3 (Three) Times a Day Before Meals. 270 tablet 3    amoxicillin-clavulanate (AUGMENTIN) 250-125 MG per tablet Take 1 tablet by mouth 2 (Two) Times a Day.      aspirin 81 MG EC tablet Take 1 tablet by mouth Daily. 30 tablet 5    magnesium hydroxide (MILK OF MAGNESIA) 400 MG/5ML suspension Take 5 mL by mouth Daily As Needed for Constipation. Indications: constipation      metoprolol tartrate (LOPRESSOR) 25 MG tablet Take 0.5 tablets by mouth Every 12 (Twelve) Hours for 30 days. 30 tablet 0    nitroglycerin (NITROSTAT) 0.4 MG SL tablet        Allergies   Allergen Reactions    No Known Drug Allergy Unknown  (See Comments)     NKA       Objective    Objective     Vital Signs  Temp:  [97.6 °F (36.4 °C)-98.1 °F (36.7 °C)] 98.1 °F (36.7 °C)  Heart Rate:  [108-144] 114  Resp:  [20-40] 24  BP: ()/(39-89) 90/52  SpO2:  [97 %-100 %] 99 %  on  Flow (L/min):  [2] 2;   Device (Oxygen Therapy): nasal cannula  Body mass index is 25.85 kg/m².    Physical Exam  Vitals and nursing note reviewed.   Constitutional:       General: He is awake.      Appearance: He is overweight. He is ill-appearing.      Comments: Ill-appearing, appears uncomfortable secondary to dyspnea   Cardiovascular:      Rate and Rhythm: Regular rhythm. Tachycardia present.      Pulses: Normal pulses.      Heart sounds: Normal heart sounds.   Pulmonary:      Breath sounds: Decreased breath sounds, wheezing and rhonchi present.   Abdominal:      General: Bowel sounds are normal. There is no distension.      Palpations: Abdomen is soft.      Tenderness: There is no abdominal tenderness.   Musculoskeletal:      Right lower leg: Edema present.      Left lower leg: Edema present.   Skin:     General: Skin is warm and dry.      Coloration: Skin is pale.      Findings: Bruising (Scattered) present.   Neurological:      Mental Status: He is alert.   Psychiatric:         Behavior: Behavior is cooperative.       Results Review:  I reviewed the patient's new clinical results.  I reviewed the patient's new imaging results and agree with the interpretation.  I reviewed the patient's other test results and agree with the interpretation  I personally viewed and interpreted the patient's EKG/Telemetry data  Discussed with ED provider.    Lab Results (last 24 hours)       Procedure Component Value Units Date/Time    Protime-INR [[id number]]  (Abnormal) Collected: 09/27/23 0102    Specimen: Blood Updated: 09/27/23 0134     Protime 14.4 Seconds      INR 1.10    aPTT [[id number]]  (Normal) Collected: 09/27/23 0102    Specimen: Blood Updated: 09/27/23 0134     PTT 35.2 seconds      "Procalcitonin [[id number]]  (Normal) Collected: 09/27/23 0102    Specimen: Blood Updated: 09/27/23 0152     Procalcitonin 0.20 ng/mL     Narrative:      As a Marker for Sepsis (Non-Neonates):    1. <0.5 ng/mL represents a low risk of severe sepsis and/or septic shock.  2. >2 ng/mL represents a high risk of severe sepsis and/or septic shock.    As a Marker for Lower Respiratory Tract Infections that require antibiotic therapy:    PCT on Admission    Antibiotic Therapy       6-12 Hrs later    >0.5                Strongly Recommended  >0.25 - <0.5        Recommended   0.1 - 0.25          Discouraged              Remeasure/reassess PCT  <0.1                Strongly Discouraged     Remeasure/reassess PCT    As 28 day mortality risk marker: \"Change in Procalcitonin Result\" (>80% or <=80%) if Day 0 (or Day 1) and Day 4 values are available. Refer to http://www.reeplay.itpct-calculator.com    Change in PCT <=80%  A decrease of PCT levels below or equal to 80% defines a positive change in PCT test result representing a higher risk for 28-day all-cause mortality of patients diagnosed with severe sepsis for septic shock.    Change in PCT >80%  A decrease of PCT levels of more than 80% defines a negative change in PCT result representing a lower risk for 28-day all-cause mortality of patients diagnosed with severe sepsis or septic shock.       CBC & Differential [[id number]]  (Abnormal) Collected: 09/27/23 0102    Specimen: Blood Updated: 09/27/23 0120    Narrative:      The following orders were created for panel order CBC & Differential.  Procedure                               Abnormality         Status                     ---------                               -----------         ------                     CBC Auto Differential[[id number]]        Abnormal            Final result                 Please view results for these tests on the individual orders.    Comprehensive Metabolic Panel [[id number]]  (Abnormal) Collected: " 09/27/23 0102    Specimen: Blood Updated: 09/27/23 0146     Glucose 130 mg/dL      BUN 32 mg/dL      Creatinine 1.58 mg/dL      Sodium 132 mmol/L      Potassium 4.6 mmol/L      Chloride 96 mmol/L      CO2 20.0 mmol/L      Calcium 9.5 mg/dL      Total Protein 6.5 g/dL      Albumin 4.1 g/dL      ALT (SGPT) 11 U/L      AST (SGOT) 18 U/L      Alkaline Phosphatase 71 U/L      Total Bilirubin 0.5 mg/dL      Globulin 2.4 gm/dL      A/G Ratio 1.7 g/dL      BUN/Creatinine Ratio 20.3     Anion Gap 16.0 mmol/L      eGFR 41.3 mL/min/1.73     Narrative:      GFR Normal >60  Chronic Kidney Disease <60  Kidney Failure <15    The GFR formula is only valid for adults with stable renal function between ages 18 and 70.    Respiratory Panel PCR w/COVID-19(SARS-CoV-2) NEFTALY/VERONICA/MARVIN/PAD/COR/MAD/ÁNGELA In-House, NP Swab in UTM/VTM, 3-4 HR TAT - Swab, Nasopharynx [[id number]]  (Abnormal) Collected: 09/27/23 0102    Specimen: Swab from Nasopharynx Updated: 09/27/23 0254     ADENOVIRUS, PCR Not Detected     Coronavirus 229E Not Detected     Coronavirus HKU1 Not Detected     Coronavirus NL63 Not Detected     Coronavirus OC43 Not Detected     COVID19 Not Detected     Human Metapneumovirus Not Detected     Human Rhinovirus/Enterovirus Detected     Influenza A PCR Not Detected     Influenza B PCR Not Detected     Parainfluenza Virus 1 Detected     Parainfluenza Virus 2 Not Detected     Parainfluenza Virus 3 Not Detected     Parainfluenza Virus 4 Not Detected     RSV, PCR Not Detected     Bordetella pertussis pcr Not Detected     Bordetella parapertussis PCR Not Detected     Chlamydophila pneumoniae PCR Not Detected     Mycoplasma pneumo by PCR Not Detected    Narrative:      In the setting of a positive respiratory panel with a viral infection PLUS a negative procalcitonin without other underlying concern for bacterial infection, consider observing off antibiotics or discontinuation of antibiotics and continue supportive care. If the respiratory panel  is positive for atypical bacterial infection (Bordetella pertussis, Chlamydophila pneumoniae, or Mycoplasma pneumoniae), consider antibiotic de-escalation to target atypical bacterial infection.    Lactic Acid, Plasma [[id number]]  (Abnormal) Collected: 09/27/23 0102    Specimen: Blood Updated: 09/27/23 0150     Lactate 3.7 mmol/L     Magnesium [[id number]]  (Normal) Collected: 09/27/23 0102    Specimen: Blood Updated: 09/27/23 0146     Magnesium 2.1 mg/dL     CBC Auto Differential [[id number]]  (Abnormal) Collected: 09/27/23 0102    Specimen: Blood Updated: 09/27/23 0120     WBC 7.68 10*3/mm3      RBC 3.43 10*6/mm3      Hemoglobin 9.8 g/dL      Hematocrit 29.8 %      MCV 86.9 fL      MCH 28.6 pg      MCHC 32.9 g/dL      RDW 13.9 %      RDW-SD 43.9 fl      MPV 10.8 fL      Platelets 167 10*3/mm3      Neutrophil % 67.6 %      Lymphocyte % 21.4 %      Monocyte % 10.0 %      Eosinophil % 0.3 %      Basophil % 0.3 %      Immature Grans % 0.4 %      Neutrophils, Absolute 5.20 10*3/mm3      Lymphocytes, Absolute 1.64 10*3/mm3      Monocytes, Absolute 0.77 10*3/mm3      Eosinophils, Absolute 0.02 10*3/mm3      Basophils, Absolute 0.02 10*3/mm3      Immature Grans, Absolute 0.03 10*3/mm3      nRBC 0.0 /100 WBC     BNP [[id number]]  (Abnormal) Collected: 09/27/23 0102    Specimen: Blood Updated: 09/27/23 0152     proBNP 9,845.0 pg/mL     Narrative:      This assay is used as an aid in the diagnosis of individuals suspected of having heart failure. It can be used as an aid in the diagnosis of acute decompensated heart failure (ADHF) in patients presenting with signs and symptoms of ADHF to the emergency department (ED). In addition, NT-proBNP of <300 pg/mL indicates ADHF is not likely.    Blood Culture - Blood, Hand, Right [[id number]] Collected: 09/27/23 0110    Specimen: Blood from Hand, Right Updated: 09/27/23 0115    Blood Culture - Blood, Arm, Left [[id number]] Collected: 09/27/23 0139    Specimen: Blood from Arm, Left  Updated: 09/27/23 0242    POC Lactate [[id number]] Collected: 09/27/23 0214    Specimen: Arterial Blood Updated: 09/27/23 0218     Lactate 1.5 mmol/L      Comment: Serial Number: [id number]:  [id number]        Device Comment 12/6 sat 100%    POC Chem Panel [[id number]]  (Abnormal) Collected: 09/27/23 0214    Specimen: Arterial Blood Updated: 09/27/23 0218     Glucose 129 mg/dL      Comment: Serial Number: [id number]:  [id number]        Sodium 130 mmol/L      POC Potassium 3.9 mmol/L      Ionized Calcium 1.18 mmol/L      Chloride 100 mmol/L      Creatinine 1.35 mg/dL      BUN 30 mg/dL      CO2 Content 17.3 mmol/L      Device Comment 12/6 sat 100%    Blood Gas, Arterial - [[id number]]  (Abnormal) Collected: 09/27/23 0214    Specimen: Arterial Blood Updated: 09/27/23 0218     Site Right Radial     Wilian's Test Positive     pH, Arterial 7.440 pH units      pCO2, Arterial 25.8 mm Hg      pO2, Arterial 189.2 mm Hg      HCO3, Arterial 17.6 mmol/L      Base Excess, Arterial -5.5 mmol/L      Comment: Serial Number: [id number]:  [id number]        O2 Saturation, Arterial 99.7 %      A-a DO2 63.7 mmHg      A-a DO2 0.7 mmHg      Barometric Pressure for Blood Gas 750.0000 mmHg      Modality BiPap     FIO2 40 %      Ventilator Mode BiLevel     Set Tidal Volume 833     Set Mech Resp Rate 14     Rate 22 Breaths/minute      PEEP 6     PIP 13 cmH2O      Hemodilution No     Inspiratory Time 1     Device Comment 12/6 sat 100%    STAT Lactic Acid, Reflex [[id number]]  (Normal) Collected: 09/27/23 0408    Specimen: Blood Updated: 09/27/23 0434     Lactate 1.6 mmol/L     POC Glucose Once [[id number]]  (Abnormal) Collected: 09/27/23 0432    Specimen: Blood Updated: 09/27/23 0433     Glucose 176 mg/dL     Basic Metabolic Panel [[id number]]  (Abnormal) Collected: 09/27/23 0532    Specimen: Blood Updated: 09/27/23 0624     Glucose 169 mg/dL      BUN 31 mg/dL      Creatinine 1.42 mg/dL      Sodium 131 mmol/L      Potassium 4.4 mmol/L      Chloride 99  mmol/L      CO2 14.6 mmol/L      Calcium 8.4 mg/dL      BUN/Creatinine Ratio 21.8     Anion Gap 17.4 mmol/L      eGFR 46.9 mL/min/1.73     Narrative:      GFR Normal >60  Chronic Kidney Disease <60  Kidney Failure <15    The GFR formula is only valid for adults with stable renal function between ages 18 and 70.    CBC Auto Differential [[id number]]  (Abnormal) Collected: 09/27/23 0532    Specimen: Blood Updated: 09/27/23 0635     WBC 5.68 10*3/mm3      RBC 3.29 10*6/mm3      Hemoglobin 9.5 g/dL      Hematocrit 28.7 %      MCV 87.2 fL      MCH 28.9 pg      MCHC 33.1 g/dL      RDW 14.0 %      RDW-SD 44.6 fl      MPV 11.5 fL      Platelets 147 10*3/mm3      Neutrophil % 90.5 %      Lymphocyte % 6.3 %      Monocyte % 2.3 %      Eosinophil % 0.0 %      Basophil % 0.2 %      Neutrophils, Absolute 5.14 10*3/mm3      Lymphocytes, Absolute 0.36 10*3/mm3      Monocytes, Absolute 0.13 10*3/mm3      Eosinophils, Absolute 0.00 10*3/mm3      Basophils, Absolute 0.01 10*3/mm3     Protime-INR [[id number]]  (Abnormal) Collected: 09/27/23 0532    Specimen: Blood Updated: 09/27/23 0623     Protime 14.8 Seconds      INR 1.15            Imaging Results (Last 24 Hours)       Procedure Component Value Units Date/Time    XR Chest 1 View [[id number]] Collected: 09/27/23 0538     Updated: 09/27/23 0538    Narrative:        Patient: JALIL JOSHI  Time Out: 05:37  Exam(s): XR CXR 1 VIEW     EXAM:    XR Chest, 1 View    CLINICAL HISTORY:     Reason for exam: increased WOB.    TECHNIQUE:    Frontal view of the chest.    COMPARISON:    September 27, 2023 and 0104 hrs.    FINDINGS:    Lungs:  Slightly prominent interstitial markings throughout both lungs   suggesting mild emphysema.  No acute focal infiltrate or consolidation is   seen.    Pleural space:  Unremarkable.  No pneumothorax.    Heart:  The cardiac silhouette is mildly enlarged.    Mediastinum:  Unremarkable.    Bones joints:  Mild to moderate osteophytosis throughout the thoracic    spine.    Vasculature:  The aortic arch is mildly calcified but nondilated.    IMPRESSION:         Mild cardiomegaly.  Slightly prominent interstitial markings throughout   both lungs suggesting mild emphysema.  No acute focal infiltrate or   consolidation is seen.      Impression:          Electronically signed by Fernie San MD on 09-27-23 at 0537    XR Chest 1 View [[id number]] Collected: 09/27/23 0317     Updated: 09/27/23 0317    Narrative:        Patient: JALIL JOSHI  Time Out: 03:17  Exam(s): XR CXR 1 VIEW     EXAM:    XR Chest, 1 View    CLINICAL HISTORY:     Reason for exam: soa.    TECHNIQUE:    Frontal view of the chest.    COMPARISON:  9 3 2023    FINDINGS:    Lungs:  No consolidation or mass.  Slight bibasilar opacities.    Pleural space:  No acute findings    Heart:  cardiomegaly.    Bones joints:  No acute findings.    IMPRESSION:         Slight bibasilar opacities.      Impression:          Electronically signed by Savana Hopkins MD on 09-27-23 at 0317            Results for orders placed during the hospital encounter of 09/03/23    Adult Transthoracic Echo Complete W/ Cont if Necessary Per Protocol    Interpretation Summary    Left ventricular systolic function is mildly decreased. Calculated left ventricular EF = 44.7%    Left ventricular wall thickness is consistent with mild to moderate septal asymmetric hypertrophy.    The following left ventricular wall segments are hypokinetic: mid anterior, apical anterior, apical lateral, basal inferolateral, mid inferolateral and basal anterior.    Left ventricular diastolic function is consistent with (grade I) impaired relaxation.    Left atrial volume is moderately increased.    The aortic valve is abnormal in structure. There is calcification of the aortic valve. The aortic valve was poorly visualized but appears trileaflet. No aortic valve regurgitation is present. Severe aortic valve stenosis is present. Peak velocity of the flow distal to the  aortic valve is 410 cm/s. Aortic valve maximum pressure gradient is 67.2 mmHg. Aortic valve mean pressure gradient is 37.4 mmHg. Aortic valve dimensionless index is 0.20 .      ECG 12 Lead Dyspnea   Preliminary Result   HEART RATE= 118  bpm   RR Interval= 508  ms   DC Interval= 110  ms   P Horizontal Axis=   deg   P Front Axis= 259  deg   QRSD Interval= 100  ms   QT Interval= 314  ms   QTcB= 441  ms   QRS Axis= -13  deg   T Wave Axis= 121  deg   - ABNORMAL ECG -   Sinus or ectopic atrial tachycardia   Multiform ventricular premature complexes   Abnormal R-wave progression, early transition   Repol abnrm suggests ischemia, diffuse leads   Electronically Signed By:    Date and Time of Study: 2023-09-27 00:51:20      SCANNED - TELEMETRY     Final Result      SCANNED - TELEMETRY     Final Result           Assessment/Plan     Active Hospital Problems    Diagnosis  POA    **COPD exacerbation [J44.1]  Yes    History of pulmonary embolism [Z86.711]  Yes    Prediabetes [R73.03]  Yes    Pneumonia [J18.9]  Yes    Acute bronchitis due to Rhinovirus [J20.6]  Yes    Parainfluenza type 1 infection [B34.8]  Yes    Acute on chronic combined systolic and diastolic CHF (congestive heart failure) [I50.43]  Yes    Atrial fibrillation [I48.91]  Yes    Hyponatremia [E87.1]  Yes    Orthostatic hypotension [I95.1]  Yes    HLD (hyperlipidemia) [E78.5]  Yes    Nonrheumatic aortic valve stenosis [I35.0]  Yes    Stage 3a chronic kidney disease [N18.31]  Yes    Coronary artery disease involving native coronary artery of native heart without angina pectoris [I25.10]  Yes    Anemia, chronic disease [D63.8]  Yes    HTN (hypertension) [I10]  Yes      Resolved Hospital Problems   No resolved problems to display.     Mr. Bonner is a 89 y.o. non-smoker with a history of COPD, CHF, CAD, CKD stage IIIa, paroxysmal atrial fibrillation, aortic stenosis, chronic hypotension, prediabetes, anemia of chronic disease that presents to [facility]  complaining of worsening dyspnea and wheezing over the past 3 days prior to arrival.     COPD with acute exacerbation  Pneumonia secondary to parainfluenza virus 1 and unspecified bacterial organism  Acute bronchitis due to rhinovirus infection  - Etiology of acute exacerbation likely 2/2 to bacterial pneumonia, rhinovirus and parainfluenza virus 1. Patient endorsing worsening dyspnea, wheezing and difficulty with obtaining full breath in.  - Imaging obtained, reviewed, showing cardiomegaly, slightly prominent interstitial markings throughout both lungs suggesting mild emphysema. Further workup obtained, as follows: Respiratory viral panel positive for rhinovirus and parainfluenza virus 1, procalcitonin 0.20.  - Continue bronchodilators, corticosteroids, supplemental O2 PRN to maintain O2 sat 88-92%.  - Telemetry, pulse oximetry.  - Home O2 evaluation prior to discharge, COPD order set to be completed at discharge.    Acute on chronic combined systolic and diastolic heart failure  - Most recent 2D Echo: (09/24/2023) showing EF 44.7% with mildly decreased LV systolic function and grade 1 diastolic dysfunction.  Both EF and systolic function impaired worse from most recent prior echocardiogram before that.  - Imaging obtained on admission, reviewed, showing findings as discussed elsewhere. ProBNP elevated at 9845.  Per chart review, BNP is chronically elevated, however, most recent value was worse from prior  - Start Lasix 40 mg IV q24H, caution with diuresis due to underlying hypotension. Closely monitor urine output and volume status to guide further management.  - Consult Cardiology. Follow their plans/recommendations, greatly appreciate their help.  - Close monitoring of renal function and electrolytes, 2g sodium diet, daily weights, strict I/O, DVT ppx, continuous pulse oximetry, telemetry monitoring, elevate HOB, supplemental O2 PRN for O2 sat <90%    Coronary artery disease  - Patient denies any chest pain,  palpitations, no signs/symptoms to suggest ACS, however, does have new drop in EF which is concerning so further evaluation warranted.  - Consult Cardiology.  - Continue current treatment as prescribed. Will continue to monitor.  - Continue telemetry monitoring.    Paroxysmal Atrial fibrillation on long term anticoagulation  - [id number] score: 4.  - Vitals, telemetry reviewed, patient heart rate elevated but in sinus rhythm, likely reflexive in setting of acute illness. Cardiology consulted.  - Continue current treatment as prescribed. Continue telemetry monitoring.    Hyponatremia  - Na level low at 131. Order urine sodium, urine osmolality, serum osmolarity, uric acid, urine creatinine, urea urea, TSH.  - Follow up results of testing to guide ongoing management decisions. Consider Nephrology consult based on results of testing.  - Order repeat BMP in AM for reassessment.    Chronic kidney disease stage 3A  - On most recent labs, patient's creatinine found to be stable and near apparent baseline (around 1.5), per review of previous lab values and outside records.  - No indications to warrant acute changes and/or intervention at this time.  - Order repeat BMP in AM for reassessment of renal function.   - Will continue to monitor and trend creatinine to guide ongoing management decisions. Avoid nephrotoxic medications, IVF, strict I/O, daily weights.    Prediabetes Hyperglycemia  - Glucose elevated on most recent labs. Patient without known history of T2DM.  Most recent hemoglobin A1c 5.90% (09/23/22).  - Order repeat A1c at this time.  - Monitor for now, continue with POC glucose checks, correctional SSI if needed.    Anemia of chronic disease  - Hemoglobin low on most recent labs, however, stable from prior. No evidence of overt blood loss. No indications for acute intervention at this time.    - Order repeat CBC in AM for reassessment. Continue to monitor, transfuse for hemoglobin <7.    Chronic hypotension  -  Continue Midodrine as prescribed. Need to very closely monitor, in setting of IV diuresis.    History of PE  - Continue Eliquis as prescribed.    I discussed the patient's findings and my recommendations with patient, family, and nursing staff.    VTE Prophylaxis - Eliquis (home med).  Code Status - Limited code (no CPR, no intubation).       Johnny Yoder DO  [facility]  09/27/23  09:27 EDT

## 2023-09-27 NOTE — PLAN OF CARE
Goal Outcome Evaluation:  Plan of Care Reviewed With: patient, spouse        Progress: improving  Outcome Evaluation: Awake and alert.  Vitals stable. Currently off  BiPAP and on room air.  Plan of care explained to patient and family members. Will continue to monitor.

## 2023-09-27 NOTE — CODE DOCUMENTATION
Patient Name:  Yoni Bonner Jr.  YOB: 1933  MRN:  [id number]  Admit Date:  9/27/2023    Visit Diagnoses:     ICD-10-CM ICD-9-CM   1. COPD exacerbation  J44.1 491.21   2. Upper respiratory tract infection, unspecified type  J06.9 465.9   3. Atrial fibrillation with rapid ventricular response  I48.91 427.31   4. Congestive heart failure, unspecified HF chronicity, unspecified heart failure type  I50.9 428.0       Reason For Rapid:   Increased WOB  RN Communicated With:  Rapid team, primary RN, patient, will update [facility]    Rapid Outcome:  Remain on unit  Communication From Rapid Team:   Monitor VS, pt breathing better with bipap, CXRAY done, will update [facility], call with any more questions    Most Recent Vital Signs  Temp:  [97.8 °F (36.6 °C)] 97.8 °F (36.6 °C)  Heart Rate:  [111-144] 124  Resp:  [20-28] 28  BP: ()/(39-89) 110/89  SpO2:  [97 %-100 %] 100 %  on  Flow (L/min):  [2] 2;   Device (Oxygen Therapy): NPPV/NIV    Labs:  Results from last 7 days   Lab Units 09/27/23  0102   COVID19  Not Detected     Glucose   Date/Time Value Ref Range Status   09/27/2023 0432 176 (H) 70 - 130 mg/dL Final   09/27/2023 0214 129 70 - 130 mg/dL Final     Comment:     Serial Number: [id number]:  [id number]     Site   Date Value Ref Range Status   09/27/2023 Right Radial  Final     Wilian's Test   Date Value Ref Range Status   09/27/2023 Positive  Final     pH, Arterial   Date Value Ref Range Status   09/27/2023 7.440 7.350 - 7.450 pH units Final     pCO2, Arterial   Date Value Ref Range Status   09/27/2023 25.8 (L) 35.0 - 45.0 mm Hg Final     pO2, Arterial   Date Value Ref Range Status   09/27/2023 189.2 (H) 80.0 - 100.0 mm Hg Final     HCO3, Arterial   Date Value Ref Range Status   09/27/2023 17.6 (L) 22.0 - 28.0 mmol/L Final     Base Excess, Arterial   Date Value Ref Range Status   09/27/2023 -5.5 (L) 0.0 - 2.0 mmol/L Final     Comment:     Serial Number: [id number]:  [id number]     O2 Saturation, Arterial   Date  Value Ref Range Status   09/27/2023 99.7 (H) 92.0 - 98.5 % Final     CO2 Content   Date Value Ref Range Status   09/27/2023 17.3 (L) 23 - 27 mmol/L Final     Barometric Pressure for Blood Gas   Date Value Ref Range Status   09/27/2023 750.0000 mmHg Final     Modality   Date Value Ref Range Status   09/27/2023 BiPap  Final     FIO2   Date Value Ref Range Status   09/27/2023 40 % Final     Results from last 7 days   Lab Units 09/27/23  0102   WBC 10*3/mm3 7.68   HEMOGLOBIN g/dL 9.8*   PLATELETS 10*3/mm3 167     Results from last 7 days   Lab Units 09/27/23  0214 09/27/23  0102   SODIUM mmol/L  --  132*   POTASSIUM mmol/L  --  4.6   CHLORIDE mmol/L  --  96*   CO2 mmol/L  --  20.0*   BUN mg/dL  --  32*   CREATININE mg/dL 1.35 1.58*   GLUCOSE mg/dL  --  130*   ALBUMIN g/dL  --  4.1   BILIRUBIN mg/dL  --  0.5   ALK PHOS U/L  --  71   AST (SGOT) U/L  --  18   ALT (SGPT) U/L  --  11   Estimated Creatinine Clearance: 42 mL/min (by C-G formula based on SCr of 1.35 mg/dL).  Results from last 7 days   Lab Units 09/27/23  0102   PROBNP pg/mL 9,845.0*     Results from last 7 days   Lab Units 09/27/23  0408 09/27/23 0214 09/27/23 0102   PROCALCITONIN ng/mL  --   --  0.20   LACTATE mmol/L 1.6 1.5 3.7*     Results from last 7 days   Lab Units 09/27/23 0214   PH, ARTERIAL pH units 7.440   PO2 ART mm Hg 189.2*   PCO2, ARTERIAL mm Hg 25.8*   HCO3 ART mmol/L 17.6*   O2 SATURATION ART % 99.7*   MODALITY  BiPap     Lab Results   Component Value Date    STREPPNEUAG Negative 09/03/2023    LEGANTIGENUR Negative 09/03/2023       Results from last 7 days   Lab Units 09/27/23 0102   ADENOVIRUS DETECTION BY PCR  Not Detected   CORONAVIRUS 229E  Not Detected   CORONAVIRUS HKU1  Not Detected   CORONAVIRUS NL63  Not Detected   CORONAVIRUS OC43  Not Detected   HUMAN METAPNEUMOVIRUS  Not Detected   HUMAN RHINOVIRUS/ENTEROVIRUS  Detected*   INFLUENZA B PCR  Not Detected   PARAINFLUENZA 1  Detected*   PARAINFLUENZA VIRUS 2  Not Detected    PARAINFLUENZA VIRUS 3  Not Detected   PARAINFLUENZA VIRUS 4  Not Detected   BORDETELLA PERTUSSIS PCR  Not Detected   CHLAMYDOPHILA PNEUMONIAE PCR  Not Detected   MYCOPLAMA PNEUMO PCR  Not Detected   INFLUENZA A PCR  Not Detected   RSV, PCR  Not Detected       NIH Stroke Scale:                                                         Please refer to full rapid documentation on summary page under Index / Code Timeline

## 2023-09-27 NOTE — CODE DOCUMENTATION
Patient Name:  Yoni Bonner Jr.  YOB: 1933  MRN:  [id number]  Admit Date:  9/27/2023    Visit Diagnoses:     ICD-10-CM ICD-9-CM   1. COPD exacerbation  J44.1 491.21   2. Upper respiratory tract infection, unspecified type  J06.9 465.9   3. Atrial fibrillation with rapid ventricular response  I48.91 427.31   4. Congestive heart failure, unspecified HF chronicity, unspecified heart failure type  I50.9 428.0       Reason For Rapid: respiratory distress    RN Communicated With: primary RN, Dr. Harden      Rapid Outcome: transfer to CICU    Communication From Rapid Team: patient to transfer to CICU due to ABG result      Most Recent Vital Signs  Temp:  [97.6 °F (36.4 °C)-98.1 °F (36.7 °C)] 98.1 °F (36.7 °C)  Heart Rate:  [103-144] 130  Resp:  [20-40] 32  BP: ()/(39-89) 133/89  SpO2:  [86 %-100 %] 100 %  on  Flow (L/min):  [2] 2;   Device (Oxygen Therapy): nonrebreather mask    Labs:  Results from last 7 days   Lab Units 09/27/23  0102   COVID19  Not Detected     Glucose   Date/Time Value Ref Range Status   09/27/2023 0432 176 (H) 70 - 130 mg/dL Final   09/27/2023 0214 129 70 - 130 mg/dL Final     Comment:     Serial Number: [id number]:  [id number]     Site   Date Value Ref Range Status   09/27/2023 Right Radial  Final     Wilian's Test   Date Value Ref Range Status   09/27/2023 Positive  Final     pH, Arterial   Date Value Ref Range Status   09/27/2023 7.440 7.350 - 7.450 pH units Final     pCO2, Arterial   Date Value Ref Range Status   09/27/2023 25.8 (L) 35.0 - 45.0 mm Hg Final     pO2, Arterial   Date Value Ref Range Status   09/27/2023 189.2 (H) 80.0 - 100.0 mm Hg Final     HCO3, Arterial   Date Value Ref Range Status   09/27/2023 17.6 (L) 22.0 - 28.0 mmol/L Final     Base Excess, Arterial   Date Value Ref Range Status   09/27/2023 -5.5 (L) 0.0 - 2.0 mmol/L Final     Comment:     Serial Number: [id number]:  [id number]     O2 Saturation, Arterial   Date Value Ref Range Status   09/27/2023 99.7 (H)  92.0 - 98.5 % Final     CO2 Content   Date Value Ref Range Status   09/27/2023 17.3 (L) 23 - 27 mmol/L Final     Barometric Pressure for Blood Gas   Date Value Ref Range Status   09/27/2023 750.0000 mmHg Final     Modality   Date Value Ref Range Status   09/27/2023 BiPap  Final     FIO2   Date Value Ref Range Status   09/27/2023 40 % Final     Results from last 7 days   Lab Units 09/27/23  0532 09/27/23  0102   WBC 10*3/mm3 5.68 7.68   HEMOGLOBIN g/dL 9.5* 9.8*   PLATELETS 10*3/mm3 147 167     Results from last 7 days   Lab Units 09/27/23  0532 09/27/23  0214 09/27/23  0102   SODIUM mmol/L 131*  --  132*   POTASSIUM mmol/L 4.4  --  4.6   CHLORIDE mmol/L 99  --  96*   CO2 mmol/L 14.6*  --  20.0*   BUN mg/dL 31*  --  32*   CREATININE mg/dL 1.42* 1.35 1.58*   GLUCOSE mg/dL 169*  --  130*   ALBUMIN g/dL  --   --  4.1   BILIRUBIN mg/dL  --   --  0.5   ALK PHOS U/L  --   --  71   AST (SGOT) U/L  --   --  18   ALT (SGPT) U/L  --   --  11   Estimated Creatinine Clearance: 38.8 mL/min (A) (by C-G formula based on SCr of 1.42 mg/dL (H)).  Results from last 7 days   Lab Units 09/27/23  0102   PROBNP pg/mL 9,845.0*     Results from last 7 days   Lab Units 09/27/23  0408 09/27/23  0214 09/27/23  0102   PROCALCITONIN ng/mL  --   --  0.20   LACTATE mmol/L 1.6 1.5 3.7*     Results from last 7 days   Lab Units 09/27/23  0214   PH, ARTERIAL pH units 7.440   PO2 ART mm Hg 189.2*   PCO2, ARTERIAL mm Hg 25.8*   HCO3 ART mmol/L 17.6*   O2 SATURATION ART % 99.7*   MODALITY  BiPap     Lab Results   Component Value Date    STREPPNEUAG Negative 09/03/2023    LEGANTIGENUR Negative 09/03/2023       Results from last 7 days   Lab Units 09/27/23  0102   ADENOVIRUS DETECTION BY PCR  Not Detected   CORONAVIRUS 229E  Not Detected   CORONAVIRUS HKU1  Not Detected   CORONAVIRUS NL63  Not Detected   CORONAVIRUS OC43  Not Detected   HUMAN METAPNEUMOVIRUS  Not Detected   HUMAN RHINOVIRUS/ENTEROVIRUS  Detected*   INFLUENZA B PCR  Not Detected    PARAINFLUENZA 1  Detected*   PARAINFLUENZA VIRUS 2  Not Detected   PARAINFLUENZA VIRUS 3  Not Detected   PARAINFLUENZA VIRUS 4  Not Detected   BORDETELLA PERTUSSIS PCR  Not Detected   CHLAMYDOPHILA PNEUMONIAE PCR  Not Detected   MYCOPLAMA PNEUMO PCR  Not Detected   INFLUENZA A PCR  Not Detected   RSV, PCR  Not Detected       NIH Stroke Scale:                                                         Please refer to full rapid documentation on summary page under Index / Code Timeline

## 2023-09-27 NOTE — ED NOTES
Nursing report ED to floor  Yoni Bonner Jr.  89 y.o.  male    HPI :   Chief Complaint   Patient presents with    Shortness of Breath       Admitting doctor:   Raj Bey MD    Admitting diagnosis:   The primary encounter diagnosis was COPD exacerbation. Diagnoses of Upper respiratory tract infection, unspecified type, Atrial fibrillation with rapid ventricular response, and Congestive heart failure, unspecified HF chronicity, unspecified heart failure type were also pertinent to this visit.    Code status:   Current Code Status       Date Active Code Status Order ID Comments User Context       Prior            Allergies:   No known drug allergy    Isolation:   No active isolations    Intake and Output    Intake/Output Summary (Last 24 hours) at 9/27/2023 0336  Last data filed at 9/27/2023 0255  Gross per 24 hour   Intake 100 ml   Output --   Net 100 ml       Weight:       09/27/23  0040   Weight: 81.6 kg (180 lb)       Most recent vitals:   Vitals:    09/27/23 0311 09/27/23 0321 09/27/23 0322 09/27/23 0331   BP: 106/56 (!) 85/50  103/61   Pulse: 114  111 116   Resp:       Temp:       TempSrc:       SpO2: 100%  100% 99%   Weight:       Height:           Active LDAs/IV Access:   Lines, Drains & Airways       Active LDAs       Name Placement date Placement time Site Days    Peripheral IV 09/27/23 0103 Right Antecubital 09/27/23  0103  Antecubital  less than 1                    Labs (abnormal labs have a star):   Labs Reviewed   RESPIRATORY PANEL PCR W/ COVID-19 (SARS-COV-2) NEFTALY/VERONICA/MARVIN/[facility] IN-HOUSE, NP SWAB [facility] [place], 3-4 HR TAT - Abnormal; Notable for the following components:       Result Value    Human Rhinovirus/Enterovirus Detected (*)     Parainfluenza Virus 1 Detected (*)     All other components within normal limits    Narrative:     In the setting of a positive respiratory panel with a viral infection PLUS a negative procalcitonin without other underlying concern for bacterial  infection, consider observing off antibiotics or discontinuation of antibiotics and continue supportive care. If the respiratory panel is positive for atypical bacterial infection (Bordetella pertussis, Chlamydophila pneumoniae, or Mycoplasma pneumoniae), consider antibiotic de-escalation to target atypical bacterial infection.   PROTIME-INR - Abnormal; Notable for the following components:    Protime 14.4 (*)     All other components within normal limits   COMPREHENSIVE METABOLIC PANEL - Abnormal; Notable for the following components:    Glucose 130 (*)     BUN 32 (*)     Creatinine 1.58 (*)     Sodium 132 (*)     Chloride 96 (*)     CO2 20.0 (*)     Anion Gap 16.0 (*)     eGFR 41.3 (*)     All other components within normal limits    Narrative:     GFR Normal >60  Chronic Kidney Disease <60  Kidney Failure <15    The GFR formula is only valid for adults with stable renal function between ages 18 and 70.   LACTIC ACID, PLASMA - Abnormal; Notable for the following components:    Lactate 3.7 (*)     All other components within normal limits   CBC WITH AUTO DIFFERENTIAL - Abnormal; Notable for the following components:    RBC 3.43 (*)     Hemoglobin 9.8 (*)     Hematocrit 29.8 (*)     All other components within normal limits   BNP (IN-HOUSE) - Abnormal; Notable for the following components:    proBNP 9,845.0 (*)     All other components within normal limits    Narrative:     This assay is used as an aid in the diagnosis of individuals suspected of having heart failure. It can be used as an aid in the diagnosis of acute decompensated heart failure (ADHF) in patients presenting with signs and symptoms of ADHF to the emergency department (ED). In addition, NT-proBNP of <300 pg/mL indicates ADHF is not likely.   BLOOD GAS, ARTERIAL - Abnormal; Notable for the following components:    pCO2, Arterial 25.8 (*)     pO2, Arterial 189.2 (*)     HCO3, Arterial 17.6 (*)     Base Excess, Arterial -5.5 (*)     O2 Saturation,  "Arterial 99.7 (*)     All other components within normal limits   POC CHEM PANEL - Abnormal; Notable for the following components:    Sodium 130 (*)     Ionized Calcium 1.18 (*)     CO2 Content 17.3 (*)     All other components within normal limits   APTT - Normal   PROCALCITONIN - Normal    Narrative:     As a Marker for Sepsis (Non-Neonates):    1. <0.5 ng/mL represents a low risk of severe sepsis and/or septic shock.  2. >2 ng/mL represents a high risk of severe sepsis and/or septic shock.    As a Marker for Lower Respiratory Tract Infections that require antibiotic therapy:    PCT on Admission    Antibiotic Therapy       6-12 Hrs later    >0.5                Strongly Recommended  >0.25 - <0.5        Recommended   0.1 - 0.25          Discouraged              Remeasure/reassess PCT  <0.1                Strongly Discouraged     Remeasure/reassess PCT    As 28 day mortality risk marker: \"Change in Procalcitonin Result\" (>80% or <=80%) if Day 0 (or Day 1) and Day 4 values are available. Refer to http://www.[employer]-pct-calculator.com    Change in PCT <=80%  A decrease of PCT levels below or equal to 80% defines a positive change in PCT test result representing a higher risk for 28-day all-cause mortality of patients diagnosed with severe sepsis for septic shock.    Change in PCT >80%  A decrease of PCT levels of more than 80% defines a negative change in PCT result representing a lower risk for 28-day all-cause mortality of patients diagnosed with severe sepsis or septic shock.      MAGNESIUM - Normal   BLOOD CULTURE   BLOOD CULTURE   BLOOD GAS, ARTERIAL   URINALYSIS W/ MICROSCOPIC IF INDICATED (NO CULTURE)   LACTIC ACID, REFLEX   POC LACTATE   CBC AND DIFFERENTIAL    Narrative:     The following orders were created for panel order CBC & Differential.  Procedure                               Abnormality         Status                     ---------                               -----------         ------                 "     CBC Auto Differential[[id number]]        Abnormal            Final result                 Please view results for these tests on the individual orders.       EKG:   ECG 12 Lead Dyspnea   Preliminary Result   HEART RATE= 118  bpm   RR Interval= 508  ms   MA Interval= 110  ms   P Horizontal Axis=   deg   P Front Axis= 259  deg   QRSD Interval= 100  ms   QT Interval= 314  ms   QTcB= 441  ms   QRS Axis= -13  deg   T Wave Axis= 121  deg   - ABNORMAL ECG -   Sinus or ectopic atrial tachycardia   Multiform ventricular premature complexes   Abnormal R-wave progression, early transition   Repol abnrm suggests ischemia, diffuse leads   Electronically Signed By:    Date and Time of Study: 2023-09-27 00:51:20          Meds given in ED:   Medications   sodium chloride 0.9 % flush 10 mL (has no administration in time range)   ipratropium-albuterol (DUO-NEB) nebulizer solution 3 mL (3 mL Nebulization Given 9/27/23 0118)   methylPREDNISolone sodium succinate (SOLU-Medrol) injection 125 mg (125 mg Intravenous Given 9/27/23 0115)   sodium chloride 0.9 % bolus 500 mL (500 mL Intravenous New Bag 9/27/23 0207)   cefepime 2 gm IVPB in 100 ml NS (VTB) (0 mg Intravenous Stopped 9/27/23 0255)       Imaging results:  XR Chest 1 View    Result Date: 9/27/2023  Electronically signed by Savana Hopkins MD on 09-27-23 at 0317     Ambulatory status:   - as tolerated    Social issues:   Social History     Socioeconomic History    Marital status:    Tobacco Use    Smoking status: Never    Smokeless tobacco: Current     Types: Chew    Tobacco comments:     chewing tobacco since 4 years old   Vaping Use    Vaping Use: Never used   Substance and Sexual Activity    Alcohol use: No     Comment: caffeine use    Drug use: Never    Sexual activity: Defer       NIH Stroke Scale:       Richard Urena RN  09/27/23 03:36 EDT

## 2023-09-27 NOTE — PLAN OF CARE
Problem: Adult Inpatient Plan of Care  Goal: Plan of Care Review  Outcome: Ongoing, Progressing  Flowsheets (Taken 9/27/2023 0610)  Progress: no change  Plan of Care Reviewed With:   patient   spouse  Outcome Evaluation: A&Ox4, BP is soft, afib with rvr 110-150's, patient was in respiratory distress when arrived on unit, Bipap placed back on and patient started to recover, legs very edemateous, PI on gluteal, wife at bedside, plan of care continues.      "[facility] [[id number]]  Chief Complaint   Patient presents with     RECHECK     transplant follow-up     Initial BP 98/64 (BP Location: Right arm, Patient Position: Sitting, Cuff Size: Adult Small)   Pulse 96   Temp 98.5  F (36.9  C) (Oral)   Ht 4' 7.71\" (141.5 cm)   Wt 83 lb 8.9 oz (37.9 kg)   BMI 18.93 kg/m   Estimated body mass index is 18.93 kg/m  as calculated from the following:    Height as of this encounter: 4' 7.71\" (141.5 cm).    Weight as of this encounter: 83 lb 8.9 oz (37.9 kg).  Medication Reconciliation: unable or not appropriate to perform     BP 98/64 (BP Location: Right arm, Patient Position: Sitting, Cuff Size: Adult Small)   Pulse 96   Temp 98.5  F (36.9  C) (Oral)   Ht 4' 7.71\" (141.5 cm)   Wt 83 lb 8.9 oz (37.9 kg)   BMI 18.93 kg/m    Rested for 5 minutes? y  Right Arm Used? y  Measured Right Arm Circumference (in cms): 20.5  Did you measure at the largest part of upper arm? y  Peds BP Cuff Size Used Small adult (17-25 cm)  Activity/Barriers:  Erin Lema      "

## 2023-09-27 NOTE — NURSING NOTE
WOCN Consult: Discussed with unit RN/ Picture viewed. Appears more shearing than pressure related. Unit RN applied silicone border dressing which is appropriate. Please call if any further needs.

## 2023-09-27 NOTE — ED NOTES
notified of pt's high heart rate and bp of 88/39. MD also notified of pt's increased work of breathing with expiratory wheezes.

## 2023-09-27 NOTE — CONSULTS
Cardiology History & Physical / Consultation      Patient Name: Yoni Bonner Jr.  Age/Sex: 89 y.o. male  : 1933  MRN: [id number]    Date of Admission: 2023  Date of Encounter Visit: 23  Encounter Provider: Alexander Phoenix MD  Referring Provider: No ref. provider found  Place of Service: [facility]  Patient Care Team:  Patric Cameron DO as PCP - General (Family Medicine)          Subjective:     Chief Complaint:  Dyspnea     Reason for consultation: CHF    History of Present Illness:  Yoni Bonner Jr. is a 89 y.o. male who follows Dr. 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  He presented to the ED early this morning with progressing dyspnea with associated chest tightness. He was seen by his PCP yesterday and diagnosed with a UTI and upper respiratory infection.  Due to work of breathing, he was placed on BIPAP and admitted to telemetry.    Work up reveals proBNP 9845, sodium 131, creatinine 1.42, lactic acid 1.6 down from 3.7, hemoglobin 9.5.  Blood cultures are pending.  CXR shows slightly prominent interstitial markings both lungs and no acute infiltrate or consolidation.      He is currently on 2L nasal cannula, IV steroids have been ordered.  Pulmonology has been consulted.  He is a DNR.                     ECHO 23    Left ventricular systolic function is mildly decreased. Calculated left ventricular EF = 44.7%    Left ventricular wall thickness is consistent with mild to moderate septal asymmetric hypertrophy.    The following left ventricular wall segments are hypokinetic: mid anterior, apical anterior, apical lateral, basal inferolateral, mid inferolateral and basal anterior.    Left ventricular diastolic function is consistent  with (grade I) impaired relaxation.    Left atrial volume is moderately increased.    The aortic valve is abnormal in structure. There is calcification of the aortic valve. The aortic valve was poorly visualized but appears trileaflet. No aortic valve regurgitation is present. Severe aortic valve stenosis is present. Peak velocity of the flow distal to the aortic valve is 410 cm/s. Aortic valve maximum pressure gradient is 67.2 mmHg. Aortic valve mean pressure gradient is 37.4 mmHg. Aortic valve dimensionless index is 0.20 .    STRESS TEST 2-21-20  Left ventricular ejection fraction is normal (Calculated EF = 54%).  Myocardial perfusion imaging indicates a large-sized infarct located in the lateral wall with mild angela-infarct ischemia.  Compared to stress nuc in 2018, the lateral wall defect is more severe.    Past Medical History:  Past Medical History:   Diagnosis Date    Bronchitis     CAD (coronary artery disease)     Carotid arterial disease     Chronic bronchitis     COPD (chronic obstructive pulmonary disease)     Gout     Hyperlipidemia     Hypertension     Ischemic cardiomyopathy     resolved, EF was 60% in 2018    Mild aortic stenosis     Mitral regurgitation     Precordial chest pain     Rheumatic fever     Syncopal episodes     Tobacco use        Past Surgical History:   Procedure Laterality Date    CARDIAC CATHETERIZATION      CARDIAC CATHETERIZATION N/A 4/8/2018    Procedure: Left Heart Cath;  Surgeon: Nicolas Jerez MD;  Location: [facility] INVASIVE LOCATION;  Service: Cardiovascular    CARDIAC CATHETERIZATION  4/8/2018    Procedure: Percutaneous Manual Thrombectomy;  Surgeon: Nicolas Jerez MD;  Location: [facility] INVASIVE LOCATION;  Service: Cardiovascular    CARDIAC CATHETERIZATION N/A 4/8/2018    Procedure: Stent CARLENE coronary;  Surgeon: Nicolas Jerez MD;  Location: [facility] CATH INVASIVE LOCATION;  Service: Cardiovascular    CARDIAC CATHETERIZATION N/A 4/8/2018    Procedure: Right Heart Cath;  Surgeon:  Nicolas Jerez MD;  Location:  [facility] CATH INVASIVE LOCATION;  Service: Cardiovascular    CARDIAC CATHETERIZATION Left 2/21/2020    Procedure: Cardiac Catheterization/Vascular Study;  Surgeon: Alejandro Jenkins MD;  Location:  [facility] CATH INVASIVE LOCATION;  Service: Cardiovascular;  Laterality: Left;    CARDIAC CATHETERIZATION N/A 2/21/2020    Procedure: Coronary angiography;  Surgeon: Alejandro Jenkins MD;  Location:  [facility] CATH INVASIVE LOCATION;  Service: Cardiovascular;  Laterality: N/A;    EYE SURGERY      cataract surg    HAND SURGERY      HERNIA REPAIR      KNEE SURGERY      TESTICLE SURGERY      TOTAL HIP ARTHROPLASTY Left 9/25/2022    Procedure: TOTAL HIP ARTHROPLASTY ANTERIOR WITH HANA TABLE;  Surgeon: Jeff Rodriguez II, MD;  Location: [facility] MAIN OR;  Service: Orthopedics;  Laterality: Left;       Home Medications:   Medications Prior to Admission   Medication Sig Dispense Refill Last Dose    acetaminophen (TYLENOL) 325 MG tablet Take 2 tablets by mouth Every 4 (Four) Hours As Needed for Mild Pain or Moderate Pain. Indications: Fever, Pain       albuterol (PROVENTIL) (2.5 MG/3ML) 0.083% nebulizer solution Take 2.5 mg by nebulization Every 4 (Four) Hours As Needed for Wheezing or Shortness of Air.  12     albuterol sulfate  (90 Base) MCG/ACT inhaler Inhale 2 puffs Every 4 (Four) Hours As Needed for Wheezing. Indications: Chronic Obstructive Lung Disease       apixaban (ELIQUIS) 2.5 MG tablet tablet Take 2 tablets by mouth 2 (Two) Times a Day for 30 days. 120 tablet 0     atorvastatin (LIPITOR) 10 MG tablet Take 1 tablet by mouth Daily. (Patient taking differently: Take 1 tablet by mouth Every Night.) 90 tablet 0     bisacodyl (DULCOLAX) 10 MG suppository Insert 1 suppository into the rectum Daily As Needed for constipation. 12 suppository 6     guaifenesin (ROBITUSSIN) 100 MG/5ML liquid Take 10 mL by mouth 3 (Three) Times a Day As Needed for Cough. Indications: Cough       midodrine  (PROAMATINE) 10 MG tablet Take 1 tablet by mouth 3 (Three) Times a Day Before Meals. 270 tablet 3     amoxicillin-clavulanate (AUGMENTIN) 250-125 MG per tablet Take 1 tablet by mouth 2 (Two) Times a Day.       aspirin 81 MG EC tablet Take 1 tablet by mouth Daily. 30 tablet 5     magnesium hydroxide (MILK OF MAGNESIA) 400 MG/5ML suspension Take 5 mL by mouth Daily As Needed for Constipation. Indications: constipation       metoprolol tartrate (LOPRESSOR) 25 MG tablet Take 0.5 tablets by mouth Every 12 (Twelve) Hours for 30 days. 30 tablet 0     nitroglycerin (NITROSTAT) 0.4 MG SL tablet           Allergies:  Allergies   Allergen Reactions    No Known Drug Allergy Unknown (See Comments)     NKA       Past Social History:  Social History     Socioeconomic History    Marital status:    Tobacco Use    Smoking status: Never    Smokeless tobacco: Current     Types: Chew    Tobacco comments:     chewing tobacco since 4 years old   Vaping Use    Vaping Use: Never used   Substance and Sexual Activity    Alcohol use: No     Comment: caffeine use    Drug use: Never    Sexual activity: Defer       Past Family History: History reviewed. No pertinent family history.   Family History   Problem Relation Age of Onset    Heart disease Father     Hypertension Father     Stroke Father     Diabetes Sister     Cancer Brother     Heart disease Brother     Hypertension Brother     No Known Problems Maternal Grandmother     No Known Problems Maternal Grandfather     No Known Problems Paternal Grandmother     No Known Problems Paternal Grandfather        Review of Systems        Objective:     Objective:  Temp:  [97.6 °F (36.4 °C)-98.1 °F (36.7 °C)] 98.1 °F (36.7 °C)  Heart Rate:  [103-144] 103  Resp:  [20-40] 24  BP: ()/(39-89) 95/64    Intake/Output Summary (Last 24 hours) at 9/27/2023 1044  Last data filed at 9/27/2023 0255  Gross per 24 hour   Intake 100 ml   Output --   Net 100 ml     Body mass index is 25.85 kg/m².       23  0040 23  0515   Weight: 81.6 kg (180 lb) 79.4 kg (175 lb 0.7 oz)           Physical Exam:   Constitutional:       Appearance: Frail. Chronically ill-appearing and acutely ill-appearing.   Cardiovascular:      Normal rate. Regular rhythm. Normal S1. Normal S2.       Murmurs: There is no murmur.      No gallop.  No click. No rub.   Pulses:     Intact distal pulses.   Edema:     Peripheral edema absent.        Labs:   Lab Review:     Results from last 7 days   Lab Units 23  0532 23  0214 23  0102   SODIUM mmol/L 131*  --  132*   POTASSIUM mmol/L 4.4  --  4.6   CHLORIDE mmol/L 99  --  96*   CO2 mmol/L 14.6*  --  20.0*   BUN mg/dL 31*  --  32*   CREATININE mg/dL 1.42* 1.35 1.58*   GLUCOSE mg/dL 169*  --  130*   CALCIUM mg/dL 8.4  --  9.5   AST (SGOT) U/L  --   --  18   ALT (SGPT) U/L  --   --  11         Results from last 7 days   Lab Units 23  0532   WBC 10*3/mm3 5.68   HEMOGLOBIN g/dL 9.5*   HEMATOCRIT % 28.7*   PLATELETS 10*3/mm3 147     Results from last 7 days   Lab Units 23  0532 23  0102   INR  1.15* 1.10   APTT seconds  --  35.2         Results from last 7 days   Lab Units 23  010   MAGNESIUM mg/dL 2.1         Results from last 7 days   Lab Units 23  0102   PROBNP pg/mL 9,845.0*                 PREVIOUS EK-3-23      EK23          Assessment:       COPD exacerbation    HTN (hypertension)    Anemia, chronic disease    Coronary artery disease involving native coronary artery of native heart without angina pectoris    Stage 3a chronic kidney disease    Nonrheumatic aortic valve stenosis    HLD (hyperlipidemia)    Orthostatic hypotension    Hyponatremia    Atrial fibrillation    History of pulmonary embolism    Prediabetes    Pneumonia    Acute bronchitis due to Rhinovirus    Parainfluenza type 1 infection    Acute on chronic combined systolic and diastolic CHF (congestive heart failure)        Plan:     1.  Atrial fibrillation.  Has  been paroxysmal in the past in the acute setting of this illness he is back in A-fib.  Patient has Lopressor written as needed however his blood pressure has not tolerated the dosage.  It is to be held it with a blood pressure less than 100 which I agree with because he has significant aortic stenosis.  We will give him a dose of digoxin however his creatinine is elevated so I be very careful with this also.  The patient is running a fever or is working hard to breathe his heart rate is going to go up and were not reveal control that.  2.  Severe aortic stenosis.  Spoke with wife there was no decision made about what to do with that.  3.  Multiple infections patient has rhinovirus parainfluenza virus as well as urinary tract infection.  Patient is extremely ill.  4.  Elevated creatinine near baseline today was elevated worse upon admission.  5.  Coronary artery disease.  Patient is chronic occluded LAD had a stent to the RCA.  6.  Patient is a DO NOT RESUSCITATE.      Thank you for allowing me to participate in the care of Yoni Bonner . Feel free to contact me directly with any further questions or concerns.    Alexander Phoenix MD  [facility]  09/27/23  10:44 EDT

## 2023-09-27 NOTE — CONSULTS
Pulmonary Consultation     Patient Name: Yoni Bonner Jr.  Age/Sex: 89 y.o. male  : 1933  MRN: [id number]    Date of Admission: 2023  Date of Encounter Visit: 23  Encounter Provider: Ivet Harden MD  Referring Provider: No ref. provider found  Place of Service: [facility]  Patient Care Team:  Patric Cameron DO as PCP - General (Family Medicine)      Subjective:     Consulted for: Respiratory failure    Chief Complaint: Worsening shortness of breath    History of Present Illness:  Yoni Bonner Jr. is a 89 y.o. male with history of aortic stenosis, coronary artery disease, peripheral arterial disease essential hypertension with ischemic cardiomyopathy who came in with hypotension, respiratory distress and COPD exacerbation.  Viral panel came back positive for parainfluenza and enterovirus, patient was very critical and was started on IV antibiotic as well by the primary team given his overall situation.  His initial ABG last night was looking okay with  compensatory hyperventilation however today the rapid response was called because patient was having worsening dyspnea, and his ABG shows severe acidemia with pH of 7.1.  Patient was transferred to the intensive care unit for acute management  Patient was evaluated on arrival, chart labs and notes were reviewed and pertinent information summarized above    Pulmonary Functions Testing Results:    No results found for: FEV1, FVC, CYY6YMN, TLC, DLCO    Review of Systems:   Review of Systems   Unable to perform ROS: Acuity of condition       Past Medical History:  Past Medical History:   Diagnosis Date    Bronchitis     CAD (coronary artery disease)     Carotid arterial disease     Chronic bronchitis     COPD (chronic obstructive pulmonary disease)     Gout     Hyperlipidemia     Hypertension     Ischemic cardiomyopathy     resolved, EF was 60% in 2018    Mild aortic stenosis     Mitral regurgitation     Precordial chest pain      Rheumatic fever     Syncopal episodes     Tobacco use        Past Surgical History:   Procedure Laterality Date    CARDIAC CATHETERIZATION      CARDIAC CATHETERIZATION N/A 4/8/2018    Procedure: Left Heart Cath;  Surgeon: Nicolas Jerez MD;  Location: [facility] CATH INVASIVE LOCATION;  Service: Cardiovascular    CARDIAC CATHETERIZATION  4/8/2018    Procedure: Percutaneous Manual Thrombectomy;  Surgeon: Nicolas Jerez MD;  Location: [facility] CATH INVASIVE LOCATION;  Service: Cardiovascular    CARDIAC CATHETERIZATION N/A 4/8/2018    Procedure: Stent CARLENE coronary;  Surgeon: Nicolas Jerez MD;  [facility]VASIVE LOC[facility];  Service: Cardiovascular    CARDIAC CATHETERIZATION N/A 4/8/2018    Procedure: Right Heart Cath;  Surgeon: Nicolas Jerez MD;  Location: [facility] CATH INVASIVE LOCATION;  Service: Cardiovascular    CARDIAC CATHETERIZATION Left 2/21/2020    Procedure: Cardiac Catheterization/Vascular Study;  Surgeon: Alejandro Jenkins MD;  Location: [facility] CATH INVASIVE LOCATION;  Service: Cardiovascular;  Laterality: Left;    CARDIAC CATHETERIZATION N/A 2/21/2020    Procedure: Coronary angiography;  Surgeon: Alejandro Jenkins MD;  Location: [facility] CATH INVASIVE LOCATION;  Service: Cardiovascular;  Laterality: N/A;    EYE SURGERY      cataract surg    HAND SURGERY      HERNIA REPAIR      KNEE SURGERY      TESTICLE SURGERY      TOTAL HIP ARTHROPLASTY Left 9/25/2022    Procedure: TOTAL HIP ARTHROPLASTY ANTERIOR WITH HANA TABLE;  Surgeon: Jeff Rodriguez II, MD;  Location: [facility];  Service: Orthopedics;  Laterality: Left;       Home Medications:   Medications Prior to Admission   Medication Sig Dispense Refill Last Dose    acetaminophen (TYLENOL) 325 MG tablet Take 2 tablets by mouth Every 4 (Four) Hours As Needed for Mild Pain or Moderate Pain. Indications: Fever, Pain       albuterol (PROVENTIL) (2.5 MG/3ML) 0.083% nebulizer solution Take 2.5 mg by nebulization Every 4 (Four) Hours As Needed for Wheezing or  Shortness of Air.  12     albuterol sulfate  (90 Base) MCG/ACT inhaler Inhale 2 puffs Every 4 (Four) Hours As Needed for Wheezing. Indications: Chronic Obstructive Lung Disease       apixaban (ELIQUIS) 2.5 MG tablet tablet Take 2 tablets by mouth 2 (Two) Times a Day for 30 days. 120 tablet 0     atorvastatin (LIPITOR) 10 MG tablet Take 1 tablet by mouth Daily. (Patient taking differently: Take 1 tablet by mouth Every Night.) 90 tablet 0     bisacodyl (DULCOLAX) 10 MG suppository Insert 1 suppository into the rectum Daily As Needed for constipation. 12 suppository 6     guaifenesin (ROBITUSSIN) 100 MG/5ML liquid Take 10 mL by mouth 3 (Three) Times a Day As Needed for Cough. Indications: Cough       midodrine (PROAMATINE) 10 MG tablet Take 1 tablet by mouth 3 (Three) Times a Day Before Meals. 270 tablet 3     amoxicillin-clavulanate (AUGMENTIN) 250-125 MG per tablet Take 1 tablet by mouth 2 (Two) Times a Day.       aspirin 81 MG EC tablet Take 1 tablet by mouth Daily. 30 tablet 5     magnesium hydroxide (MILK OF MAGNESIA) 400 MG/5ML suspension Take 5 mL by mouth Daily As Needed for Constipation. Indications: constipation       metoprolol tartrate (LOPRESSOR) 25 MG tablet Take 0.5 tablets by mouth Every 12 (Twelve) Hours for 30 days. 30 tablet 0     nitroglycerin (NITROSTAT) 0.4 MG SL tablet           Inpatient Medications:  Scheduled Meds:apixaban, 5 mg, Oral, BID  aspirin, 81 mg, Oral, Daily  atorvastatin, 10 mg, Oral, Nightly  cefepime, 2,000 mg, Intravenous, Q12H  digoxin, 250 mcg, Intravenous, Once  furosemide, 40 mg, Intravenous, Daily  guaiFENesin, 1,200 mg, Oral, Q12H  insulin lispro, 2-7 Units, Subcutaneous, 4x Daily AC & at Bedtime  ipratropium-albuterol, 3 mL, Nebulization, 4x Daily - RT  methylPREDNISolone sodium succinate, 60 mg, Intravenous, Q12H  metoprolol tartrate, 12.5 mg, Oral, Q12H  midodrine, 10 mg, Oral, TID AC  senna-docusate sodium, 2 tablet, Oral, BID  sodium chloride, 10 mL,  Intravenous, Q12H      Continuous Infusions:   PRN Meds:.  acetaminophen **OR** acetaminophen **OR** acetaminophen    albuterol    senna-docusate sodium **AND** polyethylene glycol **AND** bisacodyl **AND** bisacodyl    Calcium Replacement - Follow Nurse / BPA Driven Protocol    dextrose    dextrose    glucagon (human recombinant)    Magnesium Standard Dose Replacement - Follow Nurse / BPA Driven Protocol    ondansetron    Phosphorus Replacement - Follow Nurse / BPA Driven Protocol    Potassium Replacement - Follow Nurse / BPA Driven Protocol    [COMPLETED] Insert Peripheral IV **AND** sodium chloride    sodium chloride    sodium chloride    Allergies:  Allergies   Allergen Reactions    No Known Drug Allergy Unknown (See Comments)     NKA       Past Social History:  Social History     Socioeconomic History    Marital status:    Tobacco Use    Smoking status: Never    Smokeless tobacco: Current     Types: Chew    Tobacco comments:     chewing tobacco since 4 years old   Vaping Use    Vaping Use: Never used   Substance and Sexual Activity    Alcohol use: No     Comment: caffeine use    Drug use: Never    Sexual activity: Defer       Past Family History:  Family History   Problem Relation Age of Onset    Heart disease Father     Hypertension Father     Stroke Father     Diabetes Sister     Cancer Brother     Heart disease Brother     Hypertension Brother     No Known Problems Maternal Grandmother     No Known Problems Maternal Grandfather     No Known Problems Paternal Grandmother     No Known Problems Paternal Grandfather            Objective:   Temp:  [97.6 °F (36.4 °C)-98.1 °F (36.7 °C)] 98.1 °F (36.7 °C)  Heart Rate:  [103-150] 130  Resp:  [20-40] 32  BP: ()/() 133/89  SpO2:  [86 %-100 %] 100 %  on  Flow (L/min):  [2] 2 Device (Oxygen Therapy): nonrebreather mask     Intake/Output Summary (Last 24 hours) at 9/27/2023 1121  Last data filed at 9/27/2023 0255  Gross per 24 hour   Intake 100 ml    Output --   Net 100 ml     Body mass index is 25.85 kg/m².      09/27/23  0040 09/27/23  0515   Weight: 81.6 kg (180 lb) 79.4 kg (175 lb 0.7 oz)     Weight change:     Physical Exam:   Physical Exam   General:     sickly looking gentleman, but awake and responsive, and respiratory distress, on the BiPAP                   Head:    Normocephalic, atraumatic. External ears and nose are normal   Eyes:          Conjunctivae and sclerae normal, no icterus, PERRLA, no  discharge   Throat:   No oral lesions, no thrush, oral mucosa moist.    Neck:   Supple, trachea midline. No JVD, no cervical or supraclavicular lymphadenopathy    Lungs:     Normal chest on inspection, patient is working hard with using of accessory muscle and is excessively tachypneic, with loud wheezing, abdominal breathing, using accessory muscles, with prolonged expiratory phase.      Heart:  Tachycardic with irregularly irregular rhythm.  With a noisy background from his wheezing and being on the BiPAP, I could not appreciate for any extra heart sounds, he did have positive aortic valve stenosis murmur reported by other providers on prior encounters.   Abdomen:     Soft, nontender, nondistended, positive bowel sounds. No hepatosplenomegaly    Extremities:   No clubbing, cyanosis, 2+ pitting edema.     Pulses:   Pulses palpable and equal bilaterally.  Decreased distally but still palpable   Skin:   No bleeding or rash. No bumps, good turgor pressure    Neuro:   Nonfocal.  Moves all extremities well. Strength 5/5 and symmetrical, no sensory deficit patient is anxious and is stressed because of the respiratory status   Psychiatric: Anxious but appropriate     Lab Review:   Results from last 7 days   Lab Units 09/27/23  0532 09/27/23  0214 09/27/23  0102   SODIUM mmol/L 131*  --  132*   POTASSIUM mmol/L 4.4  --  4.6   CHLORIDE mmol/L 99  --  96*   CO2 mmol/L 14.6*  --  20.0*   BUN mg/dL 31*  --  32*   CREATININE mg/dL 1.42* 1.35 1.58*   GLUCOSE mg/dL 169*   --  130*   CALCIUM mg/dL 8.4  --  9.5   AST (SGOT) U/L  --   --  18   ALT (SGPT) U/L  --   --  11   ALBUMIN g/dL  --   --  4.1         Results from last 7 days   Lab Units 09/27/23  0532 09/27/23 0102   WBC 10*3/mm3 5.68 7.68   HEMOGLOBIN g/dL 9.5* 9.8*   HEMATOCRIT % 28.7* 29.8*   PLATELETS 10*3/mm3 147 167   MCV fL 87.2 86.9   MCH pg 28.9 28.6   MCHC g/dL 33.1 32.9   RDW % 14.0 13.9   RDW-SD fl 44.6 43.9   MPV fL 11.5 10.8   NEUTROPHIL % % 90.5* 67.6   LYMPHOCYTE % % 6.3* 21.4   MONOCYTES % % 2.3* 10.0   EOSINOPHIL % % 0.0* 0.3   BASOPHIL % % 0.2 0.3   IMM GRAN % %  --  0.4   NEUTROS ABS 10*3/mm3 5.14 5.20   LYMPHS ABS 10*3/mm3 0.36* 1.64   MONOS ABS 10*3/mm3 0.13 0.77   EOS ABS 10*3/mm3 0.00 0.02   BASOS ABS 10*3/mm3 0.01 0.02   IMMATURE GRANS (ABS) 10*3/mm3  --  0.03   NRBC /100 WBC  --  0.0     Results from last 7 days   Lab Units 09/27/23  0532 09/27/23 0102   INR  1.15* 1.10   APTT seconds  --  35.2     Results from last 7 days   Lab Units 09/27/23 0102   MAGNESIUM mg/dL 2.1           Invalid input(s): LDLCALC  Results from last 7 days   Lab Units 09/27/23 0102   PROBNP pg/mL 9,845.0*         Results from last 7 days   Lab Units 09/27/23 0214   PH, ARTERIAL pH units 7.440   PCO2, ARTERIAL mm Hg 25.8*   PO2 ART mm Hg 189.2*   HCO3 ART mmol/L 17.6*   Repeat ABG showed pH 7.10/PCO2 40.8/PO2 365.1 with a bicarbonate of 12.7  Results from last 7 days   Lab Units 09/27/23  0408 09/27/23 0214 09/27/23  0102   PROCALCITONIN ng/mL  --   --  0.20   LACTATE mmol/L 1.6 1.5 3.7*                     Results from last 7 days   Lab Units 09/27/23  0102   COVID19  Not Detected   ADENOVIRUS DETECTION BY PCR  Not Detected   CORONAVIRUS 229E  Not Detected   CORONAVIRUS HKU1  Not Detected   CORONAVIRUS NL63  Not Detected   CORONAVIRUS OC43  Not Detected   HUMAN METAPNEUMOVIRUS  Not Detected   HUMAN RHINOVIRUS/ENTEROVIRUS  Detected*   INFLUENZA B PCR  Not Detected   PARAINFLUENZA 1  Detected*   PARAINFLUENZA VIRUS 2  Not  Detected   PARAINFLUENZA VIRUS 3  Not Detected   PARAINFLUENZA VIRUS 4  Not Detected   BORDETELLA PERTUSSIS PCR  Not Detected   BORDETELLA PARAPERTUSSIS PCR  Not Detected   CHLAMYDOPHILA PNEUMONIAE PCR  Not Detected   MYCOPLAMA PNEUMO PCR  Not Detected   RSV, PCR  Not Detected             Imaging:  Imaging Results (Most Recent)       Procedure Component Value Units Date/Time    XR Chest 1 View [[id number]] Collected: 09/27/23 0538     Updated: 09/27/23 0538    Narrative:        Patient: JALIL JOSHI  Time Out: 05:37  Exam(s): XR CXR 1 VIEW     EXAM:    XR Chest, 1 View    CLINICAL HISTORY:     Reason for exam: increased WOB.    TECHNIQUE:    Frontal view of the chest.    COMPARISON:    September 27, 2023 and 0104 hrs.    FINDINGS:    Lungs:  Slightly prominent interstitial markings throughout both lungs   suggesting mild emphysema.  No acute focal infiltrate or consolidation is   seen.    Pleural space:  Unremarkable.  No pneumothorax.    Heart:  The cardiac silhouette is mildly enlarged.    Mediastinum:  Unremarkable.    Bones joints:  Mild to moderate osteophytosis throughout the thoracic   spine.    Vasculature:  The aortic arch is mildly calcified but nondilated.    IMPRESSION:         Mild cardiomegaly.  Slightly prominent interstitial markings throughout   both lungs suggesting mild emphysema.  No acute focal infiltrate or   consolidation is seen.      Impression:          Electronically signed by Fernie San MD on 09-27-23 at 0537    XR Chest 1 View [[id number]] Collected: 09/27/23 0317     Updated: 09/27/23 0317    Narrative:        Patient: JALIL JOSHI  Time Out: 03:17  Exam(s): XR CXR 1 VIEW     EXAM:    XR Chest, 1 View    CLINICAL HISTORY:     Reason for exam: soa.    TECHNIQUE:    Frontal view of the chest.    COMPARISON:  9 3 2023    FINDINGS:    Lungs:  No consolidation or mass.  Slight bibasilar opacities.    Pleural space:  No acute findings    Heart:  cardiomegaly.    Bones joints:  No acute  findings.    IMPRESSION:         Slight bibasilar opacities.      Impression:          Electronically signed by Savana Hopkins MD on 09-27-23 at 0317            I personally viewed and interpreted the patient's imaging studies.    Assessment:   COPD with acute exacerbation, infected with parainfluenza virus and rhinovirus  Severe aortic stenosis with hemodynamic compromise with hypotension and tachycardia  Coronary artery disease  Stage III chronic kidney disease  Hypotension  Sinus tachycardia  DO NOT RESUSCITATE CODE STATUS  A-fib with RVR  History of pulmonary embolism on anticoagulation      Plan:     Patient is in acute respiratory failure that caused significant distress with worsening acidosis, and his ability to compensate was already compromised by his aortic stenosis and cardiac issues.  He is in A-fib since last night and went into RVR which made things even more challenging  Patient already got 62.5 mg of Solu-Medrol and we will give another dose and continue with the scheduled IV Solu-Medrol and continue with the bronchodilators  He has 2 viral infection and treatment for that is supportive  With adjusting his BiPAP by increasing his PEEP the patient had some improvement, his slowing down on his tachypnea and his heart rate slowing down as well  Expect the tachycardia to improve as his respiratory distress improve and as his acidosis improves  He does have mixed respiratory and metabolic acidosis probably from a combination of COPD exacerbation and hypoperfusion and we will try to over ventilate with the noninvasive positive pressure ventilation try to compensate his pH  His CODE STATUS is changed to DNR/DNI but he is okay with the BiPAP, we will work with that  Continue to diurese as tolerated by the blood pressure  Heart rate management per cardiology he is already on digoxin while carefully watching given his renal failure.  Hoping that the heart rate will improve as we improve on his respiratory  distress  Prognosis is guarded, patient is a DNR/DNI and we will try to work with the noninvasive positive pressure ventilation as a last resort while medicating as discussed above  Patient has negative bacterial cultures, procalcitonin was negative, chest x-ray showed no focal infiltrate and antibiotics have added value in his management, given his clinical situation and his current CODE STATUS and being DNR patient was initiated on cefepime by the primary team which is totally understandable, we will revisit that and decide if it needs to be continued depending on his follow-up assessment    Summary of recommendations:  Vent setting adjusted with noninvasive positive pressure ventilation  Insert Posey catheter  Give additional dose of IV Solu-Medrol  Continue with the bronchodilators  Follow-up closely on the renal function and maintain good hemodynamics  Aortic valve stenosis management per cardiology  We will readdress diuresis as his hemodynamic numbers improve    Time: Critical care 40 min    Thank you for allowing me to participate in the care of Yoni Bonner Jr.. Feel free to contact me directly with any further questions or concerns.    Ivet Harden MD  [facility]   09/27/23  11:21 EDT    Dictated utilizing Dragon dictation

## 2023-09-27 NOTE — PLAN OF CARE
Goal Outcome Evaluation:              Outcome Evaluation: Orders received. Pt in the middle of a rapid response when SLP arrived to room. ST to follow and eval pt when appropriate.

## 2023-09-28 VITALS
OXYGEN SATURATION: 98 % | HEIGHT: 69 IN | SYSTOLIC BLOOD PRESSURE: 116 MMHG | HEART RATE: 88 BPM | TEMPERATURE: 97.6 F | BODY MASS INDEX: 25.83 KG/M2 | WEIGHT: 174.38 LBS | DIASTOLIC BLOOD PRESSURE: 70 MMHG | RESPIRATION RATE: 20 BRPM

## 2023-09-28 PROBLEM — I50.43 ACUTE ON CHRONIC COMBINED SYSTOLIC AND DIASTOLIC CHF (CONGESTIVE HEART FAILURE): Status: RESOLVED | Noted: 2023-09-27 | Resolved: 2023-09-28

## 2023-09-28 PROBLEM — Z86.711 HISTORY OF PULMONARY EMBOLISM: Status: RESOLVED | Noted: 2023-09-27 | Resolved: 2023-09-28

## 2023-09-28 LAB
ANION GAP SERPL CALCULATED.3IONS-SCNC: 14 MMOL/L (ref 5–15)
BASOPHILS # BLD AUTO: 0 10*3/MM3 (ref 0–0.2)
BASOPHILS NFR BLD AUTO: 0 % (ref 0–1.5)
BUN SERPL-MCNC: 49 MG/DL (ref 8–23)
BUN/CREAT SERPL: 29.9 (ref 7–25)
CALCIUM SPEC-SCNC: 8 MG/DL (ref 8.2–9.6)
CHLORIDE SERPL-SCNC: 102 MMOL/L (ref 98–107)
CO2 SERPL-SCNC: 15 MMOL/L (ref 22–29)
CREAT SERPL-MCNC: 1.64 MG/DL (ref 0.76–1.27)
DEPRECATED RDW RBC AUTO: 44.4 FL (ref 37–54)
EGFRCR SERPLBLD CKD-EPI 2021: 39.5 ML/MIN/1.73
EOSINOPHIL # BLD AUTO: 0 10*3/MM3 (ref 0–0.4)
EOSINOPHIL NFR BLD AUTO: 0 % (ref 0.3–6.2)
ERYTHROCYTE [DISTWIDTH] IN BLOOD BY AUTOMATED COUNT: 14.2 % (ref 12.3–15.4)
GLUCOSE BLDC GLUCOMTR-MCNC: 135 MG/DL (ref 70–130)
GLUCOSE BLDC GLUCOMTR-MCNC: 152 MG/DL (ref 70–130)
GLUCOSE BLDC GLUCOMTR-MCNC: 158 MG/DL (ref 70–130)
GLUCOSE SERPL-MCNC: 144 MG/DL (ref 65–99)
HBA1C MFR BLD: 6.1 % (ref 4.8–5.6)
HCT VFR BLD AUTO: 25.5 % (ref 37.5–51)
HGB BLD-MCNC: 8.4 G/DL (ref 13–17.7)
IMM GRANULOCYTES # BLD AUTO: 0.01 10*3/MM3 (ref 0–0.05)
IMM GRANULOCYTES NFR BLD AUTO: 0.2 % (ref 0–0.5)
LYMPHOCYTES # BLD AUTO: 0.78 10*3/MM3 (ref 0.7–3.1)
LYMPHOCYTES NFR BLD AUTO: 15.5 % (ref 19.6–45.3)
MAGNESIUM SERPL-MCNC: 2.9 MG/DL (ref 1.6–2.4)
MCH RBC QN AUTO: 28.5 PG (ref 26.6–33)
MCHC RBC AUTO-ENTMCNC: 32.9 G/DL (ref 31.5–35.7)
MCV RBC AUTO: 86.4 FL (ref 79–97)
MONOCYTES # BLD AUTO: 0.11 10*3/MM3 (ref 0.1–0.9)
MONOCYTES NFR BLD AUTO: 2.2 % (ref 5–12)
NEUTROPHILS NFR BLD AUTO: 4.14 10*3/MM3 (ref 1.7–7)
NEUTROPHILS NFR BLD AUTO: 82.1 % (ref 42.7–76)
NRBC BLD AUTO-RTO: 0 /100 WBC (ref 0–0.2)
PHOSPHATE SERPL-MCNC: 5.7 MG/DL (ref 2.5–4.5)
PLATELET # BLD AUTO: 141 10*3/MM3 (ref 140–450)
PMV BLD AUTO: 11.8 FL (ref 6–12)
POTASSIUM SERPL-SCNC: 4.2 MMOL/L (ref 3.5–5.2)
RBC # BLD AUTO: 2.95 10*6/MM3 (ref 4.14–5.8)
SODIUM SERPL-SCNC: 131 MMOL/L (ref 136–145)
TSH SERPL DL<=0.05 MIU/L-ACNC: 0.28 UIU/ML (ref 0.27–4.2)
URATE SERPL-MCNC: 10.1 MG/DL (ref 3.4–7)
WBC NRBC COR # BLD: 5.04 10*3/MM3 (ref 3.4–10.8)

## 2023-09-28 PROCEDURE — 94660 CPAP INITIATION&MGMT: CPT

## 2023-09-28 PROCEDURE — 84443 ASSAY THYROID STIM HORMONE: CPT | Performed by: STUDENT IN AN ORGANIZED HEALTH CARE EDUCATION/TRAINING PROGRAM

## 2023-09-28 PROCEDURE — 80048 BASIC METABOLIC PNL TOTAL CA: CPT | Performed by: STUDENT IN AN ORGANIZED HEALTH CARE EDUCATION/TRAINING PROGRAM

## 2023-09-28 PROCEDURE — 0 CEFEPIME PER 500 MG: Performed by: NURSE PRACTITIONER

## 2023-09-28 PROCEDURE — 94761 N-INVAS EAR/PLS OXIMETRY MLT: CPT

## 2023-09-28 PROCEDURE — 94664 DEMO&/EVAL PT USE INHALER: CPT

## 2023-09-28 PROCEDURE — 25010000002 FENTANYL CITRATE (PF) 50 MCG/ML SOLUTION: Performed by: INTERNAL MEDICINE

## 2023-09-28 PROCEDURE — 84100 ASSAY OF PHOSPHORUS: CPT | Performed by: STUDENT IN AN ORGANIZED HEALTH CARE EDUCATION/TRAINING PROGRAM

## 2023-09-28 PROCEDURE — 84550 ASSAY OF BLOOD/URIC ACID: CPT | Performed by: STUDENT IN AN ORGANIZED HEALTH CARE EDUCATION/TRAINING PROGRAM

## 2023-09-28 PROCEDURE — 94799 UNLISTED PULMONARY SVC/PX: CPT

## 2023-09-28 PROCEDURE — 97162 PT EVAL MOD COMPLEX 30 MIN: CPT

## 2023-09-28 PROCEDURE — 25010000002 FUROSEMIDE PER 20 MG: Performed by: STUDENT IN AN ORGANIZED HEALTH CARE EDUCATION/TRAINING PROGRAM

## 2023-09-28 PROCEDURE — 82948 REAGENT STRIP/BLOOD GLUCOSE: CPT

## 2023-09-28 PROCEDURE — 85025 COMPLETE CBC W/AUTO DIFF WBC: CPT | Performed by: STUDENT IN AN ORGANIZED HEALTH CARE EDUCATION/TRAINING PROGRAM

## 2023-09-28 PROCEDURE — 99232 SBSQ HOSP IP/OBS MODERATE 35: CPT | Performed by: INTERNAL MEDICINE

## 2023-09-28 PROCEDURE — 83036 HEMOGLOBIN GLYCOSYLATED A1C: CPT | Performed by: STUDENT IN AN ORGANIZED HEALTH CARE EDUCATION/TRAINING PROGRAM

## 2023-09-28 PROCEDURE — 25010000002 METHYLPREDNISOLONE PER 125 MG: Performed by: NURSE PRACTITIONER

## 2023-09-28 PROCEDURE — 63710000001 INSULIN LISPRO (HUMAN) PER 5 UNITS: Performed by: STUDENT IN AN ORGANIZED HEALTH CARE EDUCATION/TRAINING PROGRAM

## 2023-09-28 PROCEDURE — 97110 THERAPEUTIC EXERCISES: CPT

## 2023-09-28 PROCEDURE — 83735 ASSAY OF MAGNESIUM: CPT | Performed by: STUDENT IN AN ORGANIZED HEALTH CARE EDUCATION/TRAINING PROGRAM

## 2023-09-28 RX ORDER — HYDROCODONE BITARTRATE AND ACETAMINOPHEN 5; 325 MG/1; MG/1
1 TABLET ORAL EVERY 6 HOURS PRN
Qty: 45 TABLET | Refills: 0 | Status: SHIPPED | OUTPATIENT
Start: 2023-09-28

## 2023-09-28 RX ORDER — FENTANYL CITRATE 50 UG/ML
50 INJECTION, SOLUTION INTRAMUSCULAR; INTRAVENOUS ONCE
Status: COMPLETED | OUTPATIENT
Start: 2023-09-28 | End: 2023-09-28

## 2023-09-28 RX ORDER — CLONAZEPAM 0.5 MG/1
0.25 TABLET ORAL EVERY 6 HOURS PRN
Qty: 23 TABLET | Refills: 0 | Status: SHIPPED | OUTPATIENT
Start: 2023-09-28

## 2023-09-28 RX ORDER — DOXYCYCLINE HYCLATE 100 MG/1
100 CAPSULE ORAL 2 TIMES DAILY
Qty: 12 CAPSULE | Refills: 0 | Status: SHIPPED | OUTPATIENT
Start: 2023-09-28 | End: 2023-10-04

## 2023-09-28 RX ADMIN — Medication 10 ML: at 09:07

## 2023-09-28 RX ADMIN — GUAIFENESIN 1200 MG: 600 TABLET, EXTENDED RELEASE ORAL at 09:29

## 2023-09-28 RX ADMIN — FUROSEMIDE 40 MG: 10 INJECTION, SOLUTION INTRAMUSCULAR; INTRAVENOUS at 09:06

## 2023-09-28 RX ADMIN — IPRATROPIUM BROMIDE AND ALBUTEROL SULFATE 3 ML: 2.5; .5 SOLUTION RESPIRATORY (INHALATION) at 15:11

## 2023-09-28 RX ADMIN — INSULIN LISPRO 2 UNITS: 100 INJECTION, SOLUTION INTRAVENOUS; SUBCUTANEOUS at 06:35

## 2023-09-28 RX ADMIN — CEFEPIME 2000 MG: 2 INJECTION, POWDER, FOR SOLUTION INTRAVENOUS at 09:29

## 2023-09-28 RX ADMIN — IPRATROPIUM BROMIDE AND ALBUTEROL SULFATE 3 ML: 2.5; .5 SOLUTION RESPIRATORY (INHALATION) at 10:47

## 2023-09-28 RX ADMIN — FENTANYL CITRATE 50 MCG: 50 INJECTION, SOLUTION INTRAMUSCULAR; INTRAVENOUS at 13:40

## 2023-09-28 RX ADMIN — MIDODRINE HYDROCHLORIDE 10 MG: 5 TABLET ORAL at 06:30

## 2023-09-28 RX ADMIN — METHYLPREDNISOLONE SODIUM SUCCINATE 60 MG: 125 INJECTION, POWDER, FOR SOLUTION INTRAMUSCULAR; INTRAVENOUS at 09:06

## 2023-09-28 RX ADMIN — ASPIRIN 81 MG: 81 TABLET, COATED ORAL at 09:06

## 2023-09-28 RX ADMIN — METOPROLOL TARTRATE 12.5 MG: 25 TABLET, FILM COATED ORAL at 09:29

## 2023-09-28 RX ADMIN — MIDODRINE HYDROCHLORIDE 10 MG: 5 TABLET ORAL at 11:52

## 2023-09-28 RX ADMIN — IPRATROPIUM BROMIDE AND ALBUTEROL SULFATE 3 ML: 2.5; .5 SOLUTION RESPIRATORY (INHALATION) at 07:22

## 2023-09-28 RX ADMIN — APIXABAN 5 MG: 5 TABLET, FILM COATED ORAL at 09:06

## 2023-09-28 NOTE — PROGRESS NOTES
Nutrition Services    Patient Name:  Yoni Bonner Jr.  YOB: 1933  MRN: [id number]  Admit Date:  9/27/2023    Assessment Date:  09/28/23    Summary: Nutrition assessment for pressure injury  89 y/o male admitted d/t COPD exacerbation.   Pt transferred to CICU from the floor d/t need for Bipap and metabolic/respiratory acidosis. This morning has been weaned off bipap. Pt has been NPO since admission (x 2 days). OK to advance diet per discussion in rounds. Pt has stage II PI to gluteal and L heel.     Recommendations  Boost Plus TID to supplement protein intake  Recommend MVI with minerals    RD will continue to follow.       CLINICAL NUTRITION ASSESSMENT      Reason for Assessment Pressure Injury and/or Non-Healing Wound     Diagnosis/Problem   COPD exacerbation   Medical/Surgical History Past Medical History:   Diagnosis Date    Bronchitis     CAD (coronary artery disease)     Carotid arterial disease     Chronic bronchitis     COPD (chronic obstructive pulmonary disease)     Gout     Hyperlipidemia     Hypertension     Ischemic cardiomyopathy     resolved, EF was 60% in 2018    Mild aortic stenosis     Mitral regurgitation     Precordial chest pain     Rheumatic fever     Syncopal episodes     Tobacco use        Past Surgical History:   Procedure Laterality Date    CARDIAC CATHETERIZATION      CARDIAC CATHETERIZATION N/A 4/8/2018    Procedure: Left Heart Cath;  Surgeon: Nicolas Jerez MD;  Location: [facility] INVASIVE LOCATION;  Service: Cardiovascular    CARDIAC CATHETERIZATION  4/8/2018    Procedure: Percutaneous Manual Thrombectomy;  Surgeon: Nicolas Jerez MD;  Location: [facility] INVASIVE LOCATION;  Service: Cardiovascular    CARDIAC CATHETERIZATION N/A 4/8/2018    Procedure: Stent CARLENE coronary;  Surgeon: Nicolas Jerez MD;  Location: [facility] INVASIVE LOCATION;  Service: Cardiovascular    CARDIAC CATHETERIZATION N/A 4/8/2018    Procedure: Right Heart Cath;  Surgeon: Nicolas Jerez MD;  Location:   "[facility];  Service: Cardiovascular    CARDIAC CATHETERIZATION Left 2/21/2020    Procedure: Cardiac Catheterization/Vascular Study;  Surgeon: Alejandro Jenkins MD;  Location:  [facility];  Service: Cardiovascular;  Laterality: Left;    CARDIAC CATHETERIZATION N/A 2/21/2020    Procedure: Coronary angiography;  Surgeon: Alejandro Jenkins MD;  Location:  [facility];  Service: Cardiovascular;  Laterality: N/A;    EYE SURGERY      cataract surg    HAND SURGERY      HERNIA REPAIR      KNEE SURGERY      TESTICLE SURGERY      TOTAL HIP ARTHROPLASTY Left 9/25/2022    Procedure: TOTAL HIP ARTHROPLASTY ANTERIOR WITH HANA TABLE;  Surgeon: Jeff Rodriguez II, MD;  Location: [facility] MAIN OR;  Service: Orthopedics;  Laterality: Left;        Anthropometrics        Current Height  Current Weight  BMI kg/m2 Height: 175.3 cm (69\")  Weight: 79.1 kg (174 lb 6.1 oz) (09/28/23 0600)  Body mass index is 25.75 kg/m².   Adjusted BMI (if applicable)    BMI Category Overweight (25 - 29.9)   Ideal Body Weight (IBW) 160#   Usual Body Weight (UBW) See below   Weight Trend Stable   Weight History Wt Readings from Last 30 Encounters:   09/28/23 0600 79.1 kg (174 lb 6.1 oz)   09/27/23 0515 79.4 kg (175 lb 0.7 oz)   09/27/23 0040 81.6 kg (180 lb)   09/07/23 1550 80.7 kg (178 lb)   09/04/23 1407 80 kg (176 lb 5.9 oz)   09/03/23 0851 80 kg (176 lb 6.4 oz)   09/03/23 0516 72.1 kg (159 lb)   08/04/23 1432 72.1 kg (159 lb)   02/04/23 2102 76.5 kg (168 lb 9.6 oz)   01/09/23 1437 83.9 kg (185 lb)   10/28/22 0625 83.3 kg (183 lb 9.6 oz)   10/27/22 0624 82.6 kg (182 lb)   10/26/22 0618 82.3 kg (181 lb 7 oz)   10/25/22 0500 78.7 kg (173 lb 6.4 oz)   10/24/22 0748 77.1 kg (170 lb)   10/22/22 1524 77.1 kg (170 lb)   10/18/22 0510 76.9 kg (169 lb 8.5 oz)   10/16/22 0500 80.6 kg (177 lb 11.1 oz)   10/15/22 0500 81.1 kg (178 lb 12.7 oz)   10/12/22 0522 84.9 kg (187 lb 2.7 oz)   10/11/22 0500 81.8 kg " (180 lb 5.4 oz)   10/10/22 1038 82 kg (180 lb 12.4 oz)   10/10/22 0414 82.1 kg (181 lb)   09/28/22 0508 81.8 kg (180 lb 4.8 oz)   09/27/22 0633 83.3 kg (183 lb 9.6 oz)   09/26/22 0500 84.2 kg (185 lb 9.6 oz)   09/25/22 0500 80.5 kg (177 lb 6.4 oz)   09/24/22 1249 80.3 kg (177 lb)   09/24/22 0500 80.6 kg (177 lb 12.8 oz)   09/23/22 1005 76.6 kg (168 lb 12.8 oz)   09/23/22 0536 75.8 kg (167 lb)   09/23/22 1630 76.2 kg (168 lb)   09/01/22 1307 75.9 kg (167 lb 6.4 oz)   07/28/22 1617 81.6 kg (180 lb)   04/15/22 1304 81.9 kg (180 lb 9.6 oz)   02/15/22 1309 78.3 kg (172 lb 9.6 oz)   01/21/22 1359 81.2 kg (179 lb)   01/13/22 1143 81.4 kg (179 lb 6.4 oz)   08/30/21 1338 83.9 kg (185 lb)   04/08/21 1131 85.7 kg (189 lb)   10/08/20 1502 86.5 kg (190 lb 9.6 oz)   06/29/20 1726 88.5 kg (195 lb)   03/25/20 1726 87.4 kg (192 lb 9.6 oz)   02/20/20 2139 83.5 kg (184 lb 1.6 oz)   02/20/20 1515 85.7 kg (189 lb)   11/05/19 1217 85.7 kg (189 lb)   07/30/19 1329 81.6 kg (180 lb)   07/30/19 1430 84.4 kg (186 lb)   07/24/19 1856 81.6 kg (180 lb)   07/24/19 1757 86.2 kg (190 lb)   07/23/19 1619 86 kg (189 lb 8 oz)   03/20/19 1115 86.2 kg (190 lb)   01/09/19 0500 88.7 kg (195 lb 8 oz)   01/07/19 0055 87 kg (191 lb 12.8 oz)   01/06/19 1843 89.6 kg (197 lb 8 oz)   12/19/18 0920 87.5 kg (193 lb)        Estimated/Assessed Needs        Current Weight  Weight: 79.1 kg (174 lb 6.1 oz) (09/28/23 0600)       Energy Requirements    Weight for Calculation 79.1 kg   Method for Estimation  25 kcal/kg, 30 kcal/kg   EST Needs (kcal/day) [id number]       Protein Requirements    Weight for Calculation 79.1 kg   EST Protein Needs (g/kg) 1.2 - 1.5 gm/kg   EST Daily Needs (g/day)        Fluid Requirements     Method for Estimation 1 mL/kcal    EST Needs (mL/day)      Labs       Pertinent Labs    Results from last 7 days   Lab Units 09/28/23  0359 09/27/23  0532 09/27/23  0214 09/27/23  0102   SODIUM mmol/L 131* 131*  --  132*   POTASSIUM mmol/L 4.2 4.4   --  4.6   CHLORIDE mmol/L 102 99  --  96*   CO2 mmol/L 15.0* 14.6*  --  20.0*   BUN mg/dL 49* 31*  --  32*   CREATININE mg/dL 1.64* 1.42* 1.35 1.58*   CALCIUM mg/dL 8.0* 8.4  --  9.5   BILIRUBIN mg/dL  --   --   --  0.5   ALK PHOS U/L  --   --   --  71   ALT (SGPT) U/L  --   --   --  11   AST (SGOT) U/L  --   --   --  18   GLUCOSE mg/dL 144* 169*  --  130*     Results from last 7 days   Lab Units 09/28/23  0359 09/27/23  0532 09/27/23  0102   MAGNESIUM mg/dL 2.9*  --  2.1   PHOSPHORUS mg/dL 5.7*  --   --    HEMOGLOBIN g/dL 8.4*   < > 9.8*   HEMATOCRIT % 25.5*   < > 29.8*   WBC 10*3/mm3 5.04   < > 7.68   ALBUMIN g/dL  --   --  4.1    < > = values in this interval not displayed.     Results from last 7 days   Lab Units 09/28/23  0359 09/27/23  0532 09/27/23  0102   INR   --  1.15* 1.10   APTT seconds  --   --  35.2   PLATELETS 10*3/mm3 141 147 167     COVID19   Date Value Ref Range Status   09/27/2023 Not Detected Not Detected - Ref. Range Final     Lab Results   Component Value Date    HGBA1C 6.10 (H) 09/28/2023          Medications           Scheduled Medications apixaban, 5 mg, Oral, BID  aspirin, 81 mg, Oral, Daily  atorvastatin, 10 mg, Oral, Nightly  cefepime, 2,000 mg, Intravenous, Q12H  furosemide, 40 mg, Intravenous, Daily  guaiFENesin, 1,200 mg, Oral, Q12H  insulin lispro, 2-7 Units, Subcutaneous, 4x Daily AC & at Bedtime  ipratropium-albuterol, 3 mL, Nebulization, 4x Daily - RT  methylPREDNISolone sodium succinate, 60 mg, Intravenous, Q12H  metoprolol tartrate, 12.5 mg, Oral, Q12H  midodrine, 10 mg, Oral, TID AC  senna-docusate sodium, 2 tablet, Oral, BID  sodium chloride, 10 mL, Intravenous, Q12H       Infusions     PRN Medications   acetaminophen **OR** acetaminophen **OR** acetaminophen    albuterol    senna-docusate sodium **AND** polyethylene glycol **AND** bisacodyl **AND** bisacodyl    Calcium Replacement - Follow Nurse / BPA Driven Protocol    dextrose    dextrose    glucagon (human recombinant)     Magnesium Standard Dose Replacement - Follow Nurse / BPA Driven Protocol    ondansetron    Phosphorus Replacement - Follow Nurse / BPA Driven Protocol    Potassium Replacement - Follow Nurse / BPA Driven Protocol    [COMPLETED] Insert Peripheral IV **AND** sodium chloride    sodium chloride    sodium chloride     Physical Findings          General Findings disoriented, on oxygen therapy   Oral/Mouth Cavity tooth or teeth missing   Edema  lower extremity , 1+ (trace), 2+ (mild)   Gastrointestinal last bowel movement: 9/28   Skin  pressure injury: gluteal and L heel stage II   Tubes/Drains/Lines none   NFPE Not indicated at this time   --  Current Nutrition Orders & Evaluation of Intake       Oral Nutrition     Food Allergies NKFA   Current PO Diet Diet: Regular/House Diet; Texture: Regular Texture (IDDSI 7); Fluid Consistency: Thin (IDDSI 0)   Supplement n/a   PO Evaluation     % PO Intake Pending - diet just advanced    Factors Affecting Intake: altered respiratory status   --  PES STATEMENT / NUTRITION DIAGNOSIS      Nutrition Dx Problem  Problem: Increased Nutrient Needs  Etiology: Factors Affecting Nutrition - pressure injuries    Signs/Symptoms: Report/Observation     NUTRITION INTERVENTION / PLAN OF CARE      Intervention Goal(s) Disease management/therapy, Establish PO intake, and Maintain weight         RD Intervention/Action Supplement provided, Encourage intake, and Continue to monitor   --      Prescription/Orders:       PO Diet       Supplements Boost Plus BID      Enteral Nutrition       Parenteral Nutrition    New Prescription Ordered? Yes   --      Monitor/Evaluation Per protocol   Discharge Plan/Needs Pending clinical course   --    RD to follow per protocol.      Electronically signed by:  Ashlee Salomon RD  09/28/23 11:20 EDT

## 2023-09-28 NOTE — PLAN OF CARE
Goal Outcome Evaluation:      Pt remains in the CICU. Required the Bipap at times overnight for work of breathing and wheezing. Tolerated breaks from bipap on room air. 500 u/o. Wound care provided; see flowsheets

## 2023-09-28 NOTE — THERAPY EVALUATION
Patient Name: Yoni Bonner Jr.  : 1933    MRN: [id number]                              Today's Date: 2023       Admit Date: 2023    Visit Dx:     ICD-10-CM ICD-9-CM   1. COPD exacerbation  J44.1 491.21   2. Upper respiratory tract infection, unspecified type  J06.9 465.9   3. Atrial fibrillation with rapid ventricular response  I48.91 427.31   4. Congestive heart failure, unspecified HF chronicity, unspecified heart failure type  I50.9 428.0     Patient Active Problem List   Diagnosis    HTN (hypertension)    Complete tear of right rotator cuff    Onychomycosis due to dermatophyte    Left rotator cuff tear arthropathy    Obesity    Anemia, chronic disease    Right shoulder pain    S/p reverse total shoulder arthroplasty    Episode of syncope    Coronary artery disease involving native coronary artery of native heart without angina pectoris    Chest pain    Stage 3a chronic kidney disease    Nonrheumatic aortic valve stenosis    COPD (chronic obstructive pulmonary disease)    HLD (hyperlipidemia)    Contusion of right leg    Knee pain    Primary osteoarthritis of left knee    Orthostatic hypotension    Hyponatremia    Atrial fibrillation    Sepsis due to Escherichia coli    S/P hip replacement    Acute pulmonary embolism    Sepsis, due to unspecified organism, unspecified whether acute organ dysfunction present    UTI due to extended-spectrum beta lactamase (ESBL) producing Escherichia coli    Chronic heart failure with preserved ejection fraction (HFpEF)    Type 2 MI (myocardial infarction)    Episode of unresponsiveness    PNA (pneumonia)    COPD exacerbation    History of pulmonary embolism    Prediabetes    Pneumonia    Acute bronchitis due to Rhinovirus    Parainfluenza type 1 infection    Acute on chronic combined systolic and diastolic CHF (congestive heart failure)     Past Medical History:   Diagnosis Date    Bronchitis     CAD (coronary artery disease)     Carotid arterial disease      Chronic bronchitis     COPD (chronic obstructive pulmonary disease)     Gout     Hyperlipidemia     Hypertension     Ischemic cardiomyopathy     resolved, EF was 60% in 2018    Mild aortic stenosis     Mitral regurgitation     Precordial chest pain     Rheumatic fever     Syncopal episodes     Tobacco use      Past Surgical History:   Procedure Laterality Date    CARDIAC CATHETERIZATION      CARDIAC CATHETERIZATION N/A 4/8/2018    Procedure: Left Heart Cath;  Surgeon: Nicolas Jerez MD;  Location: [facility] CATH INVASIVE LOCATION;  Service: Cardiovascular    CARDIAC CATHETERIZATION  4/8/2018    Procedure: Percutaneous Manual Thrombectomy;  Surgeon: Nicolas Jerez MD;  Location: [facility] CATH INVASIVE LOCATION;  Service: Cardiovascular    CARDIAC CATHETERIZATION N/A 4/8/2018    Procedure: Stent CARLENE coronary;  Surgeon: Nicolas Jerez MD;  Location: [facility] CATH INVASIVE LOCATION;  Service: Cardiovascular    CARDIAC CATHETERIZATION N/A 4/8/2018    Procedure: Right Heart Cath;  Surgeon: Nicolas Jerez MD;  Location: [facility] CATH INVASIVE LOCATION;  Service: Cardiovascular    CARDIAC CATHETERIZATION Left 2/21/2020    Procedure: Cardiac Catheterization/Vascular Study;  Surgeon: Alejandro Jenkins MD;  Location: [facility] CATH INVASIVE LOCATION;  Service: Cardiovascular;  Laterality: Left;    CARDIAC CATHETERIZATION N/A 2/21/2020    Procedure: Coronary angiography;  Surgeon: Alejandro Jenkins MD;  Location: [facility] CATH INVASIVE LOCATION;  Service: Cardiovascular;  Laterality: N/A;    EYE SURGERY      cataract surg    HAND SURGERY      HERNIA REPAIR      KNEE SURGERY      TESTICLE SURGERY      TOTAL HIP ARTHROPLASTY Left 9/25/2022    Procedure: TOTAL HIP ARTHROPLASTY ANTERIOR WITH HANA TABLE;  Surgeon: Jeff Rodriguez II, MD;  Location: [facility];  Service: Orthopedics;  Laterality: Left;      General Information       Row Name 09/28/23 0924          Physical Therapy Time and Intention    Document Type evaluation (P)   -KH     Mode of  Treatment physical therapy (P)   -       Row Name 09/28/23 0924          General Information    Patient Profile Reviewed yes (P)   -     Prior Level of Function -- (P)   Used walker at home, unsteady at times per wife, both pt and spouse get assist with laundry and other household chores by granddaughter.  -     Existing Precautions/Restrictions fall (P)   -     Barriers to Rehab medically complex;hearing deficit (P)   -[facility] Name 09/28/23 0924          Living Environment    People in Home spouse (P)   -       Row Name 09/28/23 0924          Home Main Entrance    Number of Stairs, Main Entrance none (P)   -[facility] Name 09/28/23 0924          Stairs Within Home, Primary    Number of Stairs, Within Home, Primary none (P)   -     Stairs Comment, Within Home, Primary Pt's spouse states they have a few steps but only to enter laudry room which neither of them enter. (P)   -[facility] Name 09/28/23 0924          Safety Issues, Functional Mobility    Impairments Affecting Function (Mobility) balance;endurance/activity tolerance;shortness of breath;strength;range of motion (ROM) (P)   -               User Key  (r) = Recorded By, (t) = Taken By, (c) = Cosigned By      Initials Name Provider Type    Rosaura Pro, PT Student PT Student                   Mobility       [facility] Name 09/28/23 0926          Bed Mobility    Bed Mobility supine-sit;sit-supine (P)   -     Supine-Sit Berry (Bed Mobility) minimum assist (75% patient effort) (P)   -     Sit-Supine [place] (Bed Mobility) minimum assist (75% patient effort) (P)   -     Assistive Device (Bed Mobility) head of bed elevated (P)   -[facility] Name 09/28/23 0926          Sit-Stand Transfer    Sit-Stand Berry (Transfers) minimum assist (75% patient effort) (P)   -     Assistive Device (Sit-Stand Transfers) -- (P)   HHA  -KH       Row Name 09/28/23 0926          Gait/Stairs (Locomotion)    [place] Level (Gait) minimum  assist (75% patient effort);moderate assist (50% patient effort) (P)   -KH     Assistive Device (Gait) -- (P)   HHA  -     Distance in Feet (Gait) 1 ft side steps to R (P)   -KH     Deviations/Abnormal Patterns (Gait) gait speed decreased;stride length decreased;weight shifting decreased (P)   -KH     Comment, (Gait/Stairs) Pt only able to take small side steps to R toward HOB, pt's breathing became very labored with activity completed today. (P)   -               User Key  (r) = Recorded By, (t) = Taken By, (c) = Cosigned By      Initials Name Provider Type     Rosaura Peña, PT Student PT Student                   Obj/Interventions       Row Name 09/28/23 0928          Range of Motion Comprehensive    Comment, General Range of Motion BUE: Unable to raise arms away from sides, limited but able to flex/ext both elbows; BLE: WFL, limited in knee extension on L. (P)   -       Row Name 09/28/23 0928          Strength Comprehensive (MMT)    Comment, General Manual Muscle Testing (MMT) Assessment Generalized weakness througout, strong  strength bilaterally, unable to formally assess UEs d/t limitations in ROM, LLE weaker than R. (P)   -       Row Name 09/28/23 0928          Motor Skills    Therapeutic Exercise other (see comments) (P)   5-10 reps AP, LAQ in sitting  -       Row Name 09/28/23 0928          Balance    Balance Assessment sitting static balance;standing static balance (P)   -     Static Sitting Balance contact guard (P)   -     Position, Sitting Balance sitting edge of bed (P)   -     Static Standing Balance minimal assist (P)   -     Position/Device Used, Standing Balance -- (P)   HHA  -     Comment, Balance Pt able to sit EOB with CGA, mild dizziness reported upon sitting, needed Ray and HHA to stand. No LOB or significant unsteadiness was observed during side steps to the R. (P)   -       Row Name 09/28/23 0928          Sensory Assessment (Somatosensory)    Sensory Assessment  (Somatosensory) not tested (P)   -               User Key  (r) = Recorded By, (t) = Taken By, (c) = Cosigned By      Initials Name Provider Type    Rosaura Pro, PT Student PT Student                   Goals/Plan       Row Name 09/28/23 0946          Bed Mobility Goal 1 (PT)    Activity/Assistive Device (Bed Mobility Goal 1, PT) bed mobility activities, all (P)   -KH     [place] Level/Cues Needed (Bed Mobility Goal 1, PT) standby assist (P)   -KH     Time Frame (Bed Mobility Goal 1, PT) 1 week (P)   -       Row Name 09/28/23 0946          Transfer Goal 1 (PT)    Activity/Assistive Device (Transfer Goal 1, PT) transfers, all (P)   -KH     [place] Level/Cues Needed (Transfer Goal 1, PT) contact guard required (P)   -KH     Time Frame (Transfer Goal 1, PT) 1 week (P)   -[facility] Name 09/28/23 0946          Gait Training Goal 1 (PT)    Activity/Assistive Device (Gait Training Goal 1, PT) gait (walking locomotion) (P)   -KH     [place] Level (Gait Training Goal 1, PT) contact guard required (P)   -KH     Distance (Gait Training Goal 1, PT) 40 ft (P)   -KH     Time Frame (Gait Training Goal 1, PT) 1 week (P)   -               User Key  (r) = Recorded By, (t) = Taken By, (c) = Cosigned By      Initials Name Provider Type    Rosaura Pro, PT Student PT Student                   Clinical Impression       Row Name 09/28/23 0933          Pain    Pretreatment Pain Rating 0/10 - no pain (P)   -KH       Row Name 09/28/23 0933          Plan of Care Review    Plan of Care Reviewed With patient;spouse (P)   -KH     Outcome Evaluation Pt is a 89 y.o. male admitted with COPD exacerbation, Hx includes CHF, CAD, CKD. Pt alert and agreeable to PT today. Pt hard of hearing and unable to hear out of R ear, needed repeated commands at times but able to follow all if heard correctly. Pt states he lives with his spouse, used a walker at baseline and had difficulty with rolling walkers because of the increased  control needed to ensure it doesn't roll out from under him. Pt has both a rolling and standard walker at home. Pt and spouse receive help from granddaughter with some household tasks. Pt able to complete all LE exercises with minimal increased rate of breathing but noticable wheeze. Pt sat EOB with Ray, requiring only CGA for balance d/t reports of minor dizziness upon sitting. Pt able to stand with HHA x 2 and Ray, side-stepped to R toward HOB with Ray and no significant unsteadiness but pt needed increased time. Pt began to have increased labored breathing while standing and needed to be placed back in bed, breathing calmed fairly quick once in bed. BP was 97/54 prior to session and 80/58 upon leaving pt, pt with nsg staff, SpO2 remained in upper 90s throughout entire treatment despite labored breathing. D/C plans pending progress made with difficulty breathing during activity. Pt may benefit from skilled PT to address current impairments and improve functional mobility. (P)   -       Row Name 09/28/23 0933          Therapy Assessment/Plan (PT)    Rehab Potential (PT) fair, will monitor progress closely (P)   -     Criteria for Skilled Interventions Met (PT) yes (P)   -     Therapy Frequency (PT) 6 times/wk (P)   -       Row Name 09/28/23 0933          Vital Signs    Pre Systolic BP Rehab 97 (P)   -     Pre Treatment Diastolic BP 54 (P)   -     Post Systolic BP Rehab 80 (P)   -     Post Treatment Diastolic BP 58 (P)   -     Pre SpO2 (%) 98 (P)   -     Intra SpO2 (%) -- (P)   Stayed in upper 90s throughout treatment  -     Post SpO2 (%) 99 (P)   -       Row Name 09/28/23 0933          Positioning and Restraints    Pre-Treatment Position in bed (P)   -KH     Post Treatment Position bed (P)   -KH     In Bed fowlers;call light within reach;encouraged to call for assist;with family/caregiver;with nsg (P)   -               User Key  (r) = Recorded By, (t) = Taken By, (c) = Cosigned By       Initials Name Provider Type    Rosaura Pro, PT Student PT Student                   Outcome Measures       Row Name 09/28/23 0947          How much help from another person do you currently need...    Turning from your back to your side while in flat bed without using bedrails? 3 (P)   -KH     Moving from lying on back to sitting on the side of a flat bed without bedrails? 3 (P)   -KH     Moving to and from a bed to a chair (including a wheelchair)? 2 (P)   -KH     Standing up from a chair using your arms (e.g., wheelchair, bedside chair)? 3 (P)   -KH     Climbing 3-5 steps with a railing? 2 (P)   -KH     To walk in hospital room? 2 (P)   -KH     AM-PAC 6 Clicks Score (PT) 15 (P)   -KH     Highest level of mobility 4 --> Transferred to chair/commode (P)   -               User Key  (r) = Recorded By, (t) = Taken By, (c) = Cosigned By      Initials Name Provider Type    Rosaura Pro, PT Student PT Student                                 Physical Therapy Education       Title: PT OT SLP Therapies (In Progress)       Topic: Physical Therapy (In Progress)       Point: Mobility training (Done)       Learning Progress Summary             Patient Acceptance, E,D, DU,NR by  at 9/28/2023 0948                         Point: Home exercise program (Not Started)       Learner Progress:  Not documented in this visit.              Point: Body mechanics (Done)       Learning Progress Summary             Patient Acceptance, E,D, DU,NR by  at 9/28/2023 0948                         Point: Precautions (Done)       Learning Progress Summary             Patient Acceptance, E,D, DU,NR by  at 9/28/2023 0948                                         User Key       Initials Effective Dates Name Provider Type Discipline     08/10/23 -  Rosaura Peña, PT Student PT Student PT                  PT Recommendation and Plan     Plan of Care Reviewed With: (P) patient, spouse  Outcome Evaluation: (P) Pt is a 89 y.o. male admitted  with COPD exacerbation, Hx includes CHF, CAD, CKD. Pt alert and agreeable to PT today. Pt hard of hearing and unable to hear out of R ear, needed repeated commands at times but able to follow all if heard correctly. Pt states he lives with his spouse, used a walker at baseline and had difficulty with rolling walkers because of the increased control needed to ensure it doesn't roll out from under him. Pt has both a rolling and standard walker at home. Pt and spouse receive help from granddaughter with some household tasks. Pt able to complete all LE exercises with minimal increased rate of breathing but noticable wheeze. Pt sat EOB with Ray, requiring only CGA for balance d/t reports of minor dizziness upon sitting. Pt able to stand with HHA x 2 and Ray, side-stepped to R toward HOB with Ray and no significant unsteadiness but pt needed increased time. Pt began to have increased labored breathing while standing and needed to be placed back in bed, breathing calmed fairly quick once in bed. BP was 97/54 prior to session and 80/58 upon leaving pt, pt with nsg staff, SpO2 remained in upper 90s throughout entire treatment despite labored breathing. D/C plans pending progress made with difficulty breathing during activity. Pt may benefit from skilled PT to address current impairments and improve functional mobility.     Time Calculation:         PT Charges       Row Name 09/28/23 0949             Time Calculation    Start Time 0855 (P)   -      Stop Time 0913 (P)   -      Time Calculation (min) 18 min (P)   -      PT Received On 09/28/23 (P)   -      PT - Next Appointment 09/29/23 (P)   -      PT Goal Re-Cert Due Date 10/05/23 (P)   -         Time Calculation- PT    Total Timed Code Minutes- PT 13 minute(s) (P)   -                User Key  (r) = Recorded By, (t) = Taken By, (c) = Cosigned By      Initials Name Provider Type    Rosaura Pro, PT Student PT Student                  Therapy Charges for  Today       Code Description Service Date Service Provider Modifiers Qty    86129522875 HC PT THER PROC EA 15 MIN 9/28/2023 Rosaura Peña, PT Student GP 1    32519121131 HC PT EVAL MOD COMPLEXITY 2 9/28/2023 Rosaura Peña, PT Student GP 1            PT G-Codes  AM-PAC 6 Clicks Score (PT): (P) 15  PT Discharge Summary  Anticipated Discharge Disposition (PT): (P)  (Depends on continued progress made)    Rosaura Peña PT Student  9/28/2023

## 2023-09-28 NOTE — PLAN OF CARE
Goal Outcome Evaluation:         Spoke with RN. Family wishes to take patient home today with hospice services. Family politely refused swallow evaluation and plan to go home on least restrictive diet for comfort measures. ST to sign off. Please re-consult if patient demonstrates change in status or family wishes to proceed with evaluation.

## 2023-09-28 NOTE — DISCHARGE SUMMARY
DISCHARGE SUMMARY    Patient Name: Yoni Bonner Jr.  Age/Sex: 89 y.o. male  : 1933  MRN: [id number]  Patient Care Team:  Patric Cameron DO as PCP - General (Family Medicine)       Date of Admit: 2023  Date of Discharge:  23  Discharge Condition: Serious    Discharge Diagnoses:  COPD with acute exacerbation, infected with parainfluenza virus and rhinovirus  Severe aortic stenosis with hemodynamic compromise with hypotension and tachycardia  Coronary artery disease  Stage III chronic kidney disease  Hyponatremia, mild with no significant clinical impact  Hypotension  Sinus tachycardia  DO NOT RESUSCITATE CODE STATUS  A-fib with RVR  History of pulmonary embolism on anticoagulation  Steroids induced hyperglycemia, on sliding scale    History of present illness from H&P from 2023:   Mr. Bonner is a 89 y.o. non-smoker with a history of COPD, CHF, CAD, CKD stage IIIa, paroxysmal atrial fibrillation, aortic stenosis, chronic hypotension, prediabetes, anemia of chronic disease that presents to [facility] complaining of worsening dyspnea and wheezing over the past 3 days prior to arrival.  Symptoms began rather abruptly, constant and progressive in nature since onset.  Wife is present at bedside, who presents to provide collateral information.  Regarding inciting factors, he states he was recently seen by his PCP and was told he had an upper respiratory tract infection and a urinary tract infection, he was treated with medications, however, symptoms persisted.  Denies recollection of other known inciting factors.  Denies noticeable aggravating or alleviating factors, as symptoms are constant since onset.  Endorses associated intermittent pleuritic pain with deep breath, ongoing urinary urgency and frequency and lower extremity edema.  Denies fever, chills, chest pain, palpitations, abdominal pain, dizziness, lightheadedness.     Hospital Course:   Yoni Bonner Jr.  presented to [facility] with complaints of shortness of breath, was admitted to the medical service and had to be transferred stat to the CICU because of respiratory failure and worsening acidosis  Patient was supported with noninvasive positive pressure ventilation, was started on antibiotic and did not improve however he was still having significant poor quality of life with breathing and shortness of breath  Patient met with hospice and he is ready for going home with hospice for comfort care.  We will continue with the antibiotic after discharge and with his bronchodilator therapy as part of the palliative care measures but patient has orders for his pain medication and anxiolytics by the hospice team  Further medical care will be provided and followed by the hospice service    Consults:   IP CONSULT TO HOSPITALIST  IP CONSULT TO CASE MANAGEMENT   IP CONSULT TO CARDIOLOGY  IP CONSULT TO PULMONOLOGY  IP CONSULT TO PALLIATIVE CARE TEAM  IP CONSULT TO HOSPICE    Significant Discharge Diagnostics   Procedures Performed:         Pertinent Lab Results:  Results from last 7 days   Lab Units 09/28/23  0359 09/27/23  0532 09/27/23  0214 09/27/23  0102   SODIUM mmol/L 131* 131*  --  132*   POTASSIUM mmol/L 4.2 4.4  --  4.6   CHLORIDE mmol/L 102 99  --  96*   CO2 mmol/L 15.0* 14.6*  --  20.0*   BUN mg/dL 49* 31*  --  32*   CREATININE mg/dL 1.64* 1.42* 1.35 1.58*   GLUCOSE mg/dL 144* 169*  --  130*   CALCIUM mg/dL 8.0* 8.4  --  9.5   AST (SGOT) U/L  --   --   --  18   ALT (SGPT) U/L  --   --   --  11     Results from last 7 days   Lab Units 09/27/23  1256 09/27/23  1005   HSTROP T ng/L 99* 124*     Results from last 7 days   Lab Units 09/28/23  0359 09/27/23  0532 09/27/23  0102   WBC 10*3/mm3 5.04 5.68 7.68   HEMOGLOBIN g/dL 8.4* 9.5* 9.8*   HEMATOCRIT % 25.5* 28.7* 29.8*   PLATELETS 10*3/mm3 141 147 167   MCV fL 86.4 87.2 86.9   MCH pg 28.5 28.9 28.6   MCHC g/dL 32.9 33.1 32.9   RDW  % 14.2 14.0 13.9   RDW-SD fl 44.4 44.6 43.9   MPV fL 11.8 11.5 10.8   NEUTROPHIL % % 82.1* 90.5* 67.6   LYMPHOCYTE % % 15.5* 6.3* 21.4   MONOCYTES % % 2.2* 2.3* 10.0   EOSINOPHIL % % 0.0* 0.0* 0.3   BASOPHIL % % 0.0 0.2 0.3   IMM GRAN % % 0.2  --  0.4   NEUTROS ABS 10*3/mm3 4.14 5.14 5.20   LYMPHS ABS 10*3/mm3 0.78 0.36* 1.64   MONOS ABS 10*3/mm3 0.11 0.13 0.77   EOS ABS 10*3/mm3 0.00 0.00 0.02   BASOS ABS 10*3/mm3 0.00 0.01 0.02   IMMATURE GRANS (ABS) 10*3/mm3 0.01  --  0.03   NRBC /100 WBC 0.0  --  0.0     Results from last 7 days   Lab Units 09/27/23  0532 09/27/23  0102   INR  1.15* 1.10   APTT seconds  --  35.2     Results from last 7 days   Lab Units 09/28/23  0359 09/27/23  0102   MAGNESIUM mg/dL 2.9* 2.1         Results from last 7 days   Lab Units 09/27/23  0102   PROBNP pg/mL 9,845.0*     Results from last 7 days   Lab Units 09/28/23  0359   TSH uIU/mL 0.283     Results from last 7 days   Lab Units 09/27/23  1226 09/27/23  1058 09/27/23  0214   PH, ARTERIAL pH units 7.351 7.100* 7.440   PCO2, ARTERIAL mm Hg 28.4* 40.8 25.8*   PO2 ART mm Hg 134.2* 365.1* 189.2*   HCO3 ART mmol/L 15.7* 12.7* 17.6*     Results from last 7 days   Lab Units 09/27/23  0408 09/27/23  0214 09/27/23  0102   PROCALCITONIN ng/mL  --   --  0.20   LACTATE mmol/L 1.6 1.5 3.7*             Results from last 7 days   Lab Units 09/27/23  0139 09/27/23  0110   BLOODCX  No growth at 24 hours No growth at 24 hours     Results from last 7 days   Lab Units 09/27/23  1143   NITRITE UA  Negative     Results from last 7 days   Lab Units 09/27/23  0102   ADENOVIRUS DETECTION BY PCR  Not Detected   CORONAVIRUS 229E  Not Detected   CORONAVIRUS HKU1  Not Detected   CORONAVIRUS NL63  Not Detected   CORONAVIRUS OC43  Not Detected   HUMAN METAPNEUMOVIRUS  Not Detected   HUMAN RHINOVIRUS/ENTEROVIRUS  Detected*   INFLUENZA B PCR  Not Detected   PARAINFLUENZA 1  Detected*   PARAINFLUENZA VIRUS 2  Not Detected   PARAINFLUENZA VIRUS 3  Not Detected    PARAINFLUENZA VIRUS 4  Not Detected   BORDETELLA PERTUSSIS PCR  Not Detected   BORDETELLA PARAPERTUSSIS PCR  Not Detected   CHLAMYDOPHILA PNEUMONIAE PCR  Not Detected   MYCOPLAMA PNEUMO PCR  Not Detected   RSV, PCR  Not Detected     Results from last 7 days   Lab Units 09/28/23  0359 09/27/23  1143   SODIUM UR mmol/L  --  52   CREATININE UR mg/dL  --  96.5   OSMOLALITY UR mOsm/kg  --  434   URIC ACID mg/dL 10.1*  --        Imaging Results:  Imaging Results (All)       Procedure Component Value Units Date/Time    XR Chest 1 View [[id number]] Collected: 09/27/23 0538     Updated: 09/27/23 0538    Narrative:        Patient: JALIL JOSHI  Time Out: 05:37  Exam(s): XR CXR 1 VIEW     EXAM:    XR Chest, 1 View    CLINICAL HISTORY:     Reason for exam: increased WOB.    TECHNIQUE:    Frontal view of the chest.    COMPARISON:    September 27, 2023 and 0104 hrs.    FINDINGS:    Lungs:  Slightly prominent interstitial markings throughout both lungs   suggesting mild emphysema.  No acute focal infiltrate or consolidation is   seen.    Pleural space:  Unremarkable.  No pneumothorax.    Heart:  The cardiac silhouette is mildly enlarged.    Mediastinum:  Unremarkable.    Bones joints:  Mild to moderate osteophytosis throughout the thoracic   spine.    Vasculature:  The aortic arch is mildly calcified but nondilated.    IMPRESSION:         Mild cardiomegaly.  Slightly prominent interstitial markings throughout   both lungs suggesting mild emphysema.  No acute focal infiltrate or   consolidation is seen.      Impression:          Electronically signed by Fernie San MD on 09-27-23 at 0537    XR Chest 1 View [[id number]] Collected: 09/27/23 0317     Updated: 09/27/23 0317    Narrative:        Patient: JALIL JOSHI  Time Out: 03:17  Exam(s): XR CXR 1 VIEW     EXAM:    XR Chest, 1 View    CLINICAL HISTORY:     Reason for exam: soa.    TECHNIQUE:    Frontal view of the chest.    COMPARISON:  9 3 2023    FINDINGS:    Lungs:  No  consolidation or mass.  Slight bibasilar opacities.    Pleural space:  No acute findings    Heart:  cardiomegaly.    Bones joints:  No acute findings.    IMPRESSION:         Slight bibasilar opacities.      Impression:          Electronically signed by Savana Hopkins MD on 09-27-23 at 0317            Objective:   Temp:  [97.4 °F (36.3 °C)-98.6 °F (37 °C)] 97.6 °F (36.4 °C)  Heart Rate:  [] 83  Resp:  [20] 20  BP: ()/(50-83) 113/62   SpO2:  [95 %-100 %] 98 %  on  Flow (L/min):  [2] 2 Device (Oxygen Therapy): room air    Intake/Output Summary (Last 24 hours) at 9/28/2023 1542  Last data filed at 9/28/2023 1129  Gross per 24 hour   Intake 472.57 ml   Output 1500 ml   Net -1027.43 ml     Body mass index is 25.75 kg/m².      09/27/23  0040 09/27/23  0515 09/28/23  0600   Weight: 81.6 kg (180 lb) 79.4 kg (175 lb 0.7 oz) 79.1 kg (174 lb 6.1 oz)     Weight change: -2.548 kg (-5 lb 9.9 oz)    Physical Exam:  GEN: Sickly looking gentleman, awake, in minimal respiratory distress that seems to be more anxiety driven, alert and oriented x4  EYES:   Sclerae clear. No icterus. PERRL. Normal EOM  ENT:   External ears/nose normal, no oral lesions, no thrush, mucous membranes moist  NECK:  Supple, midline trachea, no JVD  LUNGS: Normal chest on inspection, patient has loud wheezing coming mainly from the neck area that nearly resolved when the patient was distracted during the interview and likely anxiety driven causing voluntary vocal cord vasospasm.  There are no crackles.  Less abdominal breathing respirations regular, even and less labored.   CV:  Regular rhythm and rate. Normal S1/S2. No murmurs, gallops, or rubs noted.  ABD:  Soft, nontender and nondistended. Normal bowel sounds. No guarding  EXT:  Moves all extremities well. No cyanosis. No redness. No edema.   Skin: Dry, intact, no bleeding    Discharge Medications and Instructions:     Discharge Medications     Discharge Medications        New Medications         Instructions Start Date   doxycycline 100 MG capsule  Commonly known as: VIBRAMYCIN   100 mg, Oral, 2 Times Daily             Changes to Medications        Instructions Start Date   atorvastatin 10 MG tablet  Commonly known as: LIPITOR  What changed: when to take this   10 mg, Oral, Daily             Continue These Medications        Instructions Start Date   albuterol sulfate  (90 Base) MCG/ACT inhaler  Commonly known as: PROVENTIL HFA;VENTOLIN HFA;PROAIR HFA   2 puffs, Inhalation, Every 4 Hours PRN      albuterol (2.5 MG/3ML) 0.083% nebulizer solution  Commonly known as: PROVENTIL   2.5 mg, Nebulization, Every 4 Hours PRN      apixaban 2.5 MG tablet tablet  Commonly known as: ELIQUIS   5 mg, Oral, 2 Times Daily      aspirin 81 MG EC tablet   81 mg, Oral, Daily      guaifenesin 100 MG/5ML liquid  Commonly known as: ROBITUSSIN   10 mL, Oral, 3 Times Daily PRN      metoprolol tartrate 25 MG tablet  Commonly known as: LOPRESSOR   12.5 mg, Oral, Every 12 Hours Scheduled      midodrine 10 MG tablet  Commonly known as: PROAMATINE   10 mg, Oral, 3 Times Daily Before Meals             Stop These Medications      acetaminophen 325 MG tablet  Commonly known as: TYLENOL     amoxicillin-clavulanate 250-125 MG per tablet  Commonly known as: AUGMENTIN     Gentle Laxative 10 MG suppository  Generic drug: bisacodyl     magnesium hydroxide 400 MG/5ML suspension  Commonly known as: MILK OF MAGNESIA     nitroglycerin 0.4 MG SL tablet  Commonly known as: NITROSTAT              Discharge Diet:    Dietary Orders (From admission, onward)       Start     Ordered    09/28/23 1129  Dietary Nutrition Supplements Boost Plus (Ensure Enlive, Ensure Plus)  Daily With Breakfast & Dinner      Question:  Select Supplement:  Answer:  Boost Plus (Ensure Enlive, Ensure Plus)    09/28/23 1128    09/28/23 1116  Diet: Regular/House Diet; Texture: Regular Texture (IDDSI 7); Fluid Consistency: Thin (IDDSI 0)  Diet Effective Now        References:     Diet Order [place]   Question Answer Comment   Diets: Regular/House Diet    Texture: Regular Texture (IDDSI 7)    Fluid Consistency: Thin (IDDSI 0)        09/28/23 1117                    Activity at Discharge:   as tolerated     Discharge disposition: Home with hospice     Discharge Instructions and Follow ups:      Follow-up Information       Patric Cameron DO .    Specialty: Family Medicine  Contact information:  [address] [place] [id number] [phone]                           Future Appointments   Date Time Provider Department Center   11/6/2023 11:00 AM Nneka Honeycutt APRN [id number] None        Medication Reconciliation: Please see electronically completed Med Rec.    Total time spent discharging patient including evaluation, medication reconciliation, arranging follow up, and post hospitalization instructions and education total time exceeds 30 minutes.     Ivet Harden MD  09/28/23  15:42 EDT      Dictated utilizing Dragon dictation

## 2023-09-28 NOTE — CASE MANAGEMENT/SOCIAL WORK
Continued Stay Note  [facility]     Patient Name: Yoni Bonner Jr.  MRN: [id number]  Today's Date: 9/28/2023    Admit Date: 9/27/2023    Plan: Home w/ [facility] today via family transport   Discharge Plan       Row Name 09/28/23 1513       Plan    Plan Home w/ [facility] today via family transport    Plan Comments Incoming call from Vanesa/Mitzi who states patient would like to go home w/ Hospice today. Per Kayla, patient will need EMS. [facility] spoke with [facility] who states due to location of patient's home, they will need to do the transport tomorrow morning and are not able to transport today. [facility] notified family & Vanesa/Mitzi. Kayla spoke with family at bedside who states they will take patient home today via family transport. Per Kayla, hospice will provide an oxygen tank for transport. All DME will be delivered to patient's home by 4pm per hospice. Kayla states they will assist with discharge from here and CCP is no longer needed for assistance in dc. [facility] cancelled. ROSARIO, SHANELLEW                   Discharge Codes    No documentation.                       CHELSEA Ball

## 2023-09-28 NOTE — CONSULTS
Addendum:    Ambulance service is unable to transport home until am. Arrangements made for family to transport patient home with use of portable oxygen tank to use supplied by hospice [facility]. [facility] will deliver tank today. Nurse and CM updated.         Arrived on unit, records reviewed. Received updates from patient nurse ZINA Smith. Patient would like to go home today with hospice. Patient has had c/o pain and is anxious. Sarah placed call to hospitalist and received order for pain meds during this visit.   Met with patient and his family at bedside. EOS given for hospice services. Patient is agreeable to service at home.  Call placed to D Dr. Jovanny Mott who gives verbal certification for home hospice.       EMS DNR form completed and signed by spouse Danielle    KHUSHBU/LORI Rosa RN  to make arrangements for EMS transportation    Meds to bed to be delivered today prior to discharge    Hydrocodone-acetaminophen 5-325 mg PO Q4 hr prn  Senna tabs 2 tabs PO daily PRN  Clonazepam 0.5 mg tab PO Q6 hr    Message sent to Dr. Harden to request discharge order.  CM updated  Nurse ZINA Smith updated on discharge POC.       Thank you for allowing us to participate in the care of this patient and for the referral.    Hospice ID [id number]    TYSON Bhatti, RN  Referral and Admission Coordinator  [facility]  [phone]

## 2023-09-28 NOTE — CASE MANAGEMENT/SOCIAL WORK
"Physicians Statement of Medical Necessity for  Ambulance Transportation    GENERAL INFORMATION     Name: Yoni Bonner Jr.  YOB: 1933  Medicare #: [id number]  Transport Date: 09/29/2023 (Valid for round trips this date, or for scheduled repetitive trips for 60 days from the date signed below.)  Origin: [facility]  Destination: [address] [place] [address]  Is the Patient's stay covered under Medicare Part A (PPS/DRG?)Yes  Closest appropriate facility? Yes  If this a hosp-hosp transfer? No  Is this a hospice patient? Yes, Is this transport related to patient's terminal illness? Yes    MEDICAL NECESSITY QUESTIONAIRE    Ambulance Transportation is medically necessary only if other means of transportation are contraindicated or would be potentially harmful to the patient.  To meet this requirement, the patient must be either \"bed confined\" or suffer from a condition such that transport by means other than an ambulance is contraindicated by the patient's condition.  The following questions must be answered by the healthcare professional signing below for this form to be valid:     1) Describe the MEDICAL CONDITION (physical and/or mental) of this patient AT THE TIME OF AMBULANCE TRANSPORT that requires the patient to be transported in an ambulance, and why transport by other means is contraindicated by the patient's condition:   Past Medical History:   Diagnosis Date    Bronchitis     CAD (coronary artery disease)     Carotid arterial disease     Chronic bronchitis     COPD (chronic obstructive pulmonary disease)     Gout     Hyperlipidemia     Hypertension     Ischemic cardiomyopathy     resolved, EF was 60% in 2018    Mild aortic stenosis     Mitral regurgitation     Precordial chest pain     Rheumatic fever     Syncopal episodes     Tobacco use       Past Surgical History:   Procedure Laterality Date    CARDIAC CATHETERIZATION      CARDIAC CATHETERIZATION N/A 4/8/2018    " "Procedure: Left Heart Cath;  Surgeon: Nicolas Jerez MD;  Location[facility] C[facility]VASIVE LOC[facility];  Service: Cardiovascular    CARDIAC CATHETERIZATION  4/8/2018    Procedure: Percutaneous Manual Thrombectomy;  Surgeon: Nicolas Jerez MD;  Location: [facility] CATH INVASIVE LOCATION;  Service: Cardiovascular    CARDIAC CATHETERIZATION N/A 4/8/2018    Procedure: Stent CARLENE coronary;  Surgeon: Nicolas Jerez MD;  Location: [facility] CATH [facility]VASIVE LOC[facility];  Service: Cardiovascular    CARDIAC CATHETERIZATION N/A 4/8/2018    Procedure: Right Heart Cath;  Surgeon: Nicolas Jerez MD;  Location: [facility]ATH [facility]VAS[facility] LOC[facility];  Service: Cardiovascular    CARDIAC CATHETERIZATION Left 2/21/2020    Procedure: Cardiac Catheterization/Vascular Study;  Surgeon: Alejandro Jenkins MD;  [facility]ATH [facility]VAS[facility];  Service: Cardiovascular;  Laterality: Left;    CARDIAC CATHETERIZATION N/A 2/21/2020    Procedure: Coronary angiography;  Surgeon: Alejandro Jenkins MD;  Location: [facility] CATH INVASIVE LOC[facility];  Service: Cardiovascular;  Laterality: N/A;    EYE SURGERY      cataract surg    HAND SURGERY      HERNIA REPAIR      KNEE SURGERY      TESTICLE SURGERY      TOTAL HIP ARTHROPLASTY Left 9/25/2022    Procedure: TOTAL HIP ARTHROPLASTY ANTERIOR WITH HANA TABLE;  Surgeon: Jeff Rodriguez II, MD;  Location: [facility];  Service: Orthopedics;  Laterality: Left;      2) Is this patient \"bed confined\" as defined below?Yes   To be \"bed confined\" the patient must satisfy all three of the following criteria:  (1) unable to get up from bed without assistance; AND (2) unable to ambulate;  AND (3) unable to sit in a chair or wheelchair.  3) Can this patient safely be transported by car or wheelchair van (I.e., may safely sit during transport, without an attendant or monitoring?)No   4. In addition to completing questions 1-3 above, please check any of the following conditions that apply*:          *Note: supporting documentation for any " boxes checked must be maintained in the patient's medical records Patient is confused, Moderate/severe pain on movement, Medical attendant required, Requires oxygen - unable to self administer, Special handling/isolation/infection control precautions required, Unable to tolerate seated position for time needed to transport, and Other non-ambulatory, bed bound      SIGNATURE OF PHYSICIAN OR OTHER AUTHORIZED HEALTHCARE PROFESSIONAL    I certify that the above information is true and correct based on my evaluation of this patient, and represent that the patient requires transport by ambulance and that other forms of transport are contraindicated.  I understand that this information will be used by the Centers for Medicare and Medicaid Services (CMS) to support the determiniation of medical necessity for ambulance services, and I represent that I have personal knowledge of the patient's condition at the time of transport.       If this box is checked, I also certify that the patient is physically or mentally incapable of signing the ambulance service's claim form and that the institution with which I am affiliated has furnished care, services or assistance to the patient.  My signature below is made on behalf of the patient pursuant to 42 .36(b)(4). In accordance with 42 .37, the specific reason(s) that the patient is physically or mentally incapable of signing the claim for is as follows:     Signature of Physician or Healthcare Professional  Date/Time:        (For Scheduled repetitive transport, this form is not valid for transports performed more than 60 days after this date).                                                                                                                                            --------------------------------------------------------------------------------------------  Printed Name and Credentials of Physician or Authorized Healthcare Professional     *Form must be  signed by patient's attending physician for scheduled, repetitive transports,.  For non-repetitive ambulance transports, if unable to obtain the signature of the attending physician, any of the following may sign (please select below):     Physician  Clinical Nurse Specialist  Registered Nurse     Physician Assistant  Discharge Planner  Licensed Practical Nurse     Nurse Practitioner

## 2023-09-28 NOTE — CASE MANAGEMENT/SOCIAL WORK
Discharge Planning Assessment  [facility]     Patient Name: Yoni Bonner Jr.  MRN: [id number]  Today's Date: 9/28/2023    Admit Date: 9/27/2023    Plan: Pending - palliative to see   Discharge Needs Assessment       Row Name 09/28/23 1137       Living Environment    People in Home spouse    Current Living Arrangements home    Primary Care Provided by self    Provides Primary Care For no one    Family Caregiver if Needed child(genevieve), adult    Able to Return to Prior Arrangements yes       Resource/Environmental Concerns    Resource/Environmental Concerns none                   Discharge Plan       Row Name 09/28/23 1138       Plan    Plan Pending - palliative to see    Plan Comments CCP spoke with patient's family at bedside; introduced self, and confirmed facesheet. Patient uses a walker, bedside commode at home. He lives with his spouse and grandkids. Patient has been to [facility] in the past. Palliative consult is pending - CCP to follow up post-palliative meeting for updated goals of care. ROSARIO, CHELSEA                  Continued Care and Services - Admitted Since 9/27/2023    Coordination has not been started for this encounter.       Selected Continued Care - Prior Encounters Includes continued care and service providers with selected services from prior encounters from 6/29/2023 to 9/28/2023      Discharged on 9/5/2023 Admission date: 9/3/2023 - Discharge disposition: Home-Health Care c      Home Medical Care       Service Provider Selected Services Address Phone Fax Patient Preferred    [facility] [address] [id number] [phone] --                             Demographic Summary       Row Name 09/28/23 1137       General Information    Admission Type inpatient    Arrived From home    Referral Source admission list    Reason for Consult discharge planning    Preferred Language English                   Functional Status       Row  Name 09/28/23 1137       Functional Status    Usual Activity Tolerance fair    Current Activity Tolerance poor       Functional Status, IADL    Medications assistive equipment and person    Meal Preparation assistive equipment and person    Housekeeping assistive equipment and person    Laundry assistive equipment and person    Shopping assistive equipment and person       Mental Status    General Appearance WDL WDL                   Psychosocial    No documentation.                  Abuse/Neglect    No documentation.                  Legal    No documentation.                  Substance Abuse    No documentation.                  Patient Forms    No documentation.                     CHELSEA Ball

## 2023-09-28 NOTE — CONSULTS
Purpose of the visit was to evaluate for: goals of care/advanced care planning, support for patient/family, and hospice referral/discussion. Spoke with MD, RN, and CCP as well as patient and family and discussed palliative care, goals of care, care options, resuscitation status, [facility], and discharge options.      Assessment:  Patient is palliative care appropriate for home based services with [facility] (list reasons): COPD with exacerbation, severe aortic stenosis, CAD, CKD, acute parainfluenza and rhinovirus infections. Patient seen in CICU today. He is stable on room air although with audible secretions. He is sitting up in bed, oriented to situation but very hard of hearing. Weakness and fatigue as well as lack of appetite present. PPS 20%    Recommendations/Plan: Consult [facility]. Family is hopeful to take patient home with hospice services as soon as possible - today if it can be arranged.     Other Comments: Spoke with patient, his wife, daughter Alina, granddaniel Horn at bedside. Patient told his family he is ready to go, and they would like to take him home with hospice. They previously had hospice services last year which they only revoked because patient got much better. They are all accepting now that patient is ready for comfort care. They do know that inpatient palliative care unit is available however they would prefer outpatient hospice care. Answered all questions and provided support, advised of ongoing availability. Discussed with Dr. Harden and RNs Estela and Imani.

## 2023-09-28 NOTE — PLAN OF CARE
Goal Outcome Evaluation:  Plan of Care Reviewed With: (P) patient, spouse           Outcome Evaluation: (P) Pt is a 89 y.o. male admitted with COPD exacerbation, Hx includes CHF, CAD, CKD. Pt alert and agreeable to PT today. Pt hard of hearing and unable to hear out of R ear, needed repeated commands at times but able to follow all if heard correctly. Pt states he lives with his spouse, used a walker at baseline and had difficulty with rolling walkers because of the increased control needed to ensure it doesn't roll out from under him. Pt has both a rolling and standard walker at home. Pt and spouse receive help from granddaughter with some household tasks. Pt able to complete all LE exercises with minimal increased rate of breathing but noticable wheeze. Pt sat EOB with Ray, requiring only CGA for balance d/t reports of minor dizziness upon sitting. Pt able to stand with HHA x 2 and Ray, side-stepped to R toward HOB with Ray and no significant unsteadiness but pt needed increased time. Pt began to have increased labored breathing while standing and needed to be placed back in bed, breathing calmed fairly quick once in bed. BP was 97/54 prior to session and 80/58 upon leaving pt, pt with nsg staff, SpO2 remained in upper 90s throughout entire treatment despite labored breathing. D/C plans pending progress made with difficulty breathing during activity. Pt may benefit from skilled PT to address current impairments and improve functional mobility.      Anticipated Discharge Disposition (PT): (P)  (Depends on continued progress made)

## 2023-09-28 NOTE — PROGRESS NOTES
[facility] Follow Up    Chief Complaint: Follow up afib, aortic valve stenosis, CHF    Interval History: Denies any chest pain.  Breathing better today.  He states that he wants to go home.    Objective:     Objective:  Temp:  [97.4 °F (36.3 °C)-98.6 °F (37 °C)] 98 °F (36.7 °C)  Heart Rate:  [] 85  Resp:  [19-36] 20  BP: ()/() 110/59     Intake/Output Summary (Last 24 hours) at 9/28/2023 0751  Last data filed at 9/28/2023 0700  Gross per 24 hour   Intake 347.57 ml   Output 1500 ml   Net -1152.43 ml     Body mass index is 25.75 kg/m².      09/27/23  0040 09/27/23  0515 09/28/23  0600   Weight: 81.6 kg (180 lb) 79.4 kg (175 lb 0.7 oz) 79.1 kg (174 lb 6.1 oz)     Weight change: -2.548 kg (-5 lb 9.9 oz)      Physical Exam:   General : Alert, cooperative, in no acute distress.  Neuro: Alert,cooperative and oriented.  Lungs: Expiratory wheezes  CV: Regular rate and rhythm, normal S1 and S2, no murmurs, gallops or rubs.  ABD: Soft, nontender, nondistended. Positive bowel sounds.  Extr: No edema or cyanosis, moves all extremities.    Lab Review:   Results from last 7 days   Lab Units 09/28/23  0359 09/27/23  0532 09/27/23  0214 09/27/23  0102   SODIUM mmol/L 131* 131*  --  132*   POTASSIUM mmol/L 4.2 4.4  --  4.6   CHLORIDE mmol/L 102 99  --  96*   CO2 mmol/L 15.0* 14.6*  --  20.0*   BUN mg/dL 49* 31*  --  32*   CREATININE mg/dL 1.64* 1.42*   < > 1.58*   GLUCOSE mg/dL 144* 169*  --  130*   CALCIUM mg/dL 8.0* 8.4  --  9.5   AST (SGOT) U/L  --   --   --  18   ALT (SGPT) U/L  --   --   --  11    < > = values in this interval not displayed.     Results from last 7 days   Lab Units 09/27/23  1256 09/27/23  1005   HSTROP T ng/L 99* 124*     Results from last 7 days   Lab Units 09/28/23  0359 09/27/23  0532   WBC 10*3/mm3 5.04 5.68   HEMOGLOBIN g/dL 8.4* 9.5*   HEMATOCRIT % 25.5* 28.7*   PLATELETS 10*3/mm3 141 147     Results from last 7 days   Lab Units 09/27/23  0532 09/27/23  0102   INR   1.15* 1.10   APTT seconds  --  35.2     Results from last 7 days   Lab Units 09/28/23  0359 09/27/23  0102   MAGNESIUM mg/dL 2.9* 2.1           Invalid input(s): LDLCALC  Results from last 7 days   Lab Units 09/27/23  0102   PROBNP pg/mL 9,845.0*     Results from last 7 days   Lab Units 09/28/23  0359   TSH uIU/mL 0.283     I reviewed the patient's new clinical results.  I personally viewed and interpreted the patient's EKG  Current Medications:   Scheduled Meds:apixaban, 5 mg, Oral, BID  aspirin, 81 mg, Oral, Daily  atorvastatin, 10 mg, Oral, Nightly  cefepime, 2,000 mg, Intravenous, Q12H  furosemide, 40 mg, Intravenous, Daily  guaiFENesin, 1,200 mg, Oral, Q12H  insulin lispro, 2-7 Units, Subcutaneous, 4x Daily AC & at Bedtime  ipratropium-albuterol, 3 mL, Nebulization, 4x Daily - RT  methylPREDNISolone sodium succinate, 60 mg, Intravenous, Q12H  metoprolol tartrate, 12.5 mg, Oral, Q12H  midodrine, 10 mg, Oral, TID AC  senna-docusate sodium, 2 tablet, Oral, BID  sodium chloride, 10 mL, Intravenous, Q12H      Continuous Infusions:     Allergies:  Allergies   Allergen Reactions    No Known Drug Allergy Unknown (See Comments)     NKA       Assessment/Plan:     Acute on chronic diastolic congestive heart failure.  Diuresing well with IV furosemide.  Atrial fibrillation  NSTEMI.  Type II.  Troponins trending down.   Parainfluenza and rhinovirus infection  COPD with exacerbation  Coronary artery disease  Severe aortic valve stenosis  CKD stage 3  Hyperlipidemia  Orthostatic hypotension.  On midodrine chronically.  History of pulmonary embolism.  On anticoagulation chronically.      -I think he needs 1 more day of IV furosemide.  Hopefully can transition to oral furosemide tomorrow.  - Suspect non-STEMI is type II.  Troponins trending down.  Denies any chest pain.  -DNR/DNI status noted.  The patient expressed repeatedly that he just wants to go home.  Difficult to have goals of care conversation with the patient because  he is extremely hard of hearing.  I tried to have a conversation with both the patient and his wife at bedside.  He indicates to me that he just wants to go home and he does not want any aggressive procedures.  I explained to him that we can work on just trying to keep him comfortable.  He seems agreeable with that.  After discussing further with the patient's wife she also seems to have a better understanding.    We will await to the palliative care consult to discuss further with the patient and his family.    Melissa Templeton MD  09/28/23  07:51 EDT

## 2023-09-28 NOTE — PROGRESS NOTES
PROGRESS NOTE  Patient Name: Yoni Bonner Jr.  Age/Sex: 89 y.o. male  : 1933  MRN: [id number]    Date of Admission: 2023  Date of Encounter Visit: 23   LOS: 1 day   Patient Care Team:  Patric Cameron DO as PCP - General (Family Medicine)    Chief Complaint: Acute on chronic diastolic heart failure, acute COPD exacerbation    Hospital course: Patient was transferred to the CICU and was started on the BiPAP for his severe mixed metabolic and respiratory acidosis.  He is diuresing with the Lasix and is that is followed by cardiology, he was kept on the steroid bronchodilators and antibiotic and was started on noninvasive positive pressure ventilation.  He did very well overnight his repeat ABG improved and this morning he was doing fine off the BiPAP.  Patient started wheezing loudly with any kind of stimulation however that resolved whenever he is distracted.  No nausea or vomiting, his CODE STATUS is DNR/DNI      REVIEW OF SYSTEMS:   CONSTITUTIONAL: no fever or chills  CARDIOVASCULAR: No chest pain, chest pressure or chest discomfort. No palpitations or edema.   RESPIRATORY: Improving shortness of breath, with fluctuating wheezing depending on his activity level.   GASTROINTESTINAL: No anorexia, nausea, vomiting or diarrhea. No abdominal pain or blood.   HEMATOLOGIC: No bleeding or bruising.     Ventilator/Non-Invasive Ventilation Settings (From admission, onward)       Start     Ordered    23 0150  NIPPV (CPAP or BIPAP)  Until Discontinued        Question Answer Comment   Type BIPAP    IPAP 10    EPAP 5        23 0149                      Vital Signs  Temp:  [97.4 °F (36.3 °C)-98.6 °F (37 °C)] 98 °F (36.7 °C)  Heart Rate:  [] 106  Resp:  [19-36] 20  BP: ()/() 104/67  SpO2:  [86 %-100 %] 100 %  on    Device (Oxygen Therapy): room air    Intake/Output Summary (Last 24 hours) at 2023 0932  Last data filed at 2023 0700  Gross per 24 hour   Intake  "347.57 ml   Output 1500 ml   Net -1152.43 ml     Flowsheet Rows      Flowsheet Row First Filed Value   Admission Height 172.7 cm (68\") Documented at 09/27/2023 0040   Admission Weight 81.6 kg (180 lb) Documented at 09/27/2023 0040          Body mass index is 25.75 kg/m².      09/27/23  0040 09/27/23  0515 09/28/23  0600   Weight: 81.6 kg (180 lb) 79.4 kg (175 lb 0.7 oz) 79.1 kg (174 lb 6.1 oz)       Physical Exam:  GEN: Sickly looking gentleman, awake, in minimal respiratory distress that seems to be more anxiety driven, alert and oriented x4  EYES:   Sclerae clear. No icterus. PERRL. Normal EOM  ENT:   External ears/nose normal, no oral lesions, no thrush, mucous membranes moist  NECK:  Supple, midline trachea, no JVD  LUNGS: Normal chest on inspection, patient has loud wheezing coming mainly from the neck area that nearly resolved when the patient was distracted during the interview and likely anxiety driven causing voluntary vocal cord vasospasm.  There are no crackles.  Less abdominal breathing respirations regular, even and less labored.   CV:  Regular rhythm and rate. Normal S1/S2. No murmurs, gallops, or rubs noted.  ABD:  Soft, nontender and nondistended. Normal bowel sounds. No guarding  EXT:  Moves all extremities well. No cyanosis. No redness. No edema.   Skin: Dry, intact, no bleeding    Results Review:    Results From Last 14 Days   Lab Units 09/27/23  0408 09/27/23  0214 09/27/23  0102   LACTATE mmol/L 1.6 1.5 3.7*     Results from last 7 days   Lab Units 09/28/23  0359 09/27/23  0532 09/27/23  0214 09/27/23  0102   SODIUM mmol/L 131* 131*  --  132*   POTASSIUM mmol/L 4.2 4.4  --  4.6   CHLORIDE mmol/L 102 99  --  96*   CO2 mmol/L 15.0* 14.6*  --  20.0*   BUN mg/dL 49* 31*  --  32*   CREATININE mg/dL 1.64* 1.42* 1.35 1.58*   CALCIUM mg/dL 8.0* 8.4  --  9.5   AST (SGOT) U/L  --   --   --  18   ALT (SGPT) U/L  --   --   --  11   ANION GAP mmol/L 14.0 17.4*  --  16.0*   ALBUMIN g/dL  --   --   --  4.1 "     Results from last 7 days   Lab Units 09/27/23  1256 09/27/23  1005   HSTROP T ng/L 99* 124*     Results from last 7 days   Lab Units 09/28/23  0359   TSH uIU/mL 0.283     Results from last 7 days   Lab Units 09/27/23  0102   PROBNP pg/mL 9,845.0*     Results from last 7 days   Lab Units 09/28/23  0359 09/27/23  0532 09/27/23  0102   WBC 10*3/mm3 5.04 5.68 7.68   HEMOGLOBIN g/dL 8.4* 9.5* 9.8*   HEMATOCRIT % 25.5* 28.7* 29.8*   PLATELETS 10*3/mm3 141 147 167   MCV fL 86.4 87.2 86.9   NEUTROPHIL % % 82.1* 90.5* 67.6   LYMPHOCYTE % % 15.5* 6.3* 21.4   MONOCYTES % % 2.2* 2.3* 10.0   EOSINOPHIL % % 0.0* 0.0* 0.3   BASOPHIL % % 0.0 0.2 0.3   IMM GRAN % % 0.2  --  0.4     Results from last 7 days   Lab Units 09/27/23  0532 09/27/23  0102   INR  1.15* 1.10   APTT seconds  --  35.2     Results from last 7 days   Lab Units 09/28/23  0359 09/27/23  0102   MAGNESIUM mg/dL 2.9* 2.1           Invalid input(s): LDLCALC  Results from last 7 days   Lab Units 09/27/23  1226 09/27/23  1058 09/27/23  0214   PH, ARTERIAL pH units 7.351 7.100* 7.440   PCO2, ARTERIAL mm Hg 28.4* 40.8 25.8*   PO2 ART mm Hg 134.2* 365.1* 189.2*   HCO3 ART mmol/L 15.7* 12.7* 17.6*     Results from last 7 days   Lab Units 09/28/23  0359   HEMOGLOBIN A1C % 6.10*     Glucose   Date/Time Value Ref Range Status   09/28/2023 0720 158 (H) 70 - 130 mg/dL Final   09/28/2023 0633 152 (H) 70 - 130 mg/dL Final   09/27/2023 2011 148 (H) 70 - 130 mg/dL Final   09/27/2023 1658 170 (H) 70 - 130 mg/dL Final   09/27/2023 1130 193 (H) 70 - 130 mg/dL Final   09/27/2023 0432 176 (H) 70 - 130 mg/dL Final   09/27/2023 0214 129 70 - 130 mg/dL Final     Comment:     Serial Number: [id number]:  [id number]     Results from last 7 days   Lab Units 09/27/23  0408 09/27/23  0214 09/27/23  0102   PROCALCITONIN ng/mL  --   --  0.20   LACTATE mmol/L 1.6 1.5 3.7*     Results from last 7 days   Lab Units 09/27/23  0139 09/27/23  0110   BLOODCX  No growth at 24 hours No growth at 24 hours      Results from last 7 days   Lab Units 09/27/23  1143   NITRITE UA  Negative     Results from last 7 days   Lab Units 09/27/23  0102   COVID19  Not Detected   ADENOVIRUS DETECTION BY PCR  Not Detected   CORONAVIRUS 229E  Not Detected   CORONAVIRUS HKU1  Not Detected   CORONAVIRUS NL63  Not Detected   CORONAVIRUS OC43  Not Detected   HUMAN METAPNEUMOVIRUS  Not Detected   HUMAN RHINOVIRUS/ENTEROVIRUS  Detected*   INFLUENZA B PCR  Not Detected   PARAINFLUENZA 1  Detected*   PARAINFLUENZA VIRUS 2  Not Detected   PARAINFLUENZA VIRUS 3  Not Detected   PARAINFLUENZA VIRUS 4  Not Detected   BORDETELLA PERTUSSIS PCR  Not Detected   BORDETELLA PARAPERTUSSIS PCR  Not Detected   CHLAMYDOPHILA PNEUMONIAE PCR  Not Detected   MYCOPLAMA PNEUMO PCR  Not Detected   RSV, PCR  Not Detected     Results from last 7 days   Lab Units 09/28/23  0359 09/27/23  1143   SODIUM UR mmol/L  --  52   CREATININE UR mg/dL  --  96.5   OSMOLALITY UR mOsm/kg  --  434   URIC ACID mg/dL 10.1*  --            Imaging:   Imaging Results (All)       Procedure Component Value Units Date/Time            I reviewed the patient's new clinical results.  I personally viewed and interpreted the patient's imaging results:        Medication Review:   apixaban, 5 mg, Oral, BID  aspirin, 81 mg, Oral, Daily  atorvastatin, 10 mg, Oral, Nightly  cefepime, 2,000 mg, Intravenous, Q12H  furosemide, 40 mg, Intravenous, Daily  guaiFENesin, 1,200 mg, Oral, Q12H  insulin lispro, 2-7 Units, Subcutaneous, 4x Daily AC & at Bedtime  ipratropium-albuterol, 3 mL, Nebulization, 4x Daily - RT  methylPREDNISolone sodium succinate, 60 mg, Intravenous, Q12H  metoprolol tartrate, 12.5 mg, Oral, Q12H  midodrine, 10 mg, Oral, TID AC  senna-docusate sodium, 2 tablet, Oral, BID  sodium chloride, 10 mL, Intravenous, Q12H             ASSESSMENT:   COPD with acute exacerbation, infected with parainfluenza virus and rhinovirus  Severe aortic stenosis with hemodynamic compromise with hypotension  and tachycardia  Coronary artery disease  Stage III chronic kidney disease  Hyponatremia, mild with no significant clinical impact  Hypotension  Sinus tachycardia  DO NOT RESUSCITATE CODE STATUS  A-fib with RVR  History of pulmonary embolism on anticoagulation  Steroids induced hyperglycemia, on sliding scale    PLAN:  Patient is definitely better than yesterday, currently on the steroids and on the breathing treatment with the BiPAP at night and as needed  He breathes so loudly whenever he is stressed out and part of that is vocal cord spasm and more of an intentional anxiety driven, the wheezing from the lung itself is actually much better.  Patient will be monitored in the CICU for 1 more day and we can transition back to the telemetry unit tomorrow if he continues to improve  Patient is getting 1 more dose of IV Lasix per cardiology and will be reassessed tomorrow, agree with that given the increase in the creatinine  Continue with the sliding scale for the steroid-induced hyperglycemia, his glycemic profile is acceptable less than 180.  Patient is DNR/DNI    Summary of recommendations:  Continue with the same dose of steroids and consider starting weaning tomorrow if he continues to improve  Continue with the sliding scale  Monitor in the CICU for 1 more day  BiPAP to be used as needed and at bedtime  Continue antibiotics  Last dose of Lasix today and readdress tomorrow depending on his chemistry profile    Discussed with patient, with nursing staff and with the spouse at the bedside  Labs/Notes/films were independently reviewed and pertinent results are summarized above  The copied texts in this note were reviewed and they are accurate as of 09/28/23    Disposition: Monitor in the CICU for 1 more day    Ivet Harden MD  09/28/23  09:32 EDT          Dictated utilizing Dragon dictation

## 2023-09-29 NOTE — CASE MANAGEMENT/SOCIAL WORK
Case Management Discharge Note      Final Note: home w/[facility]         Selected Continued Care - Discharged on 9/28/2023 Admission date: 9/27/2023 - Discharge disposition: Hospice/Medical Facility (DC - External)      Destination    No services have been selected for the patient.                Durable Medical Equipment    No services have been selected for the patient.                Dialysis/Infusion    No services have been selected for the patient.                Home Medical Care    No services have been selected for the patient.                Therapy    No services have been selected for the patient.                Community Resources    No services have been selected for the patient.                Community & DME    No services have been selected for the patient.                    Selected Continued Care - Prior Encounters Includes continued care and service providers with selected services from prior encounters from 6/29/2023 to 9/28/2023      Discharged on 9/5/2023 Admission date: 9/3/2023 - Discharge disposition: Home-Health Care Svc      Home Medical Care       Service Provider Selected Services Address Phone Fax Patient Preferred    [facility] [address] [facility] [address] [address] [phone] --                               Final Discharge Disposition Code: 50 - home with hospice

## 2023-10-02 LAB
BACTERIA SPEC AEROBE CULT: NORMAL
BACTERIA SPEC AEROBE CULT: NORMAL
